# Patient Record
Sex: FEMALE | Race: WHITE | NOT HISPANIC OR LATINO | Employment: OTHER | URBAN - METROPOLITAN AREA
[De-identification: names, ages, dates, MRNs, and addresses within clinical notes are randomized per-mention and may not be internally consistent; named-entity substitution may affect disease eponyms.]

---

## 2017-01-16 DIAGNOSIS — I10 BENIGN ESSENTIAL HYPERTENSION: ICD-10-CM

## 2017-01-16 DIAGNOSIS — F41.9 ANXIETY: ICD-10-CM

## 2017-01-17 DIAGNOSIS — F41.9 ANXIETY: ICD-10-CM

## 2017-01-17 RX ORDER — HYDROCHLOROTHIAZIDE 12.5 MG/1
CAPSULE ORAL
Qty: 90 CAPSULE | Refills: 0 | Status: SHIPPED | OUTPATIENT
Start: 2017-01-17 | End: 2017-04-21 | Stop reason: SDUPTHER

## 2017-01-17 RX ORDER — ALPRAZOLAM 1 MG/1
TABLET ORAL
Qty: 14 TABLET | Refills: 0 | Status: SHIPPED | OUTPATIENT
Start: 2017-01-17 | End: 2017-04-25

## 2017-01-17 RX ORDER — ALPRAZOLAM 1 MG/1
TABLET ORAL
Qty: 45 TABLET | Refills: 0 | Status: SHIPPED | OUTPATIENT
Start: 2017-01-17 | End: 2017-01-25

## 2017-01-17 NOTE — TELEPHONE ENCOUNTER
----- Message from Talisha Kovacs sent at 1/17/2017 12:30 PM CST -----  Contact: Patient  Refill    alprazolam (XANAX) 1 MG tablet 45 tablet 0 10/17/2016  No  Sig: TAKE 1/2 TABLET BY MOUTH EVERY MORNING AND TAKE 1 TABLET AT BEDTIME  Class: Normal    [id number] - [place], [place] - [address] [facility] [phone] (Phone) [phone] (Fax)    The patient has an appointment scheduled for 1/24/17, which is the soonest she could get in.  She would like a refill of her medication to get her through until that appointment.  She says it is due to be filled tomorrow.      Thanks!

## 2017-01-18 NOTE — TELEPHONE ENCOUNTER
Pt is overdue for office visit with Dr. Parker. Has appt 1/24. Will send a two week supply xanax  to cover until her appt. hctz refilled.

## 2017-01-20 ENCOUNTER — HOSPITAL ENCOUNTER (EMERGENCY)
Facility: HOSPITAL | Age: 57
Discharge: HOME | End: 2017-01-21
Attending: EMERGENCY MEDICINE
Payer: MEDICARE

## 2017-01-20 DIAGNOSIS — R10.9 ABDOMINAL PAIN, UNSPECIFIED LOCATION: Primary | ICD-10-CM

## 2017-01-20 PROCEDURE — 96361 HYDRATE IV INFUSION ADD-ON: CPT

## 2017-01-20 PROCEDURE — 96375 TX/PRO/DX INJ NEW DRUG ADDON: CPT

## 2017-01-20 PROCEDURE — 96365 THER/PROPH/DIAG IV INF INIT: CPT

## 2017-01-20 PROCEDURE — 99284 EMERGENCY DEPT VISIT MOD MDM: CPT | Mod: 25

## 2017-01-20 PROCEDURE — 99283 EMERGENCY DEPT VISIT LOW MDM: CPT | Mod: ,,, | Performed by: EMERGENCY MEDICINE

## 2017-01-20 NOTE — ED AVS SNAPSHOT
[facility]  [address] [address]               Cara Mendoza   2017 11:58 PM   ED    Description:  Female : 1960   Department:  [facility]           Your Care was Coordinated By:     Provider Role From To    Eulalio Coleman MD Attending Provider 17 0015 --      Reason for Visit     Abdominal Pain           Diagnoses this Visit        Comments    Abdominal pain, unspecified location    -  Primary       ED Disposition     ED Disposition Condition Comment    Discharge             To Do List           Follow-up Information     Follow up with Jaqueline Parker MD.    Specialty:  Internal Medicine    Contact information:    [address]  [place] [address]  [phone]          Follow up with [facility].    Specialty:  Emergency Medicine    Why:  ASAP for new or worsening symptoms    Contact information:    [address] [facility] [id number] [phone]       These Medications        Disp Refills Start End    promethazine (PHENERGAN) 25 MG tablet 20 tablet 0 2017     Take 1 tablet (25 mg total) by mouth every 6 (six) hours as needed for Nausea. - Oral    Pharmacy: [facility] [address] [place] [address] Ph #: [phone]       tramadol (ULTRAM) 50 mg tablet 10 tablet 0 2017    Take 1 tablet (50 mg total) by mouth every 6 (six) hours as needed for Pain. - Oral    Pharmacy: [facility] [address] [place], [place] [address] Ph #: [phone]         [facility]     [facility] -  Assistance  Registered nurses in the [facility] provide clinical advisement, health education, appointment booking, and other advisory services.  Call for this free service at [phone].             Medications           Message regarding Medications     Verify the changes and/or additions to your medication  regime listed below are the same as discussed with your clinician today.  If any of these changes or additions are incorrect, please notify your healthcare provider.        START taking these NEW medications        Refills    tramadol (ULTRAM) 50 mg tablet 0    Sig: Take 1 tablet (50 mg total) by mouth every 6 (six) hours as needed for Pain.    Class: Print    Route: Oral      These medications were administered today        Dose Freq    sodium chloride 0.9% bolus 1,000 mL 1,000 mL ED 1 Time    Sig: Inject 1,000 mLs into the vein ED 1 Time.    Class: Normal    Route: Intravenous    promethazine (PHENERGAN) 25 mg in dextrose 5 % 50 mL IVPB 25 mg ED 1 Time    Sig: Inject 25 mg into the vein ED 1 Time.    Class: Normal    Route: Intravenous    hydromorphone injection 0.5 mg 0.5 mg ED 1 Time    Sig: Inject 0.5 mLs (0.5 mg total) into the vein ED 1 Time.    Class: Normal    Route: Intravenous           Verify that the below list of medications is an accurate representation of the medications you are currently taking.  If none reported, the list may be blank. If incorrect, please contact your healthcare provider. Carry this list with you in case of emergency.           Current Medications     alprazolam (XANAX) 1 MG tablet TAKE 1/2 TABLET BY MOUTH TWO TIMES A DAY    alprazolam (XANAX) 1 MG tablet TAKE 1/2 TABLET BY MOUTH EVERY MORNING AND TAKE 1 TABLET AT BEDTIME    escitalopram oxalate (LEXAPRO) 20 MG tablet Take 1 tablet (20 mg total) by mouth every evening.    estradiol (ESTRACE) 1 MG tablet Take 1 tablet (1 mg total) by mouth once daily.    gabapentin (NEURONTIN) 300 MG capsule TAKE 1 CAPSULE BY MOUTH TWO TIMES A DAY    hydrochlorothiazide (MICROZIDE) 12.5 mg capsule TAKE 1 CAPSULE BY MOUTH EVERY DAY    levothyroxine (SYNTHROID) 75 MCG tablet TAKE 1 TABLET BY MOUTH BEFORE BREAKFAST    promethazine (PHENERGAN) 25 MG tablet Take 1 tablet (25 mg total) by mouth every 6 (six) hours as needed for Nausea.    tramadol  "(ULTRAM) 50 mg tablet Take 1 tablet (50 mg total) by mouth every 6 (six) hours as needed for Pain.    vitamin D 1000 units Tab Take 185 mg by mouth once daily.           Clinical Reference Information           Your Vitals Were     BP Pulse Temp Resp Height Weight    140/63 85 98.3 °F (36.8 °C) 18 5' (1.524 m) 90.7 kg (200 lb)    SpO2 BMI             96% 39.06 kg/m2         Allergies as of 1/21/2017        Reactions    Codeine Nausea And Vomiting    Compazine [Prochlorperazine Edisylate] Anaphylaxis, Nausea And Vomiting    Demerol [Meperidine] Anaphylaxis, Nausea And Vomiting    Morpholine Analogues Anaphylaxis, Nausea And Vomiting    vomit    Percocet [Oxycodone-acetaminophen] Nausea And Vomiting    Phenothiazines Anaphylaxis    Takes Phenergan daily w/o problems.      Corticosteroids (Glucocorticoids) Itching    Agitation, "skin crawling" sensation    Cortisone Itching    Agitation, "skin crawling" sensation    Fentanyl Nausea And Vomiting, Swelling    Fentanyl patch made lips numb and swollen    Morphine Nausea And Vomiting    Nsaids (Non-steroidal Anti-inflammatory Drug) Other (See Comments)    Cluster ulcers    Pcn [Penicillins] Other (See Comments)    Stomach cramps    Toradol [Ketorolac] Nausea And Vomiting    ulcers      Immunizations Administered on Date of Encounter - 1/21/2017     None      ED Micro, Lab, POCT     Start Ordered       Status Ordering Provider    01/21/17 0029 01/21/17 0028  CBC auto differential  STAT      Final result     01/21/17 0029 01/21/17 0028  Comprehensive metabolic panel  STAT      Final result     01/21/17 0029 01/21/17 0028  Lipase  STAT      Final result     01/21/17 0029 01/21/17 0028  Urinalysis  STAT      Final result       ED Imaging Orders     None        Discharge Instructions         Abdominal Pain    Abdominal pain is pain in the stomach or belly area. Everyone has this pain from time to time. In many cases it goes away on its own. But abdominal pain can sometimes be " due to a serious problem, such as appendicitis. So its important to know when to seek help.  Causes of abdominal pain  There are many possible causes of abdominal pain. Common causes in adults include:  · Constipation, diarrhea, or gas  · Stomach acid flowing back up into the esophagus (acid reflux or heartburn)  · Severe acid reflux, called GERD (gastroesophageal reflux disease)  · A sore in the lining of the stomach or small intestine (peptic ulcer)  · Inflammation of the gallbladder, liver, or pancreas  · Gallstones or kidney stones  · Appendicitis   · Intestinal blockage   · An internal organ pushing through a muscle or other tissue (hernia)  · Urinary tract infections  · In women, menstrual cramps, fibroids, or endometriosis  · Inflammation or infection of the intestines  Diagnosing the cause of abdominal pain  Your healthcare provider will do a physical exam help find the cause of your pain. If needed, tests will be ordered. Belly pain has many possible causes. So it can be hard to find the reason for your pain. Giving details about your pain can help. Tell your provider where and when you feel the pain, and what makes it better or worse. Also let your provider know if you have other symptoms such as:  · Fever  · Tiredness  · Upset stomach (nausea)  · Vomiting  · Changes in bathroom habits  Treating abdominal pain  Some causes of pain need emergency medical treatment right away. These include appendicitis or a bowel blockage. Other problems can be treated with rest, fluids, or medicines. Your healthcare provider can give you specific instructions for treatment or self-care based on what is causing your pain.  If you have vomiting or diarrhea, sip water or other clear fluids. When you are ready to eat solid foods again, start with small amounts of easy-to-digest, low-fat foods. These include apple sauce, toast, or crackers.   When to seek medical care  Call 911 or go to the hospital right away if you:  · Cant  pass stool and are vomiting  · Are vomiting blood or have bloody diarrhea or black, tarry diarrhea  · Have chest, neck, or shoulder pain  · Feel like you might pass out  · Have pain in your shoulder blades with nausea  · Have sudden, severe belly pain  · Have new, severe pain unlike any you have felt before  · Have a belly that is rigid, hard, and tender to touch  Call your healthcare provider if you have:  · Pain for more than 5 days  · Bloating for more than 2 days  · Diarrhea for more than 5 days  · A fever of 100.4°F (38.0°C) or higher, or as directed by your provider  · Pain that gets worse  · Weight loss for no reason  · Continued lack of appetite  · Blood in your stool  How to prevent abdominal pain  Here are some tips to help prevent abdominal pain:  · Eat smaller amounts of food at one time.  · Avoid greasy, fried, or other high-fat foods.  · Avoid foods that give you gas.  · Exercise regularly.  · Drink plenty of fluids.  To help prevent GERD symptoms:  · Quit smoking.  · Reduce alcohol and certain foods that increase stomach acid.  · Avoid aspirin and over-the-counter pain and fever medicines (NSAIDS or nonsteroidal anti-inflammatory drugs), if possible  · Lose extra weight.  · Finish eating at least 2 hours before you go to bed or lie down.  · Raise the head of your bed.  © 6623-5731 [employer]. [address] [place] [address], [place] [address]. All rights reserved. This information is not intended as a substitute for professional medical care. Always follow your healthcare professional's instructions.          Your Scheduled Appointments     Jan 24, 2017  1:40 PM CST   Established Patient Visit with MD Rigoberto Interiano - Internal Medicine ([facility])    [address]  [facility] [address]   [phone]            Feb 06, 2017 11:30 AM CST   GASTROENTEROLOGY ESTABLISHED PATIENT with MD Rigoberto Floyd - Gastroenterology ([facility] )    [address]  [facility] [address]   [phone]              [facility] Sign-Up     Activating your [facility] account is as easy as 1-2-3!     1) Visit [email or web address], select Sign Up Now, enter this activation code and your date of birth, then select Next.  Activation code not generated  Current Patient Portal Status: Account disabled      2) Create a username and password to use when you visit [facility] in the future and select a security question in case you lose your password and select Next.    3) Enter your e-mail address and click Sign Up!    Additional Information  If you have questions, please e-mail [email or web address] or call [phone] to talk to our [facility] staff. Remember, [facility] is NOT to be used for urgent needs. For medical emergencies, dial 911.          [facility] complies with applicable Federal civil rights laws and does not discriminate on the basis of race, color, national origin, age, disability, or sex.        Language Assistance Services     ATTENTION: Language assistance services are available, free of charge. Please call [phone].      ATENCIÓN: Si habla español, tiene a al disposición servicios gratuitos de asistencia lingüística. Llame al [phone].     CHÚ Ý: N?u b?n nói Ti?ng Vi?t, có các d?ch v? h? tr? ngôn ng? mi?n phí dành cho b?n. G?i s? [phone].

## 2017-01-21 VITALS
DIASTOLIC BLOOD PRESSURE: 63 MMHG | WEIGHT: 200 LBS | HEART RATE: 85 BPM | RESPIRATION RATE: 18 BRPM | HEIGHT: 60 IN | TEMPERATURE: 98 F | OXYGEN SATURATION: 96 % | SYSTOLIC BLOOD PRESSURE: 140 MMHG | BODY MASS INDEX: 39.27 KG/M2

## 2017-01-21 VITALS
DIASTOLIC BLOOD PRESSURE: 68 MMHG | SYSTOLIC BLOOD PRESSURE: 123 MMHG | OXYGEN SATURATION: 95 % | TEMPERATURE: 99 F | WEIGHT: 200 LBS | BODY MASS INDEX: 39.27 KG/M2 | HEIGHT: 60 IN | RESPIRATION RATE: 18 BRPM | HEART RATE: 73 BPM

## 2017-01-21 DIAGNOSIS — K52.9 GASTROENTERITIS: Primary | ICD-10-CM

## 2017-01-21 LAB
ALBUMIN SERPL BCP-MCNC: 4 G/DL
ALP SERPL-CCNC: 57 U/L
ALT SERPL W/O P-5'-P-CCNC: 9 U/L
ANION GAP SERPL CALC-SCNC: 12 MMOL/L
AST SERPL-CCNC: 13 U/L
BASOPHILS # BLD AUTO: 0.01 K/UL
BASOPHILS NFR BLD: 0.1 %
BILIRUB SERPL-MCNC: 0.8 MG/DL
BILIRUB UR QL STRIP: NEGATIVE
BUN SERPL-MCNC: 20 MG/DL
CALCIUM SERPL-MCNC: 9.1 MG/DL
CHLORIDE SERPL-SCNC: 103 MMOL/L
CLARITY UR REFRACT.AUTO: CLEAR
CO2 SERPL-SCNC: 23 MMOL/L
COLOR UR AUTO: YELLOW
CREAT SERPL-MCNC: 0.7 MG/DL
DIFFERENTIAL METHOD: ABNORMAL
EOSINOPHIL # BLD AUTO: 0 K/UL
EOSINOPHIL NFR BLD: 0.2 %
ERYTHROCYTE [DISTWIDTH] IN BLOOD BY AUTOMATED COUNT: 14.4 %
EST. GFR  (AFRICAN AMERICAN): >60 ML/MIN/1.73 M^2
EST. GFR  (NON AFRICAN AMERICAN): >60 ML/MIN/1.73 M^2
GLUCOSE SERPL-MCNC: 125 MG/DL
GLUCOSE UR QL STRIP: NEGATIVE
HCT VFR BLD AUTO: 41.7 %
HGB BLD-MCNC: 14.9 G/DL
HGB UR QL STRIP: NEGATIVE
KETONES UR QL STRIP: ABNORMAL
LEUKOCYTE ESTERASE UR QL STRIP: NEGATIVE
LIPASE SERPL-CCNC: 12 U/L
LYMPHOCYTES # BLD AUTO: 0.8 K/UL
LYMPHOCYTES NFR BLD: 7.8 %
MCH RBC QN AUTO: 29.3 PG
MCHC RBC AUTO-ENTMCNC: 35.7 %
MCV RBC AUTO: 82 FL
MONOCYTES # BLD AUTO: 0.4 K/UL
MONOCYTES NFR BLD: 3.5 %
NEUTROPHILS # BLD AUTO: 8.8 K/UL
NEUTROPHILS NFR BLD: 88 %
NITRITE UR QL STRIP: NEGATIVE
PH UR STRIP: 8 [PH] (ref 5–8)
PLATELET # BLD AUTO: 219 K/UL
PMV BLD AUTO: 11 FL
POTASSIUM SERPL-SCNC: 3.9 MMOL/L
PROT SERPL-MCNC: 7.3 G/DL
PROT UR QL STRIP: ABNORMAL
RBC # BLD AUTO: 5.08 M/UL
SODIUM SERPL-SCNC: 138 MMOL/L
SP GR UR STRIP: 1.02 (ref 1–1.03)
URN SPEC COLLECT METH UR: ABNORMAL
UROBILINOGEN UR STRIP-ACNC: NEGATIVE EU/DL
WBC # BLD AUTO: 10.02 K/UL

## 2017-01-21 PROCEDURE — 85025 COMPLETE CBC W/AUTO DIFF WBC: CPT

## 2017-01-21 PROCEDURE — 99283 EMERGENCY DEPT VISIT LOW MDM: CPT | Mod: 25

## 2017-01-21 PROCEDURE — 83690 ASSAY OF LIPASE: CPT

## 2017-01-21 PROCEDURE — 63600175 PHARM REV CODE 636 W HCPCS: Performed by: EMERGENCY MEDICINE

## 2017-01-21 PROCEDURE — 81003 URINALYSIS AUTO W/O SCOPE: CPT

## 2017-01-21 PROCEDURE — 80053 COMPREHEN METABOLIC PANEL: CPT

## 2017-01-21 PROCEDURE — 25000003 PHARM REV CODE 250: Performed by: EMERGENCY MEDICINE

## 2017-01-21 PROCEDURE — 96360 HYDRATION IV INFUSION INIT: CPT

## 2017-01-21 PROCEDURE — 96372 THER/PROPH/DIAG INJ SC/IM: CPT

## 2017-01-21 RX ORDER — PROMETHAZINE HYDROCHLORIDE 25 MG/1
25 SUPPOSITORY RECTAL EVERY 8 HOURS PRN
Qty: 10 SUPPOSITORY | Refills: 1 | Status: SHIPPED | OUTPATIENT
Start: 2017-01-21 | End: 2017-01-25

## 2017-01-21 RX ORDER — PROMETHAZINE HYDROCHLORIDE 25 MG/1
25 TABLET ORAL EVERY 6 HOURS PRN
Qty: 20 TABLET | Refills: 0 | Status: SHIPPED | OUTPATIENT
Start: 2017-01-21 | End: 2017-01-25

## 2017-01-21 RX ORDER — SODIUM CHLORIDE 9 MG/ML
1000 INJECTION, SOLUTION INTRAVENOUS ONCE
Status: COMPLETED | OUTPATIENT
Start: 2017-01-21 | End: 2017-01-21

## 2017-01-21 RX ORDER — HYDROMORPHONE HYDROCHLORIDE 1 MG/ML
0.5 INJECTION, SOLUTION INTRAMUSCULAR; INTRAVENOUS; SUBCUTANEOUS
Status: COMPLETED | OUTPATIENT
Start: 2017-01-21 | End: 2017-01-21

## 2017-01-21 RX ORDER — ONDANSETRON 2 MG/ML
4 INJECTION INTRAMUSCULAR; INTRAVENOUS
Status: DISCONTINUED | OUTPATIENT
Start: 2017-01-21 | End: 2017-01-21

## 2017-01-21 RX ORDER — ONDANSETRON 4 MG/1
4 TABLET, ORALLY DISINTEGRATING ORAL
Status: COMPLETED | OUTPATIENT
Start: 2017-01-21 | End: 2017-01-21

## 2017-01-21 RX ORDER — TRAMADOL HYDROCHLORIDE 50 MG/1
50 TABLET ORAL EVERY 6 HOURS PRN
Qty: 10 TABLET | Refills: 0 | Status: SHIPPED | OUTPATIENT
Start: 2017-01-21 | End: 2017-01-25

## 2017-01-21 RX ORDER — PROMETHAZINE HYDROCHLORIDE 25 MG/ML
25 INJECTION, SOLUTION INTRAMUSCULAR; INTRAVENOUS
Status: COMPLETED | OUTPATIENT
Start: 2017-01-21 | End: 2017-01-21

## 2017-01-21 RX ORDER — MORPHINE SULFATE 2 MG/ML
6 INJECTION, SOLUTION INTRAMUSCULAR; INTRAVENOUS
Status: DISCONTINUED | OUTPATIENT
Start: 2017-01-21 | End: 2017-01-21

## 2017-01-21 RX ORDER — HYDROMORPHONE HYDROCHLORIDE 1 MG/ML
0.5 INJECTION, SOLUTION INTRAMUSCULAR; INTRAVENOUS; SUBCUTANEOUS
Status: DISCONTINUED | OUTPATIENT
Start: 2017-01-21 | End: 2017-01-21

## 2017-01-21 RX ADMIN — SODIUM CHLORIDE 1000 ML: 0.9 INJECTION, SOLUTION INTRAVENOUS at 08:01

## 2017-01-21 RX ADMIN — PROMETHAZINE HYDROCHLORIDE 25 MG: 25 INJECTION INTRAMUSCULAR; INTRAVENOUS at 01:01

## 2017-01-21 RX ADMIN — PROMETHAZINE HYDROCHLORIDE 25 MG: 25 INJECTION INTRAMUSCULAR; INTRAVENOUS at 07:01

## 2017-01-21 RX ADMIN — SODIUM CHLORIDE 1000 ML: 0.9 INJECTION, SOLUTION INTRAVENOUS at 01:01

## 2017-01-21 RX ADMIN — ONDANSETRON 4 MG: 4 TABLET, ORALLY DISINTEGRATING ORAL at 07:01

## 2017-01-21 RX ADMIN — HYDROMORPHONE HYDROCHLORIDE 0.5 MG: 1 INJECTION, SOLUTION INTRAMUSCULAR; INTRAVENOUS; SUBCUTANEOUS at 01:01

## 2017-01-21 NOTE — ED NOTES
Pt reports she is unable to take tramadol due to nausea and can only take dilaudid. Dr Coleman notified. Pt reports she will take tylenol at home. Tramadol prescription placed in the shredder bin.

## 2017-01-21 NOTE — ED AVS SNAPSHOT
[facility]  [address] [place] [address]               Cara Mendoza   2017  7:08 PM   ED    Description:  Female : 1960   Department:  [facility]           Your Care was Coordinated By:     Provider Role From To    Rachel Ding MD Attending Provider [id number] --      Reason for Visit     Vomiting     Diarrhea           Diagnoses this Visit        Comments    Gastroenteritis    -  Primary       ED Disposition     ED Disposition Condition Comment    Discharge             To Do List           Follow-up Information     Follow up with Jaqueline Parker MD In 3 day(s).    Specialty:  Internal Medicine    Contact information:    [address]  [facility] [address]  [phone]         These Medications        Disp Refills Start End    promethazine (PHENERGAN) 25 MG suppository 10 suppository 1 2017    Place 1 suppository (25 mg total) rectally every 8 (eight) hours as needed for Nausea. - Rectal    [id number] [address] Ph #: [phone]         [facility]     [facility] -  Assistance  Registered nurses in the [facility] provide clinical advisement, health education, appointment booking, and other advisory services.  Call for this free service at [phone].             Medications           Message regarding Medications     Verify the changes and/or additions to your medication regime listed below are the same as discussed with your clinician today.  If any of these changes or additions are incorrect, please notify your healthcare provider.        START taking these NEW medications        Refills    promethazine (PHENERGAN) 25 MG suppository 1    Sig: Place 1 suppository (25 mg total) rectally every 8 (eight) hours as needed for Nausea.    Class: Print    Route: Rectal      These medications were administered today        Dose  Freq    ondansetron disintegrating tablet 4 mg 4 mg ED 1 Time    Sig: Take 1 tablet (4 mg total) by mouth ED 1 Time.    Class: Normal    Route: Oral    promethazine injection 25 mg 25 mg ED 1 Time    Sig: Inject 1 mL (25 mg total) into the muscle ED 1 Time.    Class: Normal    Route: Intramuscular    0.9%  NaCl infusion 1,000 mL Once    Sig: Inject 1,000 mLs into the vein once.    Class: Normal    Route: Intravenous           Verify that the below list of medications is an accurate representation of the medications you are currently taking.  If none reported, the list may be blank. If incorrect, please contact your healthcare provider. Carry this list with you in case of emergency.           Current Medications     alprazolam (XANAX) 1 MG tablet TAKE 1/2 TABLET BY MOUTH TWO TIMES A DAY    escitalopram oxalate (LEXAPRO) 20 MG tablet Take 1 tablet (20 mg total) by mouth every evening.    estradiol (ESTRACE) 1 MG tablet Take 1 tablet (1 mg total) by mouth once daily.    gabapentin (NEURONTIN) 300 MG capsule TAKE 1 CAPSULE BY MOUTH TWO TIMES A DAY    hydrochlorothiazide (MICROZIDE) 12.5 mg capsule TAKE 1 CAPSULE BY MOUTH EVERY DAY    levothyroxine (SYNTHROID) 75 MCG tablet TAKE 1 TABLET BY MOUTH BEFORE BREAKFAST    promethazine (PHENERGAN) 25 MG tablet Take 1 tablet (25 mg total) by mouth every 6 (six) hours as needed for Nausea.    alprazolam (XANAX) 1 MG tablet TAKE 1/2 TABLET BY MOUTH EVERY MORNING AND TAKE 1 TABLET AT BEDTIME    promethazine (PHENERGAN) 25 MG suppository Place 1 suppository (25 mg total) rectally every 8 (eight) hours as needed for Nausea.    tramadol (ULTRAM) 50 mg tablet Take 1 tablet (50 mg total) by mouth every 6 (six) hours as needed for Pain.    vitamin D 1000 units Tab Take 185 mg by mouth once daily.           Clinical Reference Information           Your Vitals Were     BP Pulse Temp Resp Height Weight    135/88 (BP Location: Right arm, Patient Position: Sitting) 93 98.9 °F (37.2 °C) (Oral)  "18 5' (1.524 m) 90.7 kg (200 lb)    SpO2 BMI             100% 39.06 kg/m2         Allergies as of 1/21/2017        Reactions    Codeine Nausea And Vomiting    Compazine [Prochlorperazine Edisylate] Anaphylaxis, Nausea And Vomiting    Demerol [Meperidine] Anaphylaxis, Nausea And Vomiting    Morpholine Analogues Anaphylaxis, Nausea And Vomiting    vomit    Percocet [Oxycodone-acetaminophen] Nausea And Vomiting    Phenothiazines Anaphylaxis    Takes Phenergan daily w/o problems.      Corticosteroids (Glucocorticoids) Itching    Agitation, "skin crawling" sensation    Cortisone Itching    Agitation, "skin crawling" sensation    Fentanyl Nausea And Vomiting, Swelling    Fentanyl patch made lips numb and swollen    Morphine Nausea And Vomiting    Nsaids (Non-steroidal Anti-inflammatory Drug) Other (See Comments)    Cluster ulcers    Pcn [Penicillins] Other (See Comments)    Stomach cramps    Toradol [Ketorolac] Nausea And Vomiting    ulcers    Tramadol Nausea And Vomiting      Immunizations Administered on Date of Encounter - 1/21/2017     None      ED Micro, Lab, POCT     None      ED Imaging Orders     None        Discharge Instructions           Viral Gastroenteritis (Adult)    Gastroenteritis is commonly called the stomach flu. It is most often caused by a virus that affects the stomach and intestinal tract and usually lasts from 2 to 7 days. Common viruses causing gastroenteritis include norovirus, rotavirus, and hepatitis A. Non-viral causes of gastroenteritis include bacteria, parasites, and toxins.  The danger from repeated vomiting or diarrhea is dehydration. This is the loss of too much fluid from the body. When this occurs, body fluids must be replaced. Antibiotics do not help with this illness because it is usually viral.Simple home treatment will be helpful.  Symptoms of viral gastroenteritis may include:  · Watery, loose stools  · Stomach pain or abdominal cramps  · Fever and chills  · Nausea and " vomiting  · Loss of bowel control  · Headache  Home care  Gastroenteritis is transmitted by contact with the stool or vomit of an infected person. This can occur from person to person or from contact with a contaminated surface.  Follow these guidelines when caring for yourself at home:  · If symptoms are severe, rest at home for the next 24 hours or until you are feeling better.  · Wash your hands with soap and water or use alcohol-based  to prevent the spread of infection. Wash your hands after touching anyone who is sick.  · Wash your hands or use alcohol-based  after using the toilet and before meals. Clean the toilet after each use.  Remember these tips when preparing food:  · People with diarrhea should not prepare or serve food to others. When preparing foods, wash your hands before and after.  · Wash your hands after using cutting boards, countertops, knives, or utensils that have been in contact with raw food.  · Keep uncooked meats away from cooked and ready-to-eat foods.  Medicine  You may use acetaminophen or NSAID medicines like ibuprofen or naproxen to control fever unless another medicine was given. If you have chronic liver or kidney disease, talk with your healthcare provider before using these medicines. Also talk with your provider if you've had a stomach ulcer or gastrointestinal bleeding. Don't give aspirin to anyone under 18 years of age who is ill with a fever. It may cause severe liver damage. Don't use NSAIDS is you are already taking one for another condition (like arthritis) or are on aspirin (such as for heart disease or after a stroke).  If medicine for vomiting or diarrhea are prescribed, take these only as directed. Do not take over-the-counter medicines for vomiting or diarrhea unless instructed by your healthcare provider.  Diet  Follow these guidelines for food:  · Water and liquids are important so you don't get dehydrated. Drink a small amount at a time or suck  on ice chips if you are vomiting.  · If you eat, avoid fatty, greasy, spicy, or fried foods.  · Don't eat dairy if you have diarrhea. This can make diarrhea worse.  · Avoid tobacco, alcohol, and caffeine which may worsen symptoms.  During the first 24 hours (the first full day), follow the diet below:  · Beverages. Sports drinks, soft drinks without caffeine, ginger ale, mineral water (plain or flavored), decaffeinated tea and coffee. If you are very dehydrated, sports drinks aren't a good choice. They have too much sugar and not enough electrolytes. In this case, commercially available products called oral rehydration solutions, are best.  · Soups. Eat clear broth, consommé, and bouillon.  · Desserts. Eat gelatin, popsicles, and fruit juice bars.  During the next 24 hours (the second day), you may add the following to the above:  · Hot cereal, plain toast, bread, rolls, and crackers  · Plain noodles, rice, mashed potatoes, chicken noodle or rice soup  · Unsweetened canned fruit (avoid pineapple), bananas  · Limit fat intake to less than 15 grams per day. Do this by avoiding margarine, butter, oils, mayonnaise, sauces, gravies, fried foods, peanut butter, meat, poultry, and fish.  · Limit fiber and avoid raw or cooked vegetables, fresh fruits (except bananas), and bran cereals.  · Limit caffeine and chocolate. Don't use spices or seasonings other than salt.  · Limit dairy products.  · Avoid alcohol.  During the next 24 hours:  · Gradually resume a normal diet as you feel better and your symptoms improve.  · If at any time it starts getting worse again, go back to clear liquids until you feel better.  Follow-up care  Follow up with your healthcare provider, or as advised. Call your provider if you don't get better within 24 hours or if diarrhea lasts more than a week. Also follow up if you are unable to keep down liquids and get dehydrated. If a stool (diarrhea) sample was taken, call as directed for the  results.  Call 911  Call 911 if any of these occur:  · Trouble breathing  · Chest pain  · Confused  · Severe drowsiness or trouble awakening  · Fainting or loss of consciousness  · Rapid heart rate  · Seizure  · Stiff neck  When to seek medical advice  Call your healthcare provider right away if any of these occur:  · Abdominal pain that gets worse  · Continued vomiting (unable to keep liquids down)  · Frequent diarrhea (more than 5 times a day)  · Blood in vomit or stool (black or red color)  · Dark urine, reduced urine output, or extreme thirst  · Weakness or dizziness  · Drowsiness  · Fever of 100.4°F (38°C) oral or higher that does not get better with fever medicine  · New rash  © 5147-8949 [employer]. [address] [place] [address], [place] [address]. All rights reserved. This information is not intended as a substitute for professional medical care. Always follow your healthcare professional's instructions.          Your Scheduled Appointments     Jan 24, 2017  1:40 PM CST   Established Patient Visit with MD Rigoberto Interiano luciana - Internal Medicine ([facility])    [address]  [place] [address]   [phone]            Feb 06, 2017 11:30 AM CST   GASTROENTEROLOGY ESTABLISHED PATIENT with MD Rigoberto Floyd luciana - Gastroenterology ([facility] )    [address]  [place] [address]   [phone]              [facility] Sign-Up     Activating your [facility] account is as easy as 1-2-3!     1) Visit [email or web address], select Sign Up Now, enter this activation code and your date of birth, then select Next.  Activation code not generated  Current Patient Portal Status: Account disabled      2) Create a username and password to use when you visit [facility] in the future and select a security question in case you lose your password and select Next.    3) Enter your e-mail address and click Sign Up!    Additional Information  If you have questions, please  e-mail [email or web address] or call [phone] to talk to our [facility] staff. Remember, [facility] is NOT to be used for urgent needs. For medical emergencies, dial 911.          [facility] complies with applicable Federal civil rights laws and does not discriminate on the basis of race, color, national origin, age, disability, or sex.        Language Assistance Services     ATTENTION: Language assistance services are available, free of charge. Please call [phone].      ATENCIÓN: Si habla español, tiene a al disposición servicios gratuitos de asistencia lingüística. Llame al [phone].     CHÚ Ý: N?u b?n nói Ti?ng Vi?t, có các d?ch v? h? tr? ngôn ng? mi?n phí dành cho b?n. G?i s? [phone].

## 2017-01-21 NOTE — DISCHARGE INSTRUCTIONS
Abdominal Pain    Abdominal pain is pain in the stomach or belly area. Everyone has this pain from time to time. In many cases it goes away on its own. But abdominal pain can sometimes be due to a serious problem, such as appendicitis. So its important to know when to seek help.  Causes of abdominal pain  There are many possible causes of abdominal pain. Common causes in adults include:  · Constipation, diarrhea, or gas  · Stomach acid flowing back up into the esophagus (acid reflux or heartburn)  · Severe acid reflux, called GERD (gastroesophageal reflux disease)  · A sore in the lining of the stomach or small intestine (peptic ulcer)  · Inflammation of the gallbladder, liver, or pancreas  · Gallstones or kidney stones  · Appendicitis   · Intestinal blockage   · An internal organ pushing through a muscle or other tissue (hernia)  · Urinary tract infections  · In women, menstrual cramps, fibroids, or endometriosis  · Inflammation or infection of the intestines  Diagnosing the cause of abdominal pain  Your healthcare provider will do a physical exam help find the cause of your pain. If needed, tests will be ordered. Belly pain has many possible causes. So it can be hard to find the reason for your pain. Giving details about your pain can help. Tell your provider where and when you feel the pain, and what makes it better or worse. Also let your provider know if you have other symptoms such as:  · Fever  · Tiredness  · Upset stomach (nausea)  · Vomiting  · Changes in bathroom habits  Treating abdominal pain  Some causes of pain need emergency medical treatment right away. These include appendicitis or a bowel blockage. Other problems can be treated with rest, fluids, or medicines. Your healthcare provider can give you specific instructions for treatment or self-care based on what is causing your pain.  If you have vomiting or diarrhea, sip water or other clear fluids. When you are ready to eat solid foods again,  start with small amounts of easy-to-digest, low-fat foods. These include apple sauce, toast, or crackers.   When to seek medical care  Call 911 or go to the hospital right away if you:  · Cant pass stool and are vomiting  · Are vomiting blood or have bloody diarrhea or black, tarry diarrhea  · Have chest, neck, or shoulder pain  · Feel like you might pass out  · Have pain in your shoulder blades with nausea  · Have sudden, severe belly pain  · Have new, severe pain unlike any you have felt before  · Have a belly that is rigid, hard, and tender to touch  Call your healthcare provider if you have:  · Pain for more than 5 days  · Bloating for more than 2 days  · Diarrhea for more than 5 days  · A fever of 100.4°F (38.0°C) or higher, or as directed by your provider  · Pain that gets worse  · Weight loss for no reason  · Continued lack of appetite  · Blood in your stool  How to prevent abdominal pain  Here are some tips to help prevent abdominal pain:  · Eat smaller amounts of food at one time.  · Avoid greasy, fried, or other high-fat foods.  · Avoid foods that give you gas.  · Exercise regularly.  · Drink plenty of fluids.  To help prevent GERD symptoms:  · Quit smoking.  · Reduce alcohol and certain foods that increase stomach acid.  · Avoid aspirin and over-the-counter pain and fever medicines (NSAIDS or nonsteroidal anti-inflammatory drugs), if possible  · Lose extra weight.  · Finish eating at least 2 hours before you go to bed or lie down.  · Raise the head of your bed.  © 0964-6831 [employer]. [address] [place] [address], [place] [address]. All rights reserved. This information is not intended as a substitute for professional medical care. Always follow your healthcare professional's instructions.

## 2017-01-21 NOTE — ED NOTES
"Pt assisted to bed. No acute distress is noted. Pt identifiers verified  Appearance: Pt is clean and well groomed. Clothes appropriately fastened  Neuro: pt awake and alert. Pt oriented x4. Speech is clear and appropriate. Facial movement is symmetrical. Pt moving all extremities. Sensation in all extremities is intact, fatigued  Resp: airway is patent with normal rate and effort noted. Pt on room air.   Skin: skin is warm, dry and intact  Cardiac: Heart sounds are normal. Peripheral pulses are palpable and intact. Cap refill <3 seconds. No edema noted  GI: abdominal pain, nausea, states emesis is yellow "bile" in color  : pt denies frequency or pain when urinating  "

## 2017-01-21 NOTE — ED PROVIDER NOTES
"Encounter Date: 1/20/2017    SCRIBE #1 NOTE: I, Janiya Logan, am scribing for, and in the presence of,  Dr. Coleman. I have scribed the entire note.       History     Chief Complaint   Patient presents with    Abdominal Pain     Patient has been vomiting tonight. patient also reports RUQ pain. Patient has a hx on pancreatitis. Pt left from [facility] and came here because the wait was too long,     Review of patient's allergies indicates:   Allergen Reactions    Codeine Nausea And Vomiting    Compazine [prochlorperazine edisylate] Anaphylaxis and Nausea And Vomiting    Demerol [meperidine] Anaphylaxis and Nausea And Vomiting    Morpholine analogues Anaphylaxis and Nausea And Vomiting     vomit    Percocet [oxycodone-acetaminophen] Nausea And Vomiting    Phenothiazines Anaphylaxis     Takes Phenergan daily w/o problems.      Corticosteroids (glucocorticoids) Itching     Agitation, "skin crawling" sensation    Cortisone Itching     Agitation, "skin crawling" sensation    Fentanyl Nausea And Vomiting and Swelling     Fentanyl patch made lips numb and swollen    Morphine Nausea And Vomiting    Nsaids (non-steroidal anti-inflammatory drug) Other (See Comments)     Cluster ulcers    Pcn [penicillins] Other (See Comments)     Stomach cramps    Toradol [ketorolac] Nausea And Vomiting     ulcers     HPI Comments: Time seen by provider: 12:15 AM    This is a 56 y.o. female with history of pancreatitis, gastric ulcer, and kidney stones who presents with complaint of acute abdominal pain. Pain is located in epigastrium. Onset today. Pt states this is similar to prior episodes of pancreatitis. No fever. Pt has associated nausea and vomiting. No diarrhea. Pt has prior history of cholecystectomy. Has not taken any medication to relieve the pain.     The history is provided by the patient.     Past Medical History   Diagnosis Date    Adrenal adenoma     Gastric ulcer     Hypothyroidism     Kidney " stones     Pancreatitis     Traumatic compression fracture of L1 lumbar vertebra      Past Medical History Pertinent Negatives   Diagnosis Date Noted    Depression 10/19/2014    GERD (gastroesophageal reflux disease) 10/19/2014     Past Surgical History   Procedure Laterality Date    Tonsillectomy      Cholecystectomy      Cystoscopy      X2       Right adrenal gland remove      Tonsillectomy      Tubal ligation      Hysterectomy      Kyphosis surgery  7/20/15    Colonoscopy N/A 2016     Procedure: COLONOSCOPY;  Surgeon: Sathya Strickland MD;  Location: [address];  Service: Endoscopy;  Laterality: N/A;     Family History   Problem Relation Age of Onset    Diabetes Father      borderline     Social History   Substance Use Topics    Smoking status: Never Smoker    Smokeless tobacco: Never Used    Alcohol use No     Review of Systems   Constitutional: Negative for fever.   HENT: Negative for nosebleeds.    Eyes: Negative for visual disturbance.   Respiratory: Negative for shortness of breath.    Cardiovascular: Negative for chest pain.   Gastrointestinal: Positive for abdominal pain (Epigastric), nausea and vomiting. Negative for diarrhea.   Genitourinary: Negative for dysuria.   Musculoskeletal: Negative for back pain.   Skin: Negative for rash.   Neurological: Negative for syncope.       Physical Exam   Initial Vitals   BP Pulse Resp Temp SpO2   17 2219 17 2219 17 2219 17 2219 17 2219   126/60 90 18 98.1 °F (36.7 °C) 97 %     Physical Exam    Nursing note and vitals reviewed.  Constitutional: She appears well-developed and well-nourished. She is not diaphoretic. She appears distressed (Moderate given pain. ).   HENT:   Head: Normocephalic and atraumatic.   Eyes: EOM are normal.   Neck: Normal range of motion. Neck supple.   Cardiovascular: Normal rate, regular rhythm, normal heart sounds and intact distal pulses.   Pulmonary/Chest: Breath sounds normal.  No stridor. No respiratory distress. She has no wheezes. She has no rhonchi. She has no rales.   Abdominal: Soft. Bowel sounds are normal. There is tenderness (Mild epigastric. ). There is no rebound and no guarding.   Non-rigid.    Musculoskeletal: Normal range of motion.   Neurological: She is alert and oriented to person, place, and time.   Skin: Skin is warm and dry.   Psychiatric: She has a normal mood and affect. Her behavior is normal.         ED Course   Procedures  Labs Reviewed   CBC W/ AUTO DIFFERENTIAL - Abnormal; Notable for the following:        Result Value    Gran # 8.8 (*)     Lymph # 0.8 (*)     Gran% 88.0 (*)     Lymph% 7.8 (*)     Mono% 3.5 (*)     All other components within normal limits   COMPREHENSIVE METABOLIC PANEL - Abnormal; Notable for the following:     Glucose 125 (*)     ALT 9 (*)     All other components within normal limits   URINALYSIS - Abnormal; Notable for the following:     Protein, UA Trace (*)     Ketones, UA 2+ (*)     All other components within normal limits   LIPASE             Medical Decision Making:   History:   Old Medical Records: I decided to obtain old medical records.  Initial Assessment:   56 y.o. female presents with epigastric abdominal pain, nausea, and vomiting. My initial differential diagnoses include but are not limited to pancreatitis, gastritis, gastroenteritis, [place], electrolyte disturbance, and dehydration. Will obtain labs, treat her symptoms, and reassess  Clinical Tests:   Lab Tests: Reviewed and Ordered            Scribe Attestation:   Scribe #1: I performed the above scribed service and the documentation accurately describes the services I performed. I attest to the accuracy of the note.    Attending Attestation:           Physician Attestation for Scribe:  Physician Attestation Statement for Scribe #1: I, Dr. Coleman, reviewed documentation, as scribed by Janiya Logan in my presence, and it is both accurate and complete.         Attending ED  Notes:   2:10 AM  Pt feeling improved after treatment. Labs are reassuring. Pt stable for discharge home. Tolerating PO. Follow up with PCP and return for new or worsening symptoms.           ED Course     Clinical Impression:   The encounter diagnosis was Abdominal pain, unspecified location.    Disposition:   Disposition: Discharged  Condition: Stable       Eulalio Coleman MD  01/21/17 0250

## 2017-01-22 NOTE — DISCHARGE INSTRUCTIONS

## 2017-01-22 NOTE — ED NOTES
IVF placed on IV pump, infusing at bolus rate - patient given water to wash mouth out per her request - resting now on left side - will continue to monitor

## 2017-01-22 NOTE — ED NOTES
"Patient was seen at [facility] last night with same symptoms - was given phergan and IVF there - has not received any relief from that - symptoms began yesterday at about 1p after eating a hamburger - stated she thought it was a pancreatic episode, as she has chronic pancreatic history - unable to urinate, stated that "stopped" sometime today  "

## 2017-01-25 ENCOUNTER — OFFICE VISIT (OUTPATIENT)
Dept: INTERNAL MEDICINE | Facility: CLINIC | Age: 57
End: 2017-01-25
Payer: MEDICARE

## 2017-01-25 VITALS
HEART RATE: 62 BPM | HEIGHT: 60 IN | TEMPERATURE: 98 F | BODY MASS INDEX: 36.14 KG/M2 | DIASTOLIC BLOOD PRESSURE: 80 MMHG | OXYGEN SATURATION: 98 % | WEIGHT: 184.06 LBS | SYSTOLIC BLOOD PRESSURE: 118 MMHG

## 2017-01-25 DIAGNOSIS — E03.9 HYPOTHYROIDISM, UNSPECIFIED TYPE: ICD-10-CM

## 2017-01-25 DIAGNOSIS — F41.9 ANXIETY: ICD-10-CM

## 2017-01-25 DIAGNOSIS — R73.02 IGT (IMPAIRED GLUCOSE TOLERANCE): ICD-10-CM

## 2017-01-25 DIAGNOSIS — K52.9 GASTROENTERITIS: Primary | ICD-10-CM

## 2017-01-25 PROCEDURE — 99213 OFFICE O/P EST LOW 20 MIN: CPT | Mod: PBBFAC | Performed by: INTERNAL MEDICINE

## 2017-01-25 PROCEDURE — 99214 OFFICE O/P EST MOD 30 MIN: CPT | Mod: S$PBB,,, | Performed by: INTERNAL MEDICINE

## 2017-01-25 PROCEDURE — 99999 PR PBB SHADOW E&M-EST. PATIENT-LVL III: CPT | Mod: PBBFAC,,, | Performed by: INTERNAL MEDICINE

## 2017-01-25 NOTE — MR AVS SNAPSHOT
[facility]  [address]  [facility] [address]  Phone: [phone]  Fax: [phone]                  Cara Mendoza   2017 10:20 AM   Office Visit    Description:  Female : 1960   Provider:  Jaqueline Parker MD   Department:  [facility]           Reason for Visit     Hospital Follow Up           Diagnoses this Visit        Comments    Gastroenteritis    -  Primary     IGT (impaired glucose tolerance)         Anxiety         Hypothyroidism, unspecified type                To Do List           Future Appointments        Provider Department Dept Phone    2017 11:30 AM MD [facility] - G[facility]ology [phone]    2017 10:40 AM MD [facility] [phone]      Goals (5 Years of Data)     None      Follow-Up and Disposition     Return in about 3 months (around 2017).    Follow-up and Disposition History      [facility]     [facility] On Call Nurse Care Line -  Assistance  Registered nurses in the [facility] On Call Center provide clinical advisement, health education, appointment booking, and other advisory services.  Call for this free service at [phone].             Medications           Message regarding Medications     Verify the changes and/or additions to your medication regime listed below are the same as discussed with your clinician today.  If any of these changes or additions are incorrect, please notify your healthcare provider.        STOP taking these medications     tramadol (ULTRAM) 50 mg tablet Take 1 tablet (50 mg total) by mouth every 6 (six) hours as needed for Pain.    promethazine (PHENERGAN) 25 MG tablet Take 1 tablet (25 mg total) by mouth every 6 (six) hours as needed for Nausea.    promethazine (PHENERGAN) 25 MG suppository Place 1 suppository (25 mg total) rectally every 8 (eight) hours as needed for Nausea.           Verify that the below list of medications is an accurate  representation of the medications you are currently taking.  If none reported, the list may be blank. If incorrect, please contact your healthcare provider. Carry this list with you in case of emergency.           Current Medications     alprazolam (XANAX) 1 MG tablet TAKE 1/2 TABLET BY MOUTH TWO TIMES A DAY    escitalopram oxalate (LEXAPRO) 20 MG tablet Take 1 tablet (20 mg total) by mouth every evening.    estradiol (ESTRACE) 1 MG tablet Take 1 tablet (1 mg total) by mouth once daily.    gabapentin (NEURONTIN) 300 MG capsule TAKE 1 CAPSULE BY MOUTH TWO TIMES A DAY    hydrochlorothiazide (MICROZIDE) 12.5 mg capsule TAKE 1 CAPSULE BY MOUTH EVERY DAY    levothyroxine (SYNTHROID) 75 MCG tablet TAKE 1 TABLET BY MOUTH BEFORE BREAKFAST    vitamin D 1000 units Tab Take 185 mg by mouth once daily.           Clinical Reference Information           Vital Signs - Last Recorded  Most recent update: 1/25/2017 10:47 AM by Ernie Modi MA    BP Pulse Temp Ht Wt SpO2    118/80 (BP Location: Left arm, Patient Position: Sitting, BP Method: Manual) 62 98.4 °F (36.9 °C) (Oral) 5' (1.524 m) 83.5 kg (184 lb 1.4 oz) 98%    BMI                35.95 kg/m2          Blood Pressure          Most Recent Value    BP  118/80      Allergies as of 1/25/2017     Codeine    Compazine [Prochlorperazine Edisylate]    Demerol [Meperidine]    Morpholine Analogues    Percocet [Oxycodone-acetaminophen]    Phenothiazines    Corticosteroids (Glucocorticoids)    Cortisone    Fentanyl    Morphine    Nsaids (Non-steroidal Anti-inflammatory Drug)    Pcn [Penicillins]    Toradol [Ketorolac]    Tramadol      Immunizations Administered on Date of Encounter - 1/25/2017     None      [facility] Sign-Up     Activating your [facility] account is as easy as 1-2-3!     1) Visit [email or web address], select Sign Up Now, enter this activation code and your date of birth, then select Next.  Activation code not generated  Current Patient Portal Status: Account disabled      2)  Create a username and password to use when you visit [facility] in the future and select a security question in case you lose your password and select Next.    3) Enter your e-mail address and click Sign Up!    Additional Information  If you have questions, please e-mail [email or web address] or call [phone] to talk to our [facility] staff. Remember, [facility] is NOT to be used for urgent needs. For medical emergencies, dial 911.         Instructions    Start w/ clear soups and bread. Advance diet as tolerated.     Call [phone] in the next few weeks to schedule mammogram.

## 2017-01-25 NOTE — PATIENT INSTRUCTIONS
Start w/ clear soups and bread. Advance diet as tolerated.     Call [phone] in the next few weeks to schedule mammogram.

## 2017-01-25 NOTE — PROGRESS NOTES
Subjective:       Patient ID: Cara Mendoza is a 56 y.o. female.    Chief Complaint: Hospital Follow Up ( Gastroenteritis )    HPI   Thinks she had food poisoning from [employer]. Had vomiting and diarrhea within a few hours of eating. Went to ED in [facility] but didn't stay. Went to [facility] 1/20/17 for gastroenteritis. Lipase 1/21/17 WNL. CMP and CBC WNL except glucose 125. Discharged but still had vomiting. Seen in [facility] 1/21/17 and was given IV phenergan, which helped. Discharged on phenergan suppository. Has been drinking [employer]. Last use of phenergan was last night. Reports diarrhea stopped yesterday. No vomiting. Nibbled on club crackers yesterday and did ok. No fevers/chills/bloody stools.    Has h/o PUD and chronic pancreatitis. Last seen in GI clinic w/ Dr. Strickland 10/4/16. EGD 11/7/16 - chronic gastritis and duodenitis. C scope 11/7/16 WNL - repeat in 10 yrs.     Anxiety - Was on xanax 2mg BID when first seen by me 6/3/15 and has been working to wean xanax for the past year and a half.   Tried on buspar, zoloft, effexor, etc. Various stressors had come up over the last year (has been taking care of both of her parents), which have halted further weaning of xanax.   Currently on lexapro 20mg nightly and xanax 1mg in am and 1/2 at night. While being sick, was off of xanax for 3 days. Thinks she'll be ok w/ just 0.5mg BID.     Review of Systems  as above in HPI.     Objective:      Physical Exam    Visit Vitals    /80 (BP Location: Left arm, Patient Position: Sitting, BP Method: Manual)    Pulse 62    Temp 98.4 °F (36.9 °C) (Oral)    Ht 5' (1.524 m)    Wt 83.5 kg (184 lb 1.4 oz)    SpO2 98%    BMI 35.95 kg/m2     Gen - A+OX4, NAD  HEENT - PERRL, OP clear. MMM  Neck - no LAD  CV -R RR, no m/r  Chest - CTAB, no wheezing/rhonchi/crackles  Abd - S/NT/ND/+BS  Ext - 2+ B radial pulses. No LE edema.     Labs reviewed.    Assessment/Plan     Cara METZGER was seen today for hospital  follow up.    Diagnoses and all orders for this visit:    Gastroenteritis - nausea and diarrhea resolved.   Start w/ clear soups and bread. Advance diet as tolerated.     IGT (impaired glucose tolerance) - will be getting labs from [facility] so will add on.   -     Cancel: Hemoglobin A1c; Future    Anxiety - ok w/ lexapro 20mg daily. Wean xanax to 0.5mg bid.  appropriate    Hypothyroidism, unspecified type - on synthroid. Need to recheck TFT.    Call [phone] in the next few weeks to schedule mammogram.   Return in about 3 months (around 4/25/2017).    Jaqueline Parker MD  Department of Internal Medicine - [facility]  10:47 AM

## 2017-01-27 LAB
CHOLEST SERPL-MCNC: 172 MG/DL (ref 125–200)
CHOLEST/HDLC SERPL: 3.9 (CALC)
HBA1C MFR BLD: 5.6 % OF TOTAL HGB
HDLC SERPL-MCNC: 44 MG/DL
LDLC SERPL CALC-MCNC: 94 MG/DL (CALC)
NONHDLC SERPL-MCNC: 128 MG/DL (CALC)
T4 FREE SERPL-MCNC: 1.3 NG/DL (ref 0.8–1.8)
TRIGL SERPL-MCNC: 172 MG/DL
TSH SERPL-ACNC: 1.42 MIU/L (ref 0.4–4.5)

## 2017-02-08 ENCOUNTER — TELEPHONE (OUTPATIENT)
Dept: INTERNAL MEDICINE | Facility: CLINIC | Age: 57
End: 2017-02-08

## 2017-02-08 DIAGNOSIS — F13.20 BENZODIAZEPINE DEPENDENCE: ICD-10-CM

## 2017-02-08 DIAGNOSIS — F41.9 ANXIETY: Primary | ICD-10-CM

## 2017-02-08 NOTE — TELEPHONE ENCOUNTER
----- Message from Agustín Mcintosh sent at 2/8/2017 10:09 AM CST -----  Contact: Self/[phone] cell  Patient is calling to speak with someone in the office regarding personal information. Please call and advise.    Thank you!

## 2017-02-08 NOTE — TELEPHONE ENCOUNTER
Pt stated she has a few questions and something that she needs to update Dr Parker on, please have Dr Parker call when available.

## 2017-02-10 NOTE — TELEPHONE ENCOUNTER
Reports her dad had a heart attack last week. Reports after discharge, her dad passed out and then went to the hospital again. Reports that she was not able to down on the xanax so she's on the 1mg BID. Reports she had strep throat (went to  and positive for strep) and was given zpack x 2. Has been wearing a mask around the house.

## 2017-02-17 DIAGNOSIS — F41.9 ANXIETY: ICD-10-CM

## 2017-02-20 RX ORDER — ALPRAZOLAM 1 MG/1
TABLET ORAL
Qty: 45 TABLET | Refills: 0 | Status: SHIPPED | OUTPATIENT
Start: 2017-02-20 | End: 2017-03-27 | Stop reason: SDUPTHER

## 2017-02-23 DIAGNOSIS — F41.9 ANXIETY: ICD-10-CM

## 2017-02-23 RX ORDER — ESCITALOPRAM OXALATE 20 MG/1
20 TABLET ORAL NIGHTLY
Qty: 30 TABLET | Refills: 6 | Status: SHIPPED | OUTPATIENT
Start: 2017-02-23 | End: 2017-09-29 | Stop reason: SDUPTHER

## 2017-02-23 NOTE — TELEPHONE ENCOUNTER
----- Message from Marta Cerna sent at 2/23/2017  9:57 AM CST -----  Contact: self [phone]  Type: Rx    Name of medication(s): escitalopram oxalate (LEXAPRO) 20 MG tablet    Is this a refill? New rx? Refill     Who prescribed medication?    Pharmacy Name, Phone, & Location: [facility]  On file     Comments: please advise, Thanks !

## 2017-03-27 DIAGNOSIS — F41.9 ANXIETY: ICD-10-CM

## 2017-03-28 RX ORDER — ALPRAZOLAM 1 MG/1
TABLET ORAL
Qty: 45 TABLET | Refills: 1 | Status: SHIPPED | OUTPATIENT
Start: 2017-03-28 | End: 2017-04-25 | Stop reason: SDUPTHER

## 2017-04-21 DIAGNOSIS — E03.4 HYPOTHYROIDISM DUE TO ACQUIRED ATROPHY OF THYROID: ICD-10-CM

## 2017-04-21 DIAGNOSIS — I10 BENIGN ESSENTIAL HYPERTENSION: ICD-10-CM

## 2017-04-21 RX ORDER — HYDROCHLOROTHIAZIDE 12.5 MG/1
CAPSULE ORAL
Qty: 90 CAPSULE | Refills: 3 | Status: SHIPPED | OUTPATIENT
Start: 2017-04-21 | End: 2017-11-13

## 2017-04-21 RX ORDER — LEVOTHYROXINE SODIUM 75 UG/1
TABLET ORAL
Qty: 90 TABLET | Refills: 1 | Status: SHIPPED | OUTPATIENT
Start: 2017-04-21 | End: 2017-10-26 | Stop reason: SDUPTHER

## 2017-04-25 ENCOUNTER — OFFICE VISIT (OUTPATIENT)
Dept: INTERNAL MEDICINE | Facility: CLINIC | Age: 57
End: 2017-04-25
Payer: MEDICARE

## 2017-04-25 VITALS
HEART RATE: 61 BPM | SYSTOLIC BLOOD PRESSURE: 110 MMHG | RESPIRATION RATE: 17 BRPM | HEIGHT: 60 IN | DIASTOLIC BLOOD PRESSURE: 70 MMHG | TEMPERATURE: 98 F | BODY MASS INDEX: 36.44 KG/M2 | WEIGHT: 185.63 LBS

## 2017-04-25 DIAGNOSIS — R10.9 ABDOMINAL PAIN, UNSPECIFIED LOCATION: Primary | ICD-10-CM

## 2017-04-25 DIAGNOSIS — R09.89 DECREASED DORSALIS PEDIS PULSE: ICD-10-CM

## 2017-04-25 DIAGNOSIS — Z12.31 ENCOUNTER FOR SCREENING MAMMOGRAM FOR BREAST CANCER: ICD-10-CM

## 2017-04-25 DIAGNOSIS — Z87.19 HISTORY OF CHRONIC PANCREATITIS: ICD-10-CM

## 2017-04-25 DIAGNOSIS — F41.9 ANXIETY: ICD-10-CM

## 2017-04-25 PROCEDURE — 99214 OFFICE O/P EST MOD 30 MIN: CPT | Mod: S$PBB,,, | Performed by: INTERNAL MEDICINE

## 2017-04-25 PROCEDURE — 99999 PR PBB SHADOW E&M-EST. PATIENT-LVL IV: CPT | Mod: PBBFAC,,, | Performed by: INTERNAL MEDICINE

## 2017-04-25 RX ORDER — ALPRAZOLAM 1 MG/1
TABLET ORAL
Qty: 30 TABLET | Refills: 3 | Status: SHIPPED | OUTPATIENT
Start: 2017-04-27 | End: 2017-08-14

## 2017-04-25 NOTE — PROGRESS NOTES
Subjective:       Patient ID: Cara Mendoza is a 56 y.o. female.    Chief Complaint: Follow-up    HPI   H/o chronic pancreatitis. Last seen in GI clinic w/ Dr. Strickland 10/4/16. EGD 11/7/16 - chronic gastritis and duodenitis. C scope 11/7/16 WNL - repeat in 10 yrs.   Last week, had pain at the top of her stomach, which is chronic. Had been taking pepcid. Will be making appt w/ GI - Dr. Boucher - at  for second opinion.  If she eats normal food, then nausea. However can eat creamed potatoes. Has phenergan to take prn nausea.   Allergic to multiple medicines. Didn't want to take the pain medicine.     Anxiety - Was on xanax 2mg BID when first seen by me 6/3/15 and has been working to wean xanax for the past two years.   Tried on buspar, zoloft, effexor, etc. Various stressors had come up over the last year (has been taking care of both of her parents), which have halted further weaning of xanax.   Currently on lexapro 20mg nightly and xanax 1mg in am and 1/2 at night.    Tried to wean xanax again and then dad reportedly had a heart attack and so she went back to xanax 1mg in the am and 1/2 at night.  appropriate.     Pt has moved back to her own home. Only takes care of parents during the day time now. Anxiety doing well.     Review of Systems  as above in HPI.     Objective:      Physical Exam    /70  Pulse 61  Temp 98.3 °F (36.8 °C)  Resp 17  Ht 5' (1.524 m)  Wt 84.2 kg (185 lb 10 oz)  BMI 36.25 kg/m2    Gen - A+OX4, NAD  HEENT - PERRL, OP clear. MMM.  NECK - NO LAD  CV - RRR  Chest - ctab, no wheezing/rhonchi/wheezing  Abd - S/ND/+BS. Epigastric and upper abdomen pain on palpation. No rebound or guarding.   Ext - 2+ L DP. And palp R DP. No LE edema.   MSK - no spinal tenderness to palpation.     Assessment/Plan     Cara METZGER was seen today for follow-up.    Diagnoses and all orders for this visit:    Abdominal pain, unspecified location  -     US Abdomen Complete; Future  -     Lipase; Future  -      Comprehensive metabolic panel; Future    History of chronic pancreatitis  -     US Abdomen Complete; Future  -     Lipase; Future  -     Comprehensive metabolic panel; Future    Anxiety - cont weaning to 1/2 pill bid.   -     alprazolam (XANAX) 1 MG tablet; TAKE 1/2 TABLET BY MOUTH TWICE DAILY    Decreased dorsalis pedis pulse on the R side.  -     VAS Ankle Brachial Indices with Exercise; Future    Encounter for screening mammogram for breast cancer  -     Mammo Digital Screening Bilateral With CAD; Future      Return in about 3 months (around 7/25/2017).      Jaqueline Parker MD  Department of Internal Medicine - [facility]  10:58 AM

## 2017-04-25 NOTE — MR AVS SNAPSHOT
[facility] - Internal Medicine  [address]  [facility] [address]  Phone: [phone]  Fax: [phone]                  Cara Mendoza   2017 10:40 AM   Office Visit    Description:  Female : 1960   Provider:  Jaqueline Parker MD   Department:  [facility]           Reason for Visit     Follow-up           Diagnoses this Visit        Comments    Abdominal pain, unspecified location    -  Primary     History of chronic pancreatitis         Anxiety         Decreased dorsalis pedis pulse         Encounter for screening mammogram for breast cancer                To Do List           Future Appointments        Provider Department Dept Phone    2017 11:40 AM LAB, APPOINTMENT [facility] [phone]    5/3/2017 7:15 AM NOM US 11 ALL [facility] [phone]    5/3/2017 11:00 AM [facility] MAMMO2 SCREEN [facility] [phone]    5/3/2017 1:00 PM VASCULAR, CARDIOLOGY [facility] - Vascular Cardiology [phone]    2017 8:40 AM Jaqueline Parker MD [facility] Internal Medicine [phone]      Goals (5 Years of Data)     None      Follow-Up and Disposition     Return in about 3 months (around 2017).    Follow-up and Disposition History       These Medications        Disp Refills Start End    alprazolam (XANAX) 1 MG tablet 30 tablet 3 2017     TAKE 1/2 TABLET BY MOUTH TWICE DAILY    Pharmacy: [address] Ph #: [phone]         [facility]     [facility] Care [facility] -  Assistance  Unless otherwise directed by your provider, please contact [facility], our nurse care line that is available for  assistance.     Registered nurses in the [facility] Center provide: appointment scheduling, clinical advisement, health education, and other advisory services.  Call: [phone] (toll free)               Medications            Message regarding Medications     Verify the changes and/or additions to your medication regime listed below are the same as discussed with your clinician today.  If any of these changes or additions are incorrect, please notify your healthcare provider.        CHANGE how you are taking these medications     Start Taking Instead of    alprazolam (XANAX) 1 MG tablet alprazolam (XANAX) 1 MG tablet    Dosage:  TAKE 1/2 TABLET BY MOUTH TWICE DAILY Dosage:  TAKE 1/2 TABLET BY MOUTH EVERY MORNING AND TAKE 1 TABLET AT BEDTIME    Reason for Change:  Reorder     Starting on: 4/27/2017            Verify that the below list of medications is an accurate representation of the medications you are currently taking.  If none reported, the list may be blank. If incorrect, please contact your healthcare provider. Carry this list with you in case of emergency.           Current Medications     alprazolam (XANAX) 1 MG tablet Starting on Apr 27, 2017. TAKE 1/2 TABLET BY MOUTH TWICE DAILY    escitalopram oxalate (LEXAPRO) 20 MG tablet Take 1 tablet (20 mg total) by mouth every evening.    estradiol (ESTRACE) 1 MG tablet Take 1 tablet (1 mg total) by mouth once daily.    gabapentin (NEURONTIN) 300 MG capsule TAKE 1 CAPSULE BY MOUTH TWO TIMES A DAY    hydrochlorothiazide (MICROZIDE) 12.5 mg capsule TAKE 1 CAPSULE BY MOUTH EVERY DAY    levothyroxine (SYNTHROID) 75 MCG tablet TAKE 1 TABLET BY MOUTH BEFORE BREAKFAST    vitamin D 1000 units Tab Take 185 mg by mouth once daily.           Clinical Reference Information           Your Vitals Were     BP Pulse Temp Resp Height Weight    110/70 61 98.3 °F (36.8 °C) 17 5' (1.524 m) 84.2 kg (185 lb 10 oz)    BMI                36.25 kg/m2          Blood Pressure          Most Recent Value    BP  110/70      Allergies as of 4/25/2017     Codeine    Compazine [Prochlorperazine Edisylate]    Demerol [Meperidine]    Morpholine Analogues    Percocet [Oxycodone-acetaminophen]    Phenothiazines     Corticosteroids (Glucocorticoids)    Cortisone    Fentanyl    Morphine    Nsaids (Non-steroidal Anti-inflammatory Drug)    Pcn [Penicillins]    Toradol [Ketorolac]    Tramadol      Immunizations Administered on Date of Encounter - 4/25/2017     None      Orders Placed During Today's Visit     Future Labs/Procedures Expected by Expires    Comprehensive metabolic panel  4/25/2017 (Approximate) 6/24/2018    Lipase  4/25/2017 6/24/2018    Mammo Digital Screening Bilateral With CAD  4/25/2017 6/25/2018    US Abdomen Complete  4/25/2017 4/25/2018    VAS Ankle Brachial Indices with Exercise  As directed 4/25/2018      [facility] Sign-Up     Activating your [facility] account is as easy as 1-2-3!     1) Visit [email or web address], select Sign Up Now, enter this activation code and your date of birth, then select Next.  Activation code not generated  Current Patient Portal Status: Account disabled      2) Create a username and password to use when you visit [facility] in the future and select a security question in case you lose your password and select Next.    3) Enter your e-mail address and click Sign Up!    Additional Information  If you have questions, please e-mail [email or web address] or call [phone] to talk to our [facility] staff. Remember, [facility] is NOT to be used for urgent needs. For medical emergencies, dial 911.         Language Assistance Services     ATTENTION: Language assistance services are available, free of charge. Please call [phone].      ATENCIÓN: Si habla español, tiene a al disposición servicios gratuitos de asistencia lingüística. Llame al [phone].     CHÚ Ý: N?u b?n nói Ti?ng Vi?t, có các d?ch v? h? tr? ngôn ng? mi?n phí dành cho b?n. G?i s? [phone].         [facility] complies with applicable Federal civil rights laws and does not discriminate on the basis of race, color, national origin, age, disability, or sex.

## 2017-04-26 ENCOUNTER — TELEPHONE (OUTPATIENT)
Dept: INTERNAL MEDICINE | Facility: CLINIC | Age: 57
End: 2017-04-26

## 2017-04-26 ENCOUNTER — HOSPITAL ENCOUNTER (INPATIENT)
Facility: HOSPITAL | Age: 57
LOS: 2 days | Discharge: HOME | DRG: 440 | End: 2017-04-28
Attending: EMERGENCY MEDICINE | Admitting: EMERGENCY MEDICINE
Payer: MEDICARE

## 2017-04-26 ENCOUNTER — NURSE TRIAGE (OUTPATIENT)
Dept: ADMINISTRATIVE | Facility: CLINIC | Age: 57
End: 2017-04-26

## 2017-04-26 DIAGNOSIS — K85.90 ACUTE PANCREATITIS, UNSPECIFIED COMPLICATION STATUS, UNSPECIFIED PANCREATITIS TYPE: Primary | ICD-10-CM

## 2017-04-26 LAB
ALBUMIN SERPL BCP-MCNC: 3.5 G/DL
ALP SERPL-CCNC: 50 U/L
ALT SERPL W/O P-5'-P-CCNC: 12 U/L
ANION GAP SERPL CALC-SCNC: 8 MMOL/L
AST SERPL-CCNC: 18 U/L
BASOPHILS # BLD AUTO: 0.02 K/UL
BASOPHILS NFR BLD: 0.4 %
BILIRUB SERPL-MCNC: 0.4 MG/DL
BILIRUB UR QL STRIP: NEGATIVE
BUN SERPL-MCNC: 11 MG/DL
CALCIUM SERPL-MCNC: 8.2 MG/DL
CHLORIDE SERPL-SCNC: 109 MMOL/L
CLARITY UR REFRACT.AUTO: CLEAR
CO2 SERPL-SCNC: 23 MMOL/L
COLOR UR AUTO: COLORLESS
CREAT SERPL-MCNC: 0.6 MG/DL
DIFFERENTIAL METHOD: ABNORMAL
EOSINOPHIL # BLD AUTO: 0.2 K/UL
EOSINOPHIL NFR BLD: 2.8 %
ERYTHROCYTE [DISTWIDTH] IN BLOOD BY AUTOMATED COUNT: 14.2 %
EST. GFR  (AFRICAN AMERICAN): >60 ML/MIN/1.73 M^2
EST. GFR  (NON AFRICAN AMERICAN): >60 ML/MIN/1.73 M^2
GLUCOSE SERPL-MCNC: 90 MG/DL
GLUCOSE UR QL STRIP: NEGATIVE
HCT VFR BLD AUTO: 40.2 %
HGB BLD-MCNC: 14 G/DL
HGB UR QL STRIP: NEGATIVE
KETONES UR QL STRIP: NEGATIVE
LEUKOCYTE ESTERASE UR QL STRIP: NEGATIVE
LIPASE SERPL-CCNC: 149 U/L
LYMPHOCYTES # BLD AUTO: 2.8 K/UL
LYMPHOCYTES NFR BLD: 53.2 %
MCH RBC QN AUTO: 29.4 PG
MCHC RBC AUTO-ENTMCNC: 34.8 %
MCV RBC AUTO: 84 FL
MONOCYTES # BLD AUTO: 0.6 K/UL
MONOCYTES NFR BLD: 10.7 %
NEUTROPHILS # BLD AUTO: 1.7 K/UL
NEUTROPHILS NFR BLD: 32.5 %
NITRITE UR QL STRIP: NEGATIVE
PH UR STRIP: 6 [PH] (ref 5–8)
PLATELET # BLD AUTO: 231 K/UL
PMV BLD AUTO: 11.9 FL
POTASSIUM SERPL-SCNC: 3.7 MMOL/L
PROT SERPL-MCNC: 6 G/DL
PROT UR QL STRIP: NEGATIVE
RBC # BLD AUTO: 4.77 M/UL
SODIUM SERPL-SCNC: 140 MMOL/L
SP GR UR STRIP: 1 (ref 1–1.03)
URN SPEC COLLECT METH UR: ABNORMAL
UROBILINOGEN UR STRIP-ACNC: NEGATIVE EU/DL
WBC # BLD AUTO: 5.32 K/UL

## 2017-04-26 PROCEDURE — 80053 COMPREHEN METABOLIC PANEL: CPT

## 2017-04-26 PROCEDURE — 12000002 HC ACUTE/MED SURGE SEMI-PRIVATE ROOM

## 2017-04-26 PROCEDURE — 96376 TX/PRO/DX INJ SAME DRUG ADON: CPT

## 2017-04-26 PROCEDURE — 25000003 PHARM REV CODE 250: Performed by: EMERGENCY MEDICINE

## 2017-04-26 PROCEDURE — 93010 ELECTROCARDIOGRAM REPORT: CPT | Mod: ,,, | Performed by: INTERNAL MEDICINE

## 2017-04-26 PROCEDURE — 63600175 PHARM REV CODE 636 W HCPCS: Performed by: EMERGENCY MEDICINE

## 2017-04-26 PROCEDURE — 94761 N-INVAS EAR/PLS OXIMETRY MLT: CPT

## 2017-04-26 PROCEDURE — 99285 EMERGENCY DEPT VISIT HI MDM: CPT | Mod: ,,, | Performed by: EMERGENCY MEDICINE

## 2017-04-26 PROCEDURE — 99223 1ST HOSP IP/OBS HIGH 75: CPT | Mod: AI,,, | Performed by: PHYSICIAN ASSISTANT

## 2017-04-26 PROCEDURE — 96374 THER/PROPH/DIAG INJ IV PUSH: CPT

## 2017-04-26 PROCEDURE — 96361 HYDRATE IV INFUSION ADD-ON: CPT

## 2017-04-26 PROCEDURE — 81003 URINALYSIS AUTO W/O SCOPE: CPT

## 2017-04-26 PROCEDURE — 85025 COMPLETE CBC W/AUTO DIFF WBC: CPT

## 2017-04-26 PROCEDURE — 83690 ASSAY OF LIPASE: CPT

## 2017-04-26 PROCEDURE — 93005 ELECTROCARDIOGRAM TRACING: CPT

## 2017-04-26 PROCEDURE — 96375 TX/PRO/DX INJ NEW DRUG ADDON: CPT

## 2017-04-26 PROCEDURE — 99285 EMERGENCY DEPT VISIT HI MDM: CPT | Mod: 25

## 2017-04-26 RX ORDER — HYDROMORPHONE HYDROCHLORIDE 1 MG/ML
1 INJECTION, SOLUTION INTRAMUSCULAR; INTRAVENOUS; SUBCUTANEOUS
Status: COMPLETED | OUTPATIENT
Start: 2017-04-26 | End: 2017-04-26

## 2017-04-26 RX ORDER — POLYETHYLENE GLYCOL 3350 17 G/17G
17 POWDER, FOR SOLUTION ORAL 3 TIMES DAILY PRN
Status: DISCONTINUED | OUTPATIENT
Start: 2017-04-26 | End: 2017-04-28 | Stop reason: HOSPADM

## 2017-04-26 RX ORDER — ONDANSETRON 2 MG/ML
4 INJECTION INTRAMUSCULAR; INTRAVENOUS EVERY 8 HOURS PRN
Status: DISCONTINUED | OUTPATIENT
Start: 2017-04-26 | End: 2017-04-27

## 2017-04-26 RX ORDER — DIPHENHYDRAMINE HYDROCHLORIDE 50 MG/ML
25 INJECTION INTRAMUSCULAR; INTRAVENOUS
Status: COMPLETED | OUTPATIENT
Start: 2017-04-26 | End: 2017-04-26

## 2017-04-26 RX ORDER — ALPRAZOLAM 0.5 MG/1
0.5 TABLET ORAL 2 TIMES DAILY
Status: DISCONTINUED | OUTPATIENT
Start: 2017-04-26 | End: 2017-04-28 | Stop reason: HOSPADM

## 2017-04-26 RX ORDER — ONDANSETRON 2 MG/ML
4 INJECTION INTRAMUSCULAR; INTRAVENOUS EVERY 12 HOURS PRN
Status: DISCONTINUED | OUTPATIENT
Start: 2017-04-26 | End: 2017-04-26

## 2017-04-26 RX ORDER — GLUCAGON 1 MG
1 KIT INJECTION
Status: DISCONTINUED | OUTPATIENT
Start: 2017-04-26 | End: 2017-04-28 | Stop reason: HOSPADM

## 2017-04-26 RX ORDER — HYDROMORPHONE HYDROCHLORIDE 1 MG/ML
1 INJECTION, SOLUTION INTRAMUSCULAR; INTRAVENOUS; SUBCUTANEOUS EVERY 4 HOURS PRN
Status: DISCONTINUED | OUTPATIENT
Start: 2017-04-26 | End: 2017-04-28

## 2017-04-26 RX ORDER — ESTRADIOL 1 MG/1
1 TABLET ORAL DAILY
Status: DISCONTINUED | OUTPATIENT
Start: 2017-04-27 | End: 2017-04-28 | Stop reason: HOSPADM

## 2017-04-26 RX ORDER — IBUPROFEN 200 MG
16 TABLET ORAL
Status: DISCONTINUED | OUTPATIENT
Start: 2017-04-26 | End: 2017-04-28 | Stop reason: HOSPADM

## 2017-04-26 RX ORDER — METOCLOPRAMIDE HYDROCHLORIDE 5 MG/ML
5 INJECTION INTRAMUSCULAR; INTRAVENOUS EVERY 6 HOURS PRN
Status: DISCONTINUED | OUTPATIENT
Start: 2017-04-26 | End: 2017-04-27

## 2017-04-26 RX ORDER — DEXTROSE MONOHYDRATE AND SODIUM CHLORIDE 5; .45 G/100ML; G/100ML
INJECTION, SOLUTION INTRAVENOUS CONTINUOUS
Status: DISCONTINUED | OUTPATIENT
Start: 2017-04-26 | End: 2017-04-28 | Stop reason: HOSPADM

## 2017-04-26 RX ORDER — ACETAMINOPHEN 325 MG/1
650 TABLET ORAL EVERY 4 HOURS PRN
Status: DISCONTINUED | OUTPATIENT
Start: 2017-04-26 | End: 2017-04-28 | Stop reason: HOSPADM

## 2017-04-26 RX ORDER — ESCITALOPRAM OXALATE 20 MG/1
20 TABLET ORAL NIGHTLY
Status: DISCONTINUED | OUTPATIENT
Start: 2017-04-26 | End: 2017-04-28 | Stop reason: HOSPADM

## 2017-04-26 RX ORDER — IPRATROPIUM BROMIDE AND ALBUTEROL SULFATE 2.5; .5 MG/3ML; MG/3ML
3 SOLUTION RESPIRATORY (INHALATION) EVERY 4 HOURS PRN
Status: DISCONTINUED | OUTPATIENT
Start: 2017-04-26 | End: 2017-04-28 | Stop reason: HOSPADM

## 2017-04-26 RX ORDER — LOPERAMIDE HYDROCHLORIDE 2 MG/1
2 CAPSULE ORAL
Status: DISCONTINUED | OUTPATIENT
Start: 2017-04-26 | End: 2017-04-28 | Stop reason: HOSPADM

## 2017-04-26 RX ORDER — HYDROMORPHONE HYDROCHLORIDE 1 MG/ML
0.5 INJECTION, SOLUTION INTRAMUSCULAR; INTRAVENOUS; SUBCUTANEOUS EVERY 4 HOURS PRN
Status: DISCONTINUED | OUTPATIENT
Start: 2017-04-26 | End: 2017-04-28

## 2017-04-26 RX ORDER — ONDANSETRON 2 MG/ML
4 INJECTION INTRAMUSCULAR; INTRAVENOUS
Status: COMPLETED | OUTPATIENT
Start: 2017-04-26 | End: 2017-04-26

## 2017-04-26 RX ORDER — RAMELTEON 8 MG/1
8 TABLET ORAL NIGHTLY PRN
Status: DISCONTINUED | OUTPATIENT
Start: 2017-04-26 | End: 2017-04-28 | Stop reason: HOSPADM

## 2017-04-26 RX ORDER — HYDROCHLOROTHIAZIDE 12.5 MG/1
12.5 TABLET ORAL DAILY
Status: DISCONTINUED | OUTPATIENT
Start: 2017-04-27 | End: 2017-04-28 | Stop reason: HOSPADM

## 2017-04-26 RX ORDER — GABAPENTIN 300 MG/1
300 CAPSULE ORAL 2 TIMES DAILY
Status: DISCONTINUED | OUTPATIENT
Start: 2017-04-26 | End: 2017-04-28 | Stop reason: HOSPADM

## 2017-04-26 RX ORDER — METOCLOPRAMIDE 10 MG/1
10 TABLET ORAL EVERY 6 HOURS PRN
Status: DISCONTINUED | OUTPATIENT
Start: 2017-04-26 | End: 2017-04-26

## 2017-04-26 RX ORDER — IBUPROFEN 200 MG
24 TABLET ORAL
Status: DISCONTINUED | OUTPATIENT
Start: 2017-04-26 | End: 2017-04-28 | Stop reason: HOSPADM

## 2017-04-26 RX ORDER — LEVOTHYROXINE SODIUM 75 UG/1
75 TABLET ORAL
Status: DISCONTINUED | OUTPATIENT
Start: 2017-04-27 | End: 2017-04-28 | Stop reason: HOSPADM

## 2017-04-26 RX ADMIN — SODIUM CHLORIDE 1000 ML: 0.9 INJECTION, SOLUTION INTRAVENOUS at 09:04

## 2017-04-26 RX ADMIN — METOCLOPRAMIDE 10 MG: 10 TABLET ORAL at 10:04

## 2017-04-26 RX ADMIN — HYDROMORPHONE HYDROCHLORIDE 1 MG: 1 INJECTION, SOLUTION INTRAMUSCULAR; INTRAVENOUS; SUBCUTANEOUS at 09:04

## 2017-04-26 RX ADMIN — DEXTROSE MONOHYDRATE AND SODIUM CHLORIDE: 5; .45 INJECTION, SOLUTION INTRAVENOUS at 10:04

## 2017-04-26 RX ADMIN — ONDANSETRON 4 MG: 2 INJECTION INTRAMUSCULAR; INTRAVENOUS at 09:04

## 2017-04-26 RX ADMIN — DIPHENHYDRAMINE HYDROCHLORIDE 25 MG: 50 INJECTION, SOLUTION INTRAMUSCULAR; INTRAVENOUS at 09:04

## 2017-04-26 RX ADMIN — HYDROMORPHONE HYDROCHLORIDE 1 MG: 1 INJECTION, SOLUTION INTRAMUSCULAR; INTRAVENOUS; SUBCUTANEOUS at 10:04

## 2017-04-26 NOTE — IP AVS SNAPSHOT
[facility]  [address]  [facility] [address]  Phone: [phone]           Patient Discharge Instructions   Our goal is to set you up for success. This packet includes information on your condition, medications, and your home care.  It will help you care for yourself to prevent having to return to the hospital.     Please ask your nurse if you have any questions.      There are many details to remember when preparing to leave the hospital. Here is what you will need to do:    1. Take your medicine. If you are prescribed medications, review your Medication List on the following pages. You may have new medications to  at the pharmacy and others that you'll need to stop taking. Review the instructions for how and when to take your medications. Talk with your doctor or nurses if you are unsure of what to do.     2. Go to your follow-up appointments. Specific follow-up information is listed in the following pages. Your may be contacted by a nurse or clinical provider about future appointments. Be sure we have all of the phone numbers to reach you. Please contact your provider's office if you are unable to make an appointment.     3. Watch for warning signs. Your doctor or nurse will give you detailed warning signs to watch for and when to call for assistance. These instructions may also include educational information about your condition. If you experience any of warning signs to your health, call your doctor.           [facility]  Unless otherwise directed by your provider, please   contact [facility], our nurse care line   that is available for 24/7 assistance.     [phone] (toll-free)     Registered nurses in the [facility]   provide: appointment scheduling, clinical advisement, health education, and other advisory services.                  ** Verify the list of medication(s) below is accurate and up to date. Carry this with you in case of  emergency. If your medications have changed, please notify your healthcare provider.             Medication List      START taking these medications        Additional Info                      HYDROmorphone 2 MG tablet   Commonly known as:  DILAUDID   Quantity:  30 tablet   Refills:  0   Dose:  2-4 mg    Last time this was given:  2 mg on 4/28/2017  2:18 PM   Instructions:  Take 1-2 tablets (2-4 mg total) by mouth every 3 (three) hours as needed for Pain.     Begin Date    AM    Noon    PM    Bedtime       promethazine 25 MG tablet   Commonly known as:  PHENERGAN   Quantity:  60 tablet   Refills:  1   Dose:  25 mg    Last time this was given:  25 mg on 4/28/2017 11:05 AM   Instructions:  Take 1 tablet (25 mg total) by mouth every 6 (six) hours as needed.     Begin Date    AM    Noon    PM    Bedtime         CONTINUE taking these medications        Additional Info                      alprazolam 1 MG tablet   Commonly known as:  XANAX   Quantity:  30 tablet   Refills:  3    Last time this was given:  0.5 mg on 4/28/2017  8:17 AM   Instructions:  TAKE 1/2 TABLET BY MOUTH TWICE DAILY     Begin Date    AM    Noon    PM    Bedtime       escitalopram oxalate 20 MG tablet   Commonly known as:  LEXAPRO   Quantity:  30 tablet   Refills:  6   Dose:  20 mg    Last time this was given:  20 mg on 4/27/2017  8:01 PM   Instructions:  Take 1 tablet (20 mg total) by mouth every evening.     Begin Date    AM    Noon    PM    Bedtime       estradiol 1 MG tablet   Commonly known as:  ESTRACE   Quantity:  30 tablet   Refills:  6   Dose:  1 mg    Last time this was given:  1 mg on 4/28/2017  8:17 AM   Instructions:  Take 1 tablet (1 mg total) by mouth once daily.     Begin Date    AM    Noon    PM    Bedtime       gabapentin 300 MG capsule   Commonly known as:  NEURONTIN   Quantity:  60 capsule   Refills:  5    Last time this was given:  300 mg on 4/28/2017  8:17 AM   Instructions:  TAKE 1 CAPSULE BY MOUTH TWO TIMES A DAY     Begin Date     AM    Noon    PM    Bedtime       hydrochlorothiazide 12.5 mg capsule   Commonly known as:  MICROZIDE   Quantity:  90 capsule   Refills:  3    Instructions:  TAKE 1 CAPSULE BY MOUTH EVERY DAY     Begin Date    AM    Noon    PM    Bedtime       levothyroxine 75 MCG tablet   Commonly known as:  SYNTHROID   Quantity:  90 tablet   Refills:  1    Last time this was given:  75 mcg on 4/28/2017  6:05 AM   Instructions:  TAKE 1 TABLET BY MOUTH BEFORE BREAKFAST     Begin Date    AM    Noon    PM    Bedtime       vitamin D 1000 units Tab   Refills:  0   Dose:  185 mg    Instructions:  Take 185 mg by mouth once daily.     Begin Date    AM    Noon    PM    Bedtime            Where to Get Your Medications      These medications were sent to [id number] - [place], [place] - [address] [facility]  [address] [place] [address]     Phone:  [phone]     promethazine 25 MG tablet         You can get these medications from any pharmacy     Bring a paper prescription for each of these medications     HYDROmorphone 2 MG tablet                  Please bring to all follow up appointments:    1. A copy of your discharge instructions.  2. All medicines you are currently taking in their original bottles.  3. Identification and insurance card.    Please arrive 15 minutes ahead of scheduled appointment time.    Please call 24 hours in advance if you must reschedule your appointment and/or time.        Your Scheduled Appointments     May 03, 2017  7:15 AM CDT   Us Abdomen with [facility] US 11 ALL   [facility]    [address] [facility] [address]   [phone]            May 03, 2017 11:00 AM CDT   Mammo Screening with [facility] MAMMO2 SCREEN   [facility] ([facility] )    [address] [facility] [address] [address]   [phone]            May 03, 2017  1:00 PM CDT   Ankle Brachial Exercise with VASCULAR, CARDIOLOGY   [facility] - Vascular  Cardiology ([facility] )    [address]  [place] [address]   [phone]            May 18, 2017  9:20 AM CDT   Hospital Follow Up with MD Rigoberto Interiano - Internal Medicine ([facility])    [address]  [place] [address]   [phone]            Jul 25, 2017  8:40 AM CDT   Established Patient Visit with MD Rigoberto Interiano - Internal Medicine ([facility])    [address]  [place] [id number] [phone]              Follow-up Information     Follow up with Jaqueline Parker MD On 5/18/2017.    Specialty:  Internal Medicine    Why:  9:20am    Contact information:    [address]  [place] [address]  [phone]          Discharge Instructions     Future Orders    Diet general     Questions:    Total calories:      Fat restriction, if any:      Protein restriction, if any:      Na restriction, if any:      Fluid restriction:      Additional restrictions:          Primary Diagnosis     Your primary diagnosis was:  Acute Inflammation Of The Pancreas      Admission Information     Date & Time Provider Department CSN    4/26/2017  8:37 PM Mahendra Carter MD [facility] [id number]      Care Providers     Provider Role Specialty Primary office phone    Mahendra Carter MD Attending Provider Hospitalist [phone]    Mahendra Carter MD Team Attending  Hospitalist [phone]    Rita Sanchez MD Team Attending  Hospitalist [phone]      Your Vitals Were     BP Pulse Temp Resp Height Weight    123/57 (BP Location: Right arm, Patient Position: Lying, BP Method: Automatic) 77 98.5 °F (36.9 °C) (Oral) 20 5' (1.524 m) 83.9 kg (184 lb 15.5 oz)    SpO2 BMI             95% 36.12 kg/m2         Recent Lab Values        7/14/2014                           8:01 PM           A1C 5.3                       Allergies as of 4/28/2017        Reactions    Codeine Nausea And  "Vomiting    Compazine [Prochlorperazine Edisylate] Anaphylaxis, Nausea And Vomiting    Demerol [Meperidine] Anaphylaxis, Nausea And Vomiting    Morpholine Analogues Anaphylaxis, Nausea And Vomiting    vomit    Percocet [Oxycodone-acetaminophen] Nausea And Vomiting    Phenothiazines Anaphylaxis    Takes Phenergan daily w/o problems.      Corticosteroids (Glucocorticoids) Itching    Agitation, "skin crawling" sensation    Cortisone Itching    Agitation, "skin crawling" sensation    Fentanyl Nausea And Vomiting, Swelling    Fentanyl patch made lips numb and swollen    Morphine Nausea And Vomiting    Nsaids (Non-steroidal Anti-inflammatory Drug) Other (See Comments)    Cluster ulcers    Pcn [Penicillins] Other (See Comments)    Stomach cramps    Toradol [Ketorolac] Nausea And Vomiting    ulcers    Tramadol Nausea And Vomiting      Advance Directives     An advance directive is a document which, in the event you are no longer able to make decisions for yourself, tells your healthcare team what kind of treatment you do or do not want to receive, or who you would like to make those decisions for you.  If you do not currently have an advance directive, [facility] encourages you to create one.  For more information call:  [phone]), [phone] ([phone]),  or log on to [email or web address].        Language Assistance Services     ATTENTION: Language assistance services are available, free of charge. Please call [phone].      ATENCIÓN: Si habla español, tiene a al disposición servicios gratuitos de asistencia lingüística. Llame al [phone].     CHÚ Ý: N?u b?n nói Ti?ng Vi?t, có các d?ch v? h? tr? ngôn ng? mi?n phí dành cho b?n. G?i s? [phone].        [facility] Sign-Up     Activating your [facility] account is as easy as 1-2-3!     1) Visit [email or web address], select Sign Up Now, enter this activation code and your date of birth, then select Next.  Activation code not generated  Current " Patient Portal Status: Account disabled      2) Create a username and password to use when you visit [facility] in the future and select a security question in case you lose your password and select Next.    3) Enter your e-mail address and click Sign Up!    Additional Information  If you have questions, please e-mail [employer] [email or web address] or call [phone] to talk to our [employer] staff. Remember, [facility] is NOT to be used for urgent needs. For medical emergencies, dial 911.          [facility] complies with applicable Federal civil rights laws and does not discriminate on the basis of race, color, national origin, age, disability, or sex.

## 2017-04-26 NOTE — TELEPHONE ENCOUNTER
Reason for Disposition   Nursing judgment    Protocols used: ST NO PROTOCOL AVAILABLE - SICK ADULT-A-OH    Pt states that MD told her to go to ED yesterday due to elevated lipase, pt could not because taking care of elderly parents, pt states that she is going to go tonight but wanted to see if she had to go to ED. Pt sent to ED per Dr Parker notes.

## 2017-04-26 NOTE — TELEPHONE ENCOUNTER
Lipase not quite 5x ULN but pt does have chronic pancreatitis and thus lipase at 198 may signify acute pancreatitis. Pt hasn't been able to eat except jello due to pain and nausea. Spoke w/ pt. Advised to go to ED.

## 2017-04-27 LAB
AMYLASE SERPL-CCNC: 125 U/L
ANION GAP SERPL CALC-SCNC: 6 MMOL/L
BASOPHILS # BLD AUTO: 0.01 K/UL
BASOPHILS NFR BLD: 0.2 %
BUN SERPL-MCNC: 10 MG/DL
CALCIUM SERPL-MCNC: 8.2 MG/DL
CHLORIDE SERPL-SCNC: 104 MMOL/L
CO2 SERPL-SCNC: 27 MMOL/L
CREAT SERPL-MCNC: 0.6 MG/DL
DIFFERENTIAL METHOD: NORMAL
EOSINOPHIL # BLD AUTO: 0.1 K/UL
EOSINOPHIL NFR BLD: 0.9 %
ERYTHROCYTE [DISTWIDTH] IN BLOOD BY AUTOMATED COUNT: 14.3 %
EST. GFR  (AFRICAN AMERICAN): >60 ML/MIN/1.73 M^2
EST. GFR  (NON AFRICAN AMERICAN): >60 ML/MIN/1.73 M^2
GLUCOSE SERPL-MCNC: 107 MG/DL
HCT VFR BLD AUTO: 39.2 %
HGB BLD-MCNC: 13.1 G/DL
LIPASE SERPL-CCNC: 247 U/L
LYMPHOCYTES # BLD AUTO: 2.3 K/UL
LYMPHOCYTES NFR BLD: 35.7 %
MAGNESIUM SERPL-MCNC: 2.1 MG/DL
MCH RBC QN AUTO: 29.2 PG
MCHC RBC AUTO-ENTMCNC: 33.4 %
MCV RBC AUTO: 88 FL
MONOCYTES # BLD AUTO: 0.4 K/UL
MONOCYTES NFR BLD: 6.6 %
NEUTROPHILS # BLD AUTO: 3.6 K/UL
NEUTROPHILS NFR BLD: 56.4 %
PHOSPHATE SERPL-MCNC: 3.3 MG/DL
PLATELET # BLD AUTO: 206 K/UL
PMV BLD AUTO: 10.5 FL
POTASSIUM SERPL-SCNC: 4 MMOL/L
RBC # BLD AUTO: 4.48 M/UL
SODIUM SERPL-SCNC: 137 MMOL/L
WBC # BLD AUTO: 6.33 K/UL

## 2017-04-27 PROCEDURE — 83735 ASSAY OF MAGNESIUM: CPT

## 2017-04-27 PROCEDURE — 82150 ASSAY OF AMYLASE: CPT

## 2017-04-27 PROCEDURE — 25000003 PHARM REV CODE 250: Performed by: PHYSICIAN ASSISTANT

## 2017-04-27 PROCEDURE — 80048 BASIC METABOLIC PNL TOTAL CA: CPT

## 2017-04-27 PROCEDURE — 84100 ASSAY OF PHOSPHORUS: CPT

## 2017-04-27 PROCEDURE — 36415 COLL VENOUS BLD VENIPUNCTURE: CPT

## 2017-04-27 PROCEDURE — 85025 COMPLETE CBC W/AUTO DIFF WBC: CPT

## 2017-04-27 PROCEDURE — 99232 SBSQ HOSP IP/OBS MODERATE 35: CPT | Mod: ,,, | Performed by: INTERNAL MEDICINE

## 2017-04-27 PROCEDURE — 63600175 PHARM REV CODE 636 W HCPCS: Performed by: PHYSICIAN ASSISTANT

## 2017-04-27 PROCEDURE — 25000003 PHARM REV CODE 250: Performed by: EMERGENCY MEDICINE

## 2017-04-27 PROCEDURE — 83690 ASSAY OF LIPASE: CPT

## 2017-04-27 PROCEDURE — 11000001 HC ACUTE MED/SURG PRIVATE ROOM

## 2017-04-27 PROCEDURE — 63600175 PHARM REV CODE 636 W HCPCS: Performed by: EMERGENCY MEDICINE

## 2017-04-27 RX ORDER — ONDANSETRON 2 MG/ML
4 INJECTION INTRAMUSCULAR; INTRAVENOUS EVERY 8 HOURS PRN
Status: DISCONTINUED | OUTPATIENT
Start: 2017-04-27 | End: 2017-04-28 | Stop reason: HOSPADM

## 2017-04-27 RX ORDER — PROMETHAZINE HYDROCHLORIDE 25 MG/1
25 TABLET ORAL EVERY 6 HOURS PRN
Status: DISCONTINUED | OUTPATIENT
Start: 2017-04-27 | End: 2017-04-28 | Stop reason: HOSPADM

## 2017-04-27 RX ADMIN — PROMETHAZINE HYDROCHLORIDE 25 MG: 25 TABLET ORAL at 12:04

## 2017-04-27 RX ADMIN — ONDANSETRON 4 MG: 2 INJECTION INTRAMUSCULAR; INTRAVENOUS at 04:04

## 2017-04-27 RX ADMIN — GABAPENTIN 300 MG: 300 CAPSULE ORAL at 08:04

## 2017-04-27 RX ADMIN — PROMETHAZINE HYDROCHLORIDE 25 MG: 25 TABLET ORAL at 07:04

## 2017-04-27 RX ADMIN — HYDROMORPHONE HYDROCHLORIDE 1 MG: 1 INJECTION, SOLUTION INTRAMUSCULAR; INTRAVENOUS; SUBCUTANEOUS at 02:04

## 2017-04-27 RX ADMIN — LEVOTHYROXINE SODIUM 75 MCG: 75 TABLET ORAL at 06:04

## 2017-04-27 RX ADMIN — DEXTROSE MONOHYDRATE AND SODIUM CHLORIDE: 5; .45 INJECTION, SOLUTION INTRAVENOUS at 07:04

## 2017-04-27 RX ADMIN — PROMETHAZINE HYDROCHLORIDE 25 MG: 25 TABLET ORAL at 06:04

## 2017-04-27 RX ADMIN — GABAPENTIN 300 MG: 300 CAPSULE ORAL at 12:04

## 2017-04-27 RX ADMIN — DEXTROSE MONOHYDRATE AND SODIUM CHLORIDE: 5; .45 INJECTION, SOLUTION INTRAVENOUS at 02:04

## 2017-04-27 RX ADMIN — HYDROMORPHONE HYDROCHLORIDE 1 MG: 1 INJECTION, SOLUTION INTRAMUSCULAR; INTRAVENOUS; SUBCUTANEOUS at 08:04

## 2017-04-27 RX ADMIN — ALPRAZOLAM 0.5 MG: 0.5 TABLET ORAL at 08:04

## 2017-04-27 RX ADMIN — HYDROMORPHONE HYDROCHLORIDE 1 MG: 1 INJECTION, SOLUTION INTRAMUSCULAR; INTRAVENOUS; SUBCUTANEOUS at 04:04

## 2017-04-27 RX ADMIN — ALPRAZOLAM 0.5 MG: 0.5 TABLET ORAL at 12:04

## 2017-04-27 RX ADMIN — ESCITALOPRAM 20 MG: 5 TABLET, FILM COATED ORAL at 08:04

## 2017-04-27 RX ADMIN — DEXTROSE MONOHYDRATE AND SODIUM CHLORIDE: 5; .45 INJECTION, SOLUTION INTRAVENOUS at 12:04

## 2017-04-27 RX ADMIN — HYDROMORPHONE HYDROCHLORIDE 1 MG: 1 INJECTION, SOLUTION INTRAMUSCULAR; INTRAVENOUS; SUBCUTANEOUS at 07:04

## 2017-04-27 RX ADMIN — HYDROCHLOROTHIAZIDE 12.5 MG: 12.5 TABLET ORAL at 08:04

## 2017-04-27 RX ADMIN — ESTRADIOL 1 MG: 1 TABLET ORAL at 03:04

## 2017-04-27 NOTE — NURSING TRANSFER
Nursing Transfer Note      4/27/2017     Transfer From: ER to room 347     Transfer via wheelchair    Transfer with IV fluids    Transported by Escort    Medicines sent: D51/2NS    Chart send with patient: Yes    Notified: MD aware    Patient reassessed at: 04/27/2017 020    Upon arrival to floor: patient oriented to room, call bell in reach and bed in lowest position

## 2017-04-27 NOTE — SUBJECTIVE & OBJECTIVE
"Past Medical History:   Diagnosis Date    Adrenal adenoma     Gastric ulcer     Hypothyroidism     Kidney stones     Pancreatitis     Traumatic compression fracture of L1 lumbar vertebra        Past Surgical History:   Procedure Laterality Date    CHOLECYSTECTOMY      COLONOSCOPY N/A 2016    Procedure: COLONOSCOPY;  Surgeon: Sathya Strickland MD;  Location: [address];  Service: Endoscopy;  Laterality: N/A;    CYSTOSCOPY      HYSTERECTOMY      KYPHOSIS SURGERY  7/20/15    right adrenal gland remove      tonsillectomy      TONSILLECTOMY      TUBAL LIGATION      x2          Review of patient's allergies indicates:   Allergen Reactions    Codeine Nausea And Vomiting    Compazine [prochlorperazine edisylate] Anaphylaxis and Nausea And Vomiting    Demerol [meperidine] Anaphylaxis and Nausea And Vomiting    Morpholine analogues Anaphylaxis and Nausea And Vomiting     vomit    Percocet [oxycodone-acetaminophen] Nausea And Vomiting    Phenothiazines Anaphylaxis     Takes Phenergan daily w/o problems.      Corticosteroids (glucocorticoids) Itching     Agitation, "skin crawling" sensation    Cortisone Itching     Agitation, "skin crawling" sensation    Fentanyl Nausea And Vomiting and Swelling     Fentanyl patch made lips numb and swollen    Morphine Nausea And Vomiting    Nsaids (non-steroidal anti-inflammatory drug) Other (See Comments)     Cluster ulcers    Pcn [penicillins] Other (See Comments)     Stomach cramps    Toradol [ketorolac] Nausea And Vomiting     ulcers    Tramadol Nausea And Vomiting       No current facility-administered medications on file prior to encounter.      Current Outpatient Prescriptions on File Prior to Encounter   Medication Sig    alprazolam (XANAX) 1 MG tablet TAKE 1/2 TABLET BY MOUTH TWICE DAILY    escitalopram oxalate (LEXAPRO) 20 MG tablet Take 1 tablet (20 mg total) by mouth every evening.    estradiol (ESTRACE) 1 MG tablet Take 1 tablet (1 " mg total) by mouth once daily.    gabapentin (NEURONTIN) 300 MG capsule TAKE 1 CAPSULE BY MOUTH TWO TIMES A DAY    hydrochlorothiazide (MICROZIDE) 12.5 mg capsule TAKE 1 CAPSULE BY MOUTH EVERY DAY    levothyroxine (SYNTHROID) 75 MCG tablet TAKE 1 TABLET BY MOUTH BEFORE BREAKFAST    vitamin D 1000 units Tab Take 185 mg by mouth once daily.     Family History     Problem Relation (Age of Onset)    Diabetes Father    Heart attack Father        Social History Main Topics    Smoking status: Never Smoker    Smokeless tobacco: Never Used    Alcohol use No    Drug use: Not on file    Sexual activity: Not Currently     Review of Systems   Constitutional: Positive for appetite change. Negative for activity change, chills, fatigue, fever and unexpected weight change.   HENT: Negative for congestion, rhinorrhea, sore throat, trouble swallowing and voice change.    Eyes: Negative for visual disturbance.   Respiratory: Negative for cough, choking, chest tightness, shortness of breath and wheezing.    Cardiovascular: Negative for chest pain, palpitations and leg swelling.   Gastrointestinal: Positive for abdominal pain, nausea and vomiting. Negative for abdominal distention, anal bleeding, blood in stool, constipation and diarrhea.   Endocrine: Negative for cold intolerance, heat intolerance, polydipsia and polyuria.   Genitourinary: Positive for frequency. Negative for dysuria, flank pain, hematuria and urgency.   Musculoskeletal: Negative for arthralgias, back pain, joint swelling and myalgias.   Skin: Negative for color change and rash.   Neurological: Negative for dizziness, seizures, syncope, facial asymmetry, speech difficulty, weakness, light-headedness, numbness and headaches.   Hematological: Negative for adenopathy. Does not bruise/bleed easily.   Psychiatric/Behavioral: Negative for agitation, confusion, hallucinations and suicidal ideas.     Objective:     Vital Signs (Most Recent):  Temp: 98.1 °F (36.7 °C)  (04/27/17 0030)  Pulse: 69 (04/27/17 0030)  Resp: 18 (04/27/17 0030)  BP: 113/61 (04/27/17 0030)  SpO2: 96 % (04/27/17 0030) Vital Signs (24h Range):  Temp:  [97.4 °F (36.3 °C)-98.1 °F (36.7 °C)] 98.1 °F (36.7 °C)  Pulse:  [64-71] 69  Resp:  [18] 18  SpO2:  [96 %-98 %] 96 %  BP: (113-135)/(59-65) 113/61     Weight: 83.9 kg (184 lb 15.5 oz)  Body mass index is 36.12 kg/(m^2).    Physical Exam   Constitutional: She is oriented to person, place, and time. She appears well-developed and well-nourished. No distress.   HENT:   Head: Normocephalic and atraumatic.   Eyes: Pupils are equal, round, and reactive to light.   Neck: Neck supple. Carotid bruit is not present. No thyromegaly present.   Cardiovascular: Normal rate and regular rhythm.  Exam reveals no gallop.    No murmur heard.  Pulmonary/Chest: Effort normal and breath sounds normal. No respiratory distress. She has no wheezes.   Abdominal: Soft. Bowel sounds are normal. She exhibits no distension and no mass. There is no splenomegaly or hepatomegaly. There is tenderness in the right upper quadrant.   Musculoskeletal: Normal range of motion. She exhibits no edema or deformity.   Neurological: She is alert and oriented to person, place, and time. No cranial nerve deficit or sensory deficit.   Skin: Skin is warm and dry. No rash noted.   Psychiatric: She has a normal mood and affect.        Significant Labs: All pertinent labs within the past 24 hours have been reviewed.    Significant Imaging: I have reviewed all pertinent imaging results/findings within the past 24 hours.

## 2017-04-27 NOTE — ED PROVIDER NOTES
"Encounter Date: 4/26/2017    SCRIBE #1 NOTE: I, Janiya Logan, am scribing for, and in the presence of,  Dr. Centeno. I have scribed the entire note.       History     Chief Complaint   Patient presents with    Abnormal Lab     Pt was contacted by Dr Parker and told her Lipase low was told to come to ED. Pt has chronic Pancreatitis.     Review of patient's allergies indicates:   Allergen Reactions    Codeine Nausea And Vomiting    Compazine [prochlorperazine edisylate] Anaphylaxis and Nausea And Vomiting    Demerol [meperidine] Anaphylaxis and Nausea And Vomiting    Morpholine analogues Anaphylaxis and Nausea And Vomiting     vomit    Percocet [oxycodone-acetaminophen] Nausea And Vomiting    Phenothiazines Anaphylaxis     Takes Phenergan daily w/o problems.      Corticosteroids (glucocorticoids) Itching     Agitation, "skin crawling" sensation    Cortisone Itching     Agitation, "skin crawling" sensation    Fentanyl Nausea And Vomiting and Swelling     Fentanyl patch made lips numb and swollen    Morphine Nausea And Vomiting    Nsaids (non-steroidal anti-inflammatory drug) Other (See Comments)     Cluster ulcers    Pcn [penicillins] Other (See Comments)     Stomach cramps    Toradol [ketorolac] Nausea And Vomiting     ulcers    Tramadol Nausea And Vomiting     HPI Comments: Time seen by provider: 9:00 PM    This is a 56 y.o. female with history of pancreatitis who presents with complaint of acute abnormal labs and abdominal pain prior to arrival. Pt states she had blood work done for her chronic pancreatitis yesterday and was told by PCP to come into ED for low lipase. She rates her abdominal pain as 10+/10 and states it radiates from the back to the front. She notes she has not been able to eat since last Thursday. Pt endorses frequent urination. Pt denies chest pain, SOB, fever, dysuria, and weakness.     The history is provided by the patient.     Past Medical History:   Diagnosis Date    Adrenal " adenoma     Gastric ulcer     Hypothyroidism     Kidney stones     Pancreatitis     Traumatic compression fracture of L1 lumbar vertebra      Past Surgical History:   Procedure Laterality Date    CHOLECYSTECTOMY      COLONOSCOPY N/A 2016    Procedure: COLONOSCOPY;  Surgeon: Sathya Strickland MD;  Location: [address]);  Service: Endoscopy;  Laterality: N/A;    CYSTOSCOPY      HYSTERECTOMY      KYPHOSIS SURGERY  7/20/15    right adrenal gland remove      tonsillectomy      TONSILLECTOMY      TUBAL LIGATION      x2        Family History   Problem Relation Age of Onset    Diabetes Father      borderline    Heart attack Father      Social History   Substance Use Topics    Smoking status: Never Smoker    Smokeless tobacco: Never Used    Alcohol use No     Review of Systems   Constitutional: Negative for fever.   HENT: Negative for nosebleeds.    Eyes: Negative for visual disturbance.   Respiratory: Negative for shortness of breath.    Cardiovascular: Negative for chest pain.   Gastrointestinal: Positive for abdominal pain.   Genitourinary: Positive for frequency. Negative for dysuria.   Musculoskeletal: Negative for back pain.   Skin: Negative for rash.   Neurological: Negative for weakness.       Physical Exam   Initial Vitals   BP Pulse Resp Temp SpO2   17   135/65 64 18 97.6 °F (36.4 °C) 97 %     Physical Exam    Nursing note and vitals reviewed.  Constitutional: She appears well-developed and well-nourished. No distress.   HENT:   Head: Normocephalic and atraumatic.   Mouth/Throat: Oropharynx is clear and moist and mucous membranes are normal.   Eyes: Conjunctivae and EOM are normal. Pupils are equal, round, and reactive to light.   Neck: Normal range of motion. Neck supple. No thyromegaly present.   Cardiovascular: Normal rate, regular rhythm, normal heart sounds and intact distal pulses. Exam reveals no gallop and  no friction rub.    No murmur heard.  Pulmonary/Chest: Breath sounds normal. No respiratory distress. She has no wheezes. She has no rhonchi. She has no rales.   Abdominal: Soft. She exhibits no distension. There is no rebound and no guarding.   Moderate diffuse tenderness in the RUQ and epigastric region. No bowel sounds.    Musculoskeletal: Normal range of motion. She exhibits no edema or tenderness.   Lymphadenopathy:     She has no cervical adenopathy.   Neurological: She is alert and oriented to person, place, and time. She has normal strength. No cranial nerve deficit or sensory deficit.   Skin: Skin is dry. No rash noted.         ED Course   Procedures  Labs Reviewed   CBC W/ AUTO DIFFERENTIAL - Abnormal; Notable for the following:        Result Value    Gran # 1.7 (*)     Gran% 32.5 (*)     Lymph% 53.2 (*)     All other components within normal limits   URINALYSIS - Abnormal; Notable for the following:     Color, UA Colorless (*)     All other components within normal limits   COMPREHENSIVE METABOLIC PANEL - Abnormal; Notable for the following:     Calcium 8.2 (*)     Alkaline Phosphatase 50 (*)     All other components within normal limits   LIPASE - Abnormal; Notable for the following:     Lipase 149 (*)     All other components within normal limits   LIPASE     EKG Readings: (Independently Interpreted)   Normal sinus rhythm at 56. NO acute STT changes. No ectopy. No STEMI.           Medical Decision Making:   History:   Old Medical Records: I decided to obtain old medical records.  Initial Assessment:   Initial considerations based upon present complaint include but are not limited to: pancreatitis, duct stone, gastritis, metabolic abnormality, and dehydration.   Independently Interpreted Test(s):   I have ordered and independently interpreted EKG Reading(s) - see prior notes  Clinical Tests:   Lab Tests: Reviewed and Ordered  Radiological Study: Reviewed and Ordered  Medical Tests: Reviewed and  Ordered            Scribe Attestation:   Scribe #1: I performed the above scribed service and the documentation accurately describes the services I performed. I attest to the accuracy of the note.    Attending Attestation:           Physician Attestation for Scribe:  Physician Attestation Statement for Scribe #1: I, Dr. Centeno, reviewed documentation, as scribed by Janiya Logan in my presence, and it is both accurate and complete.                 ED Course     Clinical Impression:   The encounter diagnosis was Acute pancreatitis, unspecified complication status, unspecified pancreatitis type.          Kevin Centeno MD  04/30/17 1406

## 2017-04-27 NOTE — H&P
[facility] Hospital Medicine  History & Physical    Patient Name: Cara Mendoza  MRN: [id number]  Admission Date: 2017  Attending Physician: Matteo Borja MD   Primary Care Provider: Jaqueline Parker MD    [facility] Medicine Team: Networked reference to record PCT  Manda Jeong PA-C     Patient information was obtained from patient, past medical records and ER records.     Subjective:     Principal Problem:Acute pancreatitis    Chief Complaint:   Chief Complaint   Patient presents with    Abnormal Lab     Pt was contacted by Dr Parker and told her Lipase low was told to come to ED. Pt has chronic Pancreatitis.        HPI: Patient is a 56 year old lady with a h/o chronic pancreatitis, PUD, HTN, hypothyroidism, and anxiety.  She presents with complaints of abdominal pain, primarily to the RUQ that began a week ago.  She was seen by her PCP yesterday, and he called her today to notify her of abnormal labs.  She was told to report to the ER.  She also complains of associated N/V.  She reports decreased PO intake and has not been able to eat since Thursday.  Patient notes that pain began after eating crawfish.  Denies chest pain, SOB, dizziness, palpitations, fever/chills, dysuria.  Complains of frequent urination.      Past Medical History:   Diagnosis Date    Adrenal adenoma     Gastric ulcer     Hypothyroidism     Kidney stones     Pancreatitis     Traumatic compression fracture of L1 lumbar vertebra        Past Surgical History:   Procedure Laterality Date    CHOLECYSTECTOMY      COLONOSCOPY N/A 2016    Procedure: COLONOSCOPY;  Surgeon: Sathya Strickland MD;  Location: [address]);  Service: Endoscopy;  Laterality: N/A;    CYSTOSCOPY      HYSTERECTOMY      KYPHOSIS SURGERY  7/20/15    right adrenal gland remove      tonsillectomy      TONSILLECTOMY      TUBAL LIGATION      x2          Review of patient's allergies indicates:   Allergen Reactions    Codeine  "Nausea And Vomiting    Compazine [prochlorperazine edisylate] Anaphylaxis and Nausea And Vomiting    Demerol [meperidine] Anaphylaxis and Nausea And Vomiting    Morpholine analogues Anaphylaxis and Nausea And Vomiting     vomit    Percocet [oxycodone-acetaminophen] Nausea And Vomiting    Phenothiazines Anaphylaxis     Takes Phenergan daily w/o problems.      Corticosteroids (glucocorticoids) Itching     Agitation, "skin crawling" sensation    Cortisone Itching     Agitation, "skin crawling" sensation    Fentanyl Nausea And Vomiting and Swelling     Fentanyl patch made lips numb and swollen    Morphine Nausea And Vomiting    Nsaids (non-steroidal anti-inflammatory drug) Other (See Comments)     Cluster ulcers    Pcn [penicillins] Other (See Comments)     Stomach cramps    Toradol [ketorolac] Nausea And Vomiting     ulcers    Tramadol Nausea And Vomiting       No current facility-administered medications on file prior to encounter.      Current Outpatient Prescriptions on File Prior to Encounter   Medication Sig    alprazolam (XANAX) 1 MG tablet TAKE 1/2 TABLET BY MOUTH TWICE DAILY    escitalopram oxalate (LEXAPRO) 20 MG tablet Take 1 tablet (20 mg total) by mouth every evening.    estradiol (ESTRACE) 1 MG tablet Take 1 tablet (1 mg total) by mouth once daily.    gabapentin (NEURONTIN) 300 MG capsule TAKE 1 CAPSULE BY MOUTH TWO TIMES A DAY    hydrochlorothiazide (MICROZIDE) 12.5 mg capsule TAKE 1 CAPSULE BY MOUTH EVERY DAY    levothyroxine (SYNTHROID) 75 MCG tablet TAKE 1 TABLET BY MOUTH BEFORE BREAKFAST    vitamin D 1000 units Tab Take 185 mg by mouth once daily.     Family History     Problem Relation (Age of Onset)    Diabetes Father    Heart attack Father        Social History Main Topics    Smoking status: Never Smoker    Smokeless tobacco: Never Used    Alcohol use No    Drug use: Not on file    Sexual activity: Not Currently     Review of Systems   Constitutional: Positive for appetite " change. Negative for activity change, chills, fatigue, fever and unexpected weight change.   HENT: Negative for congestion, rhinorrhea, sore throat, trouble swallowing and voice change.    Eyes: Negative for visual disturbance.   Respiratory: Negative for cough, choking, chest tightness, shortness of breath and wheezing.    Cardiovascular: Negative for chest pain, palpitations and leg swelling.   Gastrointestinal: Positive for abdominal pain, nausea and vomiting. Negative for abdominal distention, anal bleeding, blood in stool, constipation and diarrhea.   Endocrine: Negative for cold intolerance, heat intolerance, polydipsia and polyuria.   Genitourinary: Positive for frequency. Negative for dysuria, flank pain, hematuria and urgency.   Musculoskeletal: Negative for arthralgias, back pain, joint swelling and myalgias.   Skin: Negative for color change and rash.   Neurological: Negative for dizziness, seizures, syncope, facial asymmetry, speech difficulty, weakness, light-headedness, numbness and headaches.   Hematological: Negative for adenopathy. Does not bruise/bleed easily.   Psychiatric/Behavioral: Negative for agitation, confusion, hallucinations and suicidal ideas.     Objective:     Vital Signs (Most Recent):  Temp: 98.1 °F (36.7 °C) (04/27/17 0030)  Pulse: 69 (04/27/17 0030)  Resp: 18 (04/27/17 0030)  BP: 113/61 (04/27/17 0030)  SpO2: 96 % (04/27/17 0030) Vital Signs (24h Range):  Temp:  [97.4 °F (36.3 °C)-98.1 °F (36.7 °C)] 98.1 °F (36.7 °C)  Pulse:  [64-71] 69  Resp:  [18] 18  SpO2:  [96 %-98 %] 96 %  BP: (113-135)/(59-65) 113/61     Weight: 83.9 kg (184 lb 15.5 oz)  Body mass index is 36.12 kg/(m^2).    Physical Exam   Constitutional: She is oriented to person, place, and time. She appears well-developed and well-nourished. No distress.   HENT:   Head: Normocephalic and atraumatic.   Eyes: Pupils are equal, round, and reactive to light.   Neck: Neck supple. Carotid bruit is not present. No thyromegaly  present.   Cardiovascular: Normal rate and regular rhythm.  Exam reveals no gallop.    No murmur heard.  Pulmonary/Chest: Effort normal and breath sounds normal. No respiratory distress. She has no wheezes.   Abdominal: Soft. Bowel sounds are normal. She exhibits no distension and no mass. There is no splenomegaly or hepatomegaly. There is tenderness in the right upper quadrant.   Musculoskeletal: Normal range of motion. She exhibits no edema or deformity.   Neurological: She is alert and oriented to person, place, and time. No cranial nerve deficit or sensory deficit.   Skin: Skin is warm and dry. No rash noted.   Psychiatric: She has a normal mood and affect.        Significant Labs: All pertinent labs within the past 24 hours have been reviewed.    Significant Imaging: I have reviewed all pertinent imaging results/findings within the past 24 hours.    Assessment/Plan:     * Acute pancreatitis  - Lipase 198 yesterday.  Will check and order US.  - IVFs, NPO, supportive care.      Hypothyroidism  - Continue levothyroxine 75mcg daily.      Anxiety  - Continue Xanax 0.5mg BID, Lexapro 20mg qHS.      Benign essential hypertension  - Continue HCTZ 12.5mg daily.      VTE Risk Mitigation         Ordered     Medium Risk of VTE  Once      04/26/17 2259     Place CHANEL hose  Until discontinued      04/26/17 2259     Place sequential compression device  Until discontinued      04/26/17 2259        Manda Jeong PA-C  Department of Hospital Medicine   [facility]

## 2017-04-27 NOTE — PROGRESS NOTES
[facility] Hospital Medicine  Progress Note    Patient Name: Cara Mendoza  MRN: [id number]  Patient Class: IP- Inpatient   Admission Date: 4/26/2017  Length of Stay: 1 days  Attending Physician: Mahendra Carter MD  Primary Care Provider: Jaqueline Parker MD    [facility] Medicine Team: [facility] HOSP MED G Mahendra Carter MD    Subjective:     Principal Problem:Acute pancreatitis    HPI:  Patient is a 56 year old lady with a h/o chronic pancreatitis, PUD, HTN, hypothyroidism, and anxiety.  She presents with complaints of abdominal pain, primarily to the RUQ that began a week ago.  She was seen by her PCP yesterday, and he called her today to notify her of abnormal labs.  She was told to report to the ER.  She also complains of associated N/V.  She reports decreased PO intake and has not been able to eat since Thursday.  Patient notes that pain began after eating crawfish.  Denies chest pain, SOB, dizziness, palpitations, fever/chills, dysuria.  Complains of frequent urination.      Hospital Course:       Interval History:     Cristent says pain is improving, eager to start diet.     Review of Systems   All other systems reviewed and are negative.    Objective:     Vital Signs (Most Recent):  Temp: 98.3 °F (36.8 °C) (04/27/17 1500)  Pulse: 66 (04/27/17 1500)  Resp: 18 (04/27/17 1500)  BP: (!) 127/59 (04/27/17 1500)  SpO2: 97 % (04/27/17 1500) Vital Signs (24h Range):  Temp:  [97.1 °F (36.2 °C)-98.3 °F (36.8 °C)] 98.3 °F (36.8 °C)  Pulse:  [61-71] 66  Resp:  [18] 18  SpO2:  [85 %-98 %] 97 %  BP: (105-135)/(59-70) 127/59     Weight: 83.9 kg (184 lb 15.5 oz)  Body mass index is 36.12 kg/(m^2).    Intake/Output Summary (Last 24 hours) at 04/27/17 1712  Last data filed at 04/27/17 1500   Gross per 24 hour   Intake              335 ml   Output             1475 ml   Net            -1140 ml      Physical Exam   Constitutional: She appears well-nourished.   HENT:   Head: Atraumatic.   Cardiovascular: Normal rate.     Pulmonary/Chest: Effort normal.   Abdominal: Soft. She exhibits no distension. There is no tenderness.   Neurological: She is alert.   Psychiatric: She has a normal mood and affect.   Nursing note and vitals reviewed.      Significant Labs:   BMP:   Recent Labs  Lab 04/27/17  0452         K 4.0      CO2 27   BUN 10   CREATININE 0.6   CALCIUM 8.2*   MG 2.1     CBC:   Recent Labs  Lab 04/26/17 2129 04/27/17 0452   WBC 5.32 6.33   HGB 14.0 13.1   HCT 40.2 39.2    206       Significant Imaging: None    Assessment/Plan:      * Acute pancreatitis  - Continue IVF, pain control  - Symptoms improving ADAT    Hypothyroidism  - Continue levothyroxine 75mcg daily.      Anxiety  - Continue Xanax 0.5mg BID, Lexapro 20mg qHS.      Benign essential hypertension  - Continue HCTZ 12.5mg daily.      VTE Risk Mitigation         Ordered     Medium Risk of VTE  Once      04/26/17 2259     Place CHANEL hose  Until discontinued      04/26/17 2259     Place sequential compression device  Until discontinued      04/26/17 2259          Mahendra Carter MD  Department of Hospital Medicine   [facility]

## 2017-04-27 NOTE — ED TRIAGE NOTES
Reports having PCP call for elevated lipase. Hx of pancreatitis, has been experiencing abdominal pain, N/V since last week.

## 2017-04-27 NOTE — PLAN OF CARE
Problem: Patient Care Overview  Goal: Plan of Care Review  Outcome: Ongoing (interventions implemented as appropriate)  Pt remains free from falls and injury. Plan of care reviewed with pt. Pt understands plan. Pt c/o R upper abd pain, VSS. Phenergan given around the clock q6h. Plans were discussed with pt to try and wean off around the clock hydromorphone 1mg IV. Will continue to monitor.

## 2017-04-27 NOTE — PLAN OF CARE
Problem: Patient Care Overview  Goal: Plan of Care Review  Outcome: Ongoing (interventions implemented as appropriate)  Pt denies Chest pain, SOB C/o nausea and abd pain; prn zofran, phenergan, dilaudid given. Fall risk reviewed with pt. No falls, trauma or injury noted. VSS. NPO. Plan of care reviewed with patient. No further questions at this time. No significant events. Will continue to monitor.

## 2017-04-27 NOTE — ED NOTES
Two patient identifiers have been checked and are correct.      Appearance: Pt awake, alert & oriented to person, place & time. Pt in no acute distress at present time. Pt is clean and well groomed with clothes appropriately fastened.   Skin: Skin warm, dry & intact. Color consistent with ethnicity. Mucous membranes moist. No breakdown or brusing noted.   Musculoskeletal: Patient moving all extremities well, no obvious swelling or deformities noted.   Respiratory: Respirations spontaneous, even, and non-labored. Visible chest rise noted. Airway is open and patent. No accessory muscle use noted.   Neurologic: Sensation is intact. Speech is clear and appropriate. Eyes open spontaneously, behavior appropriate to situation, follows commands, facial expression symmetrical, bilateral hand grasp equal and even, purposeful motor response noted.  Cardiac: All peripheral pulses present. No Bilateral lower extremity edema. Cap refill is <3 seconds.  Abdomen: Abdomen soft, tender to palpation.   : Pt reports no dysuria or hematuria.

## 2017-04-28 VITALS
DIASTOLIC BLOOD PRESSURE: 57 MMHG | BODY MASS INDEX: 36.31 KG/M2 | WEIGHT: 184.94 LBS | HEIGHT: 60 IN | OXYGEN SATURATION: 95 % | HEART RATE: 77 BPM | TEMPERATURE: 99 F | RESPIRATION RATE: 20 BRPM | SYSTOLIC BLOOD PRESSURE: 123 MMHG

## 2017-04-28 LAB
BASOPHILS # BLD AUTO: 0.01 K/UL
BASOPHILS NFR BLD: 0.2 %
DIFFERENTIAL METHOD: NORMAL
EOSINOPHIL # BLD AUTO: 0.1 K/UL
EOSINOPHIL NFR BLD: 2.9 %
ERYTHROCYTE [DISTWIDTH] IN BLOOD BY AUTOMATED COUNT: 14.4 %
HCT VFR BLD AUTO: 38.7 %
HGB BLD-MCNC: 12.4 G/DL
LYMPHOCYTES # BLD AUTO: 2.1 K/UL
LYMPHOCYTES NFR BLD: 43.7 %
MCH RBC QN AUTO: 28.6 PG
MCHC RBC AUTO-ENTMCNC: 32 %
MCV RBC AUTO: 89 FL
MONOCYTES # BLD AUTO: 0.5 K/UL
MONOCYTES NFR BLD: 10.6 %
NEUTROPHILS # BLD AUTO: 2.1 K/UL
NEUTROPHILS NFR BLD: 42.6 %
PLATELET # BLD AUTO: 225 K/UL
PMV BLD AUTO: 11.1 FL
RBC # BLD AUTO: 4.34 M/UL
WBC # BLD AUTO: 4.83 K/UL

## 2017-04-28 PROCEDURE — 25000003 PHARM REV CODE 250: Performed by: EMERGENCY MEDICINE

## 2017-04-28 PROCEDURE — 25000003 PHARM REV CODE 250: Performed by: PHYSICIAN ASSISTANT

## 2017-04-28 PROCEDURE — 36415 COLL VENOUS BLD VENIPUNCTURE: CPT

## 2017-04-28 PROCEDURE — 63600175 PHARM REV CODE 636 W HCPCS: Performed by: EMERGENCY MEDICINE

## 2017-04-28 PROCEDURE — 85025 COMPLETE CBC W/AUTO DIFF WBC: CPT

## 2017-04-28 PROCEDURE — 63600175 PHARM REV CODE 636 W HCPCS: Performed by: PHYSICIAN ASSISTANT

## 2017-04-28 PROCEDURE — 25000003 PHARM REV CODE 250: Performed by: INTERNAL MEDICINE

## 2017-04-28 PROCEDURE — 99239 HOSP IP/OBS DSCHRG MGMT >30: CPT | Mod: ,,, | Performed by: INTERNAL MEDICINE

## 2017-04-28 RX ORDER — HYDROMORPHONE HYDROCHLORIDE 2 MG/1
2-4 TABLET ORAL
Qty: 30 TABLET | Refills: 0 | Status: SHIPPED | OUTPATIENT
Start: 2017-04-28 | End: 2017-05-18

## 2017-04-28 RX ORDER — PROMETHAZINE HYDROCHLORIDE 25 MG/1
25 TABLET ORAL EVERY 6 HOURS PRN
Qty: 60 TABLET | Refills: 1 | Status: SHIPPED | OUTPATIENT
Start: 2017-04-28 | End: 2017-08-14

## 2017-04-28 RX ORDER — HYDROMORPHONE HYDROCHLORIDE 2 MG/1
2 TABLET ORAL
Status: DISCONTINUED | OUTPATIENT
Start: 2017-04-28 | End: 2017-04-28 | Stop reason: HOSPADM

## 2017-04-28 RX ORDER — HYDROMORPHONE HYDROCHLORIDE 2 MG/1
4 TABLET ORAL
Status: DISCONTINUED | OUTPATIENT
Start: 2017-04-28 | End: 2017-04-28 | Stop reason: HOSPADM

## 2017-04-28 RX ADMIN — ESTRADIOL 1 MG: 1 TABLET ORAL at 08:04

## 2017-04-28 RX ADMIN — HYDROMORPHONE HYDROCHLORIDE 2 MG: 2 TABLET ORAL at 02:04

## 2017-04-28 RX ADMIN — DEXTROSE MONOHYDRATE AND SODIUM CHLORIDE: 5; .45 INJECTION, SOLUTION INTRAVENOUS at 04:04

## 2017-04-28 RX ADMIN — ONDANSETRON 4 MG: 2 INJECTION INTRAMUSCULAR; INTRAVENOUS at 06:04

## 2017-04-28 RX ADMIN — HYDROMORPHONE HYDROCHLORIDE 1 MG: 1 INJECTION, SOLUTION INTRAMUSCULAR; INTRAVENOUS; SUBCUTANEOUS at 11:04

## 2017-04-28 RX ADMIN — HYDROMORPHONE HYDROCHLORIDE 1 MG: 1 INJECTION, SOLUTION INTRAMUSCULAR; INTRAVENOUS; SUBCUTANEOUS at 12:04

## 2017-04-28 RX ADMIN — GABAPENTIN 300 MG: 300 CAPSULE ORAL at 08:04

## 2017-04-28 RX ADMIN — PROMETHAZINE HYDROCHLORIDE 25 MG: 25 TABLET ORAL at 01:04

## 2017-04-28 RX ADMIN — HYDROCHLOROTHIAZIDE 12.5 MG: 12.5 TABLET ORAL at 08:04

## 2017-04-28 RX ADMIN — LEVOTHYROXINE SODIUM 75 MCG: 75 TABLET ORAL at 06:04

## 2017-04-28 RX ADMIN — HYDROMORPHONE HYDROCHLORIDE 1 MG: 1 INJECTION, SOLUTION INTRAMUSCULAR; INTRAVENOUS; SUBCUTANEOUS at 04:04

## 2017-04-28 RX ADMIN — PROMETHAZINE HYDROCHLORIDE 25 MG: 25 TABLET ORAL at 11:04

## 2017-04-28 RX ADMIN — ALPRAZOLAM 0.5 MG: 0.5 TABLET ORAL at 08:04

## 2017-04-28 NOTE — PLAN OF CARE
04/27/17 1428   Discharge Assessment   Assessment Type Discharge Planning Assessment   Confirmed/corrected address and phone number on facesheet? Yes   Assessment information obtained from? Patient   Expected Length of Stay (days) 3   Communicated expected length of stay with patient/caregiver yes  (patient would like to leave as soon as possible )   Prior to hospitilization cognitive status: Alert/Oriented   Prior to hospitalization functional status: Independent   Current cognitive status: Alert/Oriented   Current Functional Status: Independent   Arrived From other (see comments)  (PCP recommended admit 2/2 elevated lipase)   Lives With alone   Able to Return to Prior Arrangements yes   Is patient able to care for self after discharge? Yes  (is primary cg for parents)   Patient's perception of discharge disposition home or selfcare   Readmission Within The Last 30 Days no previous admission in last 30 days   Patient currently being followed by outpatient case management? No   Patient currently receives home health services? No   Does the patient currently use HME? No   Patient currently receives private duty nursing? No   Patient currently receives any other outside agency services? No   Equipment Currently Used at Home none   Do you have any problems affording any of your prescribed medications? No   Is the patient taking medications as prescribed? yes   Do you have any financial concerns preventing you from receiving the healthcare you need? No   Does the patient have transportation to healthcare appointments? Yes   Transportation Available car   On Dialysis? No   Does the patient receive services at the Coumadin Clinic? No   Are there any open cases? No   Discharge Plan A Home with family   Patient/Family In Agreement With Plan yes

## 2017-04-28 NOTE — PLAN OF CARE
Problem: Patient Care Overview  Goal: Plan of Care Review  Outcome: Ongoing (interventions implemented as appropriate)  VS and assessment performed per orders. Pain and nausea medication given as ordered, moderate relief. Plan of care reviewed with pt, all concerns addressed. Pt free from injury or any falls

## 2017-04-28 NOTE — PROGRESS NOTES
Report given to ADDIS Layne. Pt is being transferred to Room 1144. Waiting for the room to be cleaned. Will transfer the pt shortly.

## 2017-04-28 NOTE — PLAN OF CARE
04/28/17 1413   Final Note   Assessment Type Final Discharge Note   Discharge Disposition Home   Discharge planning education complete? Yes   Hospital Follow Up  Appt(s) scheduled? Yes   Discharge plans and expectations educations in teach back method with documentation complete? Yes   Offered [facility] -- Bedside Delivery? n/a   Discharge/Hospital Encounter Summary to (non-[facility]) PCP n/a   Referral to Outpatient Case Management complete? No   Referral to / orders for Home Health Complete? n/a   30 day supply of medicines given at discharge, if documented non-compliance / non-adherence? n/a

## 2017-04-29 ENCOUNTER — HOSPITAL ENCOUNTER (OUTPATIENT)
Facility: HOSPITAL | Age: 57
Discharge: HOME | End: 2017-05-03
Attending: FAMILY MEDICINE | Admitting: HOSPITALIST
Payer: MEDICARE

## 2017-04-29 ENCOUNTER — NURSE TRIAGE (OUTPATIENT)
Dept: ADMINISTRATIVE | Facility: CLINIC | Age: 57
End: 2017-04-29

## 2017-04-29 DIAGNOSIS — K86.89 PANCREATIC DUCT DILATED: ICD-10-CM

## 2017-04-29 DIAGNOSIS — K85.90 ACUTE PANCREATITIS, UNSPECIFIED COMPLICATION STATUS, UNSPECIFIED PANCREATITIS TYPE: ICD-10-CM

## 2017-04-29 DIAGNOSIS — R11.2 INTRACTABLE VOMITING WITH NAUSEA, UNSPECIFIED VOMITING TYPE: ICD-10-CM

## 2017-04-29 DIAGNOSIS — F41.9 ANXIETY: Chronic | ICD-10-CM

## 2017-04-29 DIAGNOSIS — K20.90 ESOPHAGITIS: ICD-10-CM

## 2017-04-29 DIAGNOSIS — R10.13 EPIGASTRIC PAIN: Primary | ICD-10-CM

## 2017-04-29 DIAGNOSIS — I10 BENIGN ESSENTIAL HYPERTENSION: Chronic | ICD-10-CM

## 2017-04-29 DIAGNOSIS — K29.50 CHRONIC GASTRITIS WITHOUT BLEEDING, UNSPECIFIED GASTRITIS TYPE: ICD-10-CM

## 2017-04-29 DIAGNOSIS — E03.9 HYPOTHYROIDISM, UNSPECIFIED TYPE: Chronic | ICD-10-CM

## 2017-04-29 DIAGNOSIS — R79.89 ABNORMAL LFTS: ICD-10-CM

## 2017-04-29 DIAGNOSIS — R74.01 TRANSAMINITIS: ICD-10-CM

## 2017-04-29 DIAGNOSIS — K85.90 ACUTE PANCREATITIS: ICD-10-CM

## 2017-04-29 DIAGNOSIS — K29.80 DUODENITIS: ICD-10-CM

## 2017-04-29 LAB
ALBUMIN SERPL BCP-MCNC: 3.4 G/DL
ALP SERPL-CCNC: 96 U/L
ALT SERPL W/O P-5'-P-CCNC: 150 U/L
ANION GAP SERPL CALC-SCNC: 5 MMOL/L
AST SERPL-CCNC: 44 U/L
BASOPHILS # BLD AUTO: 0.02 K/UL
BASOPHILS NFR BLD: 0.3 %
BILIRUB SERPL-MCNC: 0.3 MG/DL
BUN SERPL-MCNC: 10 MG/DL
CALCIUM SERPL-MCNC: 9 MG/DL
CHLORIDE SERPL-SCNC: 100 MMOL/L
CO2 SERPL-SCNC: 33 MMOL/L
CREAT SERPL-MCNC: 0.6 MG/DL
DIFFERENTIAL METHOD: NORMAL
EOSINOPHIL # BLD AUTO: 0.1 K/UL
EOSINOPHIL NFR BLD: 2.1 %
ERYTHROCYTE [DISTWIDTH] IN BLOOD BY AUTOMATED COUNT: 14.5 %
EST. GFR  (AFRICAN AMERICAN): >60 ML/MIN/1.73 M^2
EST. GFR  (NON AFRICAN AMERICAN): >60 ML/MIN/1.73 M^2
GLUCOSE SERPL-MCNC: 87 MG/DL
HCT VFR BLD AUTO: 37.5 %
HGB BLD-MCNC: 12.8 G/DL
LIPASE SERPL-CCNC: 92 U/L
LYMPHOCYTES # BLD AUTO: 2.6 K/UL
LYMPHOCYTES NFR BLD: 45.3 %
MCH RBC QN AUTO: 30 PG
MCHC RBC AUTO-ENTMCNC: 34.1 %
MCV RBC AUTO: 88 FL
MONOCYTES # BLD AUTO: 0.5 K/UL
MONOCYTES NFR BLD: 8 %
NEUTROPHILS # BLD AUTO: 2.5 K/UL
NEUTROPHILS NFR BLD: 44.3 %
PLATELET # BLD AUTO: 207 K/UL
PMV BLD AUTO: 11 FL
POTASSIUM SERPL-SCNC: 4 MMOL/L
PROT SERPL-MCNC: 6 G/DL
RBC # BLD AUTO: 4.27 M/UL
SODIUM SERPL-SCNC: 138 MMOL/L
WBC # BLD AUTO: 5.74 K/UL

## 2017-04-29 PROCEDURE — 80053 COMPREHEN METABOLIC PANEL: CPT

## 2017-04-29 PROCEDURE — 96374 THER/PROPH/DIAG INJ IV PUSH: CPT

## 2017-04-29 PROCEDURE — 83690 ASSAY OF LIPASE: CPT

## 2017-04-29 PROCEDURE — 96361 HYDRATE IV INFUSION ADD-ON: CPT

## 2017-04-29 PROCEDURE — 96360 HYDRATION IV INFUSION INIT: CPT | Mod: XS

## 2017-04-29 PROCEDURE — 94761 N-INVAS EAR/PLS OXIMETRY MLT: CPT

## 2017-04-29 PROCEDURE — 63600175 PHARM REV CODE 636 W HCPCS: Performed by: EMERGENCY MEDICINE

## 2017-04-29 PROCEDURE — 99285 EMERGENCY DEPT VISIT HI MDM: CPT | Mod: ,,, | Performed by: EMERGENCY MEDICINE

## 2017-04-29 PROCEDURE — 99220 PR INITIAL OBSERVATION CARE,LEVL III: CPT | Mod: ,,, | Performed by: PHYSICIAN ASSISTANT

## 2017-04-29 PROCEDURE — 96375 TX/PRO/DX INJ NEW DRUG ADDON: CPT

## 2017-04-29 PROCEDURE — 25000003 PHARM REV CODE 250: Performed by: PHYSICIAN ASSISTANT

## 2017-04-29 PROCEDURE — 96376 TX/PRO/DX INJ SAME DRUG ADON: CPT

## 2017-04-29 PROCEDURE — 85025 COMPLETE CBC W/AUTO DIFF WBC: CPT

## 2017-04-29 PROCEDURE — 63600175 PHARM REV CODE 636 W HCPCS: Performed by: FAMILY MEDICINE

## 2017-04-29 PROCEDURE — 25000003 PHARM REV CODE 250: Performed by: FAMILY MEDICINE

## 2017-04-29 PROCEDURE — G0378 HOSPITAL OBSERVATION PER HR: HCPCS

## 2017-04-29 PROCEDURE — 99285 EMERGENCY DEPT VISIT HI MDM: CPT | Mod: 25

## 2017-04-29 RX ORDER — ONDANSETRON 8 MG/1
8 TABLET, ORALLY DISINTEGRATING ORAL EVERY 8 HOURS PRN
Status: DISCONTINUED | OUTPATIENT
Start: 2017-04-29 | End: 2017-05-03 | Stop reason: HOSPADM

## 2017-04-29 RX ORDER — SODIUM CHLORIDE 9 MG/ML
INJECTION, SOLUTION INTRAVENOUS CONTINUOUS
Status: DISCONTINUED | OUTPATIENT
Start: 2017-04-29 | End: 2017-05-03 | Stop reason: HOSPADM

## 2017-04-29 RX ORDER — GABAPENTIN 100 MG/1
300 CAPSULE ORAL 2 TIMES DAILY
Status: DISCONTINUED | OUTPATIENT
Start: 2017-04-29 | End: 2017-05-03 | Stop reason: HOSPADM

## 2017-04-29 RX ORDER — ALPRAZOLAM 0.5 MG/1
0.5 TABLET ORAL 2 TIMES DAILY
Status: DISCONTINUED | OUTPATIENT
Start: 2017-04-29 | End: 2017-05-03 | Stop reason: HOSPADM

## 2017-04-29 RX ORDER — IPRATROPIUM BROMIDE AND ALBUTEROL SULFATE 2.5; .5 MG/3ML; MG/3ML
3 SOLUTION RESPIRATORY (INHALATION) EVERY 4 HOURS PRN
Status: DISCONTINUED | OUTPATIENT
Start: 2017-04-29 | End: 2017-05-03 | Stop reason: HOSPADM

## 2017-04-29 RX ORDER — GLUCAGON 1 MG
1 KIT INJECTION
Status: DISCONTINUED | OUTPATIENT
Start: 2017-04-29 | End: 2017-05-03

## 2017-04-29 RX ORDER — HYDROCHLOROTHIAZIDE 12.5 MG/1
12.5 TABLET ORAL DAILY
Status: DISCONTINUED | OUTPATIENT
Start: 2017-04-30 | End: 2017-04-30

## 2017-04-29 RX ORDER — POLYETHYLENE GLYCOL 3350 17 G/17G
17 POWDER, FOR SOLUTION ORAL 3 TIMES DAILY PRN
Status: DISCONTINUED | OUTPATIENT
Start: 2017-04-29 | End: 2017-05-03 | Stop reason: HOSPADM

## 2017-04-29 RX ORDER — HYDROMORPHONE HYDROCHLORIDE 1 MG/ML
0.5 INJECTION, SOLUTION INTRAMUSCULAR; INTRAVENOUS; SUBCUTANEOUS
Status: COMPLETED | OUTPATIENT
Start: 2017-04-29 | End: 2017-04-29

## 2017-04-29 RX ORDER — ESCITALOPRAM OXALATE 20 MG/1
20 TABLET ORAL NIGHTLY
Status: DISCONTINUED | OUTPATIENT
Start: 2017-04-29 | End: 2017-05-03 | Stop reason: HOSPADM

## 2017-04-29 RX ORDER — HYDROMORPHONE HYDROCHLORIDE 1 MG/ML
1 INJECTION, SOLUTION INTRAMUSCULAR; INTRAVENOUS; SUBCUTANEOUS
Status: COMPLETED | OUTPATIENT
Start: 2017-04-29 | End: 2017-04-29

## 2017-04-29 RX ORDER — IBUPROFEN 200 MG
16 TABLET ORAL
Status: DISCONTINUED | OUTPATIENT
Start: 2017-04-29 | End: 2017-05-03

## 2017-04-29 RX ORDER — ONDANSETRON 2 MG/ML
4 INJECTION INTRAMUSCULAR; INTRAVENOUS
Status: COMPLETED | OUTPATIENT
Start: 2017-04-29 | End: 2017-04-29

## 2017-04-29 RX ORDER — RAMELTEON 8 MG/1
8 TABLET ORAL NIGHTLY PRN
Status: DISCONTINUED | OUTPATIENT
Start: 2017-04-29 | End: 2017-05-03 | Stop reason: HOSPADM

## 2017-04-29 RX ORDER — LEVOTHYROXINE SODIUM 75 UG/1
75 TABLET ORAL
Status: DISCONTINUED | OUTPATIENT
Start: 2017-04-30 | End: 2017-05-03 | Stop reason: HOSPADM

## 2017-04-29 RX ORDER — AMOXICILLIN 250 MG
1 CAPSULE ORAL 2 TIMES DAILY
Status: DISCONTINUED | OUTPATIENT
Start: 2017-04-29 | End: 2017-05-03 | Stop reason: HOSPADM

## 2017-04-29 RX ORDER — HYDROMORPHONE HYDROCHLORIDE 1 MG/ML
1 INJECTION, SOLUTION INTRAMUSCULAR; INTRAVENOUS; SUBCUTANEOUS EVERY 4 HOURS PRN
Status: DISCONTINUED | OUTPATIENT
Start: 2017-04-29 | End: 2017-05-03

## 2017-04-29 RX ORDER — PROMETHAZINE HYDROCHLORIDE 25 MG/1
25 TABLET ORAL EVERY 6 HOURS PRN
Status: DISCONTINUED | OUTPATIENT
Start: 2017-04-29 | End: 2017-05-03 | Stop reason: HOSPADM

## 2017-04-29 RX ORDER — HYDROMORPHONE HYDROCHLORIDE 1 MG/ML
0.5 INJECTION, SOLUTION INTRAMUSCULAR; INTRAVENOUS; SUBCUTANEOUS EVERY 4 HOURS PRN
Status: DISCONTINUED | OUTPATIENT
Start: 2017-04-29 | End: 2017-05-03

## 2017-04-29 RX ORDER — LOPERAMIDE HYDROCHLORIDE 2 MG/1
2 CAPSULE ORAL
Status: DISCONTINUED | OUTPATIENT
Start: 2017-04-29 | End: 2017-05-03

## 2017-04-29 RX ORDER — IBUPROFEN 200 MG
24 TABLET ORAL
Status: DISCONTINUED | OUTPATIENT
Start: 2017-04-29 | End: 2017-05-03

## 2017-04-29 RX ORDER — ESTRADIOL 1 MG/1
1 TABLET ORAL DAILY
Status: DISCONTINUED | OUTPATIENT
Start: 2017-04-30 | End: 2017-05-03 | Stop reason: HOSPADM

## 2017-04-29 RX ADMIN — GABAPENTIN 300 MG: 300 CAPSULE ORAL at 10:04

## 2017-04-29 RX ADMIN — SODIUM CHLORIDE: 0.9 INJECTION, SOLUTION INTRAVENOUS at 11:04

## 2017-04-29 RX ADMIN — HYDROMORPHONE HYDROCHLORIDE 1 MG: 1 INJECTION, SOLUTION INTRAMUSCULAR; INTRAVENOUS; SUBCUTANEOUS at 06:04

## 2017-04-29 RX ADMIN — HYDROMORPHONE HYDROCHLORIDE 0.5 MG: 1 INJECTION, SOLUTION INTRAMUSCULAR; INTRAVENOUS; SUBCUTANEOUS at 05:04

## 2017-04-29 RX ADMIN — ALPRAZOLAM 0.5 MG: 0.5 TABLET ORAL at 10:04

## 2017-04-29 RX ADMIN — STANDARDIZED SENNA CONCENTRATE AND DOCUSATE SODIUM 1 TABLET: 8.6; 5 TABLET, FILM COATED ORAL at 10:04

## 2017-04-29 RX ADMIN — SODIUM CHLORIDE 1000 ML: 0.9 INJECTION, SOLUTION INTRAVENOUS at 04:04

## 2017-04-29 RX ADMIN — ONDANSETRON 4 MG: 2 INJECTION INTRAMUSCULAR; INTRAVENOUS at 04:04

## 2017-04-29 RX ADMIN — HYDROMORPHONE HYDROCHLORIDE 0.5 MG: 1 INJECTION, SOLUTION INTRAMUSCULAR; INTRAVENOUS; SUBCUTANEOUS at 09:04

## 2017-04-29 NOTE — ED TRIAGE NOTES
Pt was discharged from 11th floor yesterday. She had been admitted for pancreatitis. Last night, pt states she began vomiting after eating and her symptoms continued through this morning. Pt also reports extreme pain and exhaustion.

## 2017-04-29 NOTE — ED PROVIDER NOTES
"Encounter Date: 4/29/2017    SCRIBE #1 NOTE: I, Janiya Logan, am scribing for, and in the presence of,  Dr. Abarca. I have scribed the following portions of the note - the Resident attestation.       History     Chief Complaint   Patient presents with    Pancreatitis     Discharged from the hospital yesterday; increased pain and vomiting.     Review of patient's allergies indicates:   Allergen Reactions    Codeine Nausea And Vomiting    Compazine [prochlorperazine edisylate] Anaphylaxis and Nausea And Vomiting    Demerol [meperidine] Anaphylaxis and Nausea And Vomiting    Morpholine analogues Anaphylaxis and Nausea And Vomiting     vomit    Percocet [oxycodone-acetaminophen] Nausea And Vomiting    Phenothiazines Anaphylaxis     Takes Phenergan daily w/o problems.      Corticosteroids (glucocorticoids) Itching     Agitation, "skin crawling" sensation    Cortisone Itching     Agitation, "skin crawling" sensation    Fentanyl Nausea And Vomiting and Swelling     Fentanyl patch made lips numb and swollen    Morphine Nausea And Vomiting    Nsaids (non-steroidal anti-inflammatory drug) Other (See Comments)     Cluster ulcers    Pcn [penicillins] Other (See Comments)     Stomach cramps    Toradol [ketorolac] Nausea And Vomiting     ulcers    Tramadol Nausea And Vomiting     HPI Comments: Mr Mendoza is a 56 y F discharged yesterday after inpatient stay for pancreatitis who re-presents with recurrent vomiting and continued epigastric pain despite PO dilaudid. She reports that she had pushed herself to leave the hospital to make it to a family function tomorrow, was able to tolerate PO before leaving. However, when she got home, she started to feel badly again. She was unable to tolerate PO. She says she was up all night with nausea and significant pain despite taking her home meds. She reports that she feels worse this morning. She called her discharging provider, who advised her to come to the ED if she " continued to feel badly. Her pain is epigastric, radiates across the entire upper abdomen, constant, severe, sharp/stabbing, worse with attempts at PO intake.     The history is provided by the patient.     Past Medical History:   Diagnosis Date    Adrenal adenoma     Gastric ulcer     Hypothyroidism     Kidney stones     Pancreatitis     Traumatic compression fracture of L1 lumbar vertebra      Past Surgical History:   Procedure Laterality Date    CHOLECYSTECTOMY      COLONOSCOPY N/A 2016    Procedure: COLONOSCOPY;  Surgeon: Sathya Strickland MD;  Location: [address];  Service: Endoscopy;  Laterality: N/A;    CYSTOSCOPY      HYSTERECTOMY      KYPHOSIS SURGERY  7/20/15    right adrenal gland remove      tonsillectomy      TONSILLECTOMY      TUBAL LIGATION      x2        Family History   Problem Relation Age of Onset    Diabetes Father      borderline    Heart attack Father      Social History   Substance Use Topics    Smoking status: Never Smoker    Smokeless tobacco: Never Used    Alcohol use No     Review of Systems   Constitutional: Positive for appetite change. Negative for chills, diaphoresis and fever.   HENT: Negative for congestion, sore throat and trouble swallowing.    Respiratory: Negative for cough and shortness of breath.    Gastrointestinal: Positive for abdominal pain, nausea and vomiting. Negative for diarrhea.   Endocrine: Negative for polyphagia and polyuria.   Genitourinary: Negative for difficulty urinating, dysuria, flank pain and frequency.   Musculoskeletal: Negative for back pain.   Skin: Negative for rash.   Allergic/Immunologic: Negative for immunocompromised state.   Neurological: Negative for syncope, weakness, numbness and headaches.   Hematological: Does not bruise/bleed easily.       Physical Exam   Initial Vitals   BP Pulse Resp Temp SpO2   17 1541 17 1541 17 1541 17 1541 17 1541   137/65 72 18 97.8 °F (36.6 °C) 95 %      Physical Exam    Nursing note and vitals reviewed.  Constitutional: She appears well-developed and well-nourished. She is not diaphoretic. No distress.   HENT:   Head: Normocephalic and atraumatic.   Dry MM   Eyes: Conjunctivae are normal. No scleral icterus.   Neck: Normal range of motion. Neck supple.   Cardiovascular: Normal rate, regular rhythm, normal heart sounds and intact distal pulses.   Pulmonary/Chest: Breath sounds normal. No respiratory distress. She has no wheezes. She has no rhonchi. She has no rales.   Abdominal: Bowel sounds are normal. She exhibits no distension. There is tenderness in the epigastric area. There is guarding. There is no rebound.   Musculoskeletal: Normal range of motion. She exhibits no edema.   Neurological: She is alert and oriented to person, place, and time.   Skin: Skin is warm and dry.         ED Course   Procedures  Labs Reviewed   COMPREHENSIVE METABOLIC PANEL - Abnormal; Notable for the following:        Result Value    CO2 33 (*)     Albumin 3.4 (*)     AST 44 (*)      (*)     Anion Gap 5 (*)     All other components within normal limits   LIPASE - Abnormal; Notable for the following:     Lipase 92 (*)     All other components within normal limits   CBC W/ AUTO DIFFERENTIAL             Medical Decision Making:   History:   Old Medical Records: I decided to obtain old medical records.  Clinical Tests:   Lab Tests: Reviewed and Ordered    HO4 Note:   DDX: Intractable nausea/vomiting/pain from pancreatitis  Afebrile  No sepsis  Mildly dehydrated on exam  Obtained repeat labs  Gave dilaudid, zofran, IVF  Labs returned notable for a new transaminitis not present on admission. Lipase improved from value 2 days ago.   Admitted for observation  Priscila Mccormick MD 8:04PM 4/29/17            Scribe Attestation:   Scribe #1: I performed the above scribed service and the documentation accurately describes the services I performed. I attest to the accuracy of the  note.    Attending Attestation:   Physician Attestation Statement for Resident:  As the supervising MD   Physician Attestation Statement: I have personally seen and examined this patient.   I agree with the above history. -: Pt with pancreatitis. Recently discharged for same but not doing well at home. Pt was told to come back if worse and she is worse. Will admit.    As the supervising MD I agree with the above PE.    As the supervising MD I agree with the above treatment, course, plan, and disposition.          Physician Attestation for Scribe:  Physician Attestation Statement for Scribe #1: I, Dr. Abarca, reviewed documentation, as scribed by Janiya Logan in my presence, and it is both accurate and complete.                 ED Course     Clinical Impression:   The primary encounter diagnosis was Acute pancreatitis, unspecified complication status, unspecified pancreatitis type. Diagnoses of Intractable vomiting with nausea, unspecified vomiting type and Epigastric pain were also pertinent to this visit.    Disposition:   Disposition: Admitted       Priscila Mccormick MD  Resident  04/29/17 2004

## 2017-04-29 NOTE — TELEPHONE ENCOUNTER
Reason for Disposition   [1] SEVERE pain (e.g., excruciating) AND [2] present > 1 hour    Protocols used:  ABDOMINAL PAIN - [facility]    Pt calling with concerns of abdominal pain related to pancreatitis.  Pt rates pain 9/10 on pain scale even with taking Dilaudid.  Care advice given.

## 2017-04-29 NOTE — IP AVS SNAPSHOT
[facility]  [address]  [facility] [address]  Phone: [phone]           Patient Discharge Instructions   Our goal is to set you up for success. This packet includes information on your condition, medications, and your home care.  It will help you care for yourself to prevent having to return to the hospital.     Please ask your nurse if you have any questions.      There are many details to remember when preparing to leave the hospital. Here is what you will need to do:    1. Take your medicine. If you are prescribed medications, review your Medication List on the following pages. You may have new medications to  at the pharmacy and others that you'll need to stop taking. Review the instructions for how and when to take your medications. Talk with your doctor or nurses if you are unsure of what to do.     2. Go to your follow-up appointments. Specific follow-up information is listed in the following pages. Your may be contacted by a nurse or clinical provider about future appointments. Be sure we have all of the phone numbers to reach you. Please contact your provider's office if you are unable to make an appointment.     3. Watch for warning signs. Your doctor or nurse will give you detailed warning signs to watch for and when to call for assistance. These instructions may also include educational information about your condition. If you experience any of warning signs to your health, call your doctor.           [facility]  Unless otherwise directed by your provider, please   contact [facility], our nurse care line   that is available for 24/7 assistance.     [phone] (toll-free)     Registered nurses in the [facility]   provide: appointment scheduling, clinical advisement, health education, and other advisory services.                  ** Verify the list of medication(s) below is accurate and up to date. Carry this with you in case of  emergency. If your medications have changed, please notify your healthcare provider.             Medication List      START taking these medications        Additional Info                      aluminum-magnesium hydroxide-simethicone 200-200-20 mg/5 mL Susp   Commonly known as:  MAALOX   Refills:  0   Dose:  30 mL    Last time this was given:  30 mLs on 5/3/2017  6:20 AM   Instructions:  Take 30 mLs by mouth every 8 (eight) hours as needed (Reflux).     Begin Date    AM    Noon    PM    Bedtime       famotidine 20 MG tablet   Commonly known as:  PEPCID   Quantity:  60 tablet   Refills:  3   Dose:  20 mg    Instructions:  Take 1 tablet (20 mg total) by mouth 2 (two) times daily as needed for Heartburn.     Begin Date    AM    Noon    PM    Bedtime       pantoprazole 40 MG tablet   Commonly known as:  PROTONIX   Quantity:  30 tablet   Refills:  11   Dose:  40 mg    Last time this was given:  40 mg on 5/3/2017  9:53 AM   Instructions:  Take 1 tablet (40 mg total) by mouth once daily.     Begin Date    AM    Noon    PM    Bedtime         CONTINUE taking these medications        Additional Info                      alprazolam 1 MG tablet   Commonly known as:  XANAX   Quantity:  30 tablet   Refills:  3    Last time this was given:  0.5 mg on 5/3/2017  9:53 AM   Instructions:  TAKE 1/2 TABLET BY MOUTH TWICE DAILY     Begin Date    AM    Noon    PM    Bedtime       escitalopram oxalate 20 MG tablet   Commonly known as:  LEXAPRO   Quantity:  30 tablet   Refills:  6   Dose:  20 mg    Last time this was given:  20 mg on 5/2/2017  9:04 PM   Instructions:  Take 1 tablet (20 mg total) by mouth every evening.     Begin Date    AM    Noon    PM    Bedtime       estradiol 1 MG tablet   Commonly known as:  ESTRACE   Quantity:  30 tablet   Refills:  6   Dose:  1 mg    Last time this was given:  1 mg on 5/3/2017  9:52 AM   Instructions:  Take 1 tablet (1 mg total) by mouth once daily.     Begin Date    AM    Noon    PM    Bedtime        gabapentin 300 MG capsule   Commonly known as:  NEURONTIN   Quantity:  60 capsule   Refills:  5    Last time this was given:  300 mg on 5/3/2017  9:53 AM   Instructions:  TAKE 1 CAPSULE BY MOUTH TWO TIMES A DAY     Begin Date    AM    Noon    PM    Bedtime       hydrochlorothiazide 12.5 mg capsule   Commonly known as:  MICROZIDE   Quantity:  90 capsule   Refills:  3    Instructions:  TAKE 1 CAPSULE BY MOUTH EVERY DAY     Begin Date    AM    Noon    PM    Bedtime       HYDROmorphone 2 MG tablet   Commonly known as:  DILAUDID   Quantity:  30 tablet   Refills:  0   Dose:  2-4 mg    Instructions:  Take 1-2 tablets (2-4 mg total) by mouth every 3 (three) hours as needed for Pain.     Begin Date    AM    Noon    PM    Bedtime       levothyroxine 75 MCG tablet   Commonly known as:  SYNTHROID   Quantity:  90 tablet   Refills:  1    Last time this was given:  75 mcg on 5/3/2017  6:15 AM   Instructions:  TAKE 1 TABLET BY MOUTH BEFORE BREAKFAST     Begin Date    AM    Noon    PM    Bedtime       promethazine 25 MG tablet   Commonly known as:  PHENERGAN   Quantity:  60 tablet   Refills:  1   Dose:  25 mg    Last time this was given:  25 mg on 5/3/2017  1:53 PM   Instructions:  Take 1 tablet (25 mg total) by mouth every 6 (six) hours as needed.     Begin Date    AM    Noon    PM    Bedtime       vitamin D 1000 units Tab   Refills:  0   Dose:  185 mg    Instructions:  Take 185 mg by mouth once daily.     Begin Date    AM    Noon    PM    Bedtime            Where to Get Your Medications      These medications were sent to [id number] [place] - [address]  [address] [address]     Phone:  [phone]     famotidine 20 MG tablet    pantoprazole 40 MG tablet         You can get these medications from any pharmacy     You don't need a prescription for these medications     aluminum-magnesium hydroxide-simethicone 200-200-20 mg/5 mL Susp                  Please bring to all follow up  appointments:    1. A copy of your discharge instructions.  2. All medicines you are currently taking in their original bottles.  3. Identification and insurance card.    Please arrive 15 minutes ahead of scheduled appointment time.    Please call 24 hours in advance if you must reschedule your appointment and/or time.        Your Scheduled Appointments     May 18, 2017  9:20 AM CDT   Hospital Follow Up with MD Rigoberto Interiano - Internal Medicine ([facility])    [address]  [place] [address]   [phone]            Jul 25, 2017  8:40 AM CDT   Established Patient Visit with MD Rigoberto Interiano - Internal Medicine ([facility])    [address]  [place] [id number] [phone]              Follow-up Information     Follow up with Sathya Strickland MD. Schedule an appointment as soon as possible for a visit in 2 weeks.    Specialty:  Gastroenterology    Why:  For discharge from hospital follow up, If symptoms worsen    Contact information:    [address] [facility] [address]  [phone]          Follow up with Jaqueline Parker MD. Schedule an appointment as soon as possible for a visit in 1 week.    Specialty:  Internal Medicine    Why:  For discharge from hospital follow up    Contact information:    [address]  [place] [address]  [phone]          Discharge Instructions     Future Orders    Activity as tolerated     Call MD for:  difficulty breathing or increased cough     Call MD for:  persistent dizziness, light-headedness, or visual disturbances     Call MD for:  persistent nausea and vomiting or diarrhea     Call MD for:  temperature >100.4     Diet general     Questions:    Total calories:      Fat restriction, if any:      Protein restriction, if any:      Na restriction, if any:      Fluid restriction:      Additional restrictions:      Diet general     Questions:    Total calories:      Fat  restriction, if any:      Protein restriction, if any:      Na restriction, if any:      Fluid restriction:      Additional restrictions:  Low Chol/Sat Fat      Instructions    Discharge Summary/Instructions for after an Upper EUS  Patient Name: Destinee Mendoza  Patient MRN: [id number]  Patient YOB: 1960  Tuesday, May 02, 2017    Chong Ortiz MD  1.  Do Not eat or drink anything for 1 hour.  Try sips of water first.  If   tolerated, resume your regular diet or one recommended by your physician.  2.  Do not drive, operate machinery, make critical decisions, or do   activities that require coordination or balance for 24 hours.  3.  You may experience a sore throat for 24 to 48 hours.  You may use throat   lozenges or gargle with warm salt water to relieve the discomfort.  4.  Because air was put into your stomach during the procedure, you may   experience some belching.  5.  Go directly to the emergency room if you notice any of the following:   Chills and/or fever over 101   Persistent vomiting or vomiting with blood   Severe abdominal pain, other than gas cramps   Severe chest pain   Black, tarry stools  Your doctor recommends these additional instructions:  - Return patient to hospital lubin for ongoing care.   - Resume previous diet.   - Continue present medications.   - Await path results.  Resume your previous diet.   Continue your present medications.   We are waiting for pathology results.  Return patient to hospital lubin for ongoing care.   Resume previous diet.   Continue present medications.   Await path results.  If you have any questions on the above instructions, call the GI Lab and ask   for an endoscopy nurse.  If you have any problems, please call your physician.  EMERGENCY PHONE NUMBER: [phone]  LAB RESULTS: [phone]  Chong Ortiz MD  5/2/2017 2:11:44 PM  This report has been verified and signed electronically.         Primary Diagnosis     Your primary  "diagnosis was:  Abdominal Pain, Pit Of Stomach      Admission Information     Date & Time Provider Department CSN    4/29/2017  3:45 PM Rita Sanchez MD [facility] [id number]      Care Providers     Provider Role Specialty Primary office phone    Rita Sanchez MD Attending Provider Hospitalist [phone]    Mahendra Carter MD Team Attending  Hospitalist [phone]    Rita Sanchez MD Team Attending  Hospitalist [phone]    Chong Ortiz MD Surgeon  Gastroenterology [phone]      Your Vitals Were     BP Pulse Temp Resp Height Weight    140/66 (BP Location: Left leg, Patient Position: Lying, BP Method: Automatic) 70 97.7 °F (36.5 °C) (Oral) 16 5' (1.524 m) 83.5 kg (184 lb)    SpO2 BMI             96% 35.94 kg/m2         Recent Lab Values        7/14/2014                           8:01 PM           A1C 5.3                       Pending Labs     Order Current Status    Specimen to Pathology - Surgery In process      Allergies as of 5/3/2017        Reactions    Codeine Nausea And Vomiting    Compazine [Prochlorperazine Edisylate] Anaphylaxis, Nausea And Vomiting    Demerol [Meperidine] Anaphylaxis, Nausea And Vomiting    Morpholine Analogues Anaphylaxis, Nausea And Vomiting    vomit    Percocet [Oxycodone-acetaminophen] Nausea And Vomiting    Phenothiazines Anaphylaxis    Takes Phenergan daily w/o problems.      Corticosteroids (Glucocorticoids) Itching    Agitation, "skin crawling" sensation    Cortisone Itching    Agitation, "skin crawling" sensation    Fentanyl Nausea And Vomiting, Swelling    Fentanyl patch made lips numb and swollen    Morphine Nausea And Vomiting    Nsaids (Non-steroidal Anti-inflammatory Drug) Other (See Comments)    Cluster ulcers    Pcn [Penicillins] Other (See Comments)    Stomach cramps    Toradol [Ketorolac] Nausea And Vomiting    ulcers    Tramadol Nausea And Vomiting      Advance Directives     An advance directive is a document which, in the " event you are no longer able to make decisions for yourself, tells your healthcare team what kind of treatment you do or do not want to receive, or who you would like to make those decisions for you.  If you do not currently have an advance directive, [facility] encourages you to create one.  For more information call:  [phone]), [phone] ([phone]),  or log on to [email or web address].        Language Assistance Services     ATTENTION: Language assistance services are available, free of charge. Please call [phone].      ATENCIÓN: Si habla espyosef, tiene a al disposición servicios gratuitos de asistencia lingüística. Llame al [phone].     CHÚ Ý: N?u b?n nói Ti?ng Vi?t, có các d?ch v? h? tr? ngôn ng? mi?n phí dành cho b?n. G?i s? [phone].        [facility] Sign-Up     Activating your [facility] account is as easy as 1-2-3!     1) Visit [email or web address], select Sign Up Now, enter this activation code and your date of birth, then select Next.  Activation code not generated  Current Patient Portal Status: Account disabled      2) Create a username and password to use when you visit [facility] in the future and select a security question in case you lose your password and select Next.    3) Enter your e-mail address and click Sign Up!    Additional Information  If you have questions, please e-mail [email or web address] or call [phone] to talk to our [facility] staff. Remember, [facility] is NOT to be used for urgent needs. For medical emergencies, dial 911.          [facility] complies with applicable Federal civil rights laws and does not discriminate on the basis of race, color, national origin, age, disability, or sex.

## 2017-04-29 NOTE — DISCHARGE SUMMARY
[facility] Hospital Medicine  Discharge Summary      Patient Name: Cara Mendoza  MRN: [id number]  Admission Date: 4/26/2017  Hospital Length of Stay: 2 days  Discharge Date and Time: 4/28/2017  3:00 PM  Attending Physician: No att. providers found   Discharging Provider: Mhaendra Carter MD  Primary Care Provider: Jaqueline Parker MD    [facility] Medicine Team: [facility]  Mahendra Carter MD    HPI:     Patient is a 56 year old lady with a h/o chronic pancreatitis, PUD, HTN, hypothyroidism, and anxiety. She presents with complaints of abdominal pain, primarily to the RUQ that began a week ago. She was seen by her PCP yesterday, and he called her today to notify her of abnormal labs. She was told to report to the ER. She also complains of associated N/V. She reports decreased PO intake and has not been able to eat since Thursday. Patient notes that pain began after eating crawfish. Denies chest pain, SOB, dizziness, palpitations, fever/chills, dysuria. Complains of frequent urination.     * No surgery found *      Indwelling Lines/Drains at time of discharge:   Lines/Drains/Airways          No matching active lines, drains, or airways        Hospital Course:    The patient was treated with IVF and pain medication with good results.  Her US was negative.  She has had several instances of pancreatitis and seen GI about the issue but no there is no known etiology.  The patient asked for her diet to be advanced immediately and was consuming a full tray of regular diet at time of discharge.  She said her pain was managed by oral dilaudid tabs and had minimal nausea.  She was discharged after a stay of two days.    She called after discharge saying that her pain was increasing and she had thrown up.  She stated that she had been eating macaroni and cheese as well as some other foods and iced tea.  She says she called her PCP about the same issues.  I advised that she should try eating a bland diet that was  low in fat.  She had been taking one dilaudid tab and I said she could increase this to two.  I asked her to call her PCP about any further issues.  I advised that if her pain was uncontrollable or she could not tolerate oral intake that these would be problems she needed to re-present for.        Consults:         Pending Diagnostic Studies:     None        Final Active Diagnoses:    Diagnosis Date Noted POA    PRINCIPAL PROBLEM:  Acute pancreatitis [K85.90] 04/26/2017 Yes    Benign essential hypertension [I10] 04/26/2015 Yes     Chronic    Anxiety [F41.9] 07/14/2014 Yes     Chronic    Hypothyroidism [E03.9] 02/25/2013 Yes     Chronic      Problems Resolved During this Admission:    Diagnosis Date Noted Date Resolved POA      Discharged Condition: good    Disposition: Home or Self Care    Follow Up:  Follow-up Information     Follow up with Jaqueline Parker MD On 5/18/2017.    Specialty:  Internal Medicine    Why:  9:20am    Contact information:    [address]  [facility] [address]  [phone]          Patient Instructions:     Diet general       Medications:  Reconciled Home Medications:   Discharge Medication List as of 4/28/2017  2:44 PM      START taking these medications    Details   HYDROmorphone (DILAUDID) 2 MG tablet Take 1-2 tablets (2-4 mg total) by mouth every 3 (three) hours as needed for Pain., Starting 4/28/2017, Until Discontinued, Print      promethazine (PHENERGAN) 25 MG tablet Take 1 tablet (25 mg total) by mouth every 6 (six) hours as needed., Starting 4/28/2017, Until Discontinued, Normal         CONTINUE these medications which have NOT CHANGED    Details   alprazolam (XANAX) 1 MG tablet TAKE 1/2 TABLET BY MOUTH TWICE DAILY, Print      escitalopram oxalate (LEXAPRO) 20 MG tablet Take 1 tablet (20 mg total) by mouth every evening., Starting 2/23/2017, Until Discontinued, Normal      estradiol (ESTRACE) 1 MG tablet Take 1 tablet (1 mg total) by mouth once daily., Starting 8/26/2013, Until  Discontinued, Normal      gabapentin (NEURONTIN) 300 MG capsule TAKE 1 CAPSULE BY MOUTH TWO TIMES A DAY, Normal      hydrochlorothiazide (MICROZIDE) 12.5 mg capsule TAKE 1 CAPSULE BY MOUTH EVERY DAY, Normal      levothyroxine (SYNTHROID) 75 MCG tablet TAKE 1 TABLET BY MOUTH BEFORE BREAKFAST, Normal      vitamin D 1000 units Tab Take 185 mg by mouth once daily., Until Discontinued, Historical Med           Time spent on the discharge of patient: 35 minutes    Mahendra Carter MD  Department of Hospital Medicine  [facility]

## 2017-04-29 NOTE — PROVIDER PROGRESS NOTES - EMERGENCY DEPT.
Encounter Date: 4/29/2017    ED Physician Progress Notes       SCRIBE NOTE: I, Molina Sinha, am scribing for, and in the presence of,  Sridhar Hillman MD.  Physician Statement: I, Sridhar Hillman MD, personally performed the services described in this documentation as scribed by Molina Sinha in my presence, and it is both accurate and complete.     Physician Note:   Time seen by provider: 4:08 PM    Pt returns for relapse of her pancreatis sx for which she was discharged yesterday. She agrees this may have been a premature discharge as she asked the doctor to let her go even though lipase was not normal.  After she returned home she started vomiting again, and having extreme pain. Neither sx being controled by her oral dilaudid or Zofran. She spoke to on call doctor for her PCP and was advised to return back to the emergency room for evaluation and probable admission. Will obtain appropriate labs, start  IV rehydration, nausea control with Zofran, and final disposition per the main ED pods.     I initially evaluated this patient and ordered workup while in intake.  The patient will receive a full evaluation in an ED pod when space is available.  All results from tests ordered in intake will not be followed by the intake team, including myself. All results will be followed by the ED Pod team.

## 2017-04-30 LAB
ALBUMIN SERPL BCP-MCNC: 3.3 G/DL
ALP SERPL-CCNC: 159 U/L
ALT SERPL W/O P-5'-P-CCNC: 213 U/L
AMYLASE SERPL-CCNC: 58 U/L
ANION GAP SERPL CALC-SCNC: 6 MMOL/L
AST SERPL-CCNC: 241 U/L
BASOPHILS # BLD AUTO: 0.02 K/UL
BASOPHILS NFR BLD: 0.4 %
BILIRUB SERPL-MCNC: 1.1 MG/DL
BUN SERPL-MCNC: 9 MG/DL
CALCIUM SERPL-MCNC: 8.4 MG/DL
CHLORIDE SERPL-SCNC: 103 MMOL/L
CO2 SERPL-SCNC: 32 MMOL/L
CREAT SERPL-MCNC: 0.6 MG/DL
DIFFERENTIAL METHOD: ABNORMAL
EOSINOPHIL # BLD AUTO: 0.1 K/UL
EOSINOPHIL NFR BLD: 2.6 %
ERYTHROCYTE [DISTWIDTH] IN BLOOD BY AUTOMATED COUNT: 14.3 %
EST. GFR  (AFRICAN AMERICAN): >60 ML/MIN/1.73 M^2
EST. GFR  (NON AFRICAN AMERICAN): >60 ML/MIN/1.73 M^2
GLUCOSE SERPL-MCNC: 86 MG/DL
HCT VFR BLD AUTO: 37.3 %
HGB BLD-MCNC: 12.6 G/DL
LIPASE SERPL-CCNC: 56 U/L
LYMPHOCYTES # BLD AUTO: 2.2 K/UL
LYMPHOCYTES NFR BLD: 47 %
MAGNESIUM SERPL-MCNC: 1.7 MG/DL
MCH RBC QN AUTO: 29 PG
MCHC RBC AUTO-ENTMCNC: 33.8 %
MCV RBC AUTO: 86 FL
MONOCYTES # BLD AUTO: 0.7 K/UL
MONOCYTES NFR BLD: 14.3 %
NEUTROPHILS # BLD AUTO: 1.7 K/UL
NEUTROPHILS NFR BLD: 35.3 %
PHOSPHATE SERPL-MCNC: 3.7 MG/DL
PLATELET # BLD AUTO: 197 K/UL
PMV BLD AUTO: 10.2 FL
POTASSIUM SERPL-SCNC: 3.8 MMOL/L
PROT SERPL-MCNC: 5.9 G/DL
RBC # BLD AUTO: 4.34 M/UL
SODIUM SERPL-SCNC: 141 MMOL/L
WBC # BLD AUTO: 4.7 K/UL

## 2017-04-30 PROCEDURE — 25000003 PHARM REV CODE 250: Performed by: PHYSICIAN ASSISTANT

## 2017-04-30 PROCEDURE — 83690 ASSAY OF LIPASE: CPT

## 2017-04-30 PROCEDURE — G0378 HOSPITAL OBSERVATION PER HR: HCPCS

## 2017-04-30 PROCEDURE — 25000003 PHARM REV CODE 250: Performed by: INTERNAL MEDICINE

## 2017-04-30 PROCEDURE — 83735 ASSAY OF MAGNESIUM: CPT

## 2017-04-30 PROCEDURE — 99225 PR SUBSEQUENT OBSERVATION CARE,LEVEL II: CPT | Mod: ,,, | Performed by: INTERNAL MEDICINE

## 2017-04-30 PROCEDURE — 84100 ASSAY OF PHOSPHORUS: CPT

## 2017-04-30 PROCEDURE — 63600175 PHARM REV CODE 636 W HCPCS: Performed by: PHYSICIAN ASSISTANT

## 2017-04-30 PROCEDURE — 82150 ASSAY OF AMYLASE: CPT

## 2017-04-30 PROCEDURE — 80053 COMPREHEN METABOLIC PANEL: CPT

## 2017-04-30 PROCEDURE — 85025 COMPLETE CBC W/AUTO DIFF WBC: CPT

## 2017-04-30 PROCEDURE — 97802 MEDICAL NUTRITION INDIV IN: CPT

## 2017-04-30 RX ORDER — HEPARIN SODIUM 5000 [USP'U]/ML
5000 INJECTION, SOLUTION INTRAVENOUS; SUBCUTANEOUS EVERY 8 HOURS
Status: DISCONTINUED | OUTPATIENT
Start: 2017-04-30 | End: 2017-05-03 | Stop reason: HOSPADM

## 2017-04-30 RX ADMIN — PROMETHAZINE HYDROCHLORIDE 25 MG: 25 TABLET ORAL at 04:04

## 2017-04-30 RX ADMIN — ALPRAZOLAM 0.5 MG: 0.5 TABLET ORAL at 09:04

## 2017-04-30 RX ADMIN — ESTRADIOL 1 MG: 1 TABLET ORAL at 09:04

## 2017-04-30 RX ADMIN — HYDROMORPHONE HYDROCHLORIDE 1 MG: 1 INJECTION, SOLUTION INTRAMUSCULAR; INTRAVENOUS; SUBCUTANEOUS at 09:04

## 2017-04-30 RX ADMIN — HYDROMORPHONE HYDROCHLORIDE 1 MG: 1 INJECTION, SOLUTION INTRAMUSCULAR; INTRAVENOUS; SUBCUTANEOUS at 01:04

## 2017-04-30 RX ADMIN — GABAPENTIN 300 MG: 300 CAPSULE ORAL at 09:04

## 2017-04-30 RX ADMIN — HYDROCHLOROTHIAZIDE 12.5 MG: 12.5 TABLET ORAL at 09:04

## 2017-04-30 RX ADMIN — PROMETHAZINE HYDROCHLORIDE 25 MG: 25 TABLET ORAL at 10:04

## 2017-04-30 RX ADMIN — SODIUM CHLORIDE: 0.9 INJECTION, SOLUTION INTRAVENOUS at 08:04

## 2017-04-30 RX ADMIN — SODIUM CHLORIDE: 0.9 INJECTION, SOLUTION INTRAVENOUS at 09:04

## 2017-04-30 RX ADMIN — ONDANSETRON 8 MG: 8 TABLET, ORALLY DISINTEGRATING ORAL at 09:04

## 2017-04-30 RX ADMIN — STANDARDIZED SENNA CONCENTRATE AND DOCUSATE SODIUM 1 TABLET: 8.6; 5 TABLET, FILM COATED ORAL at 09:04

## 2017-04-30 RX ADMIN — HEPARIN SODIUM 5000 UNITS: 5000 INJECTION, SOLUTION INTRAVENOUS; SUBCUTANEOUS at 09:04

## 2017-04-30 RX ADMIN — HYDROMORPHONE HYDROCHLORIDE 1 MG: 1 INJECTION, SOLUTION INTRAMUSCULAR; INTRAVENOUS; SUBCUTANEOUS at 12:04

## 2017-04-30 RX ADMIN — LEVOTHYROXINE SODIUM 75 MCG: 75 TABLET ORAL at 06:04

## 2017-04-30 RX ADMIN — ESCITALOPRAM 20 MG: 20 TABLET, FILM COATED ORAL at 09:04

## 2017-04-30 NOTE — SUBJECTIVE & OBJECTIVE
"Past Medical History:   Diagnosis Date    Adrenal adenoma     Gastric ulcer     Hypothyroidism     Kidney stones     Pancreatitis     Traumatic compression fracture of L1 lumbar vertebra        Past Surgical History:   Procedure Laterality Date    CHOLECYSTECTOMY      COLONOSCOPY N/A 2016    Procedure: COLONOSCOPY;  Surgeon: Sathya Strickland MD;  Location: [address];  Service: Endoscopy;  Laterality: N/A;    CYSTOSCOPY      HYSTERECTOMY      KYPHOSIS SURGERY  7/20/15    right adrenal gland remove      tonsillectomy      TONSILLECTOMY      TUBAL LIGATION      x2          Review of patient's allergies indicates:   Allergen Reactions    Codeine Nausea And Vomiting    Compazine [prochlorperazine edisylate] Anaphylaxis and Nausea And Vomiting    Demerol [meperidine] Anaphylaxis and Nausea And Vomiting    Morpholine analogues Anaphylaxis and Nausea And Vomiting     vomit    Percocet [oxycodone-acetaminophen] Nausea And Vomiting    Phenothiazines Anaphylaxis     Takes Phenergan daily w/o problems.      Corticosteroids (glucocorticoids) Itching     Agitation, "skin crawling" sensation    Cortisone Itching     Agitation, "skin crawling" sensation    Fentanyl Nausea And Vomiting and Swelling     Fentanyl patch made lips numb and swollen    Morphine Nausea And Vomiting    Nsaids (non-steroidal anti-inflammatory drug) Other (See Comments)     Cluster ulcers    Pcn [penicillins] Other (See Comments)     Stomach cramps    Toradol [ketorolac] Nausea And Vomiting     ulcers    Tramadol Nausea And Vomiting       No current facility-administered medications on file prior to encounter.      Current Outpatient Prescriptions on File Prior to Encounter   Medication Sig    alprazolam (XANAX) 1 MG tablet TAKE 1/2 TABLET BY MOUTH TWICE DAILY    escitalopram oxalate (LEXAPRO) 20 MG tablet Take 1 tablet (20 mg total) by mouth every evening.    estradiol (ESTRACE) 1 MG tablet Take 1 tablet (1 " mg total) by mouth once daily.    gabapentin (NEURONTIN) 300 MG capsule TAKE 1 CAPSULE BY MOUTH TWO TIMES A DAY    hydrochlorothiazide (MICROZIDE) 12.5 mg capsule TAKE 1 CAPSULE BY MOUTH EVERY DAY    HYDROmorphone (DILAUDID) 2 MG tablet Take 1-2 tablets (2-4 mg total) by mouth every 3 (three) hours as needed for Pain.    levothyroxine (SYNTHROID) 75 MCG tablet TAKE 1 TABLET BY MOUTH BEFORE BREAKFAST    promethazine (PHENERGAN) 25 MG tablet Take 1 tablet (25 mg total) by mouth every 6 (six) hours as needed.    vitamin D 1000 units Tab Take 185 mg by mouth once daily.     Family History     Problem Relation (Age of Onset)    Diabetes Father    Heart attack Father        Social History Main Topics    Smoking status: Never Smoker    Smokeless tobacco: Never Used    Alcohol use No    Drug use: Not on file    Sexual activity: Not Currently     Review of Systems   Constitutional: Positive for appetite change. Negative for activity change, chills, fatigue, fever and unexpected weight change.   HENT: Negative for congestion, rhinorrhea, sore throat, trouble swallowing and voice change.    Eyes: Negative for visual disturbance.   Respiratory: Negative for cough, choking, chest tightness, shortness of breath and wheezing.    Cardiovascular: Negative for chest pain, palpitations and leg swelling.   Gastrointestinal: Positive for abdominal pain, nausea and vomiting. Negative for abdominal distention, anal bleeding, blood in stool, constipation and diarrhea.   Endocrine: Negative for cold intolerance, heat intolerance, polydipsia and polyuria.   Genitourinary: Negative for dysuria, flank pain, frequency, hematuria and urgency.   Musculoskeletal: Negative for arthralgias, back pain, joint swelling and myalgias.   Skin: Negative for color change and rash.   Neurological: Negative for dizziness, seizures, syncope, facial asymmetry, speech difficulty, weakness, light-headedness, numbness and headaches.   Hematological:  Negative for adenopathy. Does not bruise/bleed easily.   Psychiatric/Behavioral: Negative for agitation, confusion, hallucinations and suicidal ideas.     Objective:     Vital Signs (Most Recent):  Temp: 98.3 °F (36.8 °C) (04/30/17 0150)  Pulse: 75 (04/30/17 0150)  Resp: 13 (04/30/17 0150)  BP: 106/65 (04/30/17 0150)  SpO2: 100 % (04/30/17 0150) Vital Signs (24h Range):  Temp:  [97.5 °F (36.4 °C)-98.3 °F (36.8 °C)] 98.3 °F (36.8 °C)  Pulse:  [62-77] 75  Resp:  [13-18] 13  SpO2:  [85 %-100 %] 100 %  BP: (106-137)/(57-65) 106/65     Weight: 83.5 kg (184 lb)  Body mass index is 35.94 kg/(m^2).    Physical Exam   Constitutional: She is oriented to person, place, and time. She appears well-developed and well-nourished. No distress.   HENT:   Head: Normocephalic and atraumatic.   Eyes: Pupils are equal, round, and reactive to light.   Neck: Neck supple. Carotid bruit is not present. No thyromegaly present.   Cardiovascular: Normal rate and regular rhythm.  Exam reveals no gallop.    No murmur heard.  Pulmonary/Chest: Effort normal and breath sounds normal. No respiratory distress. She has no wheezes.   Abdominal: Soft. Bowel sounds are normal. She exhibits no distension and no mass. There is no splenomegaly or hepatomegaly. There is tenderness in the right upper quadrant.   Musculoskeletal: Normal range of motion. She exhibits no edema or deformity.   Neurological: She is alert and oriented to person, place, and time. No cranial nerve deficit or sensory deficit.   Skin: Skin is warm and dry. No rash noted.   Psychiatric: She has a normal mood and affect.        Significant Labs: All pertinent labs within the past 24 hours have been reviewed.    Significant Imaging: I have reviewed all pertinent imaging results/findings within the past 24 hours.

## 2017-04-30 NOTE — UM SECONDARY REVIEW
Physician Advisor External    Level of Care Issue    Approved Observation     Case has been approved for observation by EHR Evelyne Romeo.   JULIAN Huerta RN

## 2017-04-30 NOTE — ASSESSMENT & PLAN NOTE
- Clears, IVF  - Lipase trending down  - Mild bump in LFTs today, will obtain US with doppler of RUQ

## 2017-04-30 NOTE — ED NOTES
Pt transported to room 1156 A via stretcher with escort Pt belongings are with pt, pt condition stable on transport and pt will notify family of room number

## 2017-04-30 NOTE — PROGRESS NOTES
[facility] Hospital Medicine  Progress Note    Patient Name: Cara Mendoza  MRN: [id number]  Patient Class: OP- Observation   Admission Date: 4/29/2017  Length of Stay: 0 days  Attending Physician: Mahendra Carter MD  Primary Care Provider: Jaqueline Parker MD    [facility] Medicine Team: [facility] HOSP MED  Mahendra Carter MD    Subjective:     Principal Problem:Acute pancreatitis    HPI:  Patient is a 56 year old lady with a h/o recurrent pancreatitis, HTN, anxiety, hypothyroidism.  She presents with abdominal pain, N/V.  Patient was admitted on 4/26/2017 with similar symptoms and was found to have an elevated lipase of 198.  She was started on IVFs and advanced to a regular diet.  She has been worked up by GI extensively, but there is no known etiology.  She was discharged on 4/28/2017.  After discharge, patient states she ate macaroni and cheese and began having pain, N/V again.  She reports no PO intake since then.  Pain has been uncontrolled with PO Dilaudid.  She denies chest pain, SOB, dizziness, palpitations, fever/chills, diarrhea.    Hospital Course:       Interval History:     Walking around the room.      Upset about having a roommate.    Upset about having to wait in the ED for an extended time.      Says she is hungry and wants a diet.      Review of Systems   Gastrointestinal: Positive for abdominal pain.     Objective:     Vital Signs (Most Recent):  Temp: 98 °F (36.7 °C) (04/30/17 1222)  Pulse: 75 (04/30/17 1222)  Resp: 18 (04/30/17 1222)  BP: 121/70 (04/30/17 1222)  SpO2: 95 % (04/30/17 1222) Vital Signs (24h Range):  Temp:  [97.5 °F (36.4 °C)-98.4 °F (36.9 °C)] 98 °F (36.7 °C)  Pulse:  [62-79] 75  Resp:  [13-18] 18  SpO2:  [85 %-100 %] 95 %  BP: (106-160)/(57-70) 121/70     Weight: 83.5 kg (184 lb)  Body mass index is 35.94 kg/(m^2).  No intake or output data in the 24 hours ending 04/30/17 8090   Physical Exam   Constitutional: She appears well-developed and well-nourished.   HENT:    Head: Atraumatic.   Cardiovascular: Normal rate.    Pulmonary/Chest: Effort normal.   Abdominal: Soft.   Neurological: She is alert.   Skin: Skin is warm.   Psychiatric: She has a normal mood and affect.   Nursing note and vitals reviewed.      Significant Labs:   BMP:   Recent Labs  Lab 04/30/17  0426   GLU 86      K 3.8      CO2 32*   BUN 9   CREATININE 0.6   CALCIUM 8.4*   MG 1.7     CBC:   Recent Labs  Lab 04/29/17  1619 04/30/17  0426   WBC 5.74 4.70   HGB 12.8 12.6   HCT 37.5 37.3    197       Significant Imaging: None    Assessment/Plan:      * Acute pancreatitis  - Clears, IVF  - Lipase trending down  - Mild bump in LFTs today, will obtain US with doppler of RUQ      Hypothyroidism  - Continue levothyroxine 75mcg daily.      Anxiety  - Continue Xanax 0.5mg BID.      Benign essential hypertension  - Continue HCTZ 12.5mg daily.      VTE Risk Mitigation         Ordered     Medium Risk of VTE  Once      04/29/17 2153     Place CHANEL hose  Until discontinued      04/29/17 2153     Place sequential compression device  Until discontinued      04/29/17 2153          Mahendra Carter MD  Department of Hospital Medicine   [facility]

## 2017-04-30 NOTE — CONSULTS
[facility]  Adult Nutrition  Consult Note    SUMMARY     Recommendations    Recommendation/Intervention: Pt reports n/v but no difficulty chewing or swallowing. Helped pt call dining center. Family present at bedside.     1. As medically able, advance diet to adult regular with (low fat restricition).     RD to monitor and follow up as appropriate.    Goals: Tolerance of diet  Nutrition Goal Status: new  Communication of RD Recs: reviewed with RN    Continuum of Care Plan    Nursing Team: obtain weekly wts.    Reason for Assessment    Reason for Assessment: physician consult  Diagnosis:  (pancreatitis)  Relevent Medical History: HTN, anxiety, hypothyroidism   Interdisciplinary Rounds: did not attend  Nutrition Discharge Planning: Adequate PO intake to meet daily needs.     Nutrition Prescription Ordered    Current Diet Order: Clear liquids     Nutrition Risk Screen     Nutrition Risk Screen: no indicators present    Nutrition/Diet History    Patient Reported Diet/Restrictions/Preferences:  (no sugar, no refined CHO)  Food Preferences: No cultural/[employer]al limitations reported.   Factors Affecting Nutritional Intake: abdominal pain     Labs/Tests/Procedures/Meds    Pertinent Labs Reviewed: reviewed  Pertinent Labs Comments: Lytes + BG WNL.   Pertinent Medications Reviewed: reviewed  Pertinent Medications Comments: pmdamsetron, senna    Physical Findings    Overall Physical Appearance: obese  Skin: incision    Anthropometrics    Height (inches): 60 in  Weight Method: Stated  Weight (kg): 83.5 kg  Ideal Body Weight (IBW), Female: 100 lb  % Ideal Body Weight, Female (lb): 184.09 lb  BMI (kg/m2): 35.95  BMI Grade: 35 - 39.9 - obesity - grade II     Estimated/Assessed Needs    Weight Used For Calorie Calculations: 83.5 kg (184 lb 1.4 oz)   Height (cm): 152.4 cm  Energy Need Method: [place]-St Jeor (1700 kcal/day (mifflin x 1.25)  RMR ([place]-St. Jeor Equation): 1345.22  Weight Used For Protein  Calculations: 83.5 kg (184 lb 1.4 oz)  Protein Requirements:  gm/day (1-1.2 gm/kg of ABW )  Fluid Need Method: RDA Method (1700 kcal)     Nutrition Diagnosis    Inadequate oral intake r/t physiological causes AEB by clear liquid diet.  Status: new    Monitor and Evaluation    Food and Nutrient Intake: food and beverage intake  Food and Nutrient Adminstration: diet order  Anthropometric Measurements: weight change  Biochemical Data, Medical Tests and Procedures: electrolyte and renal panel, glucose/endocrine profile  Nutrition-Focused Physical Findings: overall appearance    Nutrition Risk    Level of Risk:  (f/u 2x/week )    Nutrition Follow-Up    RD Follow-up?: Yes     Manda Ritter, RD, LDN

## 2017-04-30 NOTE — PROGRESS NOTES
IMG paged re: diet. Pt states hungry, but currently NPO status and here with acute pancreatitis.     Will wait for team to call and any new orders.

## 2017-04-30 NOTE — SUBJECTIVE & OBJECTIVE
Interval History:     Walking around the room.      Upset about having a roommate.    Upset about having to wait in the ED for an extended time.      Says she is hungry and wants a diet.      Review of Systems   Gastrointestinal: Positive for abdominal pain.     Objective:     Vital Signs (Most Recent):  Temp: 98 °F (36.7 °C) (04/30/17 1222)  Pulse: 75 (04/30/17 1222)  Resp: 18 (04/30/17 1222)  BP: 121/70 (04/30/17 1222)  SpO2: 95 % (04/30/17 1222) Vital Signs (24h Range):  Temp:  [97.5 °F (36.4 °C)-98.4 °F (36.9 °C)] 98 °F (36.7 °C)  Pulse:  [62-79] 75  Resp:  [13-18] 18  SpO2:  [85 %-100 %] 95 %  BP: (106-160)/(57-70) 121/70     Weight: 83.5 kg (184 lb)  Body mass index is 35.94 kg/(m^2).  No intake or output data in the 24 hours ending 04/30/17 5787   Physical Exam   Constitutional: She appears well-developed and well-nourished.   HENT:   Head: Atraumatic.   Cardiovascular: Normal rate.    Pulmonary/Chest: Effort normal.   Abdominal: Soft.   Neurological: She is alert.   Skin: Skin is warm.   Psychiatric: She has a normal mood and affect.   Nursing note and vitals reviewed.      Significant Labs:   BMP:   Recent Labs  Lab 04/30/17  0426   GLU 86      K 3.8      CO2 32*   BUN 9   CREATININE 0.6   CALCIUM 8.4*   MG 1.7     CBC:   Recent Labs  Lab 04/29/17  1619 04/30/17  0426   WBC 5.74 4.70   HGB 12.8 12.6   HCT 37.5 37.3    197       Significant Imaging: None

## 2017-04-30 NOTE — H&P
[facility] Hospital Medicine  History & Physical    Patient Name: Cara Mendoza  MRN: [id number]  Admission Date: 2017  Attending Physician: Antonio Abarca MD   Primary Care Provider: Jaqueline Parker MD    [facility] Medicine Team: [facility] HOSP MED E Manda Jeong PA-C     Patient information was obtained from patient, past medical records and ER records.     Subjective:     Principal Problem:Acute pancreatitis    Chief Complaint:   Chief Complaint   Patient presents with    Pancreatitis     Discharged from the hospital yesterday; increased pain and vomiting.        HPI: Patient is a 56 year old lady with a h/o recurrent pancreatitis, HTN, anxiety, hypothyroidism.  She presents with abdominal pain, N/V.  Patient was admitted on 2017 with similar symptoms and was found to have an elevated lipase of 198.  She was started on IVFs and advanced to a regular diet.  She has been worked up by GI extensively, but there is no known etiology.  She was discharged on 2017.  After discharge, patient states she ate macaroni and cheese and began having pain, N/V again.  She reports no PO intake since then.  Pain has been uncontrolled with PO Dilaudid.  She denies chest pain, SOB, dizziness, palpitations, fever/chills, diarrhea.    Past Medical History:   Diagnosis Date    Adrenal adenoma     Gastric ulcer     Hypothyroidism     Kidney stones     Pancreatitis     Traumatic compression fracture of L1 lumbar vertebra        Past Surgical History:   Procedure Laterality Date    CHOLECYSTECTOMY      COLONOSCOPY N/A 2016    Procedure: COLONOSCOPY;  Surgeon: Sathya Strickland MD;  Location: [address]);  Service: Endoscopy;  Laterality: N/A;    CYSTOSCOPY      HYSTERECTOMY      KYPHOSIS SURGERY  7/20/15    right adrenal gland remove      tonsillectomy      TONSILLECTOMY      TUBAL LIGATION      x2          Review of patient's allergies indicates:   Allergen Reactions     "Codeine Nausea And Vomiting    Compazine [prochlorperazine edisylate] Anaphylaxis and Nausea And Vomiting    Demerol [meperidine] Anaphylaxis and Nausea And Vomiting    Morpholine analogues Anaphylaxis and Nausea And Vomiting     vomit    Percocet [oxycodone-acetaminophen] Nausea And Vomiting    Phenothiazines Anaphylaxis     Takes Phenergan daily w/o problems.      Corticosteroids (glucocorticoids) Itching     Agitation, "skin crawling" sensation    Cortisone Itching     Agitation, "skin crawling" sensation    Fentanyl Nausea And Vomiting and Swelling     Fentanyl patch made lips numb and swollen    Morphine Nausea And Vomiting    Nsaids (non-steroidal anti-inflammatory drug) Other (See Comments)     Cluster ulcers    Pcn [penicillins] Other (See Comments)     Stomach cramps    Toradol [ketorolac] Nausea And Vomiting     ulcers    Tramadol Nausea And Vomiting       No current facility-administered medications on file prior to encounter.      Current Outpatient Prescriptions on File Prior to Encounter   Medication Sig    alprazolam (XANAX) 1 MG tablet TAKE 1/2 TABLET BY MOUTH TWICE DAILY    escitalopram oxalate (LEXAPRO) 20 MG tablet Take 1 tablet (20 mg total) by mouth every evening.    estradiol (ESTRACE) 1 MG tablet Take 1 tablet (1 mg total) by mouth once daily.    gabapentin (NEURONTIN) 300 MG capsule TAKE 1 CAPSULE BY MOUTH TWO TIMES A DAY    hydrochlorothiazide (MICROZIDE) 12.5 mg capsule TAKE 1 CAPSULE BY MOUTH EVERY DAY    HYDROmorphone (DILAUDID) 2 MG tablet Take 1-2 tablets (2-4 mg total) by mouth every 3 (three) hours as needed for Pain.    levothyroxine (SYNTHROID) 75 MCG tablet TAKE 1 TABLET BY MOUTH BEFORE BREAKFAST    promethazine (PHENERGAN) 25 MG tablet Take 1 tablet (25 mg total) by mouth every 6 (six) hours as needed.    vitamin D 1000 units Tab Take 185 mg by mouth once daily.     Family History     Problem Relation (Age of Onset)    Diabetes Father    Heart attack Father "        Social History Main Topics    Smoking status: Never Smoker    Smokeless tobacco: Never Used    Alcohol use No    Drug use: Not on file    Sexual activity: Not Currently     Review of Systems   Constitutional: Positive for appetite change. Negative for activity change, chills, fatigue, fever and unexpected weight change.   HENT: Negative for congestion, rhinorrhea, sore throat, trouble swallowing and voice change.    Eyes: Negative for visual disturbance.   Respiratory: Negative for cough, choking, chest tightness, shortness of breath and wheezing.    Cardiovascular: Negative for chest pain, palpitations and leg swelling.   Gastrointestinal: Positive for abdominal pain, nausea and vomiting. Negative for abdominal distention, anal bleeding, blood in stool, constipation and diarrhea.   Endocrine: Negative for cold intolerance, heat intolerance, polydipsia and polyuria.   Genitourinary: Negative for dysuria, flank pain, frequency, hematuria and urgency.   Musculoskeletal: Negative for arthralgias, back pain, joint swelling and myalgias.   Skin: Negative for color change and rash.   Neurological: Negative for dizziness, seizures, syncope, facial asymmetry, speech difficulty, weakness, light-headedness, numbness and headaches.   Hematological: Negative for adenopathy. Does not bruise/bleed easily.   Psychiatric/Behavioral: Negative for agitation, confusion, hallucinations and suicidal ideas.     Objective:     Vital Signs (Most Recent):  Temp: 98.3 °F (36.8 °C) (04/30/17 0150)  Pulse: 75 (04/30/17 0150)  Resp: 13 (04/30/17 0150)  BP: 106/65 (04/30/17 0150)  SpO2: 100 % (04/30/17 0150) Vital Signs (24h Range):  Temp:  [97.5 °F (36.4 °C)-98.3 °F (36.8 °C)] 98.3 °F (36.8 °C)  Pulse:  [62-77] 75  Resp:  [13-18] 13  SpO2:  [85 %-100 %] 100 %  BP: (106-137)/(57-65) 106/65     Weight: 83.5 kg (184 lb)  Body mass index is 35.94 kg/(m^2).    Physical Exam   Constitutional: She is oriented to person, place, and time.  She appears well-developed and well-nourished. No distress.   HENT:   Head: Normocephalic and atraumatic.   Eyes: Pupils are equal, round, and reactive to light.   Neck: Neck supple. Carotid bruit is not present. No thyromegaly present.   Cardiovascular: Normal rate and regular rhythm.  Exam reveals no gallop.    No murmur heard.  Pulmonary/Chest: Effort normal and breath sounds normal. No respiratory distress. She has no wheezes.   Abdominal: Soft. Bowel sounds are normal. She exhibits no distension and no mass. There is no splenomegaly or hepatomegaly. There is tenderness in the right upper quadrant.   Musculoskeletal: Normal range of motion. She exhibits no edema or deformity.   Neurological: She is alert and oriented to person, place, and time. No cranial nerve deficit or sensory deficit.   Skin: Skin is warm and dry. No rash noted.   Psychiatric: She has a normal mood and affect.        Significant Labs: All pertinent labs within the past 24 hours have been reviewed.    Significant Imaging: I have reviewed all pertinent imaging results/findings within the past 24 hours.    Assessment/Plan:     * Acute pancreatitis  - Patient made NPO and started on IVFs.  - Lipase 92, down from 247 three days ago.  Will check amylase.      Hypothyroidism  - Continue levothyroxine 75mcg daily.      Anxiety  - Continue Xanax 0.5mg BID.      Benign essential hypertension  - Continue HCTZ 12.5mg daily.      VTE Risk Mitigation         Ordered     Medium Risk of VTE  Once      04/29/17 2153     Place CHANEL hose  Until discontinued      04/29/17 2153     Place sequential compression device  Until discontinued      04/29/17 2153        Manda Jeong PA-C  Department of Hospital Medicine   [facility]

## 2017-04-30 NOTE — ASSESSMENT & PLAN NOTE
- Patient made NPO and started on IVFs.  - Lipase 92, down from 247 three days ago.  Will check amylase.

## 2017-05-01 LAB
ALBUMIN SERPL BCP-MCNC: 3.1 G/DL
ALP SERPL-CCNC: 181 U/L
ALT SERPL W/O P-5'-P-CCNC: 263 U/L
ANION GAP SERPL CALC-SCNC: 6 MMOL/L
AST SERPL-CCNC: 161 U/L
BASOPHILS # BLD AUTO: 0.01 K/UL
BASOPHILS NFR BLD: 0.2 %
BILIRUB SERPL-MCNC: 0.8 MG/DL
BUN SERPL-MCNC: 5 MG/DL
CALCIUM SERPL-MCNC: 8.1 MG/DL
CHLORIDE SERPL-SCNC: 104 MMOL/L
CO2 SERPL-SCNC: 30 MMOL/L
CREAT SERPL-MCNC: 0.6 MG/DL
DIFFERENTIAL METHOD: ABNORMAL
EOSINOPHIL # BLD AUTO: 0.1 K/UL
EOSINOPHIL NFR BLD: 2.1 %
ERYTHROCYTE [DISTWIDTH] IN BLOOD BY AUTOMATED COUNT: 14.5 %
EST. GFR  (AFRICAN AMERICAN): >60 ML/MIN/1.73 M^2
EST. GFR  (NON AFRICAN AMERICAN): >60 ML/MIN/1.73 M^2
GLUCOSE SERPL-MCNC: 117 MG/DL
HCT VFR BLD AUTO: 36.1 %
HGB BLD-MCNC: 12.1 G/DL
LIPASE SERPL-CCNC: 17 U/L
LYMPHOCYTES # BLD AUTO: 2 K/UL
LYMPHOCYTES NFR BLD: 40 %
MCH RBC QN AUTO: 29.5 PG
MCHC RBC AUTO-ENTMCNC: 33.5 %
MCV RBC AUTO: 88 FL
MONOCYTES # BLD AUTO: 0.5 K/UL
MONOCYTES NFR BLD: 9.9 %
NEUTROPHILS # BLD AUTO: 2.3 K/UL
NEUTROPHILS NFR BLD: 47.6 %
PLATELET # BLD AUTO: 205 K/UL
PMV BLD AUTO: 10.8 FL
POTASSIUM SERPL-SCNC: 3.7 MMOL/L
PROT SERPL-MCNC: 5.8 G/DL
RBC # BLD AUTO: 4.1 M/UL
SODIUM SERPL-SCNC: 140 MMOL/L
WBC # BLD AUTO: 4.87 K/UL

## 2017-05-01 PROCEDURE — 99225 PR SUBSEQUENT OBSERVATION CARE,LEVEL II: CPT | Mod: ,,, | Performed by: INTERNAL MEDICINE

## 2017-05-01 PROCEDURE — 25000003 PHARM REV CODE 250: Performed by: INTERNAL MEDICINE

## 2017-05-01 PROCEDURE — 36415 COLL VENOUS BLD VENIPUNCTURE: CPT

## 2017-05-01 PROCEDURE — 85025 COMPLETE CBC W/AUTO DIFF WBC: CPT

## 2017-05-01 PROCEDURE — 25000003 PHARM REV CODE 250: Performed by: PHYSICIAN ASSISTANT

## 2017-05-01 PROCEDURE — 83690 ASSAY OF LIPASE: CPT

## 2017-05-01 PROCEDURE — 80053 COMPREHEN METABOLIC PANEL: CPT

## 2017-05-01 PROCEDURE — G0378 HOSPITAL OBSERVATION PER HR: HCPCS

## 2017-05-01 PROCEDURE — 63600175 PHARM REV CODE 636 W HCPCS: Performed by: PHYSICIAN ASSISTANT

## 2017-05-01 RX ORDER — MAG HYDROX/ALUMINUM HYD/SIMETH 200-200-20
30 SUSPENSION, ORAL (FINAL DOSE FORM) ORAL EVERY 6 HOURS PRN
Status: DISCONTINUED | OUTPATIENT
Start: 2017-05-01 | End: 2017-05-03 | Stop reason: HOSPADM

## 2017-05-01 RX ADMIN — ONDANSETRON 8 MG: 8 TABLET, ORALLY DISINTEGRATING ORAL at 10:05

## 2017-05-01 RX ADMIN — HEPARIN SODIUM 5000 UNITS: 5000 INJECTION, SOLUTION INTRAVENOUS; SUBCUTANEOUS at 01:05

## 2017-05-01 RX ADMIN — ALPRAZOLAM 0.5 MG: 0.5 TABLET ORAL at 09:05

## 2017-05-01 RX ADMIN — HYDROMORPHONE HYDROCHLORIDE 1 MG: 1 INJECTION, SOLUTION INTRAMUSCULAR; INTRAVENOUS; SUBCUTANEOUS at 12:05

## 2017-05-01 RX ADMIN — HYDROMORPHONE HYDROCHLORIDE 1 MG: 1 INJECTION, SOLUTION INTRAMUSCULAR; INTRAVENOUS; SUBCUTANEOUS at 02:05

## 2017-05-01 RX ADMIN — LEVOTHYROXINE SODIUM 75 MCG: 75 TABLET ORAL at 05:05

## 2017-05-01 RX ADMIN — HYDROMORPHONE HYDROCHLORIDE 1 MG: 1 INJECTION, SOLUTION INTRAMUSCULAR; INTRAVENOUS; SUBCUTANEOUS at 05:05

## 2017-05-01 RX ADMIN — PROMETHAZINE HYDROCHLORIDE 25 MG: 25 TABLET ORAL at 09:05

## 2017-05-01 RX ADMIN — HEPARIN SODIUM 5000 UNITS: 5000 INJECTION, SOLUTION INTRAVENOUS; SUBCUTANEOUS at 09:05

## 2017-05-01 RX ADMIN — SODIUM CHLORIDE: 0.9 INJECTION, SOLUTION INTRAVENOUS at 10:05

## 2017-05-01 RX ADMIN — GABAPENTIN 300 MG: 300 CAPSULE ORAL at 09:05

## 2017-05-01 RX ADMIN — ESTRADIOL 1 MG: 1 TABLET ORAL at 09:05

## 2017-05-01 RX ADMIN — HYDROMORPHONE HYDROCHLORIDE 1 MG: 1 INJECTION, SOLUTION INTRAMUSCULAR; INTRAVENOUS; SUBCUTANEOUS at 10:05

## 2017-05-01 RX ADMIN — STANDARDIZED SENNA CONCENTRATE AND DOCUSATE SODIUM 1 TABLET: 8.6; 5 TABLET, FILM COATED ORAL at 09:05

## 2017-05-01 RX ADMIN — PROMETHAZINE HYDROCHLORIDE 25 MG: 25 TABLET ORAL at 05:05

## 2017-05-01 RX ADMIN — PROMETHAZINE HYDROCHLORIDE 25 MG: 25 TABLET ORAL at 02:05

## 2017-05-01 RX ADMIN — ALUMINUM HYDROXIDE, MAGNESIUM HYDROXIDE, AND SIMETHICONE 30 ML: 200; 200; 20 SUSPENSION ORAL at 09:05

## 2017-05-01 RX ADMIN — HEPARIN SODIUM 5000 UNITS: 5000 INJECTION, SOLUTION INTRAVENOUS; SUBCUTANEOUS at 05:05

## 2017-05-01 RX ADMIN — HYDROMORPHONE HYDROCHLORIDE 1 MG: 1 INJECTION, SOLUTION INTRAMUSCULAR; INTRAVENOUS; SUBCUTANEOUS at 08:05

## 2017-05-01 RX ADMIN — ESCITALOPRAM 20 MG: 20 TABLET, FILM COATED ORAL at 09:05

## 2017-05-01 RX ADMIN — ALUMINUM HYDROXIDE, MAGNESIUM HYDROXIDE, AND SIMETHICONE 30 ML: 200; 200; 20 SUSPENSION ORAL at 12:05

## 2017-05-01 NOTE — ASSESSMENT & PLAN NOTE
- Clears, IVF  - Lipase trending down  - MRCP pending  - NPO at midnight pending GI interventions

## 2017-05-01 NOTE — PLAN OF CARE
Problem: Patient Care Overview  Goal: Plan of Care Review  Outcome: Ongoing (interventions implemented as appropriate)  No new injuries/falls during shift. VSS, afebrile. Complains of abdominal pain/nausea. Managed with PRN medications. No acute s/s of distress. AAOx4. Skin dry/intact. NS @ 100ml/hr. Bed locked, in lowest position, side rails raised x2, call light within reach. Will continue to monitor.

## 2017-05-01 NOTE — PROGRESS NOTES
[facility] Hospital Medicine  Progress Note    Patient Name: Cara Mendoza  MRN: [id number]  Patient Class: OP- Observation   Admission Date: 4/29/2017  Length of Stay: 0 days  Attending Physician: Mahendra Carter MD  Primary Care Provider: Jaqueline Parker MD    [facility] Medicine Team: [facility]  Mahendra Carter MD    Subjective:     Principal Problem:Acute pancreatitis    HPI:  Patient is a 56 year old lady with a h/o recurrent pancreatitis, HTN, anxiety, hypothyroidism.  She presents with abdominal pain, N/V.  Patient was admitted on 4/26/2017 with similar symptoms and was found to have an elevated lipase of 198.  She was started on IVFs and advanced to a regular diet.  She has been worked up by GI extensively, but there is no known etiology.  She was discharged on 4/28/2017.  After discharge, patient states she ate macaroni and cheese and began having pain, N/V again.  She reports no PO intake since then.  Pain has been uncontrolled with PO Dilaudid.  She denies chest pain, SOB, dizziness, palpitations, fever/chills, diarrhea.    Hospital Course:       Interval History:     Patient with continued pain.  Happy about GI consult.    Review of Systems   All other systems reviewed and are negative.    Objective:     Vital Signs (Most Recent):  Temp: 97.8 °F (36.6 °C) (05/01/17 0812)  Pulse: 70 (05/01/17 0812)  Resp: 20 (05/01/17 0812)  BP: 118/66 (05/01/17 0812)  SpO2: (!) 93 % (05/01/17 0812) Vital Signs (24h Range):  Temp:  [96.3 °F (35.7 °C)-97.8 °F (36.6 °C)] 97.8 °F (36.6 °C)  Pulse:  [67-79] 70  Resp:  [18-20] 20  SpO2:  [86 %-96 %] 93 %  BP: (115-138)/(55-72) 118/66     Weight: 83.5 kg (184 lb)  Body mass index is 35.94 kg/(m^2).  No intake or output data in the 24 hours ending 05/01/17 1328   Physical Exam   Constitutional: She appears well-nourished.   HENT:   Head: Atraumatic.   Cardiovascular: Normal rate.    Pulmonary/Chest: Effort normal.   Abdominal: Soft.   Neurological: She is alert.    Skin: Skin is warm.   Psychiatric: She has a normal mood and affect.   Nursing note and vitals reviewed.      Significant Labs:   BMP:   Recent Labs  Lab 04/30/17  0426 05/01/17  0907   GLU 86 117*    140   K 3.8 3.7    104   CO2 32* 30*   BUN 9 5*   CREATININE 0.6 0.6   CALCIUM 8.4* 8.1*   MG 1.7  --      CBC:   Recent Labs  Lab 04/29/17  1619 04/30/17  0426 05/01/17  0351   WBC 5.74 4.70 4.87   HGB 12.8 12.6 12.1   HCT 37.5 37.3 36.1*    197 205       Significant Imaging: None    Assessment/Plan:      * Acute pancreatitis  - Clears, IVF  - Lipase trending down  - MRCP pending  - NPO at midnight pending GI interventions      Hypothyroidism  - Continue levothyroxine 75mcg daily.      Anxiety  - Continue Xanax 0.5mg BID.      VTE Risk Mitigation         Ordered     heparin (porcine) injection 5,000 Units  Every 8 hours     Route:  Subcutaneous        04/30/17 2022     Medium Risk of VTE  Once      04/29/17 2153     Place CHANEL hose  Until discontinued      04/29/17 2153     Place sequential compression device  Until discontinued      04/29/17 2153          Mahendra Carter MD  Department of Hospital Medicine   [facility]

## 2017-05-01 NOTE — SUBJECTIVE & OBJECTIVE
Interval History:     Patient with continued pain.  Happy about GI consult.    Review of Systems   All other systems reviewed and are negative.    Objective:     Vital Signs (Most Recent):  Temp: 97.8 °F (36.6 °C) (05/01/17 0812)  Pulse: 70 (05/01/17 0812)  Resp: 20 (05/01/17 0812)  BP: 118/66 (05/01/17 0812)  SpO2: (!) 93 % (05/01/17 0812) Vital Signs (24h Range):  Temp:  [96.3 °F (35.7 °C)-97.8 °F (36.6 °C)] 97.8 °F (36.6 °C)  Pulse:  [67-79] 70  Resp:  [18-20] 20  SpO2:  [86 %-96 %] 93 %  BP: (115-138)/(55-72) 118/66     Weight: 83.5 kg (184 lb)  Body mass index is 35.94 kg/(m^2).  No intake or output data in the 24 hours ending 05/01/17 1328   Physical Exam   Constitutional: She appears well-nourished.   HENT:   Head: Atraumatic.   Cardiovascular: Normal rate.    Pulmonary/Chest: Effort normal.   Abdominal: Soft.   Neurological: She is alert.   Skin: Skin is warm.   Psychiatric: She has a normal mood and affect.   Nursing note and vitals reviewed.      Significant Labs:   BMP:   Recent Labs  Lab 04/30/17  0426 05/01/17  0907   GLU 86 117*    140   K 3.8 3.7    104   CO2 32* 30*   BUN 9 5*   CREATININE 0.6 0.6   CALCIUM 8.4* 8.1*   MG 1.7  --      CBC:   Recent Labs  Lab 04/29/17  1619 04/30/17  0426 05/01/17  0351   WBC 5.74 4.70 4.87   HGB 12.8 12.6 12.1   HCT 37.5 37.3 36.1*    197 205       Significant Imaging: None

## 2017-05-01 NOTE — CONSULTS
Advanced Endoscopy Service Consult    Reason for Consult: Evaluate for acute pancreatitis     HPI:  This is a 56 year old  female with a history of chronic abdominal pain since 2009 who presents with abdominal pain, nausea and vomiting with abnormal LFT's. She was discharged the day prior to admission for similar symptoms which were attributed to acute pancreatitis given lipase of 200. Lipase on admission was 90.     US here shows mild prominence of biliary ducts. Previous CT scans do not reveal evidence for acute pancreatitis.     She reports 9/10 RUQ pain with nausea. She is currently tolerating a liquid diet. Lipase has now normalized. Her last EUS was done April 2016 and showed no biliary abnormalities and diffuse echogenicity throughout pancreas. She has no evidence for chronic pancreatitis - no calcifications and normal 2016 fecal elastase.     She lives in [place], [place] alone. She denies a history of alcohol, tobacco or drug abuse. She denies constipation symptoms. EGD November 2016 for pain showed duodenitis and gastritis with normal biopsies. She was seen in general Gi clinic and no cause to her pain was revealed. She was advised to see outpatient pain management but the patient did not want to do so.     Constitutional: no fever or chills   Eyes: no visual changes   ENT: no sore throat or dysphagia  Respiratory: no cough or shortness of breath   Cardiovascular: no chest pain or palpitations   Gastrointestinal: as per HPI  Hematologic/Lymphatic: no easy bruising or lymphadenopathy   Musculoskeletal: no arthralgias or myalgias   Neurological: no seizures, tremors or change in mental status  Behavioral/Psych: no auditory or visual hallucinations    Past Medical History:   Diagnosis Date    Adrenal adenoma     Gastric ulcer     Hypothyroidism     Kidney stones     Pancreatitis     Traumatic compression fracture of L1 lumbar vertebra        Past Surgical History:   Procedure Laterality Date     "CHOLECYSTECTOMY      COLONOSCOPY N/A 2016    Procedure: COLONOSCOPY;  Surgeon: Sathya Strickland MD;  Location: [facility] ([address] [place] [address]);  Service: Endoscopy;  Laterality: N/A;    CYSTOSCOPY      HYSTERECTOMY      KYPHOSIS SURGERY  7/20/15    right adrenal gland remove      tonsillectomy      TONSILLECTOMY      TUBAL LIGATION      x2          Family History   Problem Relation Age of Onset    Diabetes Father      borderline    Heart attack Father        Review of patient's allergies indicates:   Allergen Reactions    Codeine Nausea And Vomiting    Compazine [prochlorperazine edisylate] Anaphylaxis and Nausea And Vomiting    Demerol [meperidine] Anaphylaxis and Nausea And Vomiting    Morpholine analogues Anaphylaxis and Nausea And Vomiting     vomit    Percocet [oxycodone-acetaminophen] Nausea And Vomiting    Phenothiazines Anaphylaxis     Takes Phenergan daily w/o problems.      Corticosteroids (glucocorticoids) Itching     Agitation, "skin crawling" sensation    Cortisone Itching     Agitation, "skin crawling" sensation    Fentanyl Nausea And Vomiting and Swelling     Fentanyl patch made lips numb and swollen    Morphine Nausea And Vomiting    Nsaids (non-steroidal anti-inflammatory drug) Other (See Comments)     Cluster ulcers    Pcn [penicillins] Other (See Comments)     Stomach cramps    Toradol [ketorolac] Nausea And Vomiting     ulcers    Tramadol Nausea And Vomiting       Social History     Social History    Marital status:      Spouse name: N/A    Number of children: N/A    Years of education: N/A     Social History Main Topics    Smoking status: Never Smoker    Smokeless tobacco: Never Used    Alcohol use No    Drug use: None    Sexual activity: Not Currently     Other Topics Concern    None     Social History Narrative        alprazolam  0.5 mg Oral BID    escitalopram oxalate  20 mg Oral QHS    estradiol  1 mg Oral Daily    gabapentin  300 mg Oral BID    " heparin (porcine)  5,000 Units Subcutaneous Q8H    levothyroxine  75 mcg Oral Before breakfast    senna-docusate 8.6-50 mg  1 tablet Oral BID       /66 (BP Location: Left arm, Patient Position: Lying, BP Method: Automatic)  Pulse 70  Temp 97.8 °F (36.6 °C) (Oral)   Resp 20  Ht 5' (1.524 m)  Wt 83.5 kg (184 lb)  SpO2 (!) 93%  Breastfeeding? No  BMI 35.94 kg/m2  General appearance: alert, appears stated age and cooperative.  Eyes: negative findings: conjunctivae normal and sclerae normal.  Throat: lips, mucosa, and tongue normal.  Lungs: clear to auscultation bilaterally.  Heart: S1, S2 normal.  Abdomen: soft, diffuse abdominal pain, bowel sounds normal; no masses.  Skin: no rashes to visible areas, no jaundice.  Lymph nodes: No cervical or supraclavicular adenopathy appreciated  Neurologic: Mental status: alert, oriented, thought content appropriate.  Extremities: No lower extremity edema, No muscle wasting appreciated.      Laboratory:    Recent Labs  Lab 04/29/17  1832 04/30/17  0426 05/01/17  0907    141 140   K 4.0 3.8 3.7    103 104   CO2 33* 32* 30*   BUN 10 9 5*   CREATININE 0.6 0.6 0.6   CALCIUM 9.0 8.4* 8.1*   PROT 6.0 5.9* 5.8*   BILITOT 0.3 1.1* 0.8   ALKPHOS 96 159* 181*   * 213* 263*   AST 44* 241* 161*         Recent Labs  Lab 04/29/17  1619 04/30/17  0426 05/01/17  0351   WBC 5.74 4.70 4.87   HGB 12.8 12.6 12.1   HCT 37.5 37.3 36.1*    197 205       No results for input(s): INR in the last 168 hours.    Assessment:  This is a 56 year old  female with a history of chronic abdominal pain since 2009 who presents with abdominal pain, nausea and vomiting. She was discharged the day prior to admission for similar symptoms which were attributed to acute pancreatitis given lipase of 200. Lipase on admission was 90.     1. Abnormal LFT's with diffuse pain and biliary ductal prominence on us. We will proceed with further imaging MRI/MRCP to evaluate for biliary  source such as stones or sludge.     Plan:  1. Proceed with MRI/MRCP today.   2. Keep Npo after midnight in the event a procedure is warranted.   3. Plan discussed with primary team.     Ольга Stahl MD PGY-6  Gastroenterology Fellow  Pager# [phone]

## 2017-05-01 NOTE — PLAN OF CARE
Problem: Patient Care Overview  Goal: Plan of Care Review  Outcome: Ongoing (interventions implemented as appropriate)  Pt free of falls/trauma/injuries. Bed in low position, wheels locked, and call light within reach. Skin integrity remains unchanged. Pain and nausea managed with prn medications. NS currently infusing @ 100 ml/hr. No distress noted or reported at this time. Will continue to monitor.

## 2017-05-01 NOTE — PLAN OF CARE
04/30/17 1615   Discharge Assessment   Assessment Type Discharge Planning Assessment   Confirmed/corrected address and phone number on facesheet? Yes   Assessment information obtained from? Patient;Medical Record  (patient known to me)   Expected Length of Stay (days) 2   Communicated expected length of stay with patient/caregiver yes   Prior to hospitilization cognitive status: Alert/Oriented   Prior to hospitalization functional status: Independent   Current cognitive status: Alert/Oriented   Current Functional Status: Independent   Arrived From home or self-care   Lives With alone   Able to Return to Prior Arrangements yes   Is patient able to care for self after discharge? Yes   Who are your caregiver(s) and their phone number(s)? patient is primary cg for her parents   Patient's perception of discharge disposition home or selfcare   Readmission Within The Last 30 Days previous discharge plan unsuccessful   Patient currently being followed by outpatient case management? No   Patient currently receives home health services? No   Does the patient currently use HME? No   Patient currently receives private duty nursing? No   Patient currently receives any other outside agency services? No   Equipment Currently Used at Home none   Do you have any problems affording any of your prescribed medications? No   Is the patient taking medications as prescribed? yes   Do you have any financial concerns preventing you from receiving the healthcare you need? No   Does the patient have transportation to healthcare appointments? Yes   Transportation Available car   On Dialysis? No   Does the patient receive services at the Coumadin Clinic? No   Are there any open cases? No   Discharge Plan A Home with family   Patient/Family In Agreement With Plan yes

## 2017-05-02 ENCOUNTER — SURGERY (OUTPATIENT)
Age: 57
End: 2017-05-02

## 2017-05-02 ENCOUNTER — ANESTHESIA EVENT (OUTPATIENT)
Dept: ENDOSCOPY | Facility: HOSPITAL | Age: 57
End: 2017-05-02
Payer: MEDICARE

## 2017-05-02 ENCOUNTER — ANESTHESIA (OUTPATIENT)
Dept: ENDOSCOPY | Facility: HOSPITAL | Age: 57
End: 2017-05-02
Payer: MEDICARE

## 2017-05-02 PROBLEM — R10.13 EPIGASTRIC PAIN: Status: ACTIVE | Noted: 2017-04-26

## 2017-05-02 LAB
ALBUMIN SERPL BCP-MCNC: 3.4 G/DL
ALP SERPL-CCNC: 173 U/L
ALT SERPL W/O P-5'-P-CCNC: 199 U/L
ANION GAP SERPL CALC-SCNC: 5 MMOL/L
AST SERPL-CCNC: 73 U/L
BASOPHILS # BLD AUTO: 0.01 K/UL
BASOPHILS NFR BLD: 0.2 %
BILIRUB SERPL-MCNC: 0.6 MG/DL
BUN SERPL-MCNC: 3 MG/DL
CALCIUM SERPL-MCNC: 8 MG/DL
CHLORIDE SERPL-SCNC: 105 MMOL/L
CO2 SERPL-SCNC: 32 MMOL/L
CREAT SERPL-MCNC: 0.6 MG/DL
DIFFERENTIAL METHOD: ABNORMAL
EOSINOPHIL # BLD AUTO: 0.2 K/UL
EOSINOPHIL NFR BLD: 3.3 %
ERYTHROCYTE [DISTWIDTH] IN BLOOD BY AUTOMATED COUNT: 14.7 %
EST. GFR  (AFRICAN AMERICAN): >60 ML/MIN/1.73 M^2
EST. GFR  (NON AFRICAN AMERICAN): >60 ML/MIN/1.73 M^2
GLUCOSE SERPL-MCNC: 82 MG/DL
HCT VFR BLD AUTO: 35.8 %
HGB BLD-MCNC: 12.1 G/DL
LIPASE SERPL-CCNC: 22 U/L
LYMPHOCYTES # BLD AUTO: 2.3 K/UL
LYMPHOCYTES NFR BLD: 49.9 %
MCH RBC QN AUTO: 29.4 PG
MCHC RBC AUTO-ENTMCNC: 33.8 %
MCV RBC AUTO: 87 FL
MONOCYTES # BLD AUTO: 0.5 K/UL
MONOCYTES NFR BLD: 11.9 %
NEUTROPHILS # BLD AUTO: 1.6 K/UL
NEUTROPHILS NFR BLD: 34.5 %
PLATELET # BLD AUTO: 219 K/UL
PMV BLD AUTO: 10.5 FL
POTASSIUM SERPL-SCNC: 3.9 MMOL/L
PROT SERPL-MCNC: 6.1 G/DL
RBC # BLD AUTO: 4.11 M/UL
SODIUM SERPL-SCNC: 142 MMOL/L
WBC # BLD AUTO: 4.55 K/UL

## 2017-05-02 PROCEDURE — 25000003 PHARM REV CODE 250: Performed by: NURSE ANESTHETIST, CERTIFIED REGISTERED

## 2017-05-02 PROCEDURE — 88305 TISSUE EXAM BY PATHOLOGIST: CPT | Mod: 59 | Performed by: PATHOLOGY

## 2017-05-02 PROCEDURE — G0378 HOSPITAL OBSERVATION PER HR: HCPCS

## 2017-05-02 PROCEDURE — 27201012 HC FORCEPS, HOT/COLD, DISP: Performed by: INTERNAL MEDICINE

## 2017-05-02 PROCEDURE — D9220A PRA ANESTHESIA: Mod: CRNA,,, | Performed by: NURSE ANESTHETIST, CERTIFIED REGISTERED

## 2017-05-02 PROCEDURE — 63600175 PHARM REV CODE 636 W HCPCS: Performed by: NURSE ANESTHETIST, CERTIFIED REGISTERED

## 2017-05-02 PROCEDURE — 80053 COMPREHEN METABOLIC PANEL: CPT

## 2017-05-02 PROCEDURE — D9220A PRA ANESTHESIA: Mod: ANES,,, | Performed by: ANESTHESIOLOGY

## 2017-05-02 PROCEDURE — 63600175 PHARM REV CODE 636 W HCPCS: Performed by: PHYSICIAN ASSISTANT

## 2017-05-02 PROCEDURE — 88341 IMHCHEM/IMCYTCHM EA ADD ANTB: CPT | Mod: 26,,, | Performed by: PATHOLOGY

## 2017-05-02 PROCEDURE — 88342 IMHCHEM/IMCYTCHM 1ST ANTB: CPT | Mod: 26,,, | Performed by: PATHOLOGY

## 2017-05-02 PROCEDURE — 25000003 PHARM REV CODE 250: Performed by: INTERNAL MEDICINE

## 2017-05-02 PROCEDURE — 99224 PR SUBSEQUENT OBSERVATION CARE,LEVEL I: CPT | Mod: ,,, | Performed by: INTERNAL MEDICINE

## 2017-05-02 PROCEDURE — 43239 EGD BIOPSY SINGLE/MULTIPLE: CPT | Mod: 59,,, | Performed by: INTERNAL MEDICINE

## 2017-05-02 PROCEDURE — 85025 COMPLETE CBC W/AUTO DIFF WBC: CPT

## 2017-05-02 PROCEDURE — 37000009 HC ANESTHESIA EA ADD 15 MINS: Performed by: INTERNAL MEDICINE

## 2017-05-02 PROCEDURE — 43259 EGD US EXAM DUODENUM/JEJUNUM: CPT | Mod: ,,, | Performed by: INTERNAL MEDICINE

## 2017-05-02 PROCEDURE — 43259 EGD US EXAM DUODENUM/JEJUNUM: CPT | Performed by: INTERNAL MEDICINE

## 2017-05-02 PROCEDURE — 88312 SPECIAL STAINS GROUP 1: CPT | Mod: 26,,, | Performed by: PATHOLOGY

## 2017-05-02 PROCEDURE — 36415 COLL VENOUS BLD VENIPUNCTURE: CPT

## 2017-05-02 PROCEDURE — 83690 ASSAY OF LIPASE: CPT

## 2017-05-02 PROCEDURE — 25000003 PHARM REV CODE 250: Performed by: PHYSICIAN ASSISTANT

## 2017-05-02 PROCEDURE — 88305 TISSUE EXAM BY PATHOLOGIST: CPT | Mod: 26,,, | Performed by: PATHOLOGY

## 2017-05-02 PROCEDURE — 43239 EGD BIOPSY SINGLE/MULTIPLE: CPT | Performed by: INTERNAL MEDICINE

## 2017-05-02 PROCEDURE — 37000008 HC ANESTHESIA 1ST 15 MINUTES: Performed by: INTERNAL MEDICINE

## 2017-05-02 RX ORDER — PROPOFOL 10 MG/ML
VIAL (ML) INTRAVENOUS
Status: DISCONTINUED | OUTPATIENT
Start: 2017-05-02 | End: 2017-05-02

## 2017-05-02 RX ORDER — PROPOFOL 10 MG/ML
VIAL (ML) INTRAVENOUS CONTINUOUS PRN
Status: DISCONTINUED | OUTPATIENT
Start: 2017-05-02 | End: 2017-05-02

## 2017-05-02 RX ORDER — LIDOCAINE HCL/PF 100 MG/5ML
SYRINGE (ML) INTRAVENOUS
Status: DISCONTINUED | OUTPATIENT
Start: 2017-05-02 | End: 2017-05-02

## 2017-05-02 RX ORDER — MIDAZOLAM HYDROCHLORIDE 1 MG/ML
INJECTION INTRAMUSCULAR; INTRAVENOUS
Status: DISCONTINUED | OUTPATIENT
Start: 2017-05-02 | End: 2017-05-02

## 2017-05-02 RX ADMIN — ONDANSETRON 8 MG: 8 TABLET, ORALLY DISINTEGRATING ORAL at 12:05

## 2017-05-02 RX ADMIN — LEVOTHYROXINE SODIUM 75 MCG: 75 TABLET ORAL at 05:05

## 2017-05-02 RX ADMIN — HYDROMORPHONE HYDROCHLORIDE 1 MG: 1 INJECTION, SOLUTION INTRAMUSCULAR; INTRAVENOUS; SUBCUTANEOUS at 06:05

## 2017-05-02 RX ADMIN — ESTRADIOL 1 MG: 1 TABLET ORAL at 09:05

## 2017-05-02 RX ADMIN — ONDANSETRON 8 MG: 8 TABLET, ORALLY DISINTEGRATING ORAL at 10:05

## 2017-05-02 RX ADMIN — LIDOCAINE HYDROCHLORIDE 100 MG: 20 INJECTION, SOLUTION INTRAVENOUS at 11:05

## 2017-05-02 RX ADMIN — HYDROMORPHONE HYDROCHLORIDE 1 MG: 1 INJECTION, SOLUTION INTRAMUSCULAR; INTRAVENOUS; SUBCUTANEOUS at 01:05

## 2017-05-02 RX ADMIN — STANDARDIZED SENNA CONCENTRATE AND DOCUSATE SODIUM 1 TABLET: 8.6; 5 TABLET, FILM COATED ORAL at 09:05

## 2017-05-02 RX ADMIN — PROMETHAZINE HYDROCHLORIDE 25 MG: 25 TABLET ORAL at 09:05

## 2017-05-02 RX ADMIN — ALUMINUM HYDROXIDE, MAGNESIUM HYDROXIDE, AND SIMETHICONE 30 ML: 200; 200; 20 SUSPENSION ORAL at 09:05

## 2017-05-02 RX ADMIN — ESCITALOPRAM 20 MG: 20 TABLET, FILM COATED ORAL at 09:05

## 2017-05-02 RX ADMIN — GABAPENTIN 300 MG: 300 CAPSULE ORAL at 09:05

## 2017-05-02 RX ADMIN — HEPARIN SODIUM 5000 UNITS: 5000 INJECTION, SOLUTION INTRAVENOUS; SUBCUTANEOUS at 09:05

## 2017-05-02 RX ADMIN — SODIUM CHLORIDE: 0.9 INJECTION, SOLUTION INTRAVENOUS at 04:05

## 2017-05-02 RX ADMIN — PROMETHAZINE HYDROCHLORIDE 25 MG: 25 TABLET ORAL at 06:05

## 2017-05-02 RX ADMIN — ALPRAZOLAM 0.5 MG: 0.5 TABLET ORAL at 09:05

## 2017-05-02 RX ADMIN — PROPOFOL 80 MG: 10 INJECTION, EMULSION INTRAVENOUS at 11:05

## 2017-05-02 RX ADMIN — HYDROMORPHONE HYDROCHLORIDE 1 MG: 1 INJECTION, SOLUTION INTRAMUSCULAR; INTRAVENOUS; SUBCUTANEOUS at 04:05

## 2017-05-02 RX ADMIN — PROPOFOL 150 MCG/KG/MIN: 10 INJECTION, EMULSION INTRAVENOUS at 11:05

## 2017-05-02 RX ADMIN — SODIUM CHLORIDE: 0.9 INJECTION, SOLUTION INTRAVENOUS at 11:05

## 2017-05-02 RX ADMIN — HYDROMORPHONE HYDROCHLORIDE 1 MG: 1 INJECTION, SOLUTION INTRAMUSCULAR; INTRAVENOUS; SUBCUTANEOUS at 09:05

## 2017-05-02 RX ADMIN — HEPARIN SODIUM 5000 UNITS: 5000 INJECTION, SOLUTION INTRAVENOUS; SUBCUTANEOUS at 05:05

## 2017-05-02 RX ADMIN — MIDAZOLAM HYDROCHLORIDE 2 MG: 1 INJECTION, SOLUTION INTRAMUSCULAR; INTRAVENOUS at 11:05

## 2017-05-02 RX ADMIN — ONDANSETRON 8 MG: 8 TABLET, ORALLY DISINTEGRATING ORAL at 01:05

## 2017-05-02 RX ADMIN — HYDROMORPHONE HYDROCHLORIDE 1 MG: 1 INJECTION, SOLUTION INTRAMUSCULAR; INTRAVENOUS; SUBCUTANEOUS at 12:05

## 2017-05-02 RX ADMIN — HYDROMORPHONE HYDROCHLORIDE 1 MG: 1 INJECTION, SOLUTION INTRAMUSCULAR; INTRAVENOUS; SUBCUTANEOUS at 10:05

## 2017-05-02 NOTE — ANESTHESIA PREPROCEDURE EVALUATION
05/02/2017  Cara Mendoza is a 56 y.o., female.    Pre-op Assessment    I have reviewed the Patient Summary Reports.     I have reviewed the Nursing Notes.   I have reviewed the Medications.     Review of Systems  Anesthesia Hx:  No problems with previous Anesthesia  Denies Family Hx of Anesthesia complications.  Personal Hx of Anesthesia complications, Post-Operative Nausea/Vomiting, in the past, but not with recent anesthetics / prophylaxis   Cardiovascular:   Exercise tolerance: good Hypertension CONCLUSIONS     1 - Concentric remodeling.     2 - Normal left ventricular size and systolic function (EF 60-65%).     3 - Normal right ventricular systolic function .     4 - Normal left ventricular diastolic function.     5 - The estimated PA systolic pressure is 24 mmHg.  Functional Capacity good / => 4 METS    Renal/:   renal calculi    Hepatic/GI:   PUD,  Esophageal / Stomach Disorders Peptic Ulcer Disease, Gastric Conditions:, Gastritis    Endocrine:   Hypothyroidism  Metabolic Disorders, Obesity / BMI > 30  Psych:   anxiety          Physical Exam   Airway/Jaw/Neck:  Airway Findings: Mouth Opening: Normal Tongue: Normal  General Airway Assessment: Adult, Good  Mallampati: I  TM Distance: Normal, at least 6 cm       Chest/Lungs:  Chest/Lungs Findings: Normal Respiratory Rate         Mental Status:  Mental Status Findings:  Cooperative, Alert and Oriented         Anesthesia Plan  Type of Anesthesia, risks & benefits discussed:  Anesthesia Type:  general  Patient's Preference: General  Intra-op Monitoring Plan:   Intra-op Monitoring Plan Comments:   Post Op Pain Control Plan:   Post Op Pain Control Plan Comments:   Induction:   IV  Beta Blocker:  Patient is not currently on a Beta-Blocker (No further documentation required).       Informed Consent: Patient understands risks and agrees with Anesthesia plan.   Questions answered. Anesthesia consent signed with patient.  ASA Score: 3     Day of Surgery Review of History & Physical:    H&P update referred to the surgeon.     Anesthesia Plan Notes: Reports that PONV only relieved with Phenergan.        Ready For Surgery From Anesthesia Perspective.

## 2017-05-02 NOTE — PATIENT INSTRUCTIONS
Discharge Summary/Instructions for after an Upper EUS  Patient Name: Destinee Mendoza  Patient MRN: [id number]  Patient YOB: 1960  Tuesday, May 02, 2017    Chong Ortiz MD  1.  Do Not eat or drink anything for 1 hour.  Try sips of water first.  If   tolerated, resume your regular diet or one recommended by your physician.  2.  Do not drive, operate machinery, make critical decisions, or do   activities that require coordination or balance for 24 hours.  3.  You may experience a sore throat for 24 to 48 hours.  You may use throat   lozenges or gargle with warm salt water to relieve the discomfort.  4.  Because air was put into your stomach during the procedure, you may   experience some belching.  5.  Go directly to the emergency room if you notice any of the following:   Chills and/or fever over 101   Persistent vomiting or vomiting with blood   Severe abdominal pain, other than gas cramps   Severe chest pain   Black, tarry stools  Your doctor recommends these additional instructions:  - Return patient to hospital [facility] for ongoing care.   - Resume previous diet.   - Continue present medications.   - Await path results.  Resume your previous diet.   Continue your present medications.   We are waiting for pathology results.  Return patient to hospital [facility] for ongoing care.   Resume previous diet.   Continue present medications.   Await path results.  If you have any questions on the above instructions, call the GI Lab and ask   for an endoscopy nurse.  If you have any problems, please call your physician.  EMERGENCY PHONE NUMBER: [phone]  LAB RESULTS: [phone]  Chong Ortiz MD  5/2/2017 2:11:44 PM  This report has been verified and signed electronically.

## 2017-05-02 NOTE — PROGRESS NOTES
Pt released from anesthesia.   AAOx4, VSS, NAD noted, denies pain.  Report called to Izzy Samuel RN on 11th floor.

## 2017-05-02 NOTE — ANESTHESIA RELEASE NOTE
Anesthesia Release from PACU Note    Patient: Cara Mendoza    Procedure(s) Performed: Procedure(s) (LRB):  ULTRASOUND-ENDOSCOPIC-UPPER (N/A)    Anesthesia type: general    Post pain: Adequate analgesia    Post assessment: no apparent anesthetic complications, tolerated procedure well and no evidence of recall    Last Vitals:   Visit Vitals    BP (!) 120/90 (BP Location: Left arm, Patient Position: Lying, BP Method: Automatic)    Pulse 90    Temp 36.8 °C (98.2 °F) (Skin)    Resp 17    Ht 5' (1.524 m)    Wt 83.5 kg (184 lb)    SpO2 100%    Breastfeeding No    BMI 35.94 kg/m2       Post vital signs: stable    Level of consciousness: awake, alert  and oriented    Nausea/Vomiting: no nausea/no vomiting    Complications: none    Airway Patency: patent    Respiratory: unassisted    Cardiovascular: stable and blood pressure at baseline    Hydration: euvolemic

## 2017-05-02 NOTE — ANESTHESIA POSTPROCEDURE EVALUATION
Anesthesia Post Evaluation    Patient: Cara Mendoza    Procedure(s) Performed: Procedure(s) (LRB):  ULTRASOUND-ENDOSCOPIC-UPPER (N/A)    Final Anesthesia Type: general  Patient location during evaluation: PACU  Patient participation: Yes- Able to Participate  Level of consciousness: awake and alert  Post-procedure vital signs: reviewed and stable  Pain management: adequate  Airway patency: patent  PONV status at discharge: No PONV  Anesthetic complications: no      Cardiovascular status: blood pressure returned to baseline  Respiratory status: unassisted and spontaneous ventilation  Hydration status: euvolemic  Follow-up not needed.        Visit Vitals    BP (!) 120/90 (BP Location: Left arm, Patient Position: Lying, BP Method: Automatic)    Pulse 90    Temp 36.8 °C (98.2 °F) (Skin)    Resp 17    Ht 5' (1.524 m)    Wt 83.5 kg (184 lb)    SpO2 100%    Breastfeeding No    BMI 35.94 kg/m2       Pain/Claudia Score: Pain Assessment Performed: Yes (5/2/2017 12:17 PM)  Presence of Pain: denies (5/2/2017 10:58 AM)  Pain Rating Prior to Med Admin: 10 (5/2/2017  9:13 AM)  Pain Rating Post Med Admin: 7 (5/2/2017  9:43 AM)  Claudia Score: 8 (5/2/2017 12:17 PM)

## 2017-05-03 VITALS
OXYGEN SATURATION: 94 % | TEMPERATURE: 98 F | RESPIRATION RATE: 16 BRPM | BODY MASS INDEX: 36.12 KG/M2 | WEIGHT: 184 LBS | SYSTOLIC BLOOD PRESSURE: 135 MMHG | HEIGHT: 60 IN | DIASTOLIC BLOOD PRESSURE: 63 MMHG | HEART RATE: 79 BPM

## 2017-05-03 LAB
ALBUMIN SERPL BCP-MCNC: 3 G/DL
ALP SERPL-CCNC: 169 U/L
ALT SERPL W/O P-5'-P-CCNC: 147 U/L
ANION GAP SERPL CALC-SCNC: 6 MMOL/L
AST SERPL-CCNC: 46 U/L
BASOPHILS # BLD AUTO: 0.01 K/UL
BASOPHILS NFR BLD: 0.2 %
BILIRUB SERPL-MCNC: 0.7 MG/DL
BUN SERPL-MCNC: 4 MG/DL
CALCIUM SERPL-MCNC: 7.8 MG/DL
CHLORIDE SERPL-SCNC: 106 MMOL/L
CO2 SERPL-SCNC: 29 MMOL/L
CREAT SERPL-MCNC: 0.6 MG/DL
DIFFERENTIAL METHOD: ABNORMAL
EOSINOPHIL # BLD AUTO: 0.1 K/UL
EOSINOPHIL NFR BLD: 1.9 %
ERYTHROCYTE [DISTWIDTH] IN BLOOD BY AUTOMATED COUNT: 14.9 %
EST. GFR  (AFRICAN AMERICAN): >60 ML/MIN/1.73 M^2
EST. GFR  (NON AFRICAN AMERICAN): >60 ML/MIN/1.73 M^2
GLUCOSE SERPL-MCNC: 83 MG/DL
HCT VFR BLD AUTO: 33.2 %
HGB BLD-MCNC: 11.3 G/DL
LIPASE SERPL-CCNC: 9 U/L
LYMPHOCYTES # BLD AUTO: 1.8 K/UL
LYMPHOCYTES NFR BLD: 37.5 %
MAGNESIUM SERPL-MCNC: 1.8 MG/DL
MCH RBC QN AUTO: 29.5 PG
MCHC RBC AUTO-ENTMCNC: 34 %
MCV RBC AUTO: 87 FL
MONOCYTES # BLD AUTO: 0.5 K/UL
MONOCYTES NFR BLD: 11.1 %
NEUTROPHILS # BLD AUTO: 2.3 K/UL
NEUTROPHILS NFR BLD: 49.1 %
PLATELET # BLD AUTO: 199 K/UL
PMV BLD AUTO: 10.5 FL
POTASSIUM SERPL-SCNC: 4.1 MMOL/L
PROT SERPL-MCNC: 5.5 G/DL
RBC # BLD AUTO: 3.83 M/UL
SODIUM SERPL-SCNC: 141 MMOL/L
WBC # BLD AUTO: 4.67 K/UL

## 2017-05-03 PROCEDURE — 85025 COMPLETE CBC W/AUTO DIFF WBC: CPT

## 2017-05-03 PROCEDURE — 25000003 PHARM REV CODE 250: Performed by: PHYSICIAN ASSISTANT

## 2017-05-03 PROCEDURE — 97803 MED NUTRITION INDIV SUBSEQ: CPT

## 2017-05-03 PROCEDURE — 99217 PR OBSERVATION CARE DISCHARGE: CPT | Mod: ,,, | Performed by: INTERNAL MEDICINE

## 2017-05-03 PROCEDURE — 25000003 PHARM REV CODE 250: Performed by: INTERNAL MEDICINE

## 2017-05-03 PROCEDURE — G0378 HOSPITAL OBSERVATION PER HR: HCPCS

## 2017-05-03 PROCEDURE — 80053 COMPREHEN METABOLIC PANEL: CPT

## 2017-05-03 PROCEDURE — 83690 ASSAY OF LIPASE: CPT

## 2017-05-03 PROCEDURE — 63600175 PHARM REV CODE 636 W HCPCS: Performed by: PHYSICIAN ASSISTANT

## 2017-05-03 PROCEDURE — 36415 COLL VENOUS BLD VENIPUNCTURE: CPT

## 2017-05-03 PROCEDURE — 83735 ASSAY OF MAGNESIUM: CPT

## 2017-05-03 RX ORDER — HYDROMORPHONE HYDROCHLORIDE 1 MG/ML
0.2 INJECTION, SOLUTION INTRAMUSCULAR; INTRAVENOUS; SUBCUTANEOUS EVERY 4 HOURS PRN
Status: DISCONTINUED | OUTPATIENT
Start: 2017-05-03 | End: 2017-05-03 | Stop reason: HOSPADM

## 2017-05-03 RX ORDER — FAMOTIDINE 20 MG/1
20 TABLET, FILM COATED ORAL 2 TIMES DAILY PRN
Qty: 60 TABLET | Refills: 3 | Status: SHIPPED | OUTPATIENT
Start: 2017-05-03 | End: 2017-11-13

## 2017-05-03 RX ORDER — PANTOPRAZOLE SODIUM 40 MG/1
40 TABLET, DELAYED RELEASE ORAL DAILY
Status: DISCONTINUED | OUTPATIENT
Start: 2017-05-03 | End: 2017-05-03 | Stop reason: HOSPADM

## 2017-05-03 RX ORDER — PANTOPRAZOLE SODIUM 40 MG/1
40 TABLET, DELAYED RELEASE ORAL DAILY
Qty: 30 TABLET | Refills: 11 | Status: SHIPPED | OUTPATIENT
Start: 2017-05-03 | End: 2017-11-13

## 2017-05-03 RX ORDER — MAG HYDROX/ALUMINUM HYD/SIMETH 200-200-20
30 SUSPENSION, ORAL (FINAL DOSE FORM) ORAL EVERY 8 HOURS PRN
COMMUNITY
Start: 2017-05-03 | End: 2017-05-18

## 2017-05-03 RX ADMIN — SODIUM CHLORIDE: 0.9 INJECTION, SOLUTION INTRAVENOUS at 02:05

## 2017-05-03 RX ADMIN — HYDROMORPHONE HYDROCHLORIDE 1 MG: 1 INJECTION, SOLUTION INTRAMUSCULAR; INTRAVENOUS; SUBCUTANEOUS at 02:05

## 2017-05-03 RX ADMIN — HYDROMORPHONE HYDROCHLORIDE 0.2 MG: 1 INJECTION, SOLUTION INTRAMUSCULAR; INTRAVENOUS; SUBCUTANEOUS at 09:05

## 2017-05-03 RX ADMIN — LEVOTHYROXINE SODIUM 75 MCG: 75 TABLET ORAL at 06:05

## 2017-05-03 RX ADMIN — ALUMINUM HYDROXIDE, MAGNESIUM HYDROXIDE, AND SIMETHICONE 30 ML: 200; 200; 20 SUSPENSION ORAL at 06:05

## 2017-05-03 RX ADMIN — HYDROMORPHONE HYDROCHLORIDE 1 MG: 1 INJECTION, SOLUTION INTRAMUSCULAR; INTRAVENOUS; SUBCUTANEOUS at 06:05

## 2017-05-03 RX ADMIN — PANTOPRAZOLE SODIUM 40 MG: 40 TABLET, DELAYED RELEASE ORAL at 09:05

## 2017-05-03 RX ADMIN — ONDANSETRON 8 MG: 8 TABLET, ORALLY DISINTEGRATING ORAL at 06:05

## 2017-05-03 RX ADMIN — HYDROMORPHONE HYDROCHLORIDE 0.2 MG: 1 INJECTION, SOLUTION INTRAMUSCULAR; INTRAVENOUS; SUBCUTANEOUS at 01:05

## 2017-05-03 RX ADMIN — ESTRADIOL 1 MG: 1 TABLET ORAL at 09:05

## 2017-05-03 RX ADMIN — PROMETHAZINE HYDROCHLORIDE 25 MG: 25 TABLET ORAL at 01:05

## 2017-05-03 RX ADMIN — ALPRAZOLAM 0.5 MG: 0.5 TABLET ORAL at 09:05

## 2017-05-03 RX ADMIN — PROMETHAZINE HYDROCHLORIDE 25 MG: 25 TABLET ORAL at 02:05

## 2017-05-03 RX ADMIN — SODIUM CHLORIDE: 0.9 INJECTION, SOLUTION INTRAVENOUS at 01:05

## 2017-05-03 RX ADMIN — GABAPENTIN 300 MG: 300 CAPSULE ORAL at 09:05

## 2017-05-03 NOTE — PLAN OF CARE
Problem: Patient Care Overview  Goal: Plan of Care Review  Outcome: Ongoing (interventions implemented as appropriate)  Patient Has complained of indigestion most of the night states the Maalox, and burping helps. She continue to receive the dilaudid every 4 hours with nausea med. Remains fall free.

## 2017-05-03 NOTE — ASSESSMENT & PLAN NOTE
- Clears, IVF  - Lipase trending down  - MRCP done  - GI intervention with EUS: no pancreatic pathology identified.             - [place] Grade C esophagitis. Biopsied.                        - Erythematous mucosa in the stomach. Biopsied.                        - Duodenitis. Biopsied.

## 2017-05-03 NOTE — PROGRESS NOTES
"[facility] Hospital Medicine  Progress Note    Patient Name: Cara Mendoza  MRN: [id number]  Patient Class: OP- Observation   Admission Date: 4/29/2017  Length of Stay: 0 days  Attending Physician: Rita Sanchez MD  Primary Care Provider: Jaqueline Parker MD    [facility] Medicine Team: [facility]  Rita Sanchez MD    Subjective:     Principal Problem:Epigastric pain    HPI:  "Patient is a 56 year old lady with a h/o recurrent pancreatitis, HTN, anxiety, hypothyroidism.  She presents with abdominal pain, N/V.  Patient was admitted on 4/26/2017 with similar symptoms and was found to have an elevated lipase of 198.  She was started on IVFs and advanced to a regular diet.  She has been worked up by GI extensively, but there is no known etiology.  She was discharged on 4/28/2017.  After discharge, patient states she ate macaroni and cheese and began having pain, N/V again.  She reports no PO intake since then.  Pain has been uncontrolled with PO Dilaudid.  She denies chest pain, SOB, dizziness, palpitations, fever/chills, diarrhea."    Hospital Course:       Interval History: Patient asleep post-procedure.    Review of Systems   Unable to perform ROS: Other   Constitutional: Negative for fever.   Psychiatric/Behavioral: Positive for decreased concentration.     Objective:     Vital Signs (Most Recent):  Temp: 97.9 °F (36.6 °C) (05/02/17 1338)  Pulse: 100 (05/02/17 1338)  Resp: 18 (05/02/17 1338)  BP: (!) 142/67 (05/02/17 1338)  SpO2: (!) 93 % (05/02/17 1603) Vital Signs (24h Range):  Temp:  [97.4 °F (36.3 °C)-98.2 °F (36.8 °C)] 97.9 °F (36.6 °C)  Pulse:  [] 100  Resp:  [16-20] 18  SpO2:  [87 %-100 %] 93 %  BP: (120-142)/(61-90) 142/67     Weight: 83.5 kg (184 lb)  Body mass index is 35.94 kg/(m^2).    Intake/Output Summary (Last 24 hours) at 05/02/17 2310  Last data filed at 05/02/17 1129   Gross per 24 hour   Intake              100 ml   Output                0 ml   Net              100 ml "      Physical Exam   Constitutional: She appears well-nourished. She appears lethargic.   HENT:   Head: Normocephalic.   Neck: Neck supple.   Cardiovascular: Normal rate and regular rhythm.    Pulmonary/Chest: Effort normal.   Abdominal: Soft. Bowel sounds are normal.   Neurological: She appears lethargic.   Skin: Skin is warm. She is not diaphoretic.   Psychiatric: She is inattentive.       Significant Labs:   BMP:     Recent Labs  Lab 05/02/17  0941   GLU 82      K 3.9      CO2 32*   BUN 3*   CREATININE 0.6   CALCIUM 8.0*     CBC:     Recent Labs  Lab 05/01/17  0351 05/02/17  0413   WBC 4.87 4.55   HGB 12.1 12.1   HCT 36.1* 35.8*    219         Assessment/Plan:      * Epigastric pain  - Clears, IVF  - Lipase trending down  - MRCP done  - GI intervention with EUS: no pancreatic pathology identified.             - [place] Grade C esophagitis. Biopsied.                        - Erythematous mucosa in the stomach. Biopsied.                        - Duodenitis. Biopsied.    Anxiety  - Continue Xanax 0.5mg BID.      Hypothyroidism  - Continue levothyroxine 75mcg daily.      Benign essential hypertension  - Continue HCTZ 12.5mg daily.      VTE Risk Mitigation         Ordered     heparin (porcine) injection 5,000 Units  Every 8 hours     Route:  Subcutaneous        04/30/17 2022     Medium Risk of VTE  Once      04/29/17 2153     Place CHANEL hose  Until discontinued      04/29/17 2153     Place sequential compression device  Until discontinued      04/29/17 2153          Rita Sanchez MD  Department of Hospital Medicine   [facility]

## 2017-05-03 NOTE — NURSING
Patient refuses to take S Q heparin. States because of the bruises, and the bleeding from injection site earlier today. Dr. Ganesh García notified.

## 2017-05-03 NOTE — DISCHARGE SUMMARY
"[facility] Medicine  Discharge Summary      Patient Name: Cara Mendoza  MRN: [id number]  Admission Date: 4/29/2017  Hospital Length of Stay: 0 days  Discharge Date and Time: 5/3/2017  4:48 PM  Attending Physician: Rita Sanchez MD   Discharging Provider: Rita Sanchez MD  Primary Care Provider: Jaqueline Parker MD  [place] Medicine Team: [facility]  Rita Sanchez MD    HPI:   "Patient is a 56 year old lady with a h/o recurrent pancreatitis, HTN, anxiety, hypothyroidism.  She presents with abdominal pain, N/V.  Patient was admitted on 4/26/2017 with similar symptoms and was found to have an elevated lipase of 198.  She was started on IVFs and advanced to a regular diet.  She has been worked up by GI extensively, but there is no known etiology.  She was discharged on 4/28/2017.  After discharge, patient states she ate macaroni and cheese and began having pain, N/V again.  She reports no PO intake since then.  Pain has been uncontrolled with PO Dilaudid.  She denies chest pain, SOB, dizziness, palpitations, fever/chills, diarrhea."    Procedure(s) (LRB):  ULTRASOUND-ENDOSCOPIC-UPPER (N/A)      Indwelling Lines/Drains at time of discharge:   Lines/Drains/Airways          No matching active lines, drains, or airways        Hospital Course:     Epigastric pain  History of chronic abdominal pain since 2009 (patient reports chronic pancreatitis) presenting with abdominal pain, nausea and vomiting. She was discharged the day prior to admission for similar symptoms which were attributed to acute pancreatitis given lipase of 200. Lipase on this admission was 90. Abnormal LFT's with diffuse pain and biliary ductal prominence on US. MRI/MRCP done to evaluate for biliary source such as stones or sludge. Supportive care with Clears, IVF.  MRCP revealed "mild intra-and extrahepatic bile duct dilatation with tapering at the level of the duodenal ampulla, findings that have slightly progressed " "when compared to CT dated 08/31/2016 and correlate with the recent ultrasound. No definite intraductal filling defect to suggest choledocholithiasis.  No extrinsic compressive masses."  - GI intervention with EUS: no pancreatic pathology identified, without abnormalities within biliary tract or pancreatic duct (no dilation noted).   - LA Grade C esophagitis. Biopsied.  - Erythematous mucosa in the stomach. Biopsied.  - Duodenitis. Biopsied.  Pain likely associated with esophagitis and duodenitis; PPI started. Follow up with GI.     Anxiety  - Continue Xanax 0.5mg BID.      Hypothyroidism  - Continue levothyroxine 75mcg daily.     Benign essential hypertension  - Continue HCTZ 12.5mg daily    Consults:   Consults         Status Ordering Provider     Inpatient consult to [facility] ([facility])  Once     Provider:  (Not yet assigned)    Completed SOFIE ACE     Inpatient consult to Dietary  Once     Provider:  (Not yet assigned)    Completed AAKASH CARPENTER        Significant Diagnostic Studies:  EF   Date Value Ref Range Status   01/28/2016 60 55 - 65      CBC:   Recent Labs  Lab 05/03/17  0416   WBC 4.67   RBC 3.83*   HGB 11.3*   HCT 33.2*      MCV 87   MCH 29.5   MCHC 34.0     CMP:   Recent Labs  Lab 05/03/17 0416   GLU 83   CALCIUM 7.8*   ALBUMIN 3.0*   PROT 5.5*      K 4.1   CO2 29      BUN 4*   CREATININE 0.6   ALKPHOS 169*   *   AST 46*   BILITOT 0.7       Recent Labs  Lab 04/26/17  2129   COLORU Colorless*   SPECGRAV 1.005   PHUR 6.0   PROTEINUA Negative   NITRITE Negative   LEUKOCYTESUR Negative   UROBILINOGEN Negative     Imaging Results         US Endoscopic Ultrasound (In process)         FL ERCP Biliary And Pancreatic (No Result on File)         MRI MRCP Without Contrast (Final result) Result time:  05/01/17 22:23:38    Final result by Braydon Chery MD (05/01/17 22:23:38)    Impression:        Mild intra-and extrahepatic bile duct dilatation with " tapering at the level of the duodenal ampulla, findings that have slightly progressed when compared to CT dated 08/31/2016 and correlate with the recent ultrasound. No definite intraductal filling defect to suggest choledocholithiasis.  No extrinsic compressive masses.  Consider correlation with ERCP to rule out underlying stricture. A small neoplasm beyond the resolution of MRCP is possible but is thought to be less likely.  Sequela of post cholecystectomy status can present with similar findings responsible.    Additional findings include:  - Left adrenal lesions, previously characterized as adenomas on MRI dated 02/02/2015.  - Left renal disease.  - Right renal cortical scarring.  - Right hepatic cyst.  Left hepatic dystrophic calcification.  - Chronic compression deformity of the L1 vertebral body status post vertebroplasty.      Electronically signed by: TRISHA BOB MD  Date:     05/01/17  Time:    22:23     Narrative:    Technique: Multiplanar imaging of the abdomen was performed using MRCP protocol.  MIP reformatted images of the biliary tree were obtained to better delineate the bile duct anatomy.  Images were acquired on an independent workstation with concurrent supervision, and archived for permanent record.    Comparison: Ultrasound 04/30/2017, CT 08/31/2016,02/02/2015.    Findings:    Visualized heart is is normal.  No pericardial effusion.    Visualized lungs are within normal limits.  No pleural effusion.    Liver is normal in size.  There is mild intrahepatic bile duct dilatation.  There is a wedge-shaped area of abnormal signal intensity within the periphery of the right hepatic lobe, incompletely characterized on this exam but favored to be benign noting stability when compared to CT dated 08/31/2016.  There is a focal area of signal hypointensity within segment II, was consistent with a dystrophic calcification noted on the previous CT.  Intrahepatic flow voids are present.    Gallbladder is  surgically absent.  There is a dilatation of the common bile duct measuring 8 mm in maximum dimension with tapering at the level of the ampulla.  No intraductal filling defects.  No definite extrinsic compressive masses.    The pancreas demonstrates homogeneous signal intensity without focal abnormalities.  There is mild prominence of the pancreatic duct at the level of the head measuring 2-3 mm in maximum dimension.  No peripancreatic inflammatory changes or drainable fluid collections.    Stomach, duodenum and spleen are within normal limits.    Well-circumscribed lesions noted within the left adrenal gland, previously characterized as adenomas on MRI dated 02/02/2015.  Left adrenal gland is unremarkable.    Visualized bowel is within normal limits.    Kidneys are normal in size and location.  Cortical scarring noted in the upper pole of the right kidney, possibly sequela of remote vascular infarct or infection.  T2 hyperintense lesion noted within the left renal cortex, likely a cyst.  No hydronephrosis or proximal hydroureter.    No ascites.  No definite abdominal lymph node enlargement.    Subcutaneous soft tissues are within normal limits.    Degenerative changes of the spine noted.  There is a chronic L1 compression fracture status post vertebroplasty.            US Liver with Doppler (xpd) (Final result) Result time:  04/30/17 20:21:36    Procedure changed from US Abdomen Limited with Doppler (xpd)        Final result by Lachelle Jones MD (04/30/17 20:21:36)    Impression:      Normal Doppler evaluation of the liver.    Mild prominence of the biliary ductal system has increased compared to the prior examination and is of uncertain etiology.  Clinical correlation and further evaluation as warranted.      ______________________________________     Electronically signed by resident: LACHELLE JONES MD  Date:     04/30/17  Time:    19:24      As the supervising and teaching physician, I personally reviewed the  images and resident's interpretation and I agree with the findings.    Electronically signed by: SHANNAN VALENCIA MD  Date:     04/30/17  Time:    20:21     Narrative:    Comparison: 4/27/17    Findings: Limited evaluation of the pancreas shows no abnormality.  The liver measures 15.6 cm and does not extend below the margin.  There is mild prominence of the intrahepatic biliary ductal system and the common duct measures 9 mm at the level of the hepatic artery.  Patient status post cholecystectomy.  Small echogenic focus at the anterior aspect of the left lobe measures 1.2 x 1.3 x 1.2 cm probably with some calcification, decreased in size compared to prior.  No free abdominal fluid.  The spleen measures 11.4 x 3.2 cm.    IVC and hepatic veins are patent.  The portal venous system is patent and shows forward flow.  Hepatic arterial waveforms are within normal limits.            Procedure:            Upper EUS  Indications:          Common bile duct dilation (acquired) seen on MRCP  Providers:            Chong Ortiz MD, Tamra Montes De Oca LPN,                         Sweetie Duong RN, Giancarlo Hoffman CRNA  Patient Profile:      Refer to note in patient chart for documentation of                         history and physical. . .  Referring MD:         Chong Ortiz MD  Complications:        No immediate complications.  Medicines:            Monitored Anesthesia Care  Procedure:            After obtaining informed consent, the endoscope was                         passed under direct vision. Throughout the                         procedure, the patient's blood pressure, pulse, and                         oxygen saturations were monitored continuously. The                         Olympus scope [id number] ([id number]) was introduced                         through the mouth, and advanced to the second part                         of duodenum. The Olympus scope GIF- ([id number])                         was  introduced through the mouth, and advanced to                         the second part of duodenum. The upper EUS was                         accomplished without difficulty. The patient                         tolerated the procedure well.  Findings:       Endoscopic Finding :       LA Grade C (one or more mucosal breaks continuous between tops of 2        or more mucosal folds, less than 75% circumference) esophagitis with        no bleeding was found in the lower third of the esophagus. Biopsies        were taken with a cold forceps for histology.       Diffuse mildly erythematous mucosa without bleeding was found in the        stomach. Biopsies were taken with a cold forceps for Helicobacter        pylori testing.       Diffuse mild inflammation characterized by congestion (edema) was        found in the second portion of the duodenum. Biopsies were taken        with a cold forceps for histology.       Endosonographic Finding :       The esophagus, stomach and duodenum were visualized        endosonographically.       There was no sign of significant endosonographic abnormality        involving the celiac trunk.       There was no sign of significant endosonographic abnormality in the        entire pancreas. The pancreatic duct measured up to 2 mm in        diameter. No masses.       There was no sign of significant endosonographic abnormality in the        common bile duct. The maximum diameter of the duct was 7 mm. No        stones were identified.       Endosonographic imaging in the left lobe of the liver showed no mass.       There was no sign of significant endosonographic abnormality in the        ampulla. No masses were identified.  Impression:           - LA Grade C esophagitis. Biopsied.                        - Erythematous mucosa in the stomach. Biopsied.                        - Duodenitis. Biopsied.                        - The celiac trunk was endosonographically normal.                        -  There was no sign of significant pathology in the                         entire pancreas.                        - There was no sign of significant pathology in the                         common bile duct.                        - There was no sign of significant pathology in the                         ampulla.  Recommendation:       - Return patient to hospital [facility] for ongoing care.                        - Resume previous diet.                        - Continue present medications.                        - Await path results.  Attending Participation:       I personally performed the entire procedure.  Chong Ortiz MD  5/2/2017 2:11:44 PM  This report has been verified and signed electronically.  Number of Addenda: 0  Note Initiated On: 5/2/2017 11:09 AM  Estimated Blood Loss: Estimated blood loss: none.       This report has been verified and signed electronically.    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 Schedule an appointment as soon as possible for a visit in 2 weeks.    Specialty:  Gastroenterology    Why:  For discharge from hospital follow up, If symptoms worsen    Contact information:    [address] [facility] [address]  [phone]          Follow up with Jaqueline Parker MD. Schedule an appointment as soon as possible for a visit in 1 week.    Specialty:   Internal Medicine    Why:  For discharge from hospital follow up    Contact information:    [address]  [facility] [address]  [phone]          Patient Instructions:     Diet general     Diet general   Order Specific Question Answer Comments   Additional restrictions: Low Chol/Sat Fat      Activity as tolerated     Call MD for:  temperature >100.4     Call MD for:  persistent nausea and vomiting or diarrhea     Call MD for:  difficulty breathing or increased cough     Call MD for:  persistent dizziness, light-headedness, or visual disturbances       Medications:  Reconciled Home Medications:   Current Discharge Medication List      START taking these medications    Details   aluminum-magnesium hydroxide-simethicone (MAALOX) 200-200-20 mg/5 mL Susp Take 30 mLs by mouth every 8 (eight) hours as needed (Reflux).      famotidine (PEPCID) 20 MG tablet Take 1 tablet (20 mg total) by mouth 2 (two) times daily as needed for Heartburn.  Qty: 60 tablet, Refills: 3      pantoprazole (PROTONIX) 40 MG tablet Take 1 tablet (40 mg total) by mouth once daily.  Qty: 30 tablet, Refills: 11         CONTINUE these medications which have NOT CHANGED    Details   alprazolam (XANAX) 1 MG tablet TAKE 1/2 TABLET BY MOUTH TWICE DAILY  Qty: 30 tablet, Refills: 3    Associated Diagnoses: Anxiety      escitalopram oxalate (LEXAPRO) 20 MG tablet Take 1 tablet (20 mg total) by mouth every evening.  Qty: 30 tablet, Refills: 6    Associated Diagnoses: Anxiety      estradiol (ESTRACE) 1 MG tablet Take 1 tablet (1 mg total) by mouth once daily.  Qty: 30 tablet, Refills: 6      gabapentin (NEURONTIN) 300 MG capsule TAKE 1 CAPSULE BY MOUTH TWO TIMES A DAY  Qty: 60 capsule, Refills: 5      hydrochlorothiazide (MICROZIDE) 12.5 mg capsule TAKE 1 CAPSULE BY MOUTH EVERY DAY  Qty: 90 capsule, Refills: 3    Associated Diagnoses: Benign essential hypertension      HYDROmorphone (DILAUDID) 2 MG tablet Take 1-2 tablets (2-4 mg total) by mouth every 3  (three) hours as needed for Pain.  Qty: 30 tablet, Refills: 0      levothyroxine (SYNTHROID) 75 MCG tablet TAKE 1 TABLET BY MOUTH BEFORE BREAKFAST  Qty: 90 tablet, Refills: 1    Associated Diagnoses: Hypothyroidism due to acquired atrophy of thyroid      promethazine (PHENERGAN) 25 MG tablet Take 1 tablet (25 mg total) by mouth every 6 (six) hours as needed.  Qty: 60 tablet, Refills: 1      vitamin D 1000 units Tab Take 185 mg by mouth once daily.           Time spent on the discharge of patient: 60 minutes  Time Spent:  Included reviewing hospital course with patient/family, reviewing discharge medications, and arranging follow-up care.  Face to face services were provided on 5/3/2017   Physical Exam on 5/3/2017:  height is 5' (1.524 m) and weight is 83.5 kg (184 lb). Her oral temperature is 97.7 °F (36.5 °C). Her blood pressure is 140/66 (abnormal) and her pulse is 70. Her respiration is 16 and oxygen saturation is 96%.   Cardiovascular: Normal rate and regular rhythm.   Pulmonary/Chest: Effort normal.   Abdominal: Soft. Bowel sounds are normal.        Rita Sanchez MD  Department of Hospital Medicine  [facility]

## 2017-05-03 NOTE — PROGRESS NOTES
[facility]  Adult Nutrition  Progress Note    SUMMARY     Observe pt eating food at bedside. Pt reports appetite is good. Denies n/v. No family present at bedside. BG + lytes WNL.     Recommendations    1. continue current diet order.     RD to monitor and follow up as appropriate.     Goals: Tolerance of diet  Nutrition Goal Status: progressing towards goal  Communication of RD Recs: reviewed with RN    Continuum of Care Plan    Nursing Team: obtain weekly wts.    Reason for Assessment    Reason for Assessment: RD follow-up  Diagnosis:  (pancreatitis)  Relevent Medical History: HTN, anxiety, hypothyroidism   Interdisciplinary Rounds: did not attend  Nutrition Discharge Planning: Adequate PO intake to meet daily needs.     Nutrition Prescription Ordered    Current Diet Order: Light/GI soft  Oral Nutrition Supplement:  (not needed at this time)     Evaluation of Received Nutrients/Fluid Intake    IV Fluid (mL): 1800     Nutrition Risk Screen     Nutrition Risk Screen: no indicators present    Nutrition/Diet History    Patient Reported Diet/Restrictions/Preferences:  (no sugar, no refined CHO)  Food Preferences: No cultural/Jain limitations reported.   Factors Affecting Nutritional Intake: abdominal pain    Labs/Tests/Procedures/Meds    Pertinent Labs Reviewed: reviewed  Pertinent Labs Comments: BUN 4, BG + lytes WNL  Pertinent Medications Reviewed: reviewed  Pertinent Medications Comments: heparin, senna, pantoprazole, IVF    Physical Findings    Overall Physical Appearance: obese  Tubes:  (-)  Oral/Mouth Cavity: WDL  Skin: incision    Anthropometrics    Height (inches): 60 in  Weight Method: Stated  Weight (kg): 83.5 kg  Ideal Body Weight (IBW), Female: 100 lb  % Ideal Body Weight, Female (lb): 184.09 lb  BMI (kg/m2): 35.95  BMI Grade: 35 - 39.9 - obesity - grade II     Estimated/Assessed Needs    Weight Used For Calorie Calculations: 83.5 kg (184 lb 1.4 oz)   Height (cm): 152.4 cm  Energy  Need Method: [place]-St Jeor (1700 kcal/day (mifflin x 1.25))  RMR ([place]-St. Jeor Equation): 1345.22  Weight Used For Protein Calculations: 83.5 kg (184 lb 1.4 oz)  Protein Requirements:  gm/day (1-1.2 gm/kg of ABW )  Fluid Need Method: RDA Method (1700 kcal)    Nutrition Diagnosis     Inadequate oral intake r/t physiological causes AEB by clear liquid diet.  Status: resolved     Monitor and Evaluation    Food and Nutrient Intake: food and beverage intake  Food and Nutrient Adminstration: diet order  Anthropometric Measurements: weight change  Biochemical Data, Medical Tests and Procedures: electrolyte and renal panel, glucose/endocrine profile  Nutrition-Focused Physical Findings: overall appearance     Nutrition Risk    Level of Risk:  (f/u 1x/week)    Nutrition Follow-Up    RD Follow-up?: Yes     Manda Ritter RD, LDN

## 2017-05-03 NOTE — PROGRESS NOTES
[facility] ([facility]) Follow-up Note    EUS yesterday without abnormalities within biliary tract or pancreatic duct (no dilation noted).   LA Grade C esophagitis found which was biopsied.  Erythematous mucosa in the stomach and duodenum also biopsied.                          Recommendations:         · Resume previous diet.  · Continue present medications.  · Await path results - we will contact her.   · Investigate into other causes for pain.    Thank you for allowing us to participate in the care of Cara Mendoza. Please do not hesitate to reconsult us with questions.    Ольга Stahl MD PGY-6  Gastroenterology Fellow  Pager# [phone]

## 2017-05-03 NOTE — SUBJECTIVE & OBJECTIVE
Interval History: Patient asleep post-procedure.    Review of Systems   Unable to perform ROS: Other   Constitutional: Negative for fever.   Psychiatric/Behavioral: Positive for decreased concentration.     Objective:     Vital Signs (Most Recent):  Temp: 97.9 °F (36.6 °C) (05/02/17 1338)  Pulse: 100 (05/02/17 1338)  Resp: 18 (05/02/17 1338)  BP: (!) 142/67 (05/02/17 1338)  SpO2: (!) 93 % (05/02/17 1603) Vital Signs (24h Range):  Temp:  [97.4 °F (36.3 °C)-98.2 °F (36.8 °C)] 97.9 °F (36.6 °C)  Pulse:  [] 100  Resp:  [16-20] 18  SpO2:  [87 %-100 %] 93 %  BP: (120-142)/(61-90) 142/67     Weight: 83.5 kg (184 lb)  Body mass index is 35.94 kg/(m^2).    Intake/Output Summary (Last 24 hours) at 05/02/17 2310  Last data filed at 05/02/17 1129   Gross per 24 hour   Intake              100 ml   Output                0 ml   Net              100 ml      Physical Exam   Constitutional: She appears well-nourished. She appears lethargic.   HENT:   Head: Normocephalic.   Neck: Neck supple.   Cardiovascular: Normal rate and regular rhythm.    Pulmonary/Chest: Effort normal.   Abdominal: Soft. Bowel sounds are normal.   Neurological: She appears lethargic.   Skin: Skin is warm. She is not diaphoretic.   Psychiatric: She is inattentive.       Significant Labs:   BMP:     Recent Labs  Lab 05/02/17  0941   GLU 82      K 3.9      CO2 32*   BUN 3*   CREATININE 0.6   CALCIUM 8.0*     CBC:     Recent Labs  Lab 05/01/17  0351 05/02/17  0413   WBC 4.87 4.55   HGB 12.1 12.1   HCT 36.1* 35.8*    219

## 2017-05-15 RX ORDER — GABAPENTIN 300 MG/1
CAPSULE ORAL
Qty: 60 CAPSULE | Refills: 5 | Status: SHIPPED | OUTPATIENT
Start: 2017-05-15 | End: 2017-11-12 | Stop reason: SDUPTHER

## 2017-05-17 ENCOUNTER — TELEPHONE (OUTPATIENT)
Dept: GASTROENTEROLOGY | Facility: CLINIC | Age: 57
End: 2017-05-17

## 2017-05-17 NOTE — TELEPHONE ENCOUNTER
----- Message from Chong Ortiz MD sent at 5/17/2017  1:16 PM CDT -----  Biopsies of the esophagus, stomach, and duodenum are negative for infection. No need to repeat

## 2017-05-18 ENCOUNTER — OFFICE VISIT (OUTPATIENT)
Dept: INTERNAL MEDICINE | Facility: CLINIC | Age: 57
End: 2017-05-18
Payer: MEDICARE

## 2017-05-18 ENCOUNTER — NURSE TRIAGE (OUTPATIENT)
Dept: ADMINISTRATIVE | Facility: CLINIC | Age: 57
End: 2017-05-18

## 2017-05-18 VITALS
DIASTOLIC BLOOD PRESSURE: 78 MMHG | WEIGHT: 178.13 LBS | TEMPERATURE: 98 F | HEIGHT: 60 IN | BODY MASS INDEX: 34.97 KG/M2 | HEART RATE: 72 BPM | RESPIRATION RATE: 18 BRPM | SYSTOLIC BLOOD PRESSURE: 114 MMHG

## 2017-05-18 DIAGNOSIS — F41.9 ANXIETY: ICD-10-CM

## 2017-05-18 DIAGNOSIS — Z09 HOSPITAL DISCHARGE FOLLOW-UP: Primary | ICD-10-CM

## 2017-05-18 DIAGNOSIS — K20.90 ESOPHAGITIS: ICD-10-CM

## 2017-05-18 DIAGNOSIS — L30.9 DERMATITIS: ICD-10-CM

## 2017-05-18 DIAGNOSIS — K86.1 IDIOPATHIC CHRONIC PANCREATITIS: ICD-10-CM

## 2017-05-18 PROCEDURE — 99213 OFFICE O/P EST LOW 20 MIN: CPT | Mod: S$PBB,,, | Performed by: INTERNAL MEDICINE

## 2017-05-18 PROCEDURE — 99999 PR PBB SHADOW E&M-EST. PATIENT-LVL III: CPT | Mod: PBBFAC,,, | Performed by: INTERNAL MEDICINE

## 2017-05-18 PROCEDURE — 99495 TRANSJ CARE MGMT MOD F2F 14D: CPT | Mod: PBBFAC | Performed by: INTERNAL MEDICINE

## 2017-05-18 PROCEDURE — 99213 OFFICE O/P EST LOW 20 MIN: CPT | Mod: PBBFAC | Performed by: INTERNAL MEDICINE

## 2017-05-18 NOTE — PROGRESS NOTES
Transitional Care Note  Subjective:       Patient ID: Cara Mendoza is a 56 y.o. female.  Chief Complaint: Hospital Follow Up (still having pain and discomfort)    Family and/or Caretaker present at visit?  No.  Diagnostic tests reviewed/disposition: No diagnosic tests pending after this hospitalization.  Disease/illness education: yes  Home health/community services discussion/referrals: Patient does not have home health established from hospital visit.  They do not need home health.  If needed, we will set up home health for the patient.   Establishment or re-establishment of referral orders for community resources: No other necessary community resources.   Discussion with other health care providers: No discussion with other health care providers necessary.     HPI   Seen at last visit in April. Elevated lipase w/ abdominal pain and intractable n/v. Sent to ED for pancreatitis (pt w/ h/o recurrent pancreatitis). Admitted 4/26-4/28/17 for pancreatitis. Given IVF and pain medicines. Abd US w/ enlarged pancreas consistent w/ h/o pancreatitis; s/p cholecystectomy; stable focal coarse calcification w/in L hepatic lobe. Discharged home and per note, by discharge, able to eat full diet.     However pt re-presented to ED 4/29/17 w/ increased abdominal pain and intractable N/V again. Repeat lipase 90. Elevated LFTs w/ ductal prominence on US. S/p MRCP and EUS, which showed grade C esophagitis. Biopsy w/o H pylori.  Discharged on pantoprazole 40mg daily and pepcid 20mg BID prn.     Upon discharge, pt is seeking a second opinion at  GI - Dr. Drake (saw him last week). Had labs drawn recently to compare lipase.     Has been trying to eat a healthier diet and esophagitis friendly diet. Through these episodes, weight loss of 6lbs over 3 weeks.    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 However per pt, she's been dealing well w/ the stress. Has anxiety and on lexapro 20mg daily and in the  "process of weaning xanax to 0.5mg bid (when she started seeing me, she was on 2mg BID). Pt reports that she's felt much better as she's weaning off of xanax as she's more clearheaded.     Abdominal pain is about 3 of 10 but she hasn't eaten today.     Was out in the sun watching granddaughter play baseball over the weekend (Saturday). Tuesday night, started having whelps around the chest/neck. Thought it was sunburn and put cocoa butter on it. Last night, when she got out of the shower, thought it was increased in size. This morning, she has some red whelps around the arms also. No new foods/detergent. Itchy neck/upper chest.   Allergic to steroids and steroid cream --> panic attacks and feels like "skin's crawling"    Review of Systems  as above in HPI.     Objective:      Physical Exam    /78  Pulse 72  Temp 98.2 °F (36.8 °C) (Oral)   Resp 18  Ht 5' (1.524 m)  Wt 80.8 kg (178 lb 2.1 oz)  BMI 34.79 kg/m2    GEN - A+OX4, NAD   HEENT - PERRL, EOMI, OP clear. MMM.   Neck - No thyromegaly or cervical LAD. No thyroid masses felt.  CV - RRR, no m/r   Chest - CTAB, no wheezing or rhonchi  Abd - S/ND/+BS. Epigastric region w/ mild tenderness to palpation. No rebound or guarding.   Ext - 2+BDP and radial pulses. No LE edema.   MSK - no spinal tenderness to palpation. Normal gait.  Skin - maculopapular rash on the upper chest and neck area and small patches on the inner L and R elbow. No pain on palpation/crepitus.     Discharge labs reviewed.      MRCP 5/1/17  Impression       Mild intra-and extrahepatic bile duct dilatation with tapering at the level of the duodenal ampulla, findings that have slightly progressed when compared to CT dated 08/31/2016 and correlate with the recent ultrasound. No definite intraductal filling defect to suggest choledocholithiasis.  No extrinsic compressive masses.  Consider correlation with ERCP to rule out underlying stricture. A small neoplasm beyond the resolution of MRCP is " possible but is thought to be less likely.  Sequela of post cholecystectomy status can present with similar findings responsible.    Additional findings include:  - Left adrenal lesions, previously characterized as adenomas on MRI dated 02/02/2015.  - Left renal disease.  - Right renal cortical scarring.  - Right hepatic cyst.  Left hepatic dystrophic calcification.  - Chronic compression deformity of the L1 vertebral body status post vertebroplasty.     EUS 5/2/17  Impression:           - LA Grade C esophagitis. Biopsied.                        - Erythematous mucosa in the stomach. Biopsied.                        - Duodenitis. Biopsied.                        - The celiac trunk was endosonographically normal.                        - There was no sign of significant pathology in the                         entire pancreas.                        - There was no sign of significant pathology in the                         common bile duct.                        - There was no sign of significant pathology in the                         ampulla.  Recommendation:       - Return patient to [facility] for ongoing care.                        - Resume previous diet.                        - Continue present medications.                        - Await path results.      Assessment/Plan       Cara METZGER was seen today for hospital follow up.    Diagnoses and all orders for this visit:    Hospital discharge follow-up for Idiopathic acute on chronic pancreatitis - resolved now. Will get note and lab results from Dr. Drake ( GI). Abdominal pain is much improved.     Esophagitis - cont protonix and pepcid. Avoid acidic, fatty, spicy foods, alcohol, etc.     Anxiety - doing well on xanax 0.5mg-1mg bid. Continue to wean xanax. Cont SSRI.     Dermatitis - cannot take steroids (topical or PO). Didn't like zyrtec. Take benadryl prn.     RTC in 3 mo, sooner if needed.     Jaqueline Parker MD  Department of Internal Medicine - [facility]  [address]  12:52 PM

## 2017-05-18 NOTE — MR AVS SNAPSHOT
[facility] - Internal Medicine  [address]  [facility] [address]  Phone: [phone]  Fax: [phone]                  Cara Mendoza   2017 9:20 AM   Office Visit    Description:  Female : 1960   Provider:  Jaqueline Parker MD   Department:  [facility]           Reason for Visit     Hospital Follow Up           Diagnoses this Visit        Comments    Hospital discharge follow-up    -  Primary     Idiopathic chronic pancreatitis         Esophagitis         Anxiety         Dermatitis                To Do List           Future Appointments        Provider Department Dept Phone    2017 8:40 AM MD [facility]  Internal Medicine [phone]      Goals (5 Years of Data)     None      [facility]     [facility] On [facility] Nurse Care Line -  Assistance  Unless otherwise directed by your provider, please contact [facility], our nurse care line that is available for  assistance.     Registered nurses in the [facility] On Call Center provide: appointment scheduling, clinical advisement, health education, and other advisory services.  Call: [phone] (toll free)               Medications           Message regarding Medications     Verify the changes and/or additions to your medication regime listed below are the same as discussed with your clinician today.  If any of these changes or additions are incorrect, please notify your healthcare provider.        STOP taking these medications     HYDROmorphone (DILAUDID) 2 MG tablet Take 1-2 tablets (2-4 mg total) by mouth every 3 (three) hours as needed for Pain.    aluminum-magnesium hydroxide-simethicone (MAALOX) 200-200-20 mg/5 mL Susp Take 30 mLs by mouth every 8 (eight) hours as needed (Reflux).           Verify that the below list of medications is an accurate representation of the medications you are currently taking.  If none reported, the list may be blank. If incorrect, please contact your healthcare  provider. Carry this list with you in case of emergency.           Current Medications     alprazolam (XANAX) 1 MG tablet TAKE 1/2 TABLET BY MOUTH TWICE DAILY    escitalopram oxalate (LEXAPRO) 20 MG tablet Take 1 tablet (20 mg total) by mouth every evening.    estradiol (ESTRACE) 1 MG tablet Take 1 tablet (1 mg total) by mouth once daily.    famotidine (PEPCID) 20 MG tablet Take 1 tablet (20 mg total) by mouth 2 (two) times daily as needed for Heartburn.    gabapentin (NEURONTIN) 300 MG capsule TAKE 1 CAPSULE BY MOUTH TWO TIMES A DAY    hydrochlorothiazide (MICROZIDE) 12.5 mg capsule TAKE 1 CAPSULE BY MOUTH EVERY DAY    levothyroxine (SYNTHROID) 75 MCG tablet TAKE 1 TABLET BY MOUTH BEFORE BREAKFAST    pantoprazole (PROTONIX) 40 MG tablet Take 1 tablet (40 mg total) by mouth once daily.    vitamin D 1000 units Tab Take 185 mg by mouth once daily.    promethazine (PHENERGAN) 25 MG tablet Take 1 tablet (25 mg total) by mouth every 6 (six) hours as needed.           Clinical Reference Information           Your Vitals Were     BP Pulse Temp Resp Height Weight    114/78 72 98.2 °F (36.8 °C) (Oral) 18 5' (1.524 m) 80.8 kg (178 lb 2.1 oz)    BMI                34.79 kg/m2          Blood Pressure          Most Recent Value    BP  114/78      Allergies as of 5/18/2017     Codeine    Compazine [Prochlorperazine Edisylate]    Demerol [Meperidine]    Morpholine Analogues    Percocet [Oxycodone-acetaminophen]    Phenothiazines    Corticosteroids (Glucocorticoids)    Cortisone    Fentanyl    Morphine    Nsaids (Non-steroidal Anti-inflammatory Drug)    Pcn [Penicillins]    Toradol [Ketorolac]    Tramadol      Immunizations Administered on Date of Encounter - 5/18/2017     None      [facility] Sign-Up     Activating your [facility] account is as easy as 1-2-3!     1) Visit [email or web address], select Sign Up Now, enter this activation code and your date of birth, then select Next.  Activation code not generated  Current Patient Portal  Status: Account disabled      2) Create a username and password to use when you visit [facility] in the future and select a security question in case you lose your password and select Next.    3) Enter your e-mail address and click Sign Up!    Additional Information  If you have questions, please e-mail [email or web address] or call [phone] to talk to our [facility] staff. Remember, [facility] is NOT to be used for urgent needs. For medical emergencies, dial 911.         Language Assistance Services     ATTENTION: Language assistance services are available, free of charge. Please call [phone].      ATENCIÓN: Si habla bob, tiene a al disposición servicios gratuitos de asistencia lingüística. Llame al [phone].     CHÚ Ý: N?u b?n nói Ti?ng Vi?t, có các d?ch v? h? tr? ngôn ng? mi?n phí dành cho b?n. G?i s? [phone].         [facility] complies with applicable Federal civil rights laws and does not discriminate on the basis of race, color, national origin, age, disability, or sex.

## 2017-05-18 NOTE — TELEPHONE ENCOUNTER
Reason for Disposition   Mild widespread rash    Protocols used: ST RASH OR REDNESS - WIDESPREAD-A-OH  pt was calling to notify Dr. Parker that her rash has spread to her face and her chest area and her arms. Denies swelling or pain or itching.     Please contact patient with further instructions, questions, or concerns

## 2017-05-22 ENCOUNTER — TELEPHONE (OUTPATIENT)
Dept: INTERNAL MEDICINE | Facility: CLINIC | Age: 57
End: 2017-05-22

## 2017-05-22 DIAGNOSIS — K27.9 PUD (PEPTIC ULCER DISEASE): ICD-10-CM

## 2017-05-22 DIAGNOSIS — K86.1 CHRONIC PANCREATITIS, UNSPECIFIED PANCREATITIS TYPE: Primary | ICD-10-CM

## 2017-05-22 RX ORDER — HYDROMORPHONE HYDROCHLORIDE 2 MG/1
2 TABLET ORAL EVERY 8 HOURS PRN
Qty: 30 TABLET | Refills: 0 | Status: SHIPPED | OUTPATIENT
Start: 2017-05-22 | End: 2017-07-18

## 2017-05-22 NOTE — TELEPHONE ENCOUNTER
----- Message from Kelsea Alejandro sent at 5/22/2017  2:21 PM CDT -----  Contact: self/[phone]  Pt called in regards to getting a Rx refill for hydromorphone (PF) injection 2 mg.        [facility]  Please advise

## 2017-05-22 NOTE — TELEPHONE ENCOUNTER
Pt w/ peptic ulcer disease. Allergic to multiple meds.  appropriate. Will refill dilaudid PO. [facility].

## 2017-07-15 ENCOUNTER — DOCUMENTATION ONLY (OUTPATIENT)
Dept: INTERNAL MEDICINE | Facility: CLINIC | Age: 57
End: 2017-07-15

## 2017-07-15 ENCOUNTER — NURSE TRIAGE (OUTPATIENT)
Dept: ADMINISTRATIVE | Facility: CLINIC | Age: 57
End: 2017-07-15

## 2017-07-15 NOTE — PROGRESS NOTES
The [employer] medical Dictation software was used during the dictation of portions or the entirety of this medical record.  Phonetic or grammatic errors may exist due to the use of this software. For clarification, refer to the author of the document.      Dr. Drake's office visit 5/10/17 reviewed. It seems that she might've establish care w/ Dr. Drake. Will clarify at next visit. Pt has appt on 7/25/17 for follow up w/ me.     Labs 5/17/17  Lipid panel-total cholesterol 231, HDL 58, triglycerides 128,   CMP  Glucose 95, BUNs 10, creatinine 0.62, sodium 139, potassium 4.3, chloride 101, bicarbonate 30, calcium 9.5, total protein 6.4, albumin 4, total bilirubin 0.4, alkaline phosphatase 76, AST 14, ALT 10  TSH 0.85  Lipase 81  CBC-white blood cell count 5.7, hemoglobin 13.8, hematocrit 41.3, platelet 235, MCV 87.9, RDW 14.8

## 2017-07-15 NOTE — TELEPHONE ENCOUNTER
Reason for Disposition   Over-The-Counter (OTC) medicines for mild constipation (all triage questions negative)    Protocols used: ST CONSTIPATION-A-AH

## 2017-07-18 ENCOUNTER — TELEPHONE (OUTPATIENT)
Dept: INTERNAL MEDICINE | Facility: CLINIC | Age: 57
End: 2017-07-18

## 2017-07-18 DIAGNOSIS — R92.8 ABNORMAL MAMMOGRAM OF RIGHT BREAST: Primary | ICD-10-CM

## 2017-07-18 NOTE — TELEPHONE ENCOUNTER
----- Message from Kelsea Manuel sent at 7/17/2017  8:49 AM CDT -----  Contact: patient [phone]  Pt would like to speak with  and understands that she may not be able to call back until later today. Pt said that her mammogram showed a lump on her right breast. After an ultrasound and another mammogram pt was told that they could only see the lump from one angle. Pt's gyn is at [facility] and has scheduled her for an mri at the end of the month.    Pt feels she needs advice from you . These tests were done at [facility]. If you feel you could schedule her sooner at [facility] please let her know.

## 2017-07-18 NOTE — TELEPHONE ENCOUNTER
Spoke w/ pt. Abnormal MMG w/ lump at the R breast. US w/ lump. Reports uncomfortable w/ [facility] MD and would like to be referred to [facility]. Placed referral. Will need records. Off hormones. Reports withdrawal from that also.    Reports that pt has SBO from the dilaudid and so they're weaning her off of dilaudid. Withdrawal symptoms as she stopped dilaudid last Friday. Shakes and sweats. But not terrible per pt. Has only 4 pills left of dilaudid 2mg. Recommended pt to take 1/2 pill daily x 4 days then 1/2 pill qod till done. Then stop.

## 2017-07-19 ENCOUNTER — HOSPITAL ENCOUNTER (OUTPATIENT)
Dept: RADIOLOGY | Facility: HOSPITAL | Age: 57
Discharge: HOME | End: 2017-07-19
Attending: NURSE PRACTITIONER

## 2017-07-19 ENCOUNTER — TELEPHONE (OUTPATIENT)
Dept: SURGERY | Facility: CLINIC | Age: 57
End: 2017-07-19

## 2017-07-19 NOTE — TELEPHONE ENCOUNTER
----- Message from Gordo Merlos LPN sent at 7/18/2017  2:28 PM CDT -----  Abnormal mammo protocol. Thanks, gordo   ----- Message -----  From: David Hamm  Sent: 7/18/2017  12:18 PM  To: Gordo Merlos LPN    [phone]//pt states that she needs to speak with nurse in ref to if she needs a possible Mri//please call//thank you

## 2017-07-19 NOTE — TELEPHONE ENCOUNTER
Called patient per abnormal mammogram protocol to discuss second opinion regarding breast mass. Patient would like mammograms reviewed. Gave patient our address at the [facility], and my contact information. Asked patient to ensure that the discs are either dropped off at the [facility] or mailed to us. Explained that once we receive the images our radiologist will review the discs and be in contact with their recommendations. Also explained that I will call patient to update her once the disc is received and sent for review. Verbalized understanding of all information. She will go  her discs and reports from [facility] today and call me when she is on her way here to drop them off.

## 2017-07-24 ENCOUNTER — OFFICE VISIT (OUTPATIENT)
Dept: SURGERY | Facility: CLINIC | Age: 57
End: 2017-07-24
Payer: MEDICARE

## 2017-07-24 ENCOUNTER — HOSPITAL ENCOUNTER (OUTPATIENT)
Dept: RADIOLOGY | Facility: HOSPITAL | Age: 57
Discharge: HOME | End: 2017-07-24
Attending: SURGERY
Payer: MEDICARE

## 2017-07-24 ENCOUNTER — TELEPHONE (OUTPATIENT)
Dept: SURGERY | Facility: CLINIC | Age: 57
End: 2017-07-24

## 2017-07-24 VITALS
DIASTOLIC BLOOD PRESSURE: 79 MMHG | HEIGHT: 60 IN | BODY MASS INDEX: 34.63 KG/M2 | TEMPERATURE: 98 F | WEIGHT: 176.38 LBS | HEART RATE: 75 BPM | SYSTOLIC BLOOD PRESSURE: 118 MMHG

## 2017-07-24 VITALS — WEIGHT: 178 LBS | BODY MASS INDEX: 34.95 KG/M2 | HEIGHT: 60 IN

## 2017-07-24 DIAGNOSIS — R92.8 ABNORMAL MAMMOGRAM: ICD-10-CM

## 2017-07-24 DIAGNOSIS — R92.8 ABNORMAL MAMMOGRAM: Primary | ICD-10-CM

## 2017-07-24 PROCEDURE — 99213 OFFICE O/P EST LOW 20 MIN: CPT | Mod: PBBFAC,25,PO | Performed by: SURGERY

## 2017-07-24 PROCEDURE — 77061 BREAST TOMOSYNTHESIS UNI: CPT | Mod: TC

## 2017-07-24 PROCEDURE — 76642 ULTRASOUND BREAST LIMITED: CPT | Mod: 26,RT,, | Performed by: RADIOLOGY

## 2017-07-24 PROCEDURE — 99203 OFFICE O/P NEW LOW 30 MIN: CPT | Mod: S$PBB,,, | Performed by: SURGERY

## 2017-07-24 PROCEDURE — 77061 BREAST TOMOSYNTHESIS UNI: CPT | Mod: 26,,, | Performed by: RADIOLOGY

## 2017-07-24 PROCEDURE — 77065 DX MAMMO INCL CAD UNI: CPT | Mod: 26,,, | Performed by: RADIOLOGY

## 2017-07-24 PROCEDURE — 76642 ULTRASOUND BREAST LIMITED: CPT | Mod: TC,RT

## 2017-07-24 PROCEDURE — 99999 PR PBB SHADOW E&M-EST. PATIENT-LVL III: CPT | Mod: PBBFAC,,, | Performed by: SURGERY

## 2017-07-24 NOTE — TELEPHONE ENCOUNTER
Called patient after speaking with Gordo in radiology to determine if we could perform spot compression of the breast today for abnormal mammogram. Patient is eager to come today before she goes out of town tomorrow. Scheduled with Dr. Petit for 1pm and mammography immediately following. Patient verbalized understanding of appt date/time/location, no questions or concerns regarding plan of care. Patient understands that, if biopsy is warrented, it will be a separate visit

## 2017-07-24 NOTE — LETTER
July 27, 2017      Jaqueline Parker MD  [address]  [facility] [address]           [facility]  [address]  [facility] [address]  Phone: [phone]  Fax: [phone]          Patient: Cara Mendoza   MR Number: [id number]   YOB: 1960   Date of Visit: 7/24/2017       Dear Dr. Jaqueline Parker:    Thank you for referring Cara Mendoza to me for evaluation. Attached you will find relevant portions of my assessment and plan of care.    If you have questions, please do not hesitate to call me. I look forward to following Cara Mendoza along with you.    Sincerely,        Enclosure  CC:  No Recipients    If you would like to receive this communication electronically, please contact [email or web address] or [phone] to request more information on [employer] Link access.    For providers and/or their staff who would like to refer a patient to [facility], please contact us through our one-stop-shop provider referral line, Enio Ferrera, at [phone].    If you feel you have received this communication in error or would no longer like to receive these types of communications, please e-mail [email or web address]

## 2017-08-02 NOTE — PROGRESS NOTES
"New Patient Evaluation  Date of Service: 2017    HISTORY OF PRESENT ILLNESS:   Cara Mendoza is a 57 y.o. female who presents for evaluation of an abnormal right both mammogram and ultrasound. Patient initially underwent screening mammogram on 2017 at outside hospital. Diagnostic imaging will be performed at [facility] today. Findings confirmed persistent density. Films were interpreted as BIRADS 0.     Patient has not noted palpable masses, nipple or skin changes, or nipple discharge. Patient denies previous breast biopsy. Patient denies a personal history of breast cancer. She does get annual mammograms.     GYNECOLOGIC HISTORY:   Patient underwent menarche at age 10. She is  and first live birth was at age 20. She did not breast feed. Age of menopause was 35.  Patient reports hormonal therapy x 20 years, estradiol.     IMAGING:    Findings:     There are scattered areas of fibroglandular density.     There is an asymmetry in the lateral right breast middle depth seen on CC view only on screening mammogram 7/3/17.      Focused sonographic evaluation of the right breast on 17 between 3:37 PM -  3:39 PM labeled "Rt Breast 8:00 N +6" demonstrates a focal change in echogenicity. Additional focused sonographic evaluation of the right breast on 17 between 4:33 PM - 4:35 PM on a separate ultrasound machine again demonstrates a focal change of echogenicity in the right breast labeled "Right Breast 8:00 N+ 6."  These sonographic findings are indeterminate, and possibly normal breast tissue.     A single metallic BB was used on the skin at the site of focal change of echogenicity. Repeat full field digital mammogram on 17 demonstrates no suspicious finding subjacent to the metallic BB.     No suspicious findings in the left breast.     Impression: The asymmetry in the lateral aspect of the right breast seen on CC view only may correspond to focal change of echogenicity seen on " ultrasound.     Recommendation: Spot compression mammography of the right breast and right breast ultrasound.    PAST MEDICAL HISTORY:   The following portions of the patient's history were reviewed and updated as appropriate: She  has a past medical history of Adrenal adenoma; Gastric ulcer; Hypothyroidism; Kidney stones; Pancreatitis; and Traumatic compression fracture of L1 lumbar vertebra.  She  does not have any pertinent problems on file.  She  has a past surgical history that includes tonsillectomy; Cholecystectomy; Cystoscopy; x2 ; right adrenal gland remove; Tonsillectomy; Tubal ligation; Hysterectomy; Kyphosis surgery (7/20/15); and Colonoscopy (N/A, 2016).  Her family history includes Diabetes in her father; Heart attack in her father.  She  reports that she has never smoked. She has never used smokeless tobacco. She reports that she does not drink alcohol. Her drug history is not on file.  She has a current medication list which includes the following prescription(s): alprazolam, escitalopram oxalate, estradiol, famotidine, gabapentin, hydrochlorothiazide, levothyroxine, pantoprazole, promethazine, and vitamin d.  She is allergic to codeine; compazine [prochlorperazine edisylate]; demerol [meperidine]; morpholine analogues; percocet [oxycodone-acetaminophen]; phenothiazines; corticosteroids (glucocorticoids); cortisone; fentanyl; morphine; nsaids (non-steroidal anti-inflammatory drug); pcn [penicillins]; toradol [ketorolac]; and tramadol..    REVIEW OF SYSTEMS:   ROS are negative other than checked as positive below    General:  Fever []  Chills []  Hot flashes []  Weight change []  Appetite change[]  Night sweats/change in sleep []  Fatigue []  Dizziness/weakness [] Psychiatric:   Irritability []  Anxiety []  Depression []  Mood swings []   Neuro:   Paralysis []  Tremor []  Numbness []  Seizures []  Problems with memory []  Loss of balance[]  Headaches[] HEENT:   Sores in mouth  []  Postnasal drip []  Congestion []  Sinus pain []  Hearing change []  Vision change []  Hoarseness or change in voice[]  Eye pain []  Double vision []  Glaucoma []  Ear pain []  Ear discharge []  Ringing []   Pulmonary:  Hemoptysis []  Cough  []  Wheezing [] Cardiovascular: Chest pain/pressure []   Palpitations []  Fainting [] GI:   Difficulty swallowing []  Jaundice []  Nausea, Vomiting []  Constipation []  Diarrheal []  Hemorrhoids []  Blood in stool []  Heartburn [] :  Problems controlling urine []  Foul smelling urine [] Dark urine []  Hematuria []  Dysuria []  Urinary frequency []  Weak stream []  Infection []  Kidney Stone []  Vaginal discharge []  Vaginal dryness []  Pain with intercourse []     Peripheral vascular: Swelling []  Cramps in legs [] Musculoskeletal: Bone or joint pain []  Stiffnes []  New pain [] Hematological:   Epistaxis []  Gingival bleeding []  Easy bruising []  Use of blood thinners [] Integumentary:   Skin rash []  New moles/lesions []  Texture change []  Color change []  Hair loss/growth []     PHYSICAL EXAM:   /79 (BP Location: Right arm, Patient Position: Sitting, BP Method: Automatic)   Pulse 75   Temp 97.9 °F (36.6 °C)   Ht 5' (1.524 m)   Wt 80 kg (176 lb 6.4 oz)   BMI 34.45 kg/m²   General: The patient appears well and is in no acute distress.   Neuro: Alert & oriented x3.   HEENT: PERRLA, EOMI, sclerae nonicteric. Mucous membranes moist.   Cardiovascular: Regular rate and rhythm.   Breasts: The exam was done with the patient seated and supine.  Left breast within normal limits. No palpable masses and no abnormal skin or nipple findings. No supraclavicular or axillary lymphadenopathy on the left side.   Right breast within normal limits. No palpable masses and no abnormal skin or nipple findings. No supraclavicular or axillary lymphadenopathy on the right side.  Pulmonary: nonlabored breathing bilaterally   Abdomen: soft, nontender, nondistended   Extremities:  No  pedal edema. No clubbing or cyanosis.      ASSESSMENT:       ICD-10-CM ICD-9-CM    1. Abnormal mammogram R92.8 793.80        This is a 57 y.o. female with abnormal right mammogram.     PLAN:   Spot MMG today and u/s  Will f/u results.      Addendum: MRI recommended due to patient concerns and visible only on one view.  Scheduled for next week.

## 2017-08-03 ENCOUNTER — TELEPHONE (OUTPATIENT)
Dept: INTERNAL MEDICINE | Facility: CLINIC | Age: 57
End: 2017-08-03

## 2017-08-03 NOTE — TELEPHONE ENCOUNTER
----- Message from Yudith Menjivar sent at 8/3/2017 11:30 AM CDT -----  Contact: Patient/[phone]  Prescription Request:     Name of medication: Something for pain - requesting Dilaudid    Reason for request: Back Pain    Pharmacy: [id number] - [address]    Stated that her pain level is a 10. Please notify patient when RX has been sent in. Pharmacy closes at 6pm.    Thank You

## 2017-08-04 NOTE — TELEPHONE ENCOUNTER
Will have to wait till appt. At last conversation, she was in the process of weaning off of dilaudid (see phone note from 7/18/17) due to small bowel obstruction and thus she's not supposed to be on it anymore. If she continues to have the pain, I'll refer her back to pain management.

## 2017-08-08 ENCOUNTER — HOSPITAL ENCOUNTER (OUTPATIENT)
Dept: RADIOLOGY | Facility: HOSPITAL | Age: 57
Discharge: HOME | End: 2017-08-08
Attending: SURGERY
Payer: MEDICARE

## 2017-08-08 DIAGNOSIS — R92.8 ABNORMAL MAMMOGRAM: ICD-10-CM

## 2017-08-08 PROCEDURE — 77059 MRI BREAST BILATERAL: CPT | Mod: 26,,, | Performed by: RADIOLOGY

## 2017-08-08 PROCEDURE — 0159T MRI BREAST BILATERAL: CPT | Mod: 26,,, | Performed by: RADIOLOGY

## 2017-08-08 PROCEDURE — 0159T MRI BREAST BILATERAL: CPT | Mod: TC

## 2017-08-08 PROCEDURE — A9577 INJ MULTIHANCE: HCPCS | Performed by: SURGERY

## 2017-08-08 PROCEDURE — 25500020 PHARM REV CODE 255: Performed by: SURGERY

## 2017-08-08 RX ADMIN — GADOBENATE DIMEGLUMINE 19 ML: 529 INJECTION, SOLUTION INTRAVENOUS at 08:08

## 2017-08-14 ENCOUNTER — TELEPHONE (OUTPATIENT)
Dept: SURGERY | Facility: CLINIC | Age: 57
End: 2017-08-14

## 2017-08-14 ENCOUNTER — OFFICE VISIT (OUTPATIENT)
Dept: INTERNAL MEDICINE | Facility: CLINIC | Age: 57
End: 2017-08-14
Payer: MEDICARE

## 2017-08-14 VITALS
HEART RATE: 68 BPM | BODY MASS INDEX: 35.67 KG/M2 | RESPIRATION RATE: 16 BRPM | HEIGHT: 60 IN | DIASTOLIC BLOOD PRESSURE: 72 MMHG | WEIGHT: 181.69 LBS | TEMPERATURE: 98 F | SYSTOLIC BLOOD PRESSURE: 136 MMHG

## 2017-08-14 DIAGNOSIS — F41.9 ANXIETY: Chronic | ICD-10-CM

## 2017-08-14 DIAGNOSIS — D35.02 ADENOMA OF LEFT ADRENAL GLAND: Primary | ICD-10-CM

## 2017-08-14 DIAGNOSIS — E03.4 HYPOTHYROIDISM DUE TO ACQUIRED ATROPHY OF THYROID: ICD-10-CM

## 2017-08-14 DIAGNOSIS — K86.1 OTHER CHRONIC PANCREATITIS: ICD-10-CM

## 2017-08-14 PROBLEM — R10.13 EPIGASTRIC PAIN: Status: RESOLVED | Noted: 2017-04-26 | Resolved: 2017-08-14

## 2017-08-14 PROCEDURE — 3075F SYST BP GE 130 - 139MM HG: CPT | Mod: ,,, | Performed by: INTERNAL MEDICINE

## 2017-08-14 PROCEDURE — 99999 PR PBB SHADOW E&M-EST. PATIENT-LVL III: CPT | Mod: PBBFAC,,, | Performed by: INTERNAL MEDICINE

## 2017-08-14 PROCEDURE — 99213 OFFICE O/P EST LOW 20 MIN: CPT | Mod: PBBFAC | Performed by: INTERNAL MEDICINE

## 2017-08-14 PROCEDURE — 99214 OFFICE O/P EST MOD 30 MIN: CPT | Mod: S$PBB,,, | Performed by: INTERNAL MEDICINE

## 2017-08-14 PROCEDURE — 3008F BODY MASS INDEX DOCD: CPT | Mod: ,,, | Performed by: INTERNAL MEDICINE

## 2017-08-14 PROCEDURE — 3078F DIAST BP <80 MM HG: CPT | Mod: ,,, | Performed by: INTERNAL MEDICINE

## 2017-08-14 RX ORDER — ALPRAZOLAM 1 MG/1
TABLET ORAL
Qty: 30 TABLET | Refills: 3 | Status: SHIPPED | OUTPATIENT
Start: 2017-08-14 | End: 2017-11-13
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
## 2017-08-14 NOTE — TELEPHONE ENCOUNTER
Patient left voicemail requesting callback regarding MRI results and further action. Returned call and left voicemail asking her to call me at her earliest convenience to discuss

## 2017-08-18 LAB
ALBUMIN SERPL-MCNC: 4.1 G/DL (ref 3.6–5.1)
ALBUMIN/GLOB SERPL: 1.9 (CALC) (ref 1–2.5)
ALDOST SERPL-MCNC: 2 NG/DL
ALP SERPL-CCNC: 52 U/L (ref 33–130)
ALT SERPL-CCNC: 6 U/L (ref 6–29)
AST SERPL-CCNC: 11 U/L (ref 10–35)
BILIRUB SERPL-MCNC: 0.4 MG/DL (ref 0.2–1.2)
BUN SERPL-MCNC: 11 MG/DL (ref 7–25)
BUN/CREAT SERPL: ABNORMAL (CALC) (ref 6–22)
CALCIUM SERPL-MCNC: 9.3 MG/DL (ref 8.6–10.4)
CATECHOLS PLAS-MCNC: 266 PG/ML
CHLORIDE SERPL-SCNC: 103 MMOL/L (ref 98–110)
CO2 SERPL-SCNC: 31 MMOL/L (ref 20–31)
CORTIS AM PEAK SERPL-MCNC: 13.6 MCG/DL
CREAT SERPL-MCNC: 0.58 MG/DL (ref 0.5–1.05)
DOPAMINE SERPL-MCNC: NORMAL PG/ML
EPINEPH PLAS-MCNC: NORMAL PG/ML
GFR SERPL CREATININE-BSD FRML MDRD: 102 ML/MIN/1.73M2
GLOBULIN SER CALC-MCNC: 2.2 G/DL (CALC) (ref 1.9–3.7)
GLUCOSE SERPL-MCNC: 107 MG/DL (ref 65–99)
METANEPH FREE SERPL-MCNC: <25 PG/ML
METANEPHS SERPL-MCNC: 49 PG/ML
NOREPINEPH PLAS-MCNC: 266 PG/ML
NORMETANEPH FREE SERPL-MCNC: 49 PG/ML
POTASSIUM SERPL-SCNC: 4 MMOL/L (ref 3.5–5.3)
PROT SERPL-MCNC: 6.3 G/DL (ref 6.1–8.1)
RENIN PLAS-CCNC: 0.64 NG/ML/H (ref 0.25–5.82)
SODIUM SERPL-SCNC: 141 MMOL/L (ref 135–146)
TSH SERPL-ACNC: 2.17 MIU/L (ref 0.4–4.5)

## 2017-08-19 LAB
METANEPH 24H UR-MRATE: 48 MCG/24 H (ref 90–315)
METANEPHS 24H UR-MRATE: 213 MCG/24 H (ref 224–832)
NORMETANEPHRINE 24H UR-MRATE: 165 MCG/24 H (ref 122–676)
SPECIMEN VOL 24H UR: 2350 ML

## 2017-08-21 ENCOUNTER — TELEPHONE (OUTPATIENT)
Dept: INTERNAL MEDICINE | Facility: CLINIC | Age: 57
End: 2017-08-21

## 2017-08-21 LAB
CREAT 24H UR-MRATE: 1.23 G/24 H (ref 0.63–2.5)
CREAT 24H UR-MRATE: 1.23 G/24 H (ref 0.63–2.5)
DOPAMINE 24H UR-MRATE: 232 MCG/24 H (ref 52–480)
EPINEPH 24H UR-MRATE: <5 MCG/24 H (ref 2–24)
EPINEPH+NOR 24H UR-MRATE: 24 MCG/24 H (ref 26–121)
NOREPINEPH 24H UR-MRATE: 24 MCG/24 H (ref 15–100)
SPECIMEN VOL 24H UR: 2350 ML
SPECIMEN VOL 24H UR: 2350 ML
VMA 24H UR-MRATE: 3.2 MG/24 H

## 2017-08-21 NOTE — TELEPHONE ENCOUNTER
Please call and let pt know that it does not look like the adrenal adenoma is abnormally secreting hormones. Thyroid, liver and kidney functions are in the normal range now.

## 2017-08-22 ENCOUNTER — HOSPITAL ENCOUNTER (EMERGENCY)
Facility: HOSPITAL | Age: 57
Discharge: HOME | End: 2017-08-22
Attending: EMERGENCY MEDICINE
Payer: COMMERCIAL

## 2017-08-22 ENCOUNTER — TELEPHONE (OUTPATIENT)
Dept: NEUROSURGERY | Facility: CLINIC | Age: 57
End: 2017-08-22

## 2017-08-22 VITALS
HEART RATE: 80 BPM | RESPIRATION RATE: 20 BRPM | TEMPERATURE: 98 F | WEIGHT: 176 LBS | HEIGHT: 60 IN | BODY MASS INDEX: 34.55 KG/M2 | OXYGEN SATURATION: 99 % | SYSTOLIC BLOOD PRESSURE: 145 MMHG | DIASTOLIC BLOOD PRESSURE: 82 MMHG

## 2017-08-22 DIAGNOSIS — S16.1XXA NECK STRAIN, INITIAL ENCOUNTER: ICD-10-CM

## 2017-08-22 DIAGNOSIS — S20.212A CHEST WALL CONTUSION, LEFT, INITIAL ENCOUNTER: ICD-10-CM

## 2017-08-22 DIAGNOSIS — S50.819A ABRASION FOREARM: Primary | ICD-10-CM
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
## 2017-08-22 PROCEDURE — 96372 THER/PROPH/DIAG INJ SC/IM: CPT

## 2017-08-22 PROCEDURE — 99285 EMERGENCY DEPT VISIT HI MDM: CPT | Mod: 25

## 2017-08-22 PROCEDURE — 63600175 PHARM REV CODE 636 W HCPCS: Performed by: FAMILY MEDICINE

## 2017-08-22 PROCEDURE — 63600175 PHARM REV CODE 636 W HCPCS: Performed by: EMERGENCY MEDICINE

## 2017-08-22 PROCEDURE — 25000003 PHARM REV CODE 250: Performed by: EMERGENCY MEDICINE

## 2017-08-22 PROCEDURE — 96365 THER/PROPH/DIAG IV INF INIT: CPT

## 2017-08-22 PROCEDURE — 96375 TX/PRO/DX INJ NEW DRUG ADDON: CPT

## 2017-08-22 RX ORDER — HYDROMORPHONE HYDROCHLORIDE 1 MG/ML
0.5 INJECTION, SOLUTION INTRAMUSCULAR; INTRAVENOUS; SUBCUTANEOUS
Status: COMPLETED | OUTPATIENT
Start: 2017-08-22 | End: 2017-08-22

## 2017-08-22 RX ORDER — CYCLOBENZAPRINE HCL 10 MG
10 TABLET ORAL 3 TIMES DAILY PRN
Qty: 30 TABLET | Refills: 0 | Status: SHIPPED | OUTPATIENT
Start: 2017-08-22 | End: 2017-09-01

## 2017-08-22 RX ORDER — HYDROMORPHONE HYDROCHLORIDE 2 MG/1
2 TABLET ORAL 2 TIMES DAILY PRN
Qty: 10 TABLET | Refills: 0 | Status: SHIPPED | OUTPATIENT
Start: 2017-08-22 | End: 2017-09-27

## 2017-08-22 RX ADMIN — HYDROMORPHONE HYDROCHLORIDE 0.5 MG: 1 INJECTION, SOLUTION INTRAMUSCULAR; INTRAVENOUS; SUBCUTANEOUS at 05:08

## 2017-08-22 RX ADMIN — PROMETHAZINE HYDROCHLORIDE 6.25 MG: 25 INJECTION INTRAMUSCULAR; INTRAVENOUS at 05:08

## 2017-08-22 RX ADMIN — HYDROMORPHONE HYDROCHLORIDE 0.5 MG: 1 INJECTION, SOLUTION INTRAMUSCULAR; INTRAVENOUS; SUBCUTANEOUS at 07:08
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
## 2017-08-22 NOTE — TELEPHONE ENCOUNTER
Pt was advised by Dr Montes to go to ER to get testing done, pt will go tomorrow and follow up after

## 2017-08-22 NOTE — ED NOTES
Received pt AAO x 3, pt sitting up in bed, with C Collar in place, pt with right side neck / collar bone tenderness, Nurse administering pain med..Will continue to monitor.

## 2017-08-22 NOTE — TELEPHONE ENCOUNTER
THE PATIENT ALREADY HAS AN APPT WITH DR MOSELEY 09/05/2017. THERE IS NO WAY TO GET HER IN ANY SOONER. THE PA'S ARE EVEN BOOKED UP ALL NEXT WEEK, I ADVISED HER IF SHANKAR JOINER GETS ANY WORSE COME TO THE ED HERE. LET THEM KNOW SHE IS A DR MOSELEY PATIENT AND TO CONSULT NEUROSURGERY

## 2017-08-31 ENCOUNTER — TELEPHONE (OUTPATIENT)
Dept: INTERNAL MEDICINE | Facility: CLINIC | Age: 57
End: 2017-08-31
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
## 2017-09-05 ENCOUNTER — OFFICE VISIT (OUTPATIENT)
Dept: NEUROSURGERY | Facility: CLINIC | Age: 57
End: 2017-09-05
Payer: COMMERCIAL

## 2017-09-05 VITALS
SYSTOLIC BLOOD PRESSURE: 119 MMHG | HEIGHT: 60 IN | WEIGHT: 184.31 LBS | TEMPERATURE: 98 F | RESPIRATION RATE: 14 BRPM | BODY MASS INDEX: 36.18 KG/M2 | HEART RATE: 72 BPM | DIASTOLIC BLOOD PRESSURE: 74 MMHG

## 2017-09-05 DIAGNOSIS — S32.010S COMPRESSION FRACTURE OF L1 LUMBAR VERTEBRA, SEQUELA: Primary | ICD-10-CM

## 2017-09-05 DIAGNOSIS — M50.10 CERVICAL DISC DISORDER WITH RADICULOPATHY: ICD-10-CM
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
## 2017-09-05 PROCEDURE — 99214 OFFICE O/P EST MOD 30 MIN: CPT | Mod: S$PBB,,, | Performed by: NEUROLOGICAL SURGERY

## 2017-09-05 PROCEDURE — 99999 PR PBB SHADOW E&M-EST. PATIENT-LVL IV: CPT | Mod: PBBFAC,,, | Performed by: NEUROLOGICAL SURGERY

## 2017-09-05 PROCEDURE — 99214 OFFICE O/P EST MOD 30 MIN: CPT | Mod: PBBFAC | Performed by: NEUROLOGICAL SURGERY

## 2017-09-05 RX ORDER — HYDROCODONE BITARTRATE AND ACETAMINOPHEN 5; 325 MG/1; MG/1
1 TABLET ORAL EVERY 8 HOURS PRN
Qty: 60 TABLET | Refills: 0 | Status: SHIPPED | OUTPATIENT
Start: 2017-09-05 | End: 2017-09-29

## 2017-09-05 RX ORDER — METHOCARBAMOL 750 MG/1
750 TABLET, FILM COATED ORAL
Qty: 90 TABLET | Refills: 2 | Status: SHIPPED | OUTPATIENT
Start: 2017-09-05 | End: 2017-09-15

## 2017-09-05 NOTE — LETTER
September 10, 2017      Jaqueline Parker MD  [address]  [facility] [address]           [facility] - Neurosurgery 7th Fl  [address]  [facility] [address]  Phone: [phone]          Patient: Cara Mendoza   MR Number: [id number]   YOB: 1960   Date of Visit: 9/5/2017       Dear Dr. Jaqueline Parker:    Thank you for referring Cara Mendoza to me for evaluation. Attached you will find relevant portions of my assessment and plan of care.    If you have questions, please do not hesitate to call me. I look forward to following Cara Mendoza along with you.    Sincerely,    Naveed Montes MD    Enclosure  CC:  No Recipients    If you would like to receive this communication electronically, please contact [email or web address] or [phone] to request more information on [employer] access.    For providers and/or their staff who would like to refer a patient to [facility], please contact us through our one-stop-shop provider referral line, [facility] , at [phone].    If you feel you have received this communication in error or would no longer like to receive these types of communications, please e-mail [email or web address]
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
## 2017-09-11 ENCOUNTER — TELEPHONE (OUTPATIENT)
Dept: NEUROSURGERY | Facility: CLINIC | Age: 57
End: 2017-09-11

## 2017-09-11 NOTE — TELEPHONE ENCOUNTER
----- Message from Maggie Pa sent at 9/11/2017  9:28 AM CDT -----  Contact: self @ [phone]  Pt is currently taking hydrocodone 5/325 and it is not helping her pain.  Pt is calling to see if she can take 2 at a time.  She says 2 helps her manage the pain / tolerate the pain.  She says the 2 pills do not make her looping or anything and she can still function to take care of her parents.  Pt says she has not been able to start her physical therapy due to the pain she is still having after taking 1 tablet.  pls call.

## 2017-09-11 NOTE — TELEPHONE ENCOUNTER
Patient has been informed that she should take her pain medication as prescribed. Patient was informed it is not recommended to take two pill if not instructed too. patient was informed that she will need to contact her PCP, as Dr. Montes only gave her a one time script.

## 2017-09-21 ENCOUNTER — TELEPHONE (OUTPATIENT)
Dept: NEUROSURGERY | Facility: CLINIC | Age: 57
End: 2017-09-21

## 2017-09-21 NOTE — TELEPHONE ENCOUNTER
Patient has been informed that Dr. Montes does not write for pain creams, an she was also informed the she can take some tylenol, or something over the counter for pain with the Hydrocodone.

## 2017-09-21 NOTE — TELEPHONE ENCOUNTER
----- Message from Maggie Pa sent at 9/21/2017 10:08 AM CDT -----  Contact: self @ [phone]  Pt says her prescription for hydrocodone 5/325 is not touching her head and neck pain.  Pt says she would like to discuss getting something stronger or a pain cream.  pls call.

## 2017-09-25 ENCOUNTER — TELEPHONE (OUTPATIENT)
Dept: NEUROSURGERY | Facility: CLINIC | Age: 57
End: 2017-09-25

## 2017-09-25 NOTE — TELEPHONE ENCOUNTER
Spoke with patient informed her that I lyndsey speak with Dr. Montes, and see if he will add an MRI of Brain to her appointment 9/27/2017.
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
## 2017-09-26 ENCOUNTER — TELEPHONE (OUTPATIENT)
Dept: NEUROSURGERY | Facility: CLINIC | Age: 57
End: 2017-09-26

## 2017-09-26 DIAGNOSIS — R51.9 HEADACHE, UNSPECIFIED HEADACHE TYPE: Primary | ICD-10-CM
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
## 2017-09-27 ENCOUNTER — OFFICE VISIT (OUTPATIENT)
Dept: NEUROSURGERY | Facility: CLINIC | Age: 57
End: 2017-09-27
Payer: MEDICARE

## 2017-09-27 ENCOUNTER — HOSPITAL ENCOUNTER (OUTPATIENT)
Dept: RADIOLOGY | Facility: HOSPITAL | Age: 57
Discharge: HOME | End: 2017-09-27
Attending: NEUROLOGICAL SURGERY
Payer: COMMERCIAL

## 2017-09-27 VITALS
WEIGHT: 164 LBS | BODY MASS INDEX: 32.2 KG/M2 | DIASTOLIC BLOOD PRESSURE: 74 MMHG | HEIGHT: 60 IN | SYSTOLIC BLOOD PRESSURE: 120 MMHG | HEART RATE: 75 BPM | TEMPERATURE: 98 F

## 2017-09-27 DIAGNOSIS — S32.010S COMPRESSION FRACTURE OF L1 LUMBAR VERTEBRA, SEQUELA: ICD-10-CM

## 2017-09-27 DIAGNOSIS — M54.2 NECK PAIN: Primary | ICD-10-CM
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
## 2017-09-27 DIAGNOSIS — M50.10 CERVICAL DISC DISORDER WITH RADICULOPATHY: ICD-10-CM

## 2017-09-27 PROCEDURE — 99213 OFFICE O/P EST LOW 20 MIN: CPT | Mod: S$PBB,,, | Performed by: PHYSICIAN ASSISTANT

## 2017-09-27 PROCEDURE — 72050 X-RAY EXAM NECK SPINE 4/5VWS: CPT | Mod: TC

## 2017-09-27 PROCEDURE — 72050 X-RAY EXAM NECK SPINE 4/5VWS: CPT | Mod: 26,,, | Performed by: RADIOLOGY

## 2017-09-27 PROCEDURE — 99999 PR PBB SHADOW E&M-EST. PATIENT-LVL III: CPT | Mod: PBBFAC,,, | Performed by: PHYSICIAN ASSISTANT

## 2017-09-27 PROCEDURE — 72114 X-RAY EXAM L-S SPINE BENDING: CPT | Mod: TC

## 2017-09-27 PROCEDURE — 99213 OFFICE O/P EST LOW 20 MIN: CPT | Mod: PBBFAC,25 | Performed by: PHYSICIAN ASSISTANT

## 2017-09-27 PROCEDURE — 72114 X-RAY EXAM L-S SPINE BENDING: CPT | Mod: 26,,, | Performed by: RADIOLOGY

## 2017-09-27 PROCEDURE — 72141 MRI NECK SPINE W/O DYE: CPT | Mod: 26,GC,, | Performed by: RADIOLOGY

## 2017-09-27 PROCEDURE — 72141 MRI NECK SPINE W/O DYE: CPT | Mod: TC

## 2017-09-27 RX ORDER — BIMATOPROST 0.1 MG/ML
SOLUTION/ DROPS OPHTHALMIC
COMMUNITY
Start: 2017-09-15 | End: 2018-08-03 | Stop reason: CLARIF

## 2017-09-27 RX ORDER — PROMETHAZINE HYDROCHLORIDE 12.5 MG/1
TABLET ORAL
COMMUNITY
Start: 2017-08-24 | End: 2018-01-09 | Stop reason: SDUPTHER
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
## 2017-09-27 NOTE — LETTER
September 27, 2017      Naveed Montes MD  [address]  [facility] [address]           [facility] 7th Fl  [address]  [facility] [address]  Phone: [phone]          Patient: Cara Mendoza   MR Number: [id number]   YOB: 1960   Date of Visit: 9/27/2017       Dear Dr. Naveed Montes:    Thank you for referring Cara Mendoza to me for evaluation. Attached you will find relevant portions of my assessment and plan of care.    If you have questions, please do not hesitate to call me. I look forward to following Cara Mendoza along with you.    Sincerely,    SKY Elizondoosure  CC:  No Recipients    If you would like to receive this communication electronically, please contact [email or web address] or [phone] to request more information on [employer] Link access.    For providers and/or their staff who would like to refer a patient to [facility], please contact us through our one-stop-shop provider referral line, [facility] , at [phone].    If you feel you have received this communication in error or would no longer like to receive these types of communications, please e-mail [email or web address]

## 2017-09-28 ENCOUNTER — TELEPHONE (OUTPATIENT)
Dept: NEUROSURGERY | Facility: CLINIC | Age: 57
End: 2017-09-28

## 2017-09-28 NOTE — TELEPHONE ENCOUNTER
----- Message from Oleg Gallagher sent at 9/28/2017  3:50 PM CDT -----  Contact: Patient @ [phone]  Patient is calling to get an update on the medication request (hydrocodone-acetaminophen 5-325mg (NORCO) 5-325 mg per tablet ) pls call when ready for pickup or call into     [id number] [place] [facility] [address] [address]  Phone: [phone] Fax: [phone]

## 2017-09-28 NOTE — TELEPHONE ENCOUNTER
Pt was advised that the message was given to Amanda Cifuentes today Sylvester was off 9/28/17 she returns tomorrow. WILL F/U than

## 2017-09-28 NOTE — TELEPHONE ENCOUNTER
Sw Pt about her request and explained the Pt that I'm still waiting a respond from her from Dr. MOSELEY she stated she understand

## 2017-09-28 NOTE — TELEPHONE ENCOUNTER
----- Message from Loly Neal sent at 9/28/2017 10:07 AM CDT -----  Contact: Patient [phone]  Patient is calling back to find out if her hydrocodone has been called in yet, patient states she would like a call to confirm it was called in, before she calls the pharmacy to confirm receipt. Please call         [id number]  [place] - [address]  [place] [address]  Phone: [phone] Fax: [phone]

## 2017-09-29 DIAGNOSIS — F41.9 ANXIETY: ICD-10-CM

## 2017-09-29 RX ORDER — HYDROCODONE BITARTRATE AND ACETAMINOPHEN 10; 325 MG/1; MG/1
1 TABLET ORAL EVERY 6 HOURS PRN
Qty: 60 TABLET | Refills: 0 | Status: SHIPPED | OUTPATIENT
Start: 2017-09-29 | End: 2017-11-13

## 2017-09-29 RX ORDER — ESCITALOPRAM OXALATE 20 MG/1
20 TABLET ORAL NIGHTLY
Qty: 30 TABLET | Refills: 6 | OUTPATIENT
Start: 2017-09-29

## 2017-09-29 RX ORDER — ESCITALOPRAM OXALATE 20 MG/1
TABLET ORAL
Qty: 30 TABLET | Refills: 6 | Status: SHIPPED | OUTPATIENT
Start: 2017-09-29 | End: 2017-11-13

## 2017-09-29 NOTE — TELEPHONE ENCOUNTER
----- Message from Olegario Segura sent at 9/29/2017  1:15 PM CDT -----  Contact: self/ [phone] cell  Type: Rx    Name of medication(s): escitalopram oxalate (LEXAPRO) 20 MG tablet    Is this a refill? New rx? refill    Who prescribed medication? Dr. Parker    Pharmacy Name, Phone, & Location: [facility]     Comments: Pt would like to request a refill on the medication above.  Please call and advise.    Thank you

## 2017-10-26 DIAGNOSIS — E03.4 HYPOTHYROIDISM DUE TO ACQUIRED ATROPHY OF THYROID: ICD-10-CM

## 2017-10-26 RX ORDER — LEVOTHYROXINE SODIUM 75 UG/1
TABLET ORAL
Qty: 90 TABLET | Refills: 1 | Status: SHIPPED | OUTPATIENT
Start: 2017-10-26 | End: 2017-11-13

## 2017-11-03 ENCOUNTER — TELEPHONE (OUTPATIENT)
Dept: INTERNAL MEDICINE | Facility: CLINIC | Age: 57
End: 2017-11-03

## 2017-11-03 DIAGNOSIS — K29.50 CHRONIC GASTRITIS WITHOUT BLEEDING, UNSPECIFIED GASTRITIS TYPE: ICD-10-CM

## 2017-11-03 DIAGNOSIS — K20.90 ESOPHAGITIS: ICD-10-CM

## 2017-11-03 DIAGNOSIS — K86.1 CHRONIC PANCREATITIS, UNSPECIFIED PANCREATITIS TYPE: Primary | ICD-10-CM

## 2017-11-03 NOTE — TELEPHONE ENCOUNTER
----- Message from Zuly Gentile sent at 11/3/2017 12:13 PM CDT -----  Contact: self [phone]  Type: Referral to a Specialist    Where would you like a referral to?    Have you previously requested? no    Is the physician within the [facility]? No    Name and phone number of specialist: [facility] in [place] phone [phone] and fax# [phone]    Reason for appointment: Recurring digestive issues dr Parker is aware.    Is an appointment scheduled with specialist? When? Not yet, waiting on referral to schedule apt    Comments: Patient is at Specialist waiting for the referral to be faxed over today please    Thank you

## 2017-11-13 ENCOUNTER — OFFICE VISIT (OUTPATIENT)
Dept: NEUROSURGERY | Facility: CLINIC | Age: 57
End: 2017-11-13
Payer: MEDICARE

## 2017-11-13 VITALS
SYSTOLIC BLOOD PRESSURE: 138 MMHG | WEIGHT: 187.31 LBS | HEART RATE: 65 BPM | TEMPERATURE: 99 F | DIASTOLIC BLOOD PRESSURE: 75 MMHG | BODY MASS INDEX: 36.58 KG/M2

## 2017-11-13 DIAGNOSIS — R51.9 NONINTRACTABLE HEADACHE, UNSPECIFIED CHRONICITY PATTERN, UNSPECIFIED HEADACHE TYPE: Primary | ICD-10-CM

## 2017-11-13 PROCEDURE — 99213 OFFICE O/P EST LOW 20 MIN: CPT | Mod: PBBFAC | Performed by: PHYSICIAN ASSISTANT

## 2017-11-13 PROCEDURE — 99213 OFFICE O/P EST LOW 20 MIN: CPT | Mod: S$PBB,,, | Performed by: PHYSICIAN ASSISTANT

## 2017-11-13 PROCEDURE — 99999 PR PBB SHADOW E&M-EST. PATIENT-LVL III: CPT | Mod: PBBFAC,,, | Performed by: PHYSICIAN ASSISTANT

## 2017-11-13 RX ORDER — PANTOPRAZOLE SODIUM 40 MG/1
40 TABLET, DELAYED RELEASE ORAL DAILY
COMMUNITY
End: 2022-04-14 | Stop reason: SDUPTHER

## 2017-11-13 RX ORDER — FAMOTIDINE 20 MG/1
20 TABLET, FILM COATED ORAL
COMMUNITY
End: 2017-11-28

## 2017-11-13 RX ORDER — LEVOTHYROXINE SODIUM 75 UG/1
75 TABLET ORAL
COMMUNITY
End: 2018-04-25 | Stop reason: SDUPTHER

## 2017-11-13 RX ORDER — HYDROCODONE BITARTRATE AND ACETAMINOPHEN 7.5; 325 MG/1; MG/1
TABLET ORAL
COMMUNITY
Start: 2017-11-10 | End: 2017-11-28

## 2017-11-13 RX ORDER — GABAPENTIN 300 MG/1
300 CAPSULE ORAL
COMMUNITY
End: 2017-12-11 | Stop reason: SDUPTHER

## 2017-11-13 RX ORDER — ALPRAZOLAM 1 MG/1
1 TABLET ORAL
COMMUNITY
End: 2017-12-11

## 2017-11-13 RX ORDER — ESTRADIOL 1 MG/1
1 TABLET ORAL
COMMUNITY
End: 2017-11-13

## 2017-11-13 RX ORDER — HYDROCHLOROTHIAZIDE 12.5 MG/1
12.5 CAPSULE ORAL
COMMUNITY
End: 2018-04-27 | Stop reason: SDUPTHER

## 2017-11-13 RX ORDER — DICYCLOMINE HYDROCHLORIDE 20 MG/1
TABLET ORAL
COMMUNITY
Start: 2017-11-10 | End: 2017-11-28

## 2017-11-13 RX ORDER — ESCITALOPRAM OXALATE 20 MG/1
20 TABLET ORAL
COMMUNITY
End: 2018-04-30 | Stop reason: SDUPTHER

## 2017-11-13 RX ORDER — GABAPENTIN 300 MG/1
CAPSULE ORAL
Qty: 60 CAPSULE | Refills: 5 | Status: SHIPPED | OUTPATIENT
Start: 2017-11-13 | End: 2017-11-13

## 2017-11-13 RX ORDER — DICYCLOMINE HYDROCHLORIDE 20 MG/1
20 TABLET ORAL
COMMUNITY
Start: 2017-11-09 | End: 2017-11-28

## 2017-11-13 RX ORDER — AZITHROMYCIN 250 MG/1
TABLET, FILM COATED ORAL
COMMUNITY
Start: 2017-11-06 | End: 2017-11-28
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
## 2017-11-28 ENCOUNTER — OFFICE VISIT (OUTPATIENT)
Dept: NEUROLOGY | Facility: CLINIC | Age: 57
End: 2017-11-28
Payer: COMMERCIAL

## 2017-11-28 VITALS
WEIGHT: 186 LBS | BODY MASS INDEX: 36.52 KG/M2 | HEIGHT: 60 IN | HEART RATE: 72 BPM | SYSTOLIC BLOOD PRESSURE: 127 MMHG | DIASTOLIC BLOOD PRESSURE: 82 MMHG

## 2017-11-28 DIAGNOSIS — M43.6 NECK STIFFNESS: ICD-10-CM

## 2017-11-28 DIAGNOSIS — M54.2 CERVICALGIA: ICD-10-CM
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
## 2017-11-28 DIAGNOSIS — G44.86 CERVICOGENIC HEADACHE: ICD-10-CM

## 2017-11-28 DIAGNOSIS — G44.321 INTRACTABLE CHRONIC POST-TRAUMATIC HEADACHE: Primary | ICD-10-CM

## 2017-11-28 DIAGNOSIS — M79.18 MYOFASCIAL PAIN: ICD-10-CM

## 2017-11-28 DIAGNOSIS — F41.9 ANXIETY: Chronic | ICD-10-CM

## 2017-11-28 DIAGNOSIS — G47.9 TROUBLE IN SLEEPING: ICD-10-CM

## 2017-11-28 PROCEDURE — 99204 OFFICE O/P NEW MOD 45 MIN: CPT | Mod: S$GLB,,, | Performed by: NURSE PRACTITIONER

## 2017-11-28 PROCEDURE — 99999 PR PBB SHADOW E&M-EST. PATIENT-LVL III: CPT | Mod: PBBFAC,,, | Performed by: NURSE PRACTITIONER

## 2017-11-28 PROCEDURE — 99213 OFFICE O/P EST LOW 20 MIN: CPT | Mod: PBBFAC | Performed by: NURSE PRACTITIONER

## 2017-11-28 RX ORDER — GABAPENTIN 300 MG/1
CAPSULE ORAL
Qty: 180 CAPSULE | Refills: 1 | Status: SHIPPED | OUTPATIENT
Start: 2017-11-28 | End: 2018-01-09 | Stop reason: DRUGHIGH

## 2017-11-28 RX ORDER — BACLOFEN 10 MG/1
TABLET ORAL
Qty: 90 TABLET | Refills: 5 | Status: SHIPPED | OUTPATIENT
Start: 2017-11-28 | End: 2018-02-09

## 2017-11-28 RX ORDER — ONDANSETRON 4 MG/1
TABLET, FILM COATED ORAL
COMMUNITY
Start: 2017-11-20 | End: 2017-12-11

## 2017-11-28 NOTE — Clinical Note
December 4, 2017      Amanda Phan PA-C  [address] [facility] [address]           [facility]  [address]  [place] [address]  Phone: [phone]  Fax: [phone]          Patient: Cara Mendoza   MR Number: [id number]   YOB: 1960   Date of Visit: 11/28/2017       Dear Amanda Phan:    Thank you for referring Cara Mendoza to me for evaluation. Attached you will find relevant portions of my assessment and plan of care.    If you have questions, please do not hesitate to call me. I look forward to following Cara Mendoza along with you.    Sincerely,    Jennifer Stubbs, [facility]    Enclosure  CC:  No Recipients    If you would like to receive this communication electronically, please contact [email or web address] or [phone] to request more information on [employer] Link access.    For providers and/or their staff who would like to refer a patient to [facility], please contact us through our one-stop-shop provider referral line, [facility], at [phone].    If you feel you have received this communication in error or would no longer like to receive these types of communications, please e-mail [email or web address]
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
## 2017-11-30 ENCOUNTER — TELEPHONE (OUTPATIENT)
Dept: NEUROLOGY | Facility: CLINIC | Age: 57
End: 2017-11-30

## 2017-11-30 ENCOUNTER — TELEPHONE (OUTPATIENT)
Dept: NEUROSURGERY | Facility: CLINIC | Age: 57
End: 2017-11-30

## 2017-11-30 NOTE — TELEPHONE ENCOUNTER
----- Message from Daryl Faust sent at 11/30/2017  8:40 AM CST -----  Contact: Lolita ([facility]) [phone]  Loilta called to speak to someone regarding the patient's plan of care. She faxed it over 11/21/17 and needs it signed. Please contact her to discuss further.

## 2017-12-11 ENCOUNTER — OFFICE VISIT (OUTPATIENT)
Dept: INTERNAL MEDICINE | Facility: CLINIC | Age: 57
End: 2017-12-11
Payer: MEDICARE

## 2017-12-11 VITALS
DIASTOLIC BLOOD PRESSURE: 86 MMHG | HEART RATE: 80 BPM | TEMPERATURE: 98 F | WEIGHT: 191.38 LBS | BODY MASS INDEX: 37.37 KG/M2 | RESPIRATION RATE: 16 BRPM | SYSTOLIC BLOOD PRESSURE: 122 MMHG

## 2017-12-11 DIAGNOSIS — K29.50 CHRONIC GASTRITIS WITHOUT BLEEDING, UNSPECIFIED GASTRITIS TYPE: ICD-10-CM

## 2017-12-11 DIAGNOSIS — E03.4 HYPOTHYROIDISM DUE TO ACQUIRED ATROPHY OF THYROID: ICD-10-CM

## 2017-12-11 DIAGNOSIS — K20.90 ESOPHAGITIS: ICD-10-CM

## 2017-12-11 DIAGNOSIS — G44.321 INTRACTABLE CHRONIC POST-TRAUMATIC HEADACHE: ICD-10-CM

## 2017-12-11 DIAGNOSIS — K86.1 CHRONIC PANCREATITIS, UNSPECIFIED PANCREATITIS TYPE: Primary | ICD-10-CM

## 2017-12-11 DIAGNOSIS — F41.9 ANXIETY: ICD-10-CM

## 2017-12-11 DIAGNOSIS — I10 ESSENTIAL HYPERTENSION: ICD-10-CM

## 2017-12-11 DIAGNOSIS — E66.9 OBESITY (BMI 30-39.9): ICD-10-CM

## 2017-12-11 DIAGNOSIS — D35.02 ADENOMA OF LEFT ADRENAL GLAND: ICD-10-CM

## 2017-12-11 DIAGNOSIS — K86.1 OTHER CHRONIC PANCREATITIS: ICD-10-CM

## 2017-12-11 PROCEDURE — 99214 OFFICE O/P EST MOD 30 MIN: CPT | Mod: PBBFAC,PN | Performed by: INTERNAL MEDICINE

## 2017-12-11 PROCEDURE — 99999 PR PBB SHADOW E&M-EST. PATIENT-LVL IV: CPT | Mod: PBBFAC,,, | Performed by: INTERNAL MEDICINE

## 2017-12-11 PROCEDURE — 99214 OFFICE O/P EST MOD 30 MIN: CPT | Mod: S$GLB,,, | Performed by: INTERNAL MEDICINE

## 2017-12-11 RX ORDER — ESTRADIOL 1 MG/1
1 TABLET ORAL DAILY
COMMUNITY
Start: 2017-12-09 | End: 2021-05-10 | Stop reason: SDUPTHER

## 2017-12-11 RX ORDER — DOXEPIN HYDROCHLORIDE 50 MG/G
CREAM TOPICAL
COMMUNITY
Start: 2017-12-06 | End: 2018-02-09

## 2017-12-11 RX ORDER — ALPRAZOLAM 0.5 MG/1
0.5 TABLET ORAL 2 TIMES DAILY PRN
Qty: 60 TABLET | Refills: 0 | Status: SHIPPED | OUTPATIENT
Start: 2017-12-11 | End: 2018-01-11 | Stop reason: SDUPTHER

## 2017-12-11 RX ORDER — ALPRAZOLAM 1 MG/1
TABLET ORAL
Qty: 30 TABLET | Refills: 3 | OUTPATIENT
Start: 2017-12-11

## 2017-12-11 RX ORDER — HYDROCODONE BITARTRATE AND ACETAMINOPHEN 5; 325 MG/1; MG/1
1 TABLET ORAL EVERY 6 HOURS PRN
Qty: 50 TABLET | Refills: 0 | Status: SHIPPED | OUTPATIENT
Start: 2017-12-11 | End: 2018-02-09 | Stop reason: SDUPTHER
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
## 2018-01-07 DIAGNOSIS — F41.9 ANXIETY: ICD-10-CM

## 2018-01-08 RX ORDER — ALPRAZOLAM 0.5 MG/1
TABLET ORAL
Qty: 60 TABLET | Refills: 0 | OUTPATIENT
Start: 2018-01-08

## 2018-01-08 NOTE — TELEPHONE ENCOUNTER
Patient requesting if there can be an increase on her xanax dosage, she is completely out. Please advise.

## 2018-01-08 NOTE — TELEPHONE ENCOUNTER
----- Message from Elisa Ellison sent at 1/8/2018 11:07 AM CST -----  Contact: [facility]/[phone]  Patient is requesting a refill on their medication.  Please call and advise in regard to if the dosage could be higher.  She requests a call back today as she is out.    ALPRAZolam (XANAX) 0.5 MG tablet    [id number] [place] [facility] [address]

## 2018-01-09 ENCOUNTER — TELEPHONE (OUTPATIENT)
Dept: NEUROLOGY | Facility: CLINIC | Age: 58
End: 2018-01-09

## 2018-01-09 ENCOUNTER — OFFICE VISIT (OUTPATIENT)
Dept: NEUROLOGY | Facility: CLINIC | Age: 58
End: 2018-01-09
Payer: COMMERCIAL

## 2018-01-09 VITALS
HEART RATE: 67 BPM | DIASTOLIC BLOOD PRESSURE: 85 MMHG | SYSTOLIC BLOOD PRESSURE: 112 MMHG | WEIGHT: 189 LBS | HEIGHT: 60 IN | BODY MASS INDEX: 37.11 KG/M2

## 2018-01-09 DIAGNOSIS — G89.29 CHRONIC INTRACTABLE HEADACHE, UNSPECIFIED HEADACHE TYPE: Primary | ICD-10-CM

## 2018-01-09 DIAGNOSIS — M54.2 CERVICALGIA: ICD-10-CM

## 2018-01-09 DIAGNOSIS — R41.3 MEMORY DISTURBANCE: ICD-10-CM

## 2018-01-09 DIAGNOSIS — G44.321 INTRACTABLE CHRONIC POST-TRAUMATIC HEADACHE: Primary | ICD-10-CM

## 2018-01-09 DIAGNOSIS — R51.9 CHRONIC INTRACTABLE HEADACHE, UNSPECIFIED HEADACHE TYPE: Primary | ICD-10-CM
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
## 2018-01-09 DIAGNOSIS — M43.6 NECK STIFFNESS: ICD-10-CM

## 2018-01-09 PROCEDURE — 99999 PR PBB SHADOW E&M-EST. PATIENT-LVL III: CPT | Mod: PBBFAC,,, | Performed by: NURSE PRACTITIONER

## 2018-01-09 PROCEDURE — 99213 OFFICE O/P EST LOW 20 MIN: CPT | Mod: PBBFAC | Performed by: NURSE PRACTITIONER

## 2018-01-09 PROCEDURE — 99214 OFFICE O/P EST MOD 30 MIN: CPT | Mod: S$GLB,,, | Performed by: NURSE PRACTITIONER

## 2018-01-09 RX ORDER — PROMETHAZINE HYDROCHLORIDE 12.5 MG/1
12.5 TABLET ORAL EVERY 6 HOURS PRN
Qty: 20 TABLET | Refills: 2 | Status: SHIPPED | OUTPATIENT
Start: 2018-01-09 | End: 2018-02-09 | Stop reason: SDUPTHER

## 2018-01-09 RX ORDER — GABAPENTIN 800 MG/1
800 TABLET ORAL 3 TIMES DAILY
Qty: 90 TABLET | Refills: 2 | Status: SHIPPED | OUTPATIENT
Start: 2018-01-09 | End: 2018-01-18 | Stop reason: ALTCHOICE

## 2018-01-09 NOTE — TELEPHONE ENCOUNTER
----- Message from Vania Bustamante MA sent at 1/9/2018 11:10 AM CST -----  Please contact Christiano Reji with an appt to see Dr. Dukes, she's requesting next week if possible. Referral entered by NAYELY ZAVALA...    Thanks!  -Vania

## 2018-01-09 NOTE — Clinical Note
January 15, 2018      Asael Carrillo MD  [address] [place], [place] [address]  [address]  [place] [address]           [facility]  [address]  [place] [address]  Phone: [phone]  Fax: [phone]          Patient: Cara Mendoza   MR Number: [id number]   YOB: 1960   Date of Visit: 1/9/2018       Dear Dr. Asael Carrillo:    Thank you for referring Cara Mendoza to me for evaluation. Attached you will find relevant portions of my assessment and plan of care.    If you have questions, please do not hesitate to call me. I look forward to following Cara Mendoza along with you.    Sincerely,    Jennifer Stubbs, [facility]    Enclosure  CC:  No Recipients    If you would like to receive this communication electronically, please contact [email or web address] or [phone] to request more information on [employer] Link access.    For providers and/or their staff who would like to refer a patient to [facility], please contact us through our one-stop-shop provider referral line, [facility], at [phone].    If you feel you have received this communication in error or would no longer like to receive these types of communications, please e-mail [email or web address]
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
## 2018-01-09 NOTE — TELEPHONE ENCOUNTER
"----- Message from Alie Ramachandran sent at 1/9/2018 11:44 AM CST -----  Contact: Patient herself  _  1st Request  X  2nd Request  _  3rd Request    Who: Destinee Mendoza (mrn# [id number])    Why:  Patient called and said, "she's returning your call and would like you to please call again."  Please give a call back at your earliest convenience.    THANKS!     What Number to Call Back: [phone]    When to Expect a call back: (Before the end of the day)   -- if the call is after 12:00, the call back will be tomorrow.                          "

## 2018-01-10 NOTE — TELEPHONE ENCOUNTER
----- Message from Oleg Gallagher sent at 1/10/2018  9:24 AM CST -----  Contact: Patient @ [phone]  Patient is calling to get an update on the continued PT, pls call

## 2018-01-11 ENCOUNTER — TELEPHONE (OUTPATIENT)
Dept: NEUROLOGY | Facility: CLINIC | Age: 58
End: 2018-01-11

## 2018-01-11 DIAGNOSIS — F41.9 ANXIETY: ICD-10-CM

## 2018-01-11 RX ORDER — ALPRAZOLAM 0.5 MG/1
0.5 TABLET ORAL 2 TIMES DAILY PRN
Qty: 60 TABLET | Refills: 0 | Status: SHIPPED | OUTPATIENT
Start: 2018-01-11 | End: 2018-02-09 | Stop reason: SDUPTHER

## 2018-01-11 NOTE — TELEPHONE ENCOUNTER
----- Message from Jose Tenorio sent at 1/11/2018 11:49 AM CST -----  Contact: [phone]/self  Refill request for ALPRAZolam (XANAX) 0.5 MG tablet  Please advise

## 2018-01-11 NOTE — TELEPHONE ENCOUNTER
Called and advised Patient that her Phenergan has been called in to her pharmacy per Noelle Stubbs.

## 2018-01-11 NOTE — TELEPHONE ENCOUNTER
----- Message from DEEPALI Ferrera sent at 1/11/2018  1:35 PM CST -----  Please let patient know I sent a prescription for phenergan to her pharmacy for her at her appointment.     Noelle     ----- Message -----  From: Yamini Mejia RN  Sent: 1/11/2018  11:24 AM  To: DEEPALI Ferrera        ----- Message -----  From: Bren Mac  Sent: 1/11/2018  11:20 AM  To: Enoc Rodriguez Staff    Pt is calling to request medication for nausea for MRI and can be reached at [phone].    Thank you

## 2018-01-14 ENCOUNTER — HOSPITAL ENCOUNTER (OUTPATIENT)
Dept: RADIOLOGY | Facility: HOSPITAL | Age: 58
Discharge: HOME | End: 2018-01-14
Attending: NURSE PRACTITIONER
Payer: COMMERCIAL

## 2018-01-14 DIAGNOSIS — M54.2 CERVICALGIA: ICD-10-CM

## 2018-01-14 DIAGNOSIS — R41.3 MEMORY DISTURBANCE: ICD-10-CM
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
## 2018-01-14 DIAGNOSIS — G44.321 INTRACTABLE CHRONIC POST-TRAUMATIC HEADACHE: ICD-10-CM

## 2018-01-14 DIAGNOSIS — M43.6 NECK STIFFNESS: ICD-10-CM

## 2018-01-14 PROCEDURE — 70553 MRI BRAIN STEM W/O & W/DYE: CPT | Mod: 26,,, | Performed by: RADIOLOGY

## 2018-01-14 PROCEDURE — 25500020 PHARM REV CODE 255: Performed by: NURSE PRACTITIONER

## 2018-01-14 PROCEDURE — 70553 MRI BRAIN STEM W/O & W/DYE: CPT | Mod: TC

## 2018-01-14 PROCEDURE — A9585 GADOBUTROL INJECTION: HCPCS | Performed by: NURSE PRACTITIONER

## 2018-01-14 RX ORDER — GADOBUTROL 604.72 MG/ML
9 INJECTION INTRAVENOUS
Status: COMPLETED | OUTPATIENT
Start: 2018-01-14 | End: 2018-01-14

## 2018-01-14 RX ADMIN — GADOBUTROL 9 ML: 604.72 INJECTION INTRAVENOUS at 10:01

## 2018-01-18 ENCOUNTER — OFFICE VISIT (OUTPATIENT)
Dept: NEUROLOGY | Facility: CLINIC | Age: 58
End: 2018-01-18
Attending: PSYCHIATRY & NEUROLOGY
Payer: COMMERCIAL

## 2018-01-18 ENCOUNTER — TELEPHONE (OUTPATIENT)
Dept: NEUROLOGY | Facility: CLINIC | Age: 58
End: 2018-01-18

## 2018-01-18 ENCOUNTER — LAB VISIT (OUTPATIENT)
Dept: LAB | Facility: OTHER | Age: 58
End: 2018-01-18
Payer: COMMERCIAL

## 2018-01-18 VITALS
HEIGHT: 60 IN | WEIGHT: 191.13 LBS | BODY MASS INDEX: 37.53 KG/M2 | SYSTOLIC BLOOD PRESSURE: 118 MMHG | DIASTOLIC BLOOD PRESSURE: 88 MMHG

## 2018-01-18 DIAGNOSIS — G44.321 INTRACTABLE CHRONIC POST-TRAUMATIC HEADACHE: ICD-10-CM

## 2018-01-18 DIAGNOSIS — E55.9 VITAMIN D DEFICIENCY: ICD-10-CM

## 2018-01-18 DIAGNOSIS — E66.9 OBESITY (BMI 30-39.9): ICD-10-CM

## 2018-01-18 DIAGNOSIS — E03.9 HYPOTHYROIDISM, UNSPECIFIED TYPE: Chronic | ICD-10-CM

## 2018-01-18 DIAGNOSIS — F41.9 ANXIETY: Chronic | ICD-10-CM

## 2018-01-18 DIAGNOSIS — F43.10 PTSD (POST-TRAUMATIC STRESS DISORDER): ICD-10-CM

## 2018-01-18 DIAGNOSIS — S06.0X0A CONCUSSION WITHOUT LOSS OF CONSCIOUSNESS, INITIAL ENCOUNTER: ICD-10-CM

## 2018-01-18 DIAGNOSIS — I10 ESSENTIAL HYPERTENSION: Chronic | ICD-10-CM

## 2018-01-18 DIAGNOSIS — G47.09 OTHER INSOMNIA: ICD-10-CM

## 2018-01-18 PROCEDURE — 84207 ASSAY OF VITAMIN B-6: CPT

## 2018-01-18 PROCEDURE — 84480 ASSAY TRIIODOTHYRONINE (T3): CPT

## 2018-01-18 PROCEDURE — 84436 ASSAY OF TOTAL THYROXINE: CPT

## 2018-01-18 PROCEDURE — 99215 OFFICE O/P EST HI 40 MIN: CPT | Mod: S$PBB,,, | Performed by: PSYCHIATRY & NEUROLOGY

## 2018-01-18 PROCEDURE — 36415 COLL VENOUS BLD VENIPUNCTURE: CPT

## 2018-01-18 PROCEDURE — 82306 VITAMIN D 25 HYDROXY: CPT

## 2018-01-18 PROCEDURE — 84425 ASSAY OF VITAMIN B-1: CPT

## 2018-01-18 PROCEDURE — 99214 OFFICE O/P EST MOD 30 MIN: CPT | Mod: PBBFAC | Performed by: PSYCHIATRY & NEUROLOGY

## 2018-01-18 PROCEDURE — 99999 PR PBB SHADOW E&M-EST. PATIENT-LVL IV: CPT | Mod: PBBFAC,,, | Performed by: PSYCHIATRY & NEUROLOGY

## 2018-01-18 RX ORDER — GABAPENTIN 300 MG/1
600 CAPSULE ORAL NIGHTLY
Qty: 30 CAPSULE | Refills: 3 | Status: SHIPPED | OUTPATIENT
Start: 2018-01-18 | End: 2018-02-19 | Stop reason: SDUPTHER

## 2018-01-18 NOTE — PATIENT INSTRUCTIONS
Thank you for visiting us at [facility].  You were seen by Dr. Torie Dukes.  You were seen for concussion.  We will be recommending the following:    -B12/folate, TFTs, Vitamin D, B2/B6  -conservative measures: cognitive rest, graded return to activity, focus on your therapeutic plan, avoid further TBIs, abstain from alcohol  -sleep/wake cycle:   -sleep hygiene   -melatonin 0.1-0.3mg at night  -headaches:   -migraine journal   -magnesium oxide 400mg daily(over the counter), gabapentin 600mg at night   -physical therapy, please coordinate with your external provider   -compounding gel  -You will be seen by Francisco Chiang or Kwadwo for your neuropsychological testing.  Please contact his assistant Deborah Hoffman at [phone] to schedule.    Please call or message (my [facility]) in 1 month    -counseling recommendations:      -THERAPIST RECOMMENDATIONS:  Sveta Ray, PhD - family & play therapist, [phone]  Sanchez Schmidt, Full time at [facility], marriage & family counsellor  Michael Borrego - couples therapist  Juanita Chakraborty - [facility]  Sergio Smith on [address] on [address]  [address] [facility] - Courtney Choudhary, Ze [place] [phone]  Nikkie Grace - [phone]  Yelena Tanner (ayurvedic) - [phone] (focus on families, trauma)  Lolita Tompkins [address] [facility], [phone]  Wilfrido Maria [phone] (focus on addiction)  [facility] - Apolinar Sergio [phone](focus on addiction)  Karin Cunningham [phone]  Fe Burgos [phone]  Kevin Muse , [address] [facility] [phone] (good with teenagers)  [facility], [phone]  Sharonda Dyer (sexual relationship therapist), [address] [facility], [place] [address], [phone]  Jenifer Sandhu  "[employer]", [phone], [address] at [facility], [address], [address] Sierra Santos, Social workers, Art therapy,  bodywork therapy, Conscious breathing , Alternative healing, Massage  therapists, [email or web address]      -Brain Health lifestyle modifications:    -sleep hygiene   - diet: Mediterranean Diet    -exercise: 20-30 minutes of  Moderate exercise 3-5 times a week   -stress management reviewed   -smoking cessation, alcohol moderation    Check out my blog post for further information:  [email or web address]      Sleep Hygiene:    1. Avoid watching TV, eating, and discussing emotional issues in bed. The bed should be used for sleep and sex only. If not, we can associate the bed with other activities and it often becomes difficult to fall asleep.  2. Minimize noise, light, and temperature extremes during sleep with ear plugs, window blinds, or an electric blanket or air conditioner. Even the slightest nighttime noises or luminescent lights can disrupt the quality of your sleep. Try to keep your bedroom at a comfortable temperature -- not too hot (above 75 degrees) or too cold (below 54 degrees).  3. Try not to drink fluids after 8 p.m. This may reduce awakenings due to urination.  4. Avoid naps, but if you do nap, make it no more than about 25 minutes about eight hours after you awake. But if you have problems falling asleep, then no naps for you.  5. Do not expose your self to bright light if you need to get up at night. Use a small night-light instead.  6. Nicotine is a stimulant and should be avoided particularly near bedtime and upon night awakenings. Having a smoke before bed, although it may feel relaxing, is actually putting a stimulant into your bloodstream.  7. Caffeine is also a stimulant and is present in coffee (100-200 mg), soda (50-75 mg), tea (50-75 mg), and various over-the-counter medications. Caffeine should be discontinued at least four to six hours before bedtime. If you consume large amounts of caffeine and you cut your self off too quickly, beware; you may get headaches that could keep you awake.  8.  Although alcohol is a depressant and may help you fall asleep, the subsequent metabolism that clears it from your body when you are sleeping causes a withdrawal syndrome. This withdrawal causes awakenings and is often associated with nightmares and sweats.  9. A light snack may be sleep-inducing, but a heavy meal too close to bedtime interferes with sleep. Stay away from protein and stick to carbohydrates or dairy products. Milk contains the amino acid L-tryptophan, which has been shown in research to help people go to sleep. So milk and cookies or crackers (without chocolate) may be useful and taste good as well.          You may receive a survey about your experience at [facility].  We appreciate you taking the time to complete the survey to help us improve our service and care.

## 2018-01-18 NOTE — TELEPHONE ENCOUNTER
Patient takes she's taking the gabapentin 600mg already and she still cannot fall asleep. Patient wants the medication you talk about in today visit be sent into the pharmacy. Patient also wanted to inform you she has been experience blackout and forgot to tell you at her visit.
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
## 2018-01-18 NOTE — Clinical Note
Thanks for the referral.  She'll be setting up following up with you, and I'll see her again in 4 months.

## 2018-01-18 NOTE — LETTER
January 18, 2018      Jennifer Stubbs, [facility]  [address]  [facility] [address]           [facility]  [address]  [facility] [address]  Phone: [phone]  Fax: [phone]          Patient: Cara Mendoza   MR Number: [id number]   YOB: 1960   Date of Visit: 1/18/2018       Dear Jennifer Stubbs:    Thank you for referring Cara Mendoza to me for evaluation. Attached you will find relevant portions of my assessment and plan of care.    If you have questions, please do not hesitate to call me. I look forward to following Cara Mendoza along with you.    Sincerely,    Torie Dukes MD    Enclosure  CC:  No Recipients    If you would like to receive this communication electronically, please contact [email or web address] or [phone] to request more information on [employer] Link access.    For providers and/or their staff who would like to refer a patient to [facility], please contact us through our one-stop-shop provider referral line, [facility], at [phone].    If you feel you have received this communication in error or would no longer like to receive these types of communications, please e-mail [email or web address]

## 2018-01-18 NOTE — ASSESSMENT & PLAN NOTE
-B12/folate, TSH, Vitamin D, B2/B6, Coenzyme Q10  -conservative measures: cognitive rest, graded return to work, avoid further TBIs, abstain from alcohol  -sleep/wake cycle:   -sleep hygiene   -melatonin 0.1-0.3mg at night, trazadone 50mg QHS PRN  -headaches:   -migraine journal   -magnesium oxide 400mg daily, gabapentin 800mg daily   -PT cervical ROM, gait and fall prevention   -diclofenac gel 1% PRN, sumatriptan PRN   -procedure: trigger point,  Deferred   -NP urgent referral

## 2018-01-19 PROBLEM — G47.09 OTHER INSOMNIA: Status: ACTIVE | Noted: 2018-01-19

## 2018-01-19 LAB
25(OH)D3+25(OH)D2 SERPL-MCNC: 16 NG/ML
T3 SERPL-MCNC: 81 NG/DL
T4 SERPL-MCNC: 7.9 UG/DL

## 2018-01-19 RX ORDER — TRAZODONE HYDROCHLORIDE 50 MG/1
50 TABLET ORAL NIGHTLY
Qty: 30 TABLET | Refills: 11 | Status: SHIPPED | OUTPATIENT
Start: 2018-01-19 | End: 2018-02-09 | Stop reason: SINTOL

## 2018-01-23 LAB
PYRIDOXAL SERPL-MCNC: 7 UG/L (ref 5–50)
VIT B1 SERPL-MCNC: 36 UG/L (ref 38–122)
VIT B2 SERPL-MCNC: 7 MCG/L (ref 1–19)

## 2018-02-08 ENCOUNTER — INITIAL CONSULT (OUTPATIENT)
Dept: NEUROLOGY | Facility: CLINIC | Age: 58
End: 2018-02-08
Attending: PSYCHIATRY & NEUROLOGY
Payer: MEDICARE

## 2018-02-08 DIAGNOSIS — F41.9 ANXIETY: ICD-10-CM

## 2018-02-08 DIAGNOSIS — F43.10 PTSD (POST-TRAUMATIC STRESS DISORDER): ICD-10-CM

## 2018-02-08 DIAGNOSIS — G44.321 INTRACTABLE CHRONIC POST-TRAUMATIC HEADACHE: ICD-10-CM

## 2018-02-08 DIAGNOSIS — I10 ESSENTIAL HYPERTENSION: Chronic | ICD-10-CM

## 2018-02-08 DIAGNOSIS — F41.9 ANXIETY: Chronic | ICD-10-CM

## 2018-02-08 DIAGNOSIS — E66.9 OBESITY (BMI 30-39.9): ICD-10-CM

## 2018-02-08 DIAGNOSIS — E55.9 VITAMIN D DEFICIENCY: ICD-10-CM

## 2018-02-08 DIAGNOSIS — E03.9 HYPOTHYROIDISM, UNSPECIFIED TYPE: Chronic | ICD-10-CM

## 2018-02-08 DIAGNOSIS — S06.0X0A CONCUSSION WITHOUT LOSS OF CONSCIOUSNESS, INITIAL ENCOUNTER: ICD-10-CM

## 2018-02-08 PROCEDURE — 99499 UNLISTED E&M SERVICE: CPT | Mod: S$PBB,,, | Performed by: PSYCHIATRY & NEUROLOGY

## 2018-02-08 PROCEDURE — 90791 PSYCH DIAGNOSTIC EVALUATION: CPT | Mod: PBBFAC | Performed by: PSYCHIATRY & NEUROLOGY

## 2018-02-08 PROCEDURE — 90791 PSYCH DIAGNOSTIC EVALUATION: CPT | Mod: S$PBB,,, | Performed by: PSYCHIATRY & NEUROLOGY

## 2018-02-08 RX ORDER — ALPRAZOLAM 0.5 MG/1
TABLET ORAL
Qty: 60 TABLET | Refills: 0 | OUTPATIENT
Start: 2018-02-08

## 2018-02-08 NOTE — Clinical Note
Macho Vogt,  Here's the urgent report for Ms. Mendoza. Please let me know if you have any questions.  [place], Fe
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
## 2018-02-09 ENCOUNTER — OFFICE VISIT (OUTPATIENT)
Dept: INTERNAL MEDICINE | Facility: CLINIC | Age: 58
End: 2018-02-09
Payer: MEDICARE

## 2018-02-09 VITALS
RESPIRATION RATE: 12 BRPM | TEMPERATURE: 97 F | HEART RATE: 68 BPM | DIASTOLIC BLOOD PRESSURE: 80 MMHG | WEIGHT: 191.5 LBS | HEIGHT: 60 IN | SYSTOLIC BLOOD PRESSURE: 122 MMHG | BODY MASS INDEX: 37.6 KG/M2

## 2018-02-09 DIAGNOSIS — G44.321 INTRACTABLE CHRONIC POST-TRAUMATIC HEADACHE: Primary | ICD-10-CM

## 2018-02-09 DIAGNOSIS — F41.9 ANXIETY: ICD-10-CM

## 2018-02-09 DIAGNOSIS — J06.9 UPPER RESPIRATORY TRACT INFECTION, UNSPECIFIED TYPE: ICD-10-CM

## 2018-02-09 PROCEDURE — 99213 OFFICE O/P EST LOW 20 MIN: CPT | Mod: PBBFAC,PN | Performed by: INTERNAL MEDICINE

## 2018-02-09 PROCEDURE — 99213 OFFICE O/P EST LOW 20 MIN: CPT | Mod: S$PBB,,, | Performed by: INTERNAL MEDICINE

## 2018-02-09 PROCEDURE — 99999 PR PBB SHADOW E&M-EST. PATIENT-LVL III: CPT | Mod: PBBFAC,,, | Performed by: INTERNAL MEDICINE

## 2018-02-09 RX ORDER — ALPRAZOLAM 0.5 MG/1
0.5 TABLET ORAL 2 TIMES DAILY PRN
Qty: 60 TABLET | Refills: 0 | Status: CANCELLED | OUTPATIENT
Start: 2018-02-09 | End: 2018-03-11

## 2018-02-09 RX ORDER — ALPRAZOLAM 0.5 MG/1
0.5 TABLET ORAL 2 TIMES DAILY PRN
Qty: 60 TABLET | Refills: 0 | Status: SHIPPED | OUTPATIENT
Start: 2018-02-09 | End: 2018-03-12 | Stop reason: SDUPTHER

## 2018-02-09 RX ORDER — TIZANIDINE HYDROCHLORIDE 2 MG/1
1 CAPSULE, GELATIN COATED ORAL 2 TIMES DAILY
Qty: 30 CAPSULE | Refills: 2 | Status: SHIPPED | OUTPATIENT
Start: 2018-02-09 | End: 2018-02-19

## 2018-02-09 NOTE — TELEPHONE ENCOUNTER
----- Message from Lor Rosales sent at 2/9/2018 12:34 PM CST -----  Contact: pt      _  1st Request  _  2nd Request  _  3rd Request    Please refill the medication(s) listed below. Please call the patient when the prescription(s) is ready for  at this phone number      [phone]      Medication #1ALPRAZolam (XANAX) 0.5 MG tablet     Medication #2      Preferred Pharmacy:[facility]

## 2018-02-09 NOTE — PROGRESS NOTES
Subjective:       Patient ID: Cara Mendoza is a 57 y.o. female.    Chief Complaint: Sinusitis (symptoms x 2 days) and Medication Refill    HPI  Pt c/o 2 days of nasal congestion, frontal HA with good response to Dayquil.  No F/C.  Still with postconcussive occipital HA.  Review of Systems    Objective:      Physical Exam   Constitutional: She appears well-developed. No distress.   obese   HENT:   Head: Normocephalic.   Mouth/Throat: Oropharynx is clear and moist.   Max and ethmoid sinuses tender   Eyes: EOM are normal.   Neck: Normal range of motion. No tracheal deviation present.   Cardiovascular: Normal rate, regular rhythm and intact distal pulses.    Pulmonary/Chest: Effort normal and breath sounds normal. No respiratory distress.   Abdominal: She exhibits no distension.   Musculoskeletal: Normal range of motion. She exhibits no edema.   Neurological: She is alert. No cranial nerve deficit. She exhibits normal muscle tone. Coordination normal.   Skin: Skin is warm and dry. No rash noted. She is not diaphoretic. No erythema.   Psychiatric: She has a normal mood and affect. Her behavior is normal.   Vitals reviewed.      Assessment:       1. Intractable chronic post-traumatic headache    2. Anxiety    3. Upper respiratory tract infection, unspecified type        Plan:       Cara was seen today for sinusitis and medication refill.    Diagnoses and all orders for this visit:    Intractable chronic post-traumatic headache  -     tiZANidine 2 mg Cap; Take 1 capsule (2 mg total) by mouth 2 (two) times daily.    Anxiety  -     ALPRAZolam (XANAX) 0.5 MG tablet; Take 1 tablet (0.5 mg total) by mouth 2 (two) times daily as needed for Anxiety.    Upper respiratory tract infection, unspecified type   Dayquil prn      Follow-up if symptoms worsen or fail to improve.

## 2018-02-19 ENCOUNTER — OFFICE VISIT (OUTPATIENT)
Dept: NEUROLOGY | Facility: CLINIC | Age: 58
End: 2018-02-19
Payer: MEDICARE

## 2018-02-19 VITALS
HEART RATE: 68 BPM | BODY MASS INDEX: 37.5 KG/M2 | SYSTOLIC BLOOD PRESSURE: 125 MMHG | HEIGHT: 60 IN | DIASTOLIC BLOOD PRESSURE: 72 MMHG | WEIGHT: 191 LBS

## 2018-02-19 DIAGNOSIS — S06.0X0A CONCUSSION WITHOUT LOSS OF CONSCIOUSNESS, INITIAL ENCOUNTER: ICD-10-CM

## 2018-02-19 DIAGNOSIS — G44.321 INTRACTABLE CHRONIC POST-TRAUMATIC HEADACHE: Primary | ICD-10-CM

## 2018-02-19 DIAGNOSIS — I10 ESSENTIAL HYPERTENSION: Chronic | ICD-10-CM

## 2018-02-19 DIAGNOSIS — F43.10 PTSD (POST-TRAUMATIC STRESS DISORDER): ICD-10-CM

## 2018-02-19 DIAGNOSIS — F41.9 ANXIETY: Chronic | ICD-10-CM

## 2018-02-19 DIAGNOSIS — E03.9 HYPOTHYROIDISM, UNSPECIFIED TYPE: Chronic | ICD-10-CM

## 2018-02-19 DIAGNOSIS — G44.86 CERVICOGENIC HEADACHE: ICD-10-CM

## 2018-02-19 DIAGNOSIS — E55.9 VITAMIN D DEFICIENCY: ICD-10-CM

## 2018-02-19 DIAGNOSIS — G47.9 TROUBLE IN SLEEPING: ICD-10-CM

## 2018-02-19 PROCEDURE — 99214 OFFICE O/P EST MOD 30 MIN: CPT | Mod: S$PBB,,, | Performed by: NURSE PRACTITIONER

## 2018-02-19 PROCEDURE — 99213 OFFICE O/P EST LOW 20 MIN: CPT | Mod: PBBFAC | Performed by: NURSE PRACTITIONER

## 2018-02-19 PROCEDURE — 99999 PR PBB SHADOW E&M-EST. PATIENT-LVL III: CPT | Mod: PBBFAC,,, | Performed by: NURSE PRACTITIONER

## 2018-02-19 RX ORDER — GABAPENTIN 400 MG/1
400 CAPSULE ORAL 3 TIMES DAILY
Qty: 90 CAPSULE | Refills: 5 | Status: SHIPPED | OUTPATIENT
Start: 2018-02-19 | End: 2018-06-27 | Stop reason: SDUPTHER
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
## 2018-03-08 ENCOUNTER — PATIENT MESSAGE (OUTPATIENT)
Dept: INTERNAL MEDICINE | Facility: CLINIC | Age: 58
End: 2018-03-08

## 2018-03-08 DIAGNOSIS — F41.9 ANXIETY: ICD-10-CM

## 2018-03-08 RX ORDER — ALPRAZOLAM 0.5 MG/1
TABLET ORAL
Qty: 60 TABLET | Refills: 0 | OUTPATIENT
Start: 2018-03-08

## 2018-03-08 NOTE — TELEPHONE ENCOUNTER
----- Message from Errol Juarez sent at 3/8/2018 12:10 PM CST -----  Contact: [phone] self  Patient would like refill of ALPRAZolam (XANAX) 0.5 MG tablet sent to [id number]. Patient advised she need the refill today and would like refills added so she doesn't have to call every month. Please call and advise.

## 2018-03-09 DIAGNOSIS — F41.9 ANXIETY: ICD-10-CM

## 2018-03-09 RX ORDER — TIZANIDINE 2 MG/1
2 TABLET ORAL NIGHTLY
COMMUNITY
Start: 2018-03-05 | End: 2018-07-18 | Stop reason: SDUPTHER

## 2018-03-09 RX ORDER — ALPRAZOLAM 0.5 MG/1
0.5 TABLET ORAL 2 TIMES DAILY PRN
Qty: 60 TABLET | Refills: 0 | OUTPATIENT
Start: 2018-03-09 | End: 2018-04-08

## 2018-03-09 RX ORDER — PROMETHAZINE HYDROCHLORIDE 12.5 MG/1
25 TABLET ORAL
COMMUNITY
Start: 2018-02-28 | End: 2018-07-23 | Stop reason: SDUPTHER

## 2018-03-09 NOTE — TELEPHONE ENCOUNTER
----- Message from Jossie Ariella sent at 3/9/2018 10:10 AM CST -----  Contact: self, [phone]  Patient is calling back requesting her Alprazolam prescription refill. States her brother passed away and needs her medication today. Also requests to know what she needs to do in order to have refills and not have to call every month.

## 2018-03-12 DIAGNOSIS — F41.9 ANXIETY: ICD-10-CM

## 2018-03-12 RX ORDER — ALPRAZOLAM 0.5 MG/1
0.5 TABLET ORAL 2 TIMES DAILY PRN
Qty: 60 TABLET | Refills: 0 | Status: SHIPPED | OUTPATIENT
Start: 2018-03-12 | End: 2018-04-12 | Stop reason: SDUPTHER

## 2018-03-12 NOTE — TELEPHONE ENCOUNTER
----- Message from Errol Juarez sent at 3/9/2018  4:34 PM CST -----  Contact: [phone] self  Patient would like refill of ALPRAZolam (XANAX) 0.5 MG tablet sent to [id number]. Please advise.

## 2018-04-08 DIAGNOSIS — F41.9 ANXIETY: ICD-10-CM

## 2018-04-10 RX ORDER — ALPRAZOLAM 0.5 MG/1
TABLET ORAL
Qty: 60 TABLET | Refills: 0 | OUTPATIENT
Start: 2018-04-10

## 2018-04-12 DIAGNOSIS — F41.9 ANXIETY: ICD-10-CM

## 2018-04-12 RX ORDER — ALPRAZOLAM 0.5 MG/1
0.5 TABLET ORAL 2 TIMES DAILY PRN
Qty: 60 TABLET | Refills: 0 | Status: SHIPPED | OUTPATIENT
Start: 2018-04-12 | End: 2018-04-25 | Stop reason: SDUPTHER

## 2018-04-12 NOTE — TELEPHONE ENCOUNTER
----- Message from Agatha William sent at 4/12/2018 12:49 PM CDT -----  Contact: self/[phone]  Patient is requesting to have a refill of ALPRAZolam (XANAX) 0.5 MG tablet sent to [id number] [address]. Please advise.

## 2018-04-17 ENCOUNTER — TELEPHONE (OUTPATIENT)
Dept: NEUROLOGY | Facility: CLINIC | Age: 58
End: 2018-04-17

## 2018-04-17 NOTE — TELEPHONE ENCOUNTER
----- Message from Torie Dukes MD sent at 4/17/2018  9:25 AM CDT -----  Contact: Pt  Thank you Tsering Arroyo, could you reschedule Ms. Mendoza in my next available.  kS    ----- Message -----  From: Oleg Graves MA  Sent: 4/16/2018   3:26 PM  To: Torie Dukes MD    Pt called and would like to reschedule her appt [phone].    Pt cn be reached at [phone].    Thanks

## 2018-04-25 DIAGNOSIS — F41.9 ANXIETY: ICD-10-CM

## 2018-04-25 RX ORDER — LEVOTHYROXINE SODIUM 75 UG/1
75 TABLET ORAL
Qty: 90 TABLET | Refills: 3 | Status: SHIPPED | OUTPATIENT
Start: 2018-04-25 | End: 2019-04-25 | Stop reason: SDUPTHER

## 2018-04-25 RX ORDER — ALPRAZOLAM 0.5 MG/1
0.5 TABLET ORAL 2 TIMES DAILY PRN
Qty: 180 TABLET | Refills: 1 | Status: SHIPPED | OUTPATIENT
Start: 2018-04-25 | End: 2018-05-14 | Stop reason: SDUPTHER

## 2018-04-25 NOTE — TELEPHONE ENCOUNTER
----- Message from Akiko Rojas sent at 4/25/2018  9:27 AM CDT -----  Contact: self  Requesting script for Hydrochlorothiazide 12.5 mg cap 1 po daily and Levothyroxine 75 mcg tab Mylan 1 po for breakfast. Please send to [facility] in [place].

## 2018-04-27 RX ORDER — HYDROCHLOROTHIAZIDE 12.5 MG/1
12.5 CAPSULE ORAL DAILY
Qty: 90 CAPSULE | Refills: 4 | Status: SHIPPED | OUTPATIENT
Start: 2018-04-27 | End: 2019-07-31 | Stop reason: SDUPTHER

## 2018-04-27 NOTE — TELEPHONE ENCOUNTER
----- Message from Lor Matute sent at 4/27/2018 11:54 AM CDT -----  Contact: self / [phone]  Patient is requesting a refill on the below. Please advise        hydroCHLOROthiazide (MICROZIDE) 12.5 mg capsule    Send to: Pharmacy     [id number] - [place], [place] - [address] [place] [address]

## 2018-04-30 RX ORDER — ESCITALOPRAM OXALATE 20 MG/1
20 TABLET ORAL DAILY
Qty: 90 TABLET | Refills: 2 | Status: SHIPPED | OUTPATIENT
Start: 2018-04-30 | End: 2019-01-29 | Stop reason: SDUPTHER

## 2018-04-30 RX ORDER — AMITRIPTYLINE HYDROCHLORIDE 25 MG/1
TABLET, FILM COATED ORAL
COMMUNITY
Start: 2018-04-06 | End: 2018-06-27

## 2018-04-30 RX ORDER — HYDROCODONE BITARTRATE AND ACETAMINOPHEN 10; 325 MG/1; MG/1
TABLET ORAL
COMMUNITY
Start: 2018-04-11 | End: 2018-06-27

## 2018-04-30 RX ORDER — HYDROCODONE BITARTRATE AND ACETAMINOPHEN 5; 325 MG/1; MG/1
TABLET ORAL
COMMUNITY
Start: 2018-03-26 | End: 2018-06-27

## 2018-04-30 NOTE — TELEPHONE ENCOUNTER
----- Message from Jose Tenorio sent at 4/30/2018  9:42 AM CDT -----  Contact: [phone]/self  Refill request for escitalopram oxalate (LEXAPRO) 20 MG tablet  PHARMACY: [id number]  [place], [address]  Pt states she need the prescription called in today.   Please advise

## 2018-05-14 DIAGNOSIS — F41.9 ANXIETY: ICD-10-CM

## 2018-05-14 RX ORDER — ALPRAZOLAM 0.5 MG/1
0.5 TABLET ORAL 2 TIMES DAILY PRN
Qty: 180 TABLET | Refills: 1 | Status: SHIPPED | OUTPATIENT
Start: 2018-05-14 | End: 2018-11-07 | Stop reason: SDUPTHER

## 2018-05-22 ENCOUNTER — OFFICE VISIT (OUTPATIENT)
Dept: NEUROLOGY | Facility: CLINIC | Age: 58
End: 2018-05-22
Attending: PSYCHIATRY & NEUROLOGY
Payer: MEDICARE

## 2018-05-22 VITALS
BODY MASS INDEX: 37.31 KG/M2 | HEIGHT: 60 IN | SYSTOLIC BLOOD PRESSURE: 114 MMHG | DIASTOLIC BLOOD PRESSURE: 78 MMHG | WEIGHT: 190.06 LBS

## 2018-05-22 DIAGNOSIS — E66.9 OBESITY (BMI 30-39.9): ICD-10-CM

## 2018-05-22 DIAGNOSIS — S06.0X0A CONCUSSION WITHOUT LOSS OF CONSCIOUSNESS, INITIAL ENCOUNTER: ICD-10-CM

## 2018-05-22 DIAGNOSIS — E55.9 VITAMIN D DEFICIENCY: ICD-10-CM
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
## 2018-05-22 DIAGNOSIS — H91.93 BILATERAL HEARING LOSS, UNSPECIFIED HEARING LOSS TYPE: Primary | ICD-10-CM

## 2018-05-22 DIAGNOSIS — F41.9 ANXIETY: Chronic | ICD-10-CM

## 2018-05-22 DIAGNOSIS — I10 ESSENTIAL HYPERTENSION: Chronic | ICD-10-CM

## 2018-05-22 PROCEDURE — 99213 OFFICE O/P EST LOW 20 MIN: CPT | Mod: PBBFAC | Performed by: PSYCHIATRY & NEUROLOGY

## 2018-05-22 PROCEDURE — 99215 OFFICE O/P EST HI 40 MIN: CPT | Mod: S$PBB,,, | Performed by: PSYCHIATRY & NEUROLOGY

## 2018-05-22 PROCEDURE — 99999 PR PBB SHADOW E&M-EST. PATIENT-LVL III: CPT | Mod: PBBFAC,,, | Performed by: PSYCHIATRY & NEUROLOGY
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
## 2018-05-22 NOTE — PATIENT INSTRUCTIONS
-Vit D3 1000 IU daily and multivitamin  -conservative measures: cognitive rest, avoid further TBIs, abstain from alcohol  -audiology  -sleep/wake cycle:   -sleep hygiene   -melatonin 3mg at night,  -headaches:   -migraine journal   -magnesium oxide 400mg daily, gabapentin 400mg three times a day (can take 400/800 day/night)   -Physical therapy home exercise program   -compounding gel   -f/u Noelle Vulvevich for headaches  -counseling recommendations    THERAPIST RECOMMENDATIONS:  Sveta Ray, PhD - family & play therapist, [phone]  Sanchez Schmidt, Full time at [employer], marriage & family counsellor  Michael Borrego - couples therapist  Juanita Chakraborty - [facility]  Sergio Smith on [address]  Shey Dumont on [address]  Katie Clou[address]  [facility] - Gely De La Rosa, Courtney Caballero, Shey Higgins, [phone]  Nikkie Senaito - [phone]  Yelena Tanner (ayurvedic) - [phone] (focus on families, trauma)  Lolita Turner [address], [phone]  Wilfrido Maria [phone] (focus on addiction)  [facility] - Apolinar Sergio [phone](focus on addiction)  Karin Cunningham [phone]  Fe Burgos [phone]  Kevin Muse , [address] [facility] [phone] (good with teenagers)  [facility], [phone]  Sharonda Dyer (sexual relationship therapist), [address] [facility] [facility], [place] [address], [phone]  Jenifer Sandhu  [place], [phone], [address] at [facility], [address], [address] Sierra Santos, Social workers, Art therapy,  bodywork therapy, Conscious breathing , Alternative healing, Massage therapists, [email or web address]    Anxiety/depression: counseling, PCP, lexapro      -Brain Health lifestyle modifications:    -sleep hygiene   - diet: Mediterranean Diet    -exercise: 20-30 minutes of  Moderate exercise 3-5 times a week   -stress management reviewed   -smoking cessation, alcohol moderation    Check out my blog post for further  information:  [email or web address]      Sleep Hygiene:    1. Avoid watching TV, eating, and discussing emotional issues in bed. The bed should be used for sleep and sex only. If not, we can associate the bed with other activities and it often becomes difficult to fall asleep.  2. Minimize noise, light, and temperature extremes during sleep with ear plugs, window blinds, or an electric blanket or air conditioner. Even the slightest nighttime noises or luminescent lights can disrupt the quality of your sleep. Try to keep your bedroom at a comfortable temperature -- not too hot (above 75 degrees) or too cold (below 54 degrees).  3. Try not to drink fluids after 8 p.m. This may reduce awakenings due to urination.  4. Avoid naps, but if you do nap, make it no more than about 25 minutes about eight hours after you awake. But if you have problems falling asleep, then no naps for you.  5. Do not expose your self to bright light if you need to get up at night. Use a small night-light instead.  6. Nicotine is a stimulant and should be avoided particularly near bedtime and upon night awakenings. Having a smoke before bed, although it may feel relaxing, is actually putting a stimulant into your bloodstream.  7. Caffeine is also a stimulant and is present in coffee (100-200 mg), soda (50-75 mg), tea (50-75 mg), and various over-the-counter medications. Caffeine should be discontinued at least four to six hours before bedtime. If you consume large amounts of caffeine and you cut your self off too quickly, beware; you may get headaches that could keep you awake.  8. Although alcohol is a depressant and may help you fall asleep, the subsequent metabolism that clears it from your body when you are sleeping causes a withdrawal syndrome. This withdrawal causes awakenings and is often associated with nightmares and sweats.  9. A light snack may be sleep-inducing, but a heavy  meal too close to bedtime interferes with sleep. Stay away from protein and stick to carbohydrates or dairy products. Milk contains the amino acid L-tryptophan, which has been shown in research to help people go to sleep. So milk and cookies or crackers (without chocolate) may be useful and taste good as well.

## 2018-05-22 NOTE — Clinical Note
She is going to schedule a f/u for her headaches with you.  They are improved but persistent.  I recommended switching her gabapentin from 400 TID to 400/800.  I'll see her in 6 months to re-eval.

## 2018-06-27 ENCOUNTER — OFFICE VISIT (OUTPATIENT)
Dept: NEUROLOGY | Facility: CLINIC | Age: 58
End: 2018-06-27
Payer: MEDICARE

## 2018-06-27 VITALS
BODY MASS INDEX: 38.52 KG/M2 | HEART RATE: 81 BPM | WEIGHT: 196.19 LBS | DIASTOLIC BLOOD PRESSURE: 78 MMHG | SYSTOLIC BLOOD PRESSURE: 126 MMHG | HEIGHT: 60 IN

## 2018-06-27 DIAGNOSIS — G44.321 INTRACTABLE CHRONIC POST-TRAUMATIC HEADACHE: Primary | ICD-10-CM

## 2018-06-27 DIAGNOSIS — G44.86 CERVICOGENIC HEADACHE: ICD-10-CM

## 2018-06-27 DIAGNOSIS — S06.0X0S CONCUSSION WITHOUT LOSS OF CONSCIOUSNESS, SEQUELA: ICD-10-CM

## 2018-06-27 DIAGNOSIS — E55.9 VITAMIN D DEFICIENCY: ICD-10-CM

## 2018-06-27 DIAGNOSIS — F43.10 PTSD (POST-TRAUMATIC STRESS DISORDER): ICD-10-CM

## 2018-06-27 DIAGNOSIS — M54.81 BILATERAL OCCIPITAL NEURALGIA: ICD-10-CM

## 2018-06-27 DIAGNOSIS — I10 ESSENTIAL HYPERTENSION: Chronic | ICD-10-CM

## 2018-06-27 DIAGNOSIS — F41.9 ANXIETY: Chronic | ICD-10-CM

## 2018-06-27 PROCEDURE — 99213 OFFICE O/P EST LOW 20 MIN: CPT | Mod: PBBFAC | Performed by: NURSE PRACTITIONER

## 2018-06-27 PROCEDURE — 99214 OFFICE O/P EST MOD 30 MIN: CPT | Mod: S$PBB,,, | Performed by: NURSE PRACTITIONER

## 2018-06-27 PROCEDURE — 99999 PR PBB SHADOW E&M-EST. PATIENT-LVL III: CPT | Mod: PBBFAC,,, | Performed by: NURSE PRACTITIONER

## 2018-06-27 RX ORDER — GABAPENTIN 400 MG/1
CAPSULE ORAL
Qty: 120 CAPSULE | Refills: 5 | Status: SHIPPED | OUTPATIENT
Start: 2018-06-27 | End: 2018-09-06 | Stop reason: SDUPTHER
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
## 2018-07-09 ENCOUNTER — PATIENT MESSAGE (OUTPATIENT)
Dept: GASTROENTEROLOGY | Facility: CLINIC | Age: 58
End: 2018-07-09

## 2018-07-09 DIAGNOSIS — F07.81 POST CONCUSSION SYNDROME: Primary | ICD-10-CM

## 2018-07-14 ENCOUNTER — PATIENT MESSAGE (OUTPATIENT)
Dept: NEUROLOGY | Facility: CLINIC | Age: 58
End: 2018-07-14

## 2018-07-16 ENCOUNTER — PATIENT MESSAGE (OUTPATIENT)
Dept: NEUROLOGY | Facility: CLINIC | Age: 58
End: 2018-07-16

## 2018-07-16 DIAGNOSIS — S06.0X0S CONCUSSION WITHOUT LOSS OF CONSCIOUSNESS, SEQUELA: ICD-10-CM
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
## 2018-07-17 NOTE — TELEPHONE ENCOUNTER
Dr. Lynne (neuropsychology) is happy to chat with patient by phone, and recommends Referral to Social Work.  Please advise patient and facilitate scheduling

## 2018-07-17 NOTE — TELEPHONE ENCOUNTER
Spoke with patient.  Pain Management referral made.  Re-inforced conservative measures, including PT and HEP, pain management referral, NP made.  Pt deferred pain management.  I also re-inforced thatpre-morbid PTSD/anxiety and litigation is a risk factor for PCS and persistent symptoms.  Pt acknowledged understanding of results and agreement with plan.

## 2018-07-18 ENCOUNTER — PATIENT MESSAGE (OUTPATIENT)
Dept: NEUROLOGY | Facility: CLINIC | Age: 58
End: 2018-07-18

## 2018-07-18 RX ORDER — TIZANIDINE 2 MG/1
2 TABLET ORAL NIGHTLY
Qty: 30 TABLET | Refills: 5 | Status: SHIPPED | OUTPATIENT
Start: 2018-07-18 | End: 2018-07-23 | Stop reason: ALTCHOICE

## 2018-07-18 NOTE — TELEPHONE ENCOUNTER
Spoke with patient. I reinforced the plan  I have referred her to a LCSW for additional care.  I recommended that she be evaluated by a pain management specialist(deferred), adhere to PT HEP, magnesium oxide 400mg daily, gabapentin 400mg TID.  I recommended that she also follow up with her PCP and consider a psychiatry referral.   I advised her to followup with Noelle Vera regarding her headaches.  I will have follow-up with her November.  I informed her that [facility] [employer] will coordinate with her  regarding any issues about her litigation.  Pt acknowledged understanding of results and agreement with plan.

## 2018-07-23 ENCOUNTER — OFFICE VISIT (OUTPATIENT)
Dept: INTERNAL MEDICINE | Facility: CLINIC | Age: 58
End: 2018-07-23
Payer: MEDICARE

## 2018-07-23 VITALS
BODY MASS INDEX: 38.74 KG/M2 | RESPIRATION RATE: 12 BRPM | SYSTOLIC BLOOD PRESSURE: 156 MMHG | TEMPERATURE: 98 F | DIASTOLIC BLOOD PRESSURE: 88 MMHG | WEIGHT: 197.31 LBS | HEIGHT: 60 IN | HEART RATE: 84 BPM

## 2018-07-23 DIAGNOSIS — H65.02 ACUTE SEROUS OTITIS MEDIA OF LEFT EAR, RECURRENCE NOT SPECIFIED: Primary | ICD-10-CM

## 2018-07-23 DIAGNOSIS — I10 ESSENTIAL HYPERTENSION: ICD-10-CM

## 2018-07-23 DIAGNOSIS — E66.01 MORBID OBESITY: ICD-10-CM

## 2018-07-23 PROBLEM — E66.9 OBESITY (BMI 30-39.9): Status: RESOLVED | Noted: 2017-12-11 | Resolved: 2018-07-23

## 2018-07-23 PROBLEM — S06.0X0A CONCUSSION WITHOUT LOSS OF CONSCIOUSNESS: Status: RESOLVED | Noted: 2018-01-18 | Resolved: 2018-07-23

## 2018-07-23 PROCEDURE — 99213 OFFICE O/P EST LOW 20 MIN: CPT | Mod: PBBFAC,PN | Performed by: INTERNAL MEDICINE

## 2018-07-23 PROCEDURE — 99213 OFFICE O/P EST LOW 20 MIN: CPT | Mod: S$PBB,,, | Performed by: INTERNAL MEDICINE

## 2018-07-23 PROCEDURE — 99999 PR PBB SHADOW E&M-EST. PATIENT-LVL III: CPT | Mod: PBBFAC,,, | Performed by: INTERNAL MEDICINE

## 2018-07-23 RX ORDER — PROMETHAZINE HYDROCHLORIDE 12.5 MG/1
25 TABLET ORAL EVERY 6 HOURS PRN
Qty: 12 TABLET | Refills: 0 | Status: SHIPPED | OUTPATIENT
Start: 2018-07-23 | End: 2018-08-03 | Stop reason: CLARIF

## 2018-07-23 RX ORDER — METHYLPREDNISOLONE 4 MG/1
TABLET ORAL
Qty: 1 PACKAGE | Refills: 0 | Status: SHIPPED | OUTPATIENT
Start: 2018-07-23 | End: 2018-08-03 | Stop reason: CLARIF

## 2018-07-23 NOTE — PROGRESS NOTES
Subjective:       Patient ID: Cara Mendoza is a 58 y.o. female.    Chief Complaint: Otalgia (symptoms to left ear x 2 days) and Medication Refill    HPI  Pt with 2 days of L ear pain, coryza, hyperacusis and nausea.  No ear drainage, no F/C.  Review of Systems    Objective:      Physical Exam   Constitutional: She appears well-developed. No distress.   obese   HENT:   Head: Normocephalic.   Mouth/Throat: Oropharynx is clear and moist.   L TM distended with serous fluid   Eyes: EOM are normal.   Neck: Normal range of motion. No tracheal deviation present.   Cardiovascular: Normal rate, regular rhythm, normal heart sounds and intact distal pulses.    Pulmonary/Chest: Effort normal and breath sounds normal. No respiratory distress.   Abdominal: She exhibits no distension.   Musculoskeletal: Normal range of motion. She exhibits no edema.   Neurological: She is alert. No cranial nerve deficit. She exhibits normal muscle tone. Coordination normal.   Skin: Skin is warm and dry. No rash noted. She is not diaphoretic. No erythema.   Psychiatric: She has a normal mood and affect. Her behavior is normal.   Vitals reviewed.      Assessment:       1. Acute serous otitis media of left ear, recurrence not specified    2. Essential hypertension    3. Morbid obesity        Plan:       Cara was seen today for otalgia and medication refill.    Diagnoses and all orders for this visit:    Acute serous otitis media of left ear, recurrence not specified  -     promethazine (PHENERGAN) 12.5 MG Tab; Take 2 tablets (25 mg total) by mouth every 6 (six) hours as needed.  -     methylPREDNISolone (MEDROL DOSEPACK) 4 mg tablet; use as directed    Essential hypertension   Observe    Morbid obesity   Encouraged weight loss    Follow-up in about 1 month (around 8/23/2018).

## 2018-07-24 ENCOUNTER — PATIENT MESSAGE (OUTPATIENT)
Dept: NEUROLOGY | Facility: CLINIC | Age: 58
End: 2018-07-24

## 2018-07-24 ENCOUNTER — PATIENT MESSAGE (OUTPATIENT)
Dept: INTERNAL MEDICINE | Facility: CLINIC | Age: 58
End: 2018-07-24

## 2018-08-03 ENCOUNTER — TELEPHONE (OUTPATIENT)
Dept: FAMILY MEDICINE | Facility: CLINIC | Age: 58
End: 2018-08-03

## 2018-08-03 ENCOUNTER — HOSPITAL ENCOUNTER (OUTPATIENT)
Facility: HOSPITAL | Age: 58
Discharge: HOME | End: 2018-08-04
Attending: EMERGENCY MEDICINE | Admitting: PSYCHIATRY & NEUROLOGY
Payer: MEDICARE

## 2018-08-03 DIAGNOSIS — I63.231 CEREBRAL INFARCTION DUE TO UNSPECIFIED OCCLUSION OR STENOSIS OF RIGHT CAROTID ARTERIES: ICD-10-CM

## 2018-08-03 DIAGNOSIS — H53.9 VISUAL DISTURBANCE: ICD-10-CM

## 2018-08-03 LAB
ALBUMIN SERPL BCP-MCNC: 3.8 G/DL
ALP SERPL-CCNC: 63 U/L
ALT SERPL W/O P-5'-P-CCNC: 9 U/L
ANION GAP SERPL CALC-SCNC: 9 MMOL/L
AST SERPL-CCNC: 14 U/L
BASOPHILS # BLD AUTO: 0.04 K/UL
BASOPHILS NFR BLD: 0.6 %
BILIRUB SERPL-MCNC: 0.4 MG/DL
BILIRUB UR QL STRIP: NEGATIVE
BUN SERPL-MCNC: 13 MG/DL
CALCIUM SERPL-MCNC: 9.4 MG/DL
CHLORIDE SERPL-SCNC: 101 MMOL/L
CLARITY UR REFRACT.AUTO: CLEAR
CO2 SERPL-SCNC: 27 MMOL/L
COLOR UR AUTO: NORMAL
CREAT SERPL-MCNC: 0.7 MG/DL
DIFFERENTIAL METHOD: ABNORMAL
EOSINOPHIL # BLD AUTO: 0.2 K/UL
EOSINOPHIL NFR BLD: 2.6 %
ERYTHROCYTE [DISTWIDTH] IN BLOOD BY AUTOMATED COUNT: 14.6 %
EST. GFR  (AFRICAN AMERICAN): >60 ML/MIN/1.73 M^2
EST. GFR  (NON AFRICAN AMERICAN): >60 ML/MIN/1.73 M^2
ESTIMATED AVG GLUCOSE: 105 MG/DL
GLUCOSE SERPL-MCNC: 101 MG/DL
GLUCOSE UR QL STRIP: NEGATIVE
HBA1C MFR BLD HPLC: 5.3 %
HCT VFR BLD AUTO: 40.1 %
HGB BLD-MCNC: 13.7 G/DL
HGB UR QL STRIP: NEGATIVE
IMM GRANULOCYTES # BLD AUTO: 0.01 K/UL
IMM GRANULOCYTES NFR BLD AUTO: 0.2 %
INR PPP: 1
KETONES UR QL STRIP: NEGATIVE
LEUKOCYTE ESTERASE UR QL STRIP: NEGATIVE
LYMPHOCYTES # BLD AUTO: 2.8 K/UL
LYMPHOCYTES NFR BLD: 42 %
MCH RBC QN AUTO: 29.4 PG
MCHC RBC AUTO-ENTMCNC: 34.2 G/DL
MCV RBC AUTO: 86 FL
MONOCYTES # BLD AUTO: 0.7 K/UL
MONOCYTES NFR BLD: 10 %
NEUTROPHILS # BLD AUTO: 2.9 K/UL
NEUTROPHILS NFR BLD: 44.6 %
NITRITE UR QL STRIP: NEGATIVE
NRBC BLD-RTO: 0 /100 WBC
PH UR STRIP: 5 [PH] (ref 5–8)
PLATELET # BLD AUTO: 252 K/UL
PMV BLD AUTO: 10.9 FL
POTASSIUM SERPL-SCNC: 4 MMOL/L
PROT SERPL-MCNC: 7.1 G/DL
PROT UR QL STRIP: NEGATIVE
PROTHROMBIN TIME: 10.5 SEC
RBC # BLD AUTO: 4.66 M/UL
SODIUM SERPL-SCNC: 137 MMOL/L
SP GR UR STRIP: 1.01 (ref 1–1.03)
TSH SERPL DL<=0.005 MIU/L-ACNC: 1.54 UIU/ML
URN SPEC COLLECT METH UR: NORMAL
UROBILINOGEN UR STRIP-ACNC: NEGATIVE EU/DL
WBC # BLD AUTO: 6.57 K/UL

## 2018-08-03 PROCEDURE — 80053 COMPREHEN METABOLIC PANEL: CPT

## 2018-08-03 PROCEDURE — 81003 URINALYSIS AUTO W/O SCOPE: CPT

## 2018-08-03 PROCEDURE — 63600175 PHARM REV CODE 636 W HCPCS: Performed by: PHYSICIAN ASSISTANT

## 2018-08-03 PROCEDURE — 25000003 PHARM REV CODE 250: Performed by: PHYSICIAN ASSISTANT

## 2018-08-03 PROCEDURE — 99220 PR INITIAL OBSERVATION CARE,LEVL III: CPT | Mod: ,,, | Performed by: PSYCHIATRY & NEUROLOGY

## 2018-08-03 PROCEDURE — 93010 ELECTROCARDIOGRAM REPORT: CPT | Mod: ,,, | Performed by: INTERNAL MEDICINE

## 2018-08-03 PROCEDURE — G0378 HOSPITAL OBSERVATION PER HR: HCPCS

## 2018-08-03 PROCEDURE — 83036 HEMOGLOBIN GLYCOSYLATED A1C: CPT

## 2018-08-03 PROCEDURE — 85610 PROTHROMBIN TIME: CPT

## 2018-08-03 PROCEDURE — 25500020 PHARM REV CODE 255: Performed by: EMERGENCY MEDICINE

## 2018-08-03 PROCEDURE — A4216 STERILE WATER/SALINE, 10 ML: HCPCS | Performed by: PHYSICIAN ASSISTANT

## 2018-08-03 PROCEDURE — 84443 ASSAY THYROID STIM HORMONE: CPT

## 2018-08-03 PROCEDURE — 85025 COMPLETE CBC W/AUTO DIFF WBC: CPT

## 2018-08-03 PROCEDURE — 99285 EMERGENCY DEPT VISIT HI MDM: CPT | Mod: 25

## 2018-08-03 PROCEDURE — 99285 EMERGENCY DEPT VISIT HI MDM: CPT | Mod: ,,, | Performed by: EMERGENCY MEDICINE

## 2018-08-03 PROCEDURE — 93005 ELECTROCARDIOGRAM TRACING: CPT

## 2018-08-03 RX ORDER — ESCITALOPRAM OXALATE 20 MG/1
20 TABLET ORAL DAILY
Status: DISCONTINUED | OUTPATIENT
Start: 2018-08-04 | End: 2018-08-04 | Stop reason: HOSPADM

## 2018-08-03 RX ORDER — ATORVASTATIN CALCIUM 20 MG/1
40 TABLET, FILM COATED ORAL DAILY
Status: DISCONTINUED | OUTPATIENT
Start: 2018-08-04 | End: 2018-08-04 | Stop reason: HOSPADM

## 2018-08-03 RX ORDER — LABETALOL HYDROCHLORIDE 5 MG/ML
10 INJECTION, SOLUTION INTRAVENOUS
Status: DISCONTINUED | OUTPATIENT
Start: 2018-08-03 | End: 2018-08-04 | Stop reason: HOSPADM

## 2018-08-03 RX ORDER — GABAPENTIN 400 MG/1
400 CAPSULE ORAL 2 TIMES DAILY
Status: DISCONTINUED | OUTPATIENT
Start: 2018-08-04 | End: 2018-08-04 | Stop reason: HOSPADM

## 2018-08-03 RX ORDER — LEVOTHYROXINE SODIUM 75 UG/1
75 TABLET ORAL
Status: DISCONTINUED | OUTPATIENT
Start: 2018-08-04 | End: 2018-08-04 | Stop reason: HOSPADM

## 2018-08-03 RX ORDER — SODIUM CHLORIDE 0.9 % (FLUSH) 0.9 %
3 SYRINGE (ML) INJECTION EVERY 8 HOURS
Status: DISCONTINUED | OUTPATIENT
Start: 2018-08-03 | End: 2018-08-04 | Stop reason: HOSPADM

## 2018-08-03 RX ORDER — GABAPENTIN 400 MG/1
800 CAPSULE ORAL NIGHTLY
Status: DISCONTINUED | OUTPATIENT
Start: 2018-08-03 | End: 2018-08-04 | Stop reason: HOSPADM

## 2018-08-03 RX ORDER — HEPARIN SODIUM 5000 [USP'U]/ML
5000 INJECTION, SOLUTION INTRAVENOUS; SUBCUTANEOUS EVERY 8 HOURS
Status: DISCONTINUED | OUTPATIENT
Start: 2018-08-03 | End: 2018-08-04 | Stop reason: HOSPADM

## 2018-08-03 RX ORDER — GABAPENTIN 400 MG/1
400 CAPSULE ORAL 3 TIMES DAILY
Status: DISCONTINUED | OUTPATIENT
Start: 2018-08-03 | End: 2018-08-03

## 2018-08-03 RX ORDER — CLOPIDOGREL BISULFATE 75 MG/1
75 TABLET ORAL DAILY
Status: DISCONTINUED | OUTPATIENT
Start: 2018-08-03 | End: 2018-08-04 | Stop reason: HOSPADM

## 2018-08-03 RX ORDER — PANTOPRAZOLE SODIUM 40 MG/1
40 TABLET, DELAYED RELEASE ORAL DAILY
Status: DISCONTINUED | OUTPATIENT
Start: 2018-08-04 | End: 2018-08-04 | Stop reason: HOSPADM

## 2018-08-03 RX ORDER — ONDANSETRON 8 MG/1
8 TABLET, ORALLY DISINTEGRATING ORAL EVERY 8 HOURS PRN
Status: DISCONTINUED | OUTPATIENT
Start: 2018-08-03 | End: 2018-08-04 | Stop reason: HOSPADM

## 2018-08-03 RX ORDER — ACETAMINOPHEN 325 MG/1
650 TABLET ORAL EVERY 6 HOURS PRN
Status: DISCONTINUED | OUTPATIENT
Start: 2018-08-03 | End: 2018-08-04 | Stop reason: HOSPADM

## 2018-08-03 RX ADMIN — HEPARIN SODIUM 5000 UNITS: 5000 INJECTION, SOLUTION INTRAVENOUS; SUBCUTANEOUS at 09:08

## 2018-08-03 RX ADMIN — GABAPENTIN 800 MG: 400 CAPSULE ORAL at 09:08

## 2018-08-03 RX ADMIN — SODIUM CHLORIDE, PRESERVATIVE FREE 3 ML: 5 INJECTION INTRAVENOUS at 09:08

## 2018-08-03 RX ADMIN — CLOPIDOGREL 75 MG: 75 TABLET, FILM COATED ORAL at 04:08

## 2018-08-03 RX ADMIN — IOHEXOL 100 ML: 350 INJECTION, SOLUTION INTRAVENOUS at 05:08

## 2018-08-03 RX ADMIN — ACETAMINOPHEN 650 MG: 325 TABLET, FILM COATED ORAL at 06:08

## 2018-08-03 NOTE — ASSESSMENT & PLAN NOTE
Pt reports she is intolerant of ASA, NSAIDs due to her hx  Ordered Plavix instead  Continued home Protonix

## 2018-08-03 NOTE — ASSESSMENT & PLAN NOTE
58 y.o. female with PMHx glaucoma, anxiety, hypothyroidism, chronic pancreatitis, adrenal adenoma and HTN who developed R vision abnormalities on 8/2. Pt saw her ophthalmologist who took pictures and told pt she had evidence of a stroke; instructed her to present for further evaluation.   Suspect possible embolic stroke involving Right Ophthalmic artery. Pt admitted to Vascular Neurology for acute stroke workup.      Antithrombotics for secondary stroke prevention: Antiplatelets: Clopidogrel: 75 mg daily; Pt reports she cannot take ASA as it upsets her stomach  Statins for secondary stroke prevention and hyperlipidemia, if present:   Statins: Atorvastatin- 40 mg daily  Aggressive risk factor modification: HTN, Diet, Exercise, Obesity  Rehab efforts: PT/OT/SLP to evaluate and treat, PM&R consult   Diagnostics ordered/pending: CTA Head to assess vasculature , CTA Neck/Arch to assess vasculature, HgbA1C to assess blood glucose levels, Lipid Profile to assess cholesterol levels, MRI head without contrast to assess brain parenchyma, TTE to assess cardiac function/status , TSH to assess thyroid function  VTE prophylaxis: Heparin 5000 units SQ every 8 hours  BP parameters: Infarct: No intervention, SBP <220

## 2018-08-03 NOTE — H&P
[facility]  Vascular Neurology  Comprehensive Stroke Center  History & Physical    Inpatient consult to Vascular (Stroke) Neurology  Consult performed by: SHANTAL JUDD  Consult ordered by: EBONI TOWNSEND        Assessment/Plan:     Patient is a 58 y.o. year old female with:    * Acute ischemic right internal carotid artery (ICA) stroke    58 y.o. female with PMHx glaucoma, anxiety, hypothyroidism, chronic pancreatitis, adrenal adenoma and HTN who developed R vision abnormalities on 8/2. Pt saw her ophthalmologist who took pictures and told pt she had evidence of a stroke; instructed her to present for further evaluation.   Suspect possible embolic stroke involving Right Ophthalmic artery. Pt admitted to Vascular Neurology for acute stroke workup.      Antithrombotics for secondary stroke prevention: Antiplatelets: Clopidogrel: 75 mg daily; Pt reports she cannot take ASA as it upsets her stomach  Statins for secondary stroke prevention and hyperlipidemia, if present:   Statins: Atorvastatin- 40 mg daily  Aggressive risk factor modification: HTN, Diet, Exercise, Obesity  Rehab efforts: PT/OT/SLP to evaluate and treat, PM&R consult   Diagnostics ordered/pending: CTA Head to assess vasculature , CTA Neck/Arch to assess vasculature, HgbA1C to assess blood glucose levels, Lipid Profile to assess cholesterol levels, MRI head without contrast to assess brain parenchyma, TTE to assess cardiac function/status , TSH to assess thyroid function  VTE prophylaxis: Heparin 5000 units SQ every 8 hours  BP parameters: Infarct: No intervention, SBP <220        Essential hypertension    Stroke risk factor  SBP < 220 acutely  Home HCTX held        Chronic pancreatitis    Pt reports she is intolerant of ASA, NSAIDs due to her hx  Ordered Plavix instead  Continued home Protonix        Morbid obesity    Stroke risk factor   pt on importance of diet, exercise, lifestyle modifications for secondary stroke  prevention        Hypothyroidism    Stroke risk factor  TSH WNL  Continue home Synthroid            STROKE DOCUMENTATION          NIH Scale:  1a. Level Of Consciousness: 0-->Alert: keenly responsive  1b. LOC Questions: 0-->Answers both questions correctly  1c. LOC Commands: 0-->Performs both tasks correctly  2. Best Gaze: 0-->Normal  3. Visual: 0-->No visual loss  4. Facial Palsy: 0-->Normal symmetrical movements  5a. Motor Arm, Left: 0-->No drift: limb holds 90 (or 45) degrees for full 10 secs  5b. Motor Arm, Right: 0-->No drift: limb holds 90 (or 45) degrees for full 10 secs  6a. Motor Leg, Left: 0-->No drift: leg holds 30 degree position for full 5 secs  6b. Motor Leg, Right: 0-->No drift: leg holds 30 degree position for full 5 secs  7. Limb Ataxia: 0-->Absent  8. Sensory: 0-->Normal: no sensory loss  9. Best Language: 0-->No aphasia: normal  10. Dysarthria: 0-->Normal  11. Extinction and Inattention (formerly Neglect): 0-->No abnormality  Total (NIH Stroke Scale): 0     Modified [place] Score: 0  B[place]ly Coma Scale:    ABCD2 Score:    [id number] Score:   HAS -BLED Score:   ICH Score:   Hunt & Leon Classification:      Thrombolysis Candidate? No, Out of window       Interventional Revascularization Candidate?   Is the patient eligible for mechanical endovascular reperfusion (LULA)?  No; No significant neurological deficit    Hemorrhagic change of an Ischemic Stroke: Does this patient have an ischemic stroke with hemorrhagic changes? No         Subjective:     History of Present Illness:  Cara Mendoza is a 58 y.o. female with PMHx glaucoma, anxiety, hypothyroidism, chronic pancreatitis, adrenal adenoma and HTN who is being evaluated by the Vascular Neurology service after developing visual abnormalities yesterday afternoon. Pt reports she had seen her ophthalmologist Monday and at that time was without issues. Then developed vision changes in the R eye on 8/2 and went back to ophthalmologist. He took pictures  "of her eye which reportedly showed evidence of acute infarction and "a large plaque." Pt was instructed to present for further evaluation. Pt also c/o R-sided HA, but denies speech changes, numbness/tingling, extremity weakness, or N/V.  Pt denies personal hx blood clots, denies hx cardiac arrythmia and denies use of antithrombotics at home. She lives with others and performs all ADLs independently. Pt denies tobacco, alcohol, or drug use.        Past Medical History:   Diagnosis Date    Adrenal adenoma     Gastric ulcer     Glaucoma     Hypothyroidism     Kidney stones     Pancreatitis     Traumatic compression fracture of L1 lumbar vertebra      Past Surgical History:   Procedure Laterality Date    CHOLECYSTECTOMY      COLONOSCOPY N/A 2016    Procedure: COLONOSCOPY;  Surgeon: Sathya Strickland MD;  Location: [facility] ([address] [place] [address]);  Service: Endoscopy;  Laterality: N/A;    CYSTOSCOPY      HYSTERECTOMY      KYPHOSIS SURGERY  7/20/15    right adrenal gland remove      tonsillectomy      TONSILLECTOMY      TUBAL LIGATION      x2        Family History   Problem Relation Age of Onset    Diabetes Father         borderline    Heart attack Father      Social History   Substance Use Topics    Smoking status: Never Smoker    Smokeless tobacco: Never Used    Alcohol use No     Review of patient's allergies indicates:   Allergen Reactions    Ciprofloxacin     Codeine Nausea And Vomiting and Hives    Compazine [prochlorperazine edisylate] Anaphylaxis and Nausea And Vomiting    Demerol [meperidine] Anaphylaxis and Nausea And Vomiting    Morpholine analogues Anaphylaxis and Nausea And Vomiting     vomit    Pcn [penicillins] Other (See Comments) and Anaphylaxis     Stomach cramps    Percocet [oxycodone-acetaminophen] Nausea And Vomiting    Corticosteroids (glucocorticoids) Itching     Agitation, "skin crawling" sensation    Cortisone Itching     Agitation, "skin crawling" sensation    Fentanyl " Nausea And Vomiting and Swelling     Fentanyl patch made lips numb and swollen    Morphine Nausea And Vomiting    Nsaids (non-steroidal anti-inflammatory drug) Other (See Comments) and Nausea And Vomiting     Cluster ulcers    Toradol [ketorolac] Nausea And Vomiting     ulcers    Tramadol Nausea And Vomiting       Medications: I have reviewed the current medication administration record.      (Not in a hospital admission)    Review of Systems   Constitutional: Negative for chills and fever.   HENT: Negative for drooling and nosebleeds.    Eyes: Positive for pain and visual disturbance. Negative for discharge.   Respiratory: Negative for choking and stridor.    Cardiovascular: Negative for chest pain and palpitations.   Gastrointestinal: Negative for nausea and vomiting.   Musculoskeletal: Negative for gait problem and neck stiffness.   Skin: Negative for color change and rash.   Neurological: Positive for headaches. Negative for speech difficulty and weakness.   Psychiatric/Behavioral: Negative for agitation and confusion.     Objective:     Vital Signs (Most Recent):  Temp: 98 °F (36.7 °C) (08/03/18 1530)  Pulse: 76 (08/03/18 1350)  Resp: 18 (08/03/18 1350)  BP: 139/73 (08/03/18 1350)  SpO2: 98 % (08/03/18 1350)    Vital Signs Range (Last 24H):  Temp:  [98 °F (36.7 °C)]   Pulse:  [76]   Resp:  [18]   BP: (139)/(73)   SpO2:  [98 %]     Physical Exam   Constitutional: She is oriented to person, place, and time. She appears well-developed and well-nourished. No distress.   HENT:   Head: Normocephalic and atraumatic.   Eyes: Conjunctivae and EOM are normal.   Visual fields intact, but pt desribes a gray haze inhibiliting acuity in the R inferior quadrant of the R eye.  Pt reports R eye pain upon looking to Left.   Cardiovascular: Normal rate.    Pulmonary/Chest: Effort normal. No respiratory distress.   Musculoskeletal: Normal range of motion. She exhibits no edema or deformity.   Neurological: She is alert and  oriented to person, place, and time. A cranial nerve deficit is present. No sensory deficit. She exhibits normal muscle tone. Coordination normal.   Skin: Skin is warm and dry.   Psychiatric: She has a normal mood and affect. Her behavior is normal.       Neurological Exam:   LOC: alert  Attention Span: Good   Language: No aphasia  Articulation: No dysarthria  Orientation: Person, Place, Time   Visual Fields: Full  EOM (CN III, IV, VI): Full/intact  Facial Movement (CN VII): Symmetric facial expression    Motor: 5/5 throughout  Cebellar: No evidence of appendicular or axial ataxia  Sensation: Intact to light touch, temp  Tone: Normal tone throughout      Laboratory:  CMP:   Recent Labs  Lab 08/03/18  1503   CALCIUM 9.4   ALBUMIN 3.8   PROT 7.1      K 4.0   CO2 27      BUN 13   CREATININE 0.7   ALKPHOS 63   ALT 9*   AST 14   BILITOT 0.4     CBC:   Recent Labs  Lab 08/03/18  1503   WBC 6.57   RBC 4.66   HGB 13.7   HCT 40.1      MCV 86   MCH 29.4   MCHC 34.2     Lipid Panel: No results for input(s): CHOL, LDLCALC, HDL, TRIG in the last 168 hours.  Coagulation:   Recent Labs  Lab 08/03/18  1503   INR 1.0     Hgb A1C: No results for input(s): HGBA1C in the last 168 hours.  TSH:   Recent Labs  Lab 08/03/18  1503   TSH 1.544       Diagnostic Results:      Brain/Vessel imaging:    MRI, CTA pending      Cardiac Evaluation:     Echo pending        Xochilt Arango PA-C  [facility] St[facility]  Department of Vascular Neurology   [facility]

## 2018-08-03 NOTE — TELEPHONE ENCOUNTER
Pt states that she was seen by Dr Ramirez this week and was told that she has had a stroke and she needed to be seen by her physician.  He has ordered a carotid ultrasound and she will be having that done at [facility].  ]    She was recently seen by Dr Carrillo, but she does not want to return there.  She states that she is a family friend.  She is Jomar Gallagher's sister.      She is requesting to be worked in with you as soon as possible.      Please advise!

## 2018-08-03 NOTE — CONSULTS
Inpatient consult to Physical Medicine Rehab  Consult performed by: JAMIL PRUITT  Consult ordered by: SHANTAL JUDD  Reason for consult: assess rehab needs        Reviewed patient history and current admission.  Rehab team following.  Full consult to follow.    KAYLEY Pace, FNP-C  Physical Medicine & Rehabilitation   08/03/2018  Spectralink: [id number]

## 2018-08-03 NOTE — ED NOTES
Pt identifiers Cara Mendoza were checked and are correct  LOC: The patient is awake, alert, aware of environment with an appropriate affect. Oriented x4, speaking appropriately  APPEARANCE: Pt rates right side headache a 6/10 , in no acute distress, pt is clean and well groomed, clothing properly fastened  SKIN: Skin warm, dry and intact, normal skin turgor, moist mucus membranes  RESPIRATORY: Airway is open and patent, respirations are spontaneous, even and unlabored, normal effort and rate  Lung fields clear saw to all lung fields on auscultation  CARDIAC: Normal rate and rhythm, no peripheral edema noted, capillary refill < 3 seconds, bilateral radial pulses 2+  ABDOMEN: Soft, nontender, nondistended. Bowel sounds present to all four quad of abd on auscultation  NEUROLOGIC: PERRL approx. 3 mm in size round with brisk reaction to light , facial expression is symmetrical, patient moving all extremities spontaneously, normal sensation in all extremities when touched with a finger.  Follows all commands appropriately  MUSCULOSKELETAL: No obvious deformities.

## 2018-08-03 NOTE — ED PROVIDER NOTES
"Encounter Date: 8/3/2018    SCRIBE #1 NOTE: I, Viry Gonsalez, am scribing for, and in the presence of,  Dr. Manuel. I have scribed the entire note.       History     Chief Complaint   Patient presents with    Eye Problem     yesterday woke up with partial vision in my R yes, told had small stroke, told to get carotid ultrasound    Headache     Time patient was seen by the provider: 2:29 PM      The patient is a 58 y.o. female with co-morbidities including: glaucoma, who presents to the ED with a complaint of partial vision loss in right eye that began yesterday morning after waking up. The patient reports she went to see her ophthalmologist yesterday who examined her eye and was told she may have had a possible small stroke. Patient also endorses right-sided head pain and neck pain. She denies dizziness, SOB, chest pain, nausea. Patient is right handed. Patient denies history of previous stroke.        The history is provided by the patient and medical records.     Review of patient's allergies indicates:   Allergen Reactions    Ciprofloxacin     Codeine Nausea And Vomiting and Hives    Compazine [prochlorperazine edisylate] Anaphylaxis and Nausea And Vomiting    Demerol [meperidine] Anaphylaxis and Nausea And Vomiting    Morpholine analogues Anaphylaxis and Nausea And Vomiting     vomit    Pcn [penicillins] Other (See Comments) and Anaphylaxis     Stomach cramps    Percocet [oxycodone-acetaminophen] Nausea And Vomiting    Corticosteroids (glucocorticoids) Itching     Agitation, "skin crawling" sensation    Cortisone Itching     Agitation, "skin crawling" sensation    Fentanyl Nausea And Vomiting and Swelling     Fentanyl patch made lips numb and swollen    Morphine Nausea And Vomiting    Nsaids (non-steroidal anti-inflammatory drug) Other (See Comments) and Nausea And Vomiting     Cluster ulcers    Toradol [ketorolac] Nausea And Vomiting     ulcers    Tramadol Nausea And Vomiting     Past Medical " History:   Diagnosis Date    Adrenal adenoma     Gastric ulcer     Glaucoma     Hypothyroidism     Kidney stones     Pancreatitis     Traumatic compression fracture of L1 lumbar vertebra      Past Surgical History:   Procedure Laterality Date    CHOLECYSTECTOMY      COLONOSCOPY N/A 2016    Procedure: COLONOSCOPY;  Surgeon: Sathya Strickland MD;  Location: [address];  Service: Endoscopy;  Laterality: N/A;    CYSTOSCOPY      HYSTERECTOMY      KYPHOSIS SURGERY  7/20/15    right adrenal gland remove      tonsillectomy      TONSILLECTOMY      TUBAL LIGATION      x2        Family History   Problem Relation Age of Onset    Diabetes Father         borderline    Heart attack Father      Social History   Substance Use Topics    Smoking status: Never Smoker    Smokeless tobacco: Never Used    Alcohol use No     Review of Systems   Constitutional: Negative for fever.   HENT: Negative for sore throat.    Eyes: Positive for visual disturbance (partial vision loss in right eye).   Respiratory: Negative for shortness of breath.    Cardiovascular: Negative for chest pain.   Gastrointestinal: Negative for nausea.   Genitourinary: Negative for dysuria.   Musculoskeletal: Positive for neck pain. Negative for back pain.   Skin: Negative for rash.   Neurological: Positive for headaches. Negative for dizziness and weakness.   Hematological: Does not bruise/bleed easily.       Physical Exam     Initial Vitals [18 1350]   BP Pulse Resp Temp SpO2   139/73 76 18 98 °F (36.7 °C) 98 %      MAP       --         Physical Exam    Vitals reviewed.  Constitutional: She appears well-developed and well-nourished. She is Obese . No distress.   58 y.o.  female in no acute distress   HENT:   Head: Normocephalic and atraumatic.   Eyes: EOM are normal. Pupils are equal, round, and reactive to light.   No intraorbital lesions noted    Neck: No tracheal deviation present. No JVD present.   Moderate  palpation to the right cervical paraspinal musculoskeletal tenderness.    Cardiovascular: Normal rate, regular rhythm, normal heart sounds and intact distal pulses.   Pulmonary/Chest: Breath sounds normal. No stridor. No respiratory distress.   Abdominal: Soft. She exhibits no distension. There is no tenderness.   Musculoskeletal: Normal range of motion. She exhibits no edema.   Moving all 4 extremities with equal flexion and strength. No peripheral edema.   Neurological: She is alert and oriented to person, place, and time.   Psychiatric: Her behavior is normal. Thought content normal.   Normal speech and is cooperative         ED Course   Procedures  Labs Reviewed   CBC W/ AUTO DIFFERENTIAL - Abnormal; Notable for the following:        Result Value    RDW 14.6 (*)     All other components within normal limits   COMPREHENSIVE METABOLIC PANEL - Abnormal; Notable for the following:     ALT 9 (*)     All other components within normal limits   URINALYSIS, REFLEX TO URINE CULTURE    Narrative:     Preferred Collection Type->Urine, Clean Catch   PROTIME-INR   TSH   HEMOGLOBIN A1C   HEMOGLOBIN A1C    Narrative:     ADD ON [facility] PER:DELL MAXWELL RN AND EBONI TOWNSEND MD    08/03/2018  16:55      EKG Readings: (Independently Interpreted)   Initial Reading: No STEMI. Heart Rate: 68. ST Segments: Normal ST Segments. Axis: Normal.   Sinus rhythm.       Imaging Results          MRI BRAIN WITHOUT CONTRAST (Final result)  Result time 08/03/18 20:03:05    Final result by Mahendra Reynolds MD (08/03/18 20:03:05)                 Impression:      No acute intracranial abnormality.    Electronically signed by resident: Bulmaro Villalobos  Date:    08/03/2018  Time:    19:53    Electronically signed by: Mahendra Reynolds MD  Date:    08/03/2018  Time:    20:03             Narrative:    EXAMINATION:  MRI BRAIN WITHOUT CONTRAST    CLINICAL HISTORY:  Stroke;.    TECHNIQUE:  Multiplanar multisequence MR imaging of the brain was performed  without contrast.    COMPARISON:  MRI brain with/without contrast 01/14/2018    FINDINGS:  Intracranial compartment:    Ventricles are normal in size and configuration without evidence for hydrocephalus.    There are a couple punctate T2/FLAIR hyperintensities throughout the supratentorial white matter suggesting mild chronic microvascular ischemic change.  Brain parenchyma is otherwise normal.  No evidence for recent or remote major vascular territory infarction.  No edema, parenchymal mass, or parenchymal hemorrhage    No extra-axial hemorrhage or abnormal fluid collections.  Sella and parasellar regions are within normal limits.    Normal vascular flow voids are preserved.  Craniocervical junction is within normal limits.    Skull/extracranial contents (limited evaluation): Bone marrow signal intensity is normal.  There is mucous membrane thickening of the left posterior sphenoid sinus.                               CTA Head and Neck (xpd) (Final result)  Result time 08/03/18 19:03:15    Final result by Mahendra Reynolds MD (08/03/18 19:03:15)                 Impression:      No acute intracranial abnormality.  No high-grade stenosis or major vessel occlusion.    Electronically signed by resident: Bill Palacios  Date:    08/03/2018  Time:    18:16    Electronically signed by: Mahendra Reynolds MD  Date:    08/03/2018  Time:    19:03             Narrative:    EXAMINATION:  CTA HEAD AND NECK (XPD)    CLINICAL HISTORY:  Stroke;    TECHNIQUE:  Non contrast low dose axial images were obtained though the head. CT angiogram was performed from the level of the anel to the top of the head following the IV administration of 75mL of Omnipaque 350.   Sagittal and coronal reconstructions and maximum intensity projection reconstructions were performed. Arterial stenosis percentages are based on NASCET measurement criteria.    COMPARISON:  MRI brain 01/14/2018, CT head 08/22/2017    FINDINGS:  Intracranial Compartment:    Ventricles  and sulci are normal in size for age without evidence of hydrocephalus. No extra-axial blood or fluid collections.    The brain parenchyma appears normal for age.  No parenchymal mass, hemorrhage, edema, or major vascular distribution infarct.    Skull/Extracranial Contents (limited evaluation): No fracture. Mastoid air cells and paranasal sinuses are essentially clear.    Non-Vascular Structures of the Neck/Thoracic Inlet (limited evaluation): Normal.    Aorta: Normal 3 vessel arch.    Extracranial carotid circulation: No hemodynamically significant stenosis, aneurysmal dilatation, or dissection.    Extracranial vertebral circulation: No hemodynamically significant stenosis, aneurysmal dilatation, or dissection.    Intracranial Arteries: No focal high-grade stenosis, occlusion, or aneurysm.    Venous structures (limited evaluation): Normal.                                 Medical Decision Making:   History:   Old Medical Records: I decided to obtain old medical records.  Differential Diagnosis:   Subacute embolic stroke, visual disturbance, tension headache, cervical spasm.   Independently Interpreted Test(s):   I have ordered and independently interpreted EKG Reading(s) - see prior notes  Clinical Tests:   Lab Tests: Ordered and Reviewed  Medical Tests: Ordered and Reviewed  Other:   I have discussed this case with another health care provider.       <> Summary of the Discussion: Vascular (Stroke) Neurology            Scribe Attestation:   Scribe #1: I performed the above scribed service and the documentation accurately describes the services I performed. I attest to the accuracy of the note.    Attending Attestation:             Attending ED Notes:   Patient was urgently evaluated by vascular stroke and will admit to their service for further evaluation of subacute stroke symptoms/visual disturbance.              Clinical Impression:   Diagnoses of Visual disturbance and Cerebral infarction due to unspecified  occlusion or stenosis of right carotid arteries were pertinent to this visit.      Disposition:   Disposition: Admitted                        Jonatan Manuel MD  08/05/18 [id number]

## 2018-08-03 NOTE — ED TRIAGE NOTES
Pt was sent by her ophthamologist to the and told she had a stroke caused by plaque in right eye   Yesterday morning pt awakened with partial vision loss in right eye  States she has glaucoma and did not thing very much of her loss of vision in right eye  She saw her ophthamologist yesterday afternoon and told she had a stroke

## 2018-08-03 NOTE — HPI
"Cara Mendoza is a 58 y.o. female with PMHx glaucoma, anxiety, hypothyroidism, chronic pancreatitis, adrenal adenoma and HTN who is being evaluated by the Vascular Neurology service after developing visual abnormalities yesterday afternoon. Pt reports she had seen her ophthalmologist Monday and at that time was without issues. Then developed vision changes in the R eye on 8/2 and went back to ophthalmologist. He took pictures of her eye which reportedly showed evidence of acute infarction and "a large plaque." Pt was instructed to present for further evaluation. Pt also c/o R-sided HA, but denies speech changes, numbness/tingling, extremity weakness, or N/V.  Pt denies personal hx blood clots, denies hx cardiac arrythmia and denies use of antithrombotics at home. She lives with others and performs all ADLs independently. Pt denies tobacco, alcohol, or drug use.  "

## 2018-08-03 NOTE — PHARMACY MED REC
"Admission Medication Reconciliation - Pharmacy Consult Note    The home medication history was taken by Meseret Barney, Pharmacist.  Based on information gathered and subsequent review by the clinical pharmacist, the items below may need attention.     You may go to "Admission" then "Reconcile Home Medications" tabs to review and/or act upon these items.     Potentially problematic discrepancies with current MAR  o Patient IS taking the following which was not ordered upon admit  o Estradiol 1 mg by mouth once daily  o Hydrochlorothiazide 12.5 mg by mouth once daily  o Travatan 0.004 % opthalmic 1 drop in both eyes once nightly  o Patient IS NOT taking the following which was ordered upon admit  o Atorvastatin 40 mg by mouth once daily  o Clopidogrel 75 mg by mouth once daily    Please address this information as you see fit.  Feel free to contact us if you have any questions or require assistance.    Meseret Barney, PharmD, PGY-1 Pharmacy Resident  EXT: [id number]              .    .            "

## 2018-08-03 NOTE — ASSESSMENT & PLAN NOTE
Stroke risk factor   pt on importance of diet, exercise, lifestyle modifications for secondary stroke prevention

## 2018-08-03 NOTE — SUBJECTIVE & OBJECTIVE
"    Past Medical History:   Diagnosis Date    Adrenal adenoma     Gastric ulcer     Glaucoma     Hypothyroidism     Kidney stones     Pancreatitis     Traumatic compression fracture of L1 lumbar vertebra      Past Surgical History:   Procedure Laterality Date    CHOLECYSTECTOMY      COLONOSCOPY N/A 2016    Procedure: COLONOSCOPY;  Surgeon: Sathya Strickland MD;  Location: [address];  Service: Endoscopy;  Laterality: N/A;    CYSTOSCOPY      HYSTERECTOMY      KYPHOSIS SURGERY  7/20/15    right adrenal gland remove      tonsillectomy      TONSILLECTOMY      TUBAL LIGATION      x2        Family History   Problem Relation Age of Onset    Diabetes Father         borderline    Heart attack Father      Social History   Substance Use Topics    Smoking status: Never Smoker    Smokeless tobacco: Never Used    Alcohol use No     Review of patient's allergies indicates:   Allergen Reactions    Ciprofloxacin     Codeine Nausea And Vomiting and Hives    Compazine [prochlorperazine edisylate] Anaphylaxis and Nausea And Vomiting    Demerol [meperidine] Anaphylaxis and Nausea And Vomiting    Morpholine analogues Anaphylaxis and Nausea And Vomiting     vomit    Pcn [penicillins] Other (See Comments) and Anaphylaxis     Stomach cramps    Percocet [oxycodone-acetaminophen] Nausea And Vomiting    Corticosteroids (glucocorticoids) Itching     Agitation, "skin crawling" sensation    Cortisone Itching     Agitation, "skin crawling" sensation    Fentanyl Nausea And Vomiting and Swelling     Fentanyl patch made lips numb and swollen    Morphine Nausea And Vomiting    Nsaids (non-steroidal anti-inflammatory drug) Other (See Comments) and Nausea And Vomiting     Cluster ulcers    Toradol [ketorolac] Nausea And Vomiting     ulcers    Tramadol Nausea And Vomiting       Medications: I have reviewed the current medication administration record.      (Not in a hospital admission)    Review of " Systems   Constitutional: Negative for chills and fever.   HENT: Negative for drooling and nosebleeds.    Eyes: Positive for pain and visual disturbance. Negative for discharge.   Respiratory: Negative for choking and stridor.    Cardiovascular: Negative for chest pain and palpitations.   Gastrointestinal: Negative for nausea and vomiting.   Musculoskeletal: Negative for gait problem and neck stiffness.   Skin: Negative for color change and rash.   Neurological: Positive for headaches. Negative for speech difficulty and weakness.   Psychiatric/Behavioral: Negative for agitation and confusion.     Objective:     Vital Signs (Most Recent):  Temp: 98 °F (36.7 °C) (08/03/18 1530)  Pulse: 76 (08/03/18 1350)  Resp: 18 (08/03/18 1350)  BP: 139/73 (08/03/18 1350)  SpO2: 98 % (08/03/18 1350)    Vital Signs Range (Last 24H):  Temp:  [98 °F (36.7 °C)]   Pulse:  [76]   Resp:  [18]   BP: (139)/(73)   SpO2:  [98 %]     Physical Exam   Constitutional: She is oriented to person, place, and time. She appears well-developed and well-nourished. No distress.   HENT:   Head: Normocephalic and atraumatic.   Eyes: Conjunctivae and EOM are normal.   Visual fields intact, but pt desribes a gray haze inhibiliting acuity in the R inferior quadrant of the R eye.  Pt reports R eye pain upon looking to Left.   Cardiovascular: Normal rate.    Pulmonary/Chest: Effort normal. No respiratory distress.   Musculoskeletal: Normal range of motion. She exhibits no edema or deformity.   Neurological: She is alert and oriented to person, place, and time. A cranial nerve deficit is present. No sensory deficit. She exhibits normal muscle tone. Coordination normal.   Skin: Skin is warm and dry.   Psychiatric: She has a normal mood and affect. Her behavior is normal.       Neurological Exam:   LOC: alert  Attention Span: Good   Language: No aphasia  Articulation: No dysarthria  Orientation: Person, Place, Time   Visual Fields: Full  EOM (CN III, IV, VI):  Full/intact  Facial Movement (CN VII): Symmetric facial expression    Motor: 5/5 throughout  Cebellar: No evidence of appendicular or axial ataxia  Sensation: Intact to light touch, temp  Tone: Normal tone throughout      Laboratory:  CMP:   Recent Labs  Lab 08/03/18  1503   CALCIUM 9.4   ALBUMIN 3.8   PROT 7.1      K 4.0   CO2 27      BUN 13   CREATININE 0.7   ALKPHOS 63   ALT 9*   AST 14   BILITOT 0.4     CBC:   Recent Labs  Lab 08/03/18  1503   WBC 6.57   RBC 4.66   HGB 13.7   HCT 40.1      MCV 86   MCH 29.4   MCHC 34.2     Lipid Panel: No results for input(s): CHOL, LDLCALC, HDL, TRIG in the last 168 hours.  Coagulation:   Recent Labs  Lab 08/03/18  1503   INR 1.0     Hgb A1C: No results for input(s): HGBA1C in the last 168 hours.  TSH:   Recent Labs  Lab 08/03/18  1503   TSH 1.544       Diagnostic Results:      Brain/Vessel imaging:    MRI, CTA pending      Cardiac Evaluation:     Echo pending

## 2018-08-03 NOTE — TELEPHONE ENCOUNTER
----- Message from Helga Duong sent at 8/3/2018 10:05 AM CDT -----  Contact: Self. [phone]  New patient would like to speak with you about getting an appointment to see the doctor as soon as possible because she had a stroke a couple of days ago. Patient can not wait til the next available appointment on 8/27/18. New patient states the doctor is her personal friend and knows her by Cara Lozada. Please advise

## 2018-08-04 VITALS
DIASTOLIC BLOOD PRESSURE: 76 MMHG | HEIGHT: 60 IN | SYSTOLIC BLOOD PRESSURE: 146 MMHG | HEART RATE: 78 BPM | WEIGHT: 193.38 LBS | OXYGEN SATURATION: 95 % | BODY MASS INDEX: 37.97 KG/M2 | RESPIRATION RATE: 18 BRPM | TEMPERATURE: 97 F

## 2018-08-04 LAB
ALBUMIN SERPL BCP-MCNC: 3.6 G/DL
ALP SERPL-CCNC: 59 U/L
ALT SERPL W/O P-5'-P-CCNC: 9 U/L
ANION GAP SERPL CALC-SCNC: 9 MMOL/L
APTT BLDCRRT: 24.8 SEC
AST SERPL-CCNC: 11 U/L
BASOPHILS # BLD AUTO: 0.02 K/UL
BASOPHILS NFR BLD: 0.3 %
BILIRUB SERPL-MCNC: 0.4 MG/DL
BUN SERPL-MCNC: 14 MG/DL
CALCIUM SERPL-MCNC: 9.2 MG/DL
CHLORIDE SERPL-SCNC: 99 MMOL/L
CK MB SERPL-MCNC: 0.5 NG/ML
CK MB SERPL-RTO: 1.9 %
CK SERPL-CCNC: 27 U/L
CO2 SERPL-SCNC: 30 MMOL/L
CREAT SERPL-MCNC: 0.8 MG/DL
DIASTOLIC DYSFUNCTION: NO
DIFFERENTIAL METHOD: ABNORMAL
EOSINOPHIL # BLD AUTO: 0.2 K/UL
EOSINOPHIL NFR BLD: 3.2 %
ERYTHROCYTE [DISTWIDTH] IN BLOOD BY AUTOMATED COUNT: 14.6 %
EST. GFR  (AFRICAN AMERICAN): >60 ML/MIN/1.73 M^2
EST. GFR  (NON AFRICAN AMERICAN): >60 ML/MIN/1.73 M^2
ESTIMATED PA SYSTOLIC PRESSURE: 21.66
GLUCOSE SERPL-MCNC: 93 MG/DL
HCT VFR BLD AUTO: 39.2 %
HGB BLD-MCNC: 13.1 G/DL
IMM GRANULOCYTES # BLD AUTO: 0.01 K/UL
IMM GRANULOCYTES NFR BLD AUTO: 0.2 %
INR PPP: 1
LYMPHOCYTES # BLD AUTO: 3.1 K/UL
LYMPHOCYTES NFR BLD: 51.4 %
MAGNESIUM SERPL-MCNC: 2 MG/DL
MCH RBC QN AUTO: 28.8 PG
MCHC RBC AUTO-ENTMCNC: 33.4 G/DL
MCV RBC AUTO: 86 FL
MONOCYTES # BLD AUTO: 0.6 K/UL
MONOCYTES NFR BLD: 9.7 %
NEUTROPHILS # BLD AUTO: 2.1 K/UL
NEUTROPHILS NFR BLD: 35.2 %
NRBC BLD-RTO: 0 /100 WBC
PHOSPHATE SERPL-MCNC: 3.9 MG/DL
PLATELET # BLD AUTO: 230 K/UL
PMV BLD AUTO: 11 FL
POTASSIUM SERPL-SCNC: 3.7 MMOL/L
PROT SERPL-MCNC: 6.4 G/DL
PROTHROMBIN TIME: 10.7 SEC
RBC # BLD AUTO: 4.55 M/UL
RETIRED EF AND QEF - SEE NOTES: 65 (ref 55–65)
SODIUM SERPL-SCNC: 138 MMOL/L
TRICUSPID VALVE REGURGITATION: NORMAL
TROPONIN I SERPL DL<=0.01 NG/ML-MCNC: <0.006 NG/ML
WBC # BLD AUTO: 5.97 K/UL

## 2018-08-04 PROCEDURE — G8997 SWALLOW GOAL STATUS: HCPCS | Mod: CH

## 2018-08-04 PROCEDURE — G8996 SWALLOW CURRENT STATUS: HCPCS | Mod: CH

## 2018-08-04 PROCEDURE — 93306 TTE W/DOPPLER COMPLETE: CPT | Mod: 26,,, | Performed by: INTERNAL MEDICINE

## 2018-08-04 PROCEDURE — 85610 PROTHROMBIN TIME: CPT

## 2018-08-04 PROCEDURE — G8979 MOBILITY GOAL STATUS: HCPCS | Mod: CH

## 2018-08-04 PROCEDURE — G8988 SELF CARE GOAL STATUS: HCPCS | Mod: CH

## 2018-08-04 PROCEDURE — 82550 ASSAY OF CK (CPK): CPT

## 2018-08-04 PROCEDURE — G0378 HOSPITAL OBSERVATION PER HR: HCPCS

## 2018-08-04 PROCEDURE — 99900038 HC OT GENERIC THERAPY SCREENING (STAT)

## 2018-08-04 PROCEDURE — 80053 COMPREHEN METABOLIC PANEL: CPT

## 2018-08-04 PROCEDURE — G8980 MOBILITY D/C STATUS: HCPCS | Mod: CH

## 2018-08-04 PROCEDURE — 85730 THROMBOPLASTIN TIME PARTIAL: CPT

## 2018-08-04 PROCEDURE — 93306 TTE W/DOPPLER COMPLETE: CPT

## 2018-08-04 PROCEDURE — 92523 SPEECH SOUND LANG COMPREHEN: CPT

## 2018-08-04 PROCEDURE — G8989 SELF CARE D/C STATUS: HCPCS | Mod: CH

## 2018-08-04 PROCEDURE — 36415 COLL VENOUS BLD VENIPUNCTURE: CPT

## 2018-08-04 PROCEDURE — 84484 ASSAY OF TROPONIN QUANT: CPT

## 2018-08-04 PROCEDURE — 63600175 PHARM REV CODE 636 W HCPCS: Performed by: PHYSICIAN ASSISTANT

## 2018-08-04 PROCEDURE — G8998 SWALLOW D/C STATUS: HCPCS | Mod: CH

## 2018-08-04 PROCEDURE — 25000003 PHARM REV CODE 250: Performed by: NURSE PRACTITIONER

## 2018-08-04 PROCEDURE — G8987 SELF CARE CURRENT STATUS: HCPCS | Mod: CH

## 2018-08-04 PROCEDURE — 99217 PR OBSERVATION CARE DISCHARGE: CPT | Mod: ,,, | Performed by: PSYCHIATRY & NEUROLOGY

## 2018-08-04 PROCEDURE — 85025 COMPLETE CBC W/AUTO DIFF WBC: CPT

## 2018-08-04 PROCEDURE — 82553 CREATINE MB FRACTION: CPT

## 2018-08-04 PROCEDURE — 25000003 PHARM REV CODE 250: Performed by: PHYSICIAN ASSISTANT

## 2018-08-04 PROCEDURE — G8978 MOBILITY CURRENT STATUS: HCPCS | Mod: CH

## 2018-08-04 PROCEDURE — 84100 ASSAY OF PHOSPHORUS: CPT

## 2018-08-04 PROCEDURE — 83735 ASSAY OF MAGNESIUM: CPT

## 2018-08-04 PROCEDURE — 99900037 HC PT THERAPY SCREENING (STAT)

## 2018-08-04 PROCEDURE — A4216 STERILE WATER/SALINE, 10 ML: HCPCS | Performed by: PHYSICIAN ASSISTANT

## 2018-08-04 RX ORDER — ATORVASTATIN CALCIUM 40 MG/1
40 TABLET, FILM COATED ORAL DAILY
Qty: 90 TABLET | Refills: 3 | Status: SHIPPED | OUTPATIENT
Start: 2018-08-05 | End: 2018-09-13

## 2018-08-04 RX ORDER — ALPRAZOLAM 0.25 MG/1
0.25 TABLET ORAL ONCE
Status: COMPLETED | OUTPATIENT
Start: 2018-08-04 | End: 2018-08-04

## 2018-08-04 RX ORDER — CLOPIDOGREL BISULFATE 75 MG/1
75 TABLET ORAL DAILY
Qty: 30 TABLET | Refills: 11 | Status: SHIPPED | OUTPATIENT
Start: 2018-08-05 | End: 2019-01-15

## 2018-08-04 RX ADMIN — HEPARIN SODIUM 5000 UNITS: 5000 INJECTION, SOLUTION INTRAVENOUS; SUBCUTANEOUS at 05:08

## 2018-08-04 RX ADMIN — PANTOPRAZOLE SODIUM 40 MG: 40 TABLET, DELAYED RELEASE ORAL at 08:08

## 2018-08-04 RX ADMIN — LEVOTHYROXINE SODIUM 75 MCG: 75 TABLET ORAL at 05:08

## 2018-08-04 RX ADMIN — ALPRAZOLAM 0.25 MG: 0.25 TABLET ORAL at 01:08

## 2018-08-04 RX ADMIN — SODIUM CHLORIDE, PRESERVATIVE FREE 3 ML: 5 INJECTION INTRAVENOUS at 05:08

## 2018-08-04 RX ADMIN — GABAPENTIN 400 MG: 400 CAPSULE ORAL at 12:08

## 2018-08-04 RX ADMIN — GABAPENTIN 400 MG: 400 CAPSULE ORAL at 08:08

## 2018-08-04 RX ADMIN — CLOPIDOGREL 75 MG: 75 TABLET, FILM COATED ORAL at 08:08

## 2018-08-04 RX ADMIN — ACETAMINOPHEN 650 MG: 325 TABLET, FILM COATED ORAL at 12:08

## 2018-08-04 RX ADMIN — ESCITALOPRAM 20 MG: 20 TABLET, FILM COATED ORAL at 08:08

## 2018-08-04 RX ADMIN — ATORVASTATIN CALCIUM 40 MG: 20 TABLET, FILM COATED ORAL at 08:08

## 2018-08-04 NOTE — PT/OT/SLP EVAL
Occupational Therapy   Screen and Discharge Note    Name: Cara Mendoza  MRN: [id number]  Admitting Diagnosis:  Acute ischemic right internal carotid artery (ICA) stroke      Recommendations:     Discharge Recommendations:  home no OT needed  Barriers to discharge:   none    History:     Pt reports that she was independent prior to admit with ADL's, household activities, driving, & ambulation.    Past Medical History:   Diagnosis Date    Adrenal adenoma     Gastric ulcer     Glaucoma     Hypothyroidism     Kidney stones     Pancreatitis     Traumatic compression fracture of L1 lumbar vertebra        Past Surgical History:   Procedure Laterality Date    CHOLECYSTECTOMY      COLONOSCOPY N/A 2016    Procedure: COLONOSCOPY;  Surgeon: Sathya Strickland MD;  Location: [address];  Service: Endoscopy;  Laterality: N/A;    CYSTOSCOPY      HYSTERECTOMY      KYPHOSIS SURGERY  7/20/15    right adrenal gland remove      tonsillectomy      TONSILLECTOMY      TUBAL LIGATION      x2          Subjective     Chief Complaint: being ready for discharge  Patient/Family stated goals: to go home  Communicated with: RN prior to session.  Pain/Comfort:  ·  0/10 pain prior to, during, & after session    Patients cultural, spiritual, Jehovah's witness conflicts given the current situation:      Objective:     Patient found with:  all lines intact, visitor present    General Precautions: Standard,   cardiac, aspiration, visual deficits    Occupational Performance:  Pt observed to be mobilizing unit independently after screen completed.    Cognitive/Visual Perceptual:  Intact      Patient left supine with all lines intact, call button in reach, RN notified, visitor  present and white board updated.    [facility] 6 Click:  AMPA Total Score:  24    Treatment & Education:  Discussed overall mobility & vision with pt.  Pt, PT, & RN report that pt has been mobilizing in room, to bathroom & back across unit, showered this  "morning & was rearranging furniture in room today without (A) & with (I).  Pt reported new visual deficits (chronic glaucoma with decreased peripheral vision to the right with right eye) are deficits with lower half of vision for right eye.  Pt reported that she has been compensating without any difficulty & knows that she should not drive currently due to decreased right eye vision.  Pt denied any further need of therapy at this time.  Education:    Assessment:     Cara Mendoza is a 58 y.o. female with a medical diagnosis of Acute ischemic right internal carotid artery (ICA) stroke. At this time, patient is functioning at their prior level of function and does not require further acute OT services.     Clinical Decision Makin.  OT Low:  "Pt evaluation falls under low complexity for evaluation coding due to performance deficits noted in 1-3 areas as stated above and 0 co-morbities affecting current functional status. Data obtained from problem focused assessments. No modifications or assistance was required for completion of evaluation. Only brief occupational profile and history review completed."     Plan:     During this hospitalization, patient does not require further acute OT services.  Please re-consult if situation changes.    · Plan of Care Reviewed with:  pt    This Plan of care has been discussed with the patient who was involved in its development and understands and is in agreement with the identified goals and treatment plan      Time Tracking:     OT Date of Treatment:   18  OT Start Time:  1300  OT Stop Time:   1318  OT Total Time (min):   10    Billable Minutes:Evaluation/Screen 10    JAZMÍN Ratliff  2018    "

## 2018-08-04 NOTE — PLAN OF CARE
Problem: Patient Care Overview  Goal: Plan of Care Review  Outcome: Ongoing (interventions implemented as appropriate)  Pt's VSS, NAD noted. Pt is sinus rhythm on cardiac monitor. Pt eager for discharge. Bed locked in lowest position, side rails upx2, call bell within reach, nonskid socks on pt. Pt ambulates independently with even gait.

## 2018-08-04 NOTE — PT/OT/SLP EVAL
"Physical Therapy Evaluation/Screen  Discharge Note    Patient Name:  Cara Mendoza   MRN:  [id number]    Recommendations:     Discharge Recommendations:  home   Discharge Equipment Recommendations: none   Barriers to discharge: none    Assessment:     Cara Mendoza is a 58 y.o. female admitted with a medical diagnosis of Acute ischemic right internal carotid artery (ICA) stroke. She has minimal visual deficits in the R eye which do not affect her safety or independence with ADLs or functional mobility.   At this time, patient is functioning at their prior level of function and does not require further acute PT services. Recommend d/c home with no additional skilled PT follow up when medically appropriate.     Recent Surgery: * No surgery found *      Plan:     During this hospitalization, patient does not require further acute PT services.  Please re-consult if situation changes.     Plan of Care Reviewed with: patient, spouse    History:     Past Medical History:   Diagnosis Date    Adrenal adenoma     Gastric ulcer     Glaucoma     Hypothyroidism     Kidney stones     Pancreatitis     Traumatic compression fracture of L1 lumbar vertebra        Past Surgical History:   Procedure Laterality Date    CHOLECYSTECTOMY      COLONOSCOPY N/A 2016    Procedure: COLONOSCOPY;  Surgeon: Sathya Strickland MD;  Location: [address];  Service: Endoscopy;  Laterality: N/A;    CYSTOSCOPY      HYSTERECTOMY      KYPHOSIS SURGERY  7/20/15    right adrenal gland remove      tonsillectomy      TONSILLECTOMY      TUBAL LIGATION      x2          Subjective     Communicated with RN prior to session.      Patient comments/goals: "I have been walking back and forth to our community bathroom without a problem. I even gave myself a bath and reorganized the room.  The only time the nurses have to check on me is to give medicines."  Pain/Comfort:  · Pain Rating 1: 0/10  · Pain Rating Post-Intervention 1: " 0/10    Objective:     Patient found with: telemetry     General Precautions: Standard, aspiration, fall     The patient was found supine in bed with her  at bedside. She is independent with mobility and ambulating safety and independently to and from the bathroom without an assistive device.  RN confirms the patient has been safe and independent with mobility since admit. The patient declined participation in full PT evaluation d/t lack of deficits (besides vision).  She wrote her name with no problems and christina a clock with appropriate positioning of all numbers.      AM-PAC 6 CLICK MOBILITY  Total Score:24     Patient left supine in bed with all lines intact, call bell in reach,  at bedside.     GOALS:    Physical Therapy Goals     Not on file          Multidisciplinary Problems (Resolved)        Problem: Physical Therapy Goal    Goal Priority Disciplines Outcome Goal Variances Interventions   Physical Therapy Goal   (Resolved)     PT/OT, PT Outcome(s) achieved                     Clinical Decision Making:   COMPLEXITY OF PT EXAMINATION:  HISTORY  - Comorbidities that affect the PT plan of care or the patient's ability to participate in/progress with therapy:  1. Obesity   2. PTSD  3. Remote L1 compression fracture s/p kyphoplasty  - Personal Factors:   EXAMINATION  - Body Systems:  1. Communication ability, affect, cognition, language, and learning style: the assessment of the ability to make needs known, consciousness, orientation, expected emotional /behavioral responses, and learning preferences  - Activity or participation limitations:   CLINICAL PRESENTATION: Stable and/or uncomplicated  LEVEL OF COMPLEXITY: screen     Time Tracking:     PT Received On: 08/04/18  PT Start Time: 0910     PT Stop Time: 0924  PT Total Time (min): 14 min     Billable Minutes: Evaluation/Screen 14      Shey Padilla, PT  08/04/2018

## 2018-08-04 NOTE — DISCHARGE SUMMARY
"[facility]  Vascular Neurology  Comprehensive Stroke Center  Discharge Summary     Summary:     Admit Date: 8/3/2018  2:19 PM    Discharge Date and Time: No discharge date for patient encounter.    Attending Physician: Patti Villaseñor MD     Discharge Provider: Karen Pink NP    History of Present Illness: Cara Mendoza is a 58 y.o. female with PMHx glaucoma, anxiety, hypothyroidism, chronic pancreatitis, adrenal adenoma and HTN who is being evaluated by the Vascular Neurology service after developing visual abnormalities yesterday afternoon. Pt reports she had seen her ophthalmologist Monday and at that time was without issues. Then developed vision changes in the R eye on 8/2 and went back to ophthalmologist. He took pictures of her eye which reportedly showed evidence of acute infarction and "a large plaque." Pt was instructed to present for further evaluation. Pt also c/o R-sided HA, but denies speech changes, numbness/tingling, extremity weakness, or N/V.  Pt denies personal hx blood clots, denies hx cardiac arrythmia and denies use of antithrombotics at home. She lives with others and performs all ADLs independently. Pt denies tobacco, alcohol, or drug use.    Hospital Course (synopsis of major diagnoses, care, treatment, and services provided during the course of the hospital stay): 57 y/o female with field cut to right eye who saw opthomaologist who states that she had a CRAO came to ED to be evaluated.  All work/up complete and negative so far.   8-4-18 will order 2D Echo with bubble as outpatient, seen by therapy and no needs identified, patient is stable for discharge home today    STROKE DOCUMENTATION         NIH Scale:  1a. Level Of Consciousness: 0-->Alert: keenly responsive  1b. LOC Questions: 0-->Answers both questions correctly  1c. LOC Commands: 0-->Performs both tasks correctly  2. Best Gaze: 0-->Normal  3. Visual: 0-->No visual loss  4. Facial Palsy: 0-->Normal " symmetrical movements  5a. Motor Arm, Left: 0-->No drift: limb holds 90 (or 45) degrees for full 10 secs  5b. Motor Arm, Right: 0-->No drift: limb holds 90 (or 45) degrees for full 10 secs  6a. Motor Leg, Left: 0-->No drift: leg holds 30 degree position for full 5 secs  6b. Motor Leg, Right: 0-->No drift: leg holds 30 degree position for full 5 secs  7. Limb Ataxia: 0-->Absent  8. Sensory: 0-->Normal: no sensory loss  9. Best Language: 0-->No aphasia: normal  10. Dysarthria: 0-->Normal  11. Extinction and Inattention (formerly Neglect): 0-->No abnormality  Total (NIH Stroke Scale): 0        Modified [place] Score: 1  [place] Coma Scale:    ABCD2 Score:    [id number] Score:   HAS -BLED Score:   ICH Score:   Hunt & [place] Classification:       Assessment/Plan:     Diagnostic Results      Brain Imaging   MRI Brain w/o contrast 8-4-18 results:  No acute intracranial abnormality     Vessel Imaging   CTA  head and neck  contrast 8-3-18 results:  No acute intracranial abnormality.  No high-grade stenosis or major vessel occlusion.     Cardiac Imaging   2D Echo 8-4-18 results:  The left atrial volume index is normal    1 - Normal left ventricular systolic function (EF 60-65%).     2 - Concentric remodeling.     3 - No wall motion abnormalities.     4 - Normal left ventricular diastolic function.     5 - Normal right ventricular systolic function .     6 - The estimated PA systolic pressure is 22 mmHg.     7 - Trivial to mild tricuspid regurgitation.     Interventions: None    Complications: None    Disposition: Home or Self Care    Final Active Diagnoses:    Diagnosis Date Noted POA    PRINCIPAL PROBLEM:  Acute ischemic right internal carotid artery (ICA) stroke [I63.231] 08/03/2018 Unknown    Visual disturbance [H53.9] 08/03/2018 Yes    Morbid obesity [E66.01] 07/23/2018 Yes    Chronic pancreatitis [K86.1] 09/16/2015 Yes    Essential hypertension [I10] 04/26/2015 Yes     Chronic    Hypothyroidism [E03.9] 02/25/2013  Yes     Chronic      Problems Resolved During this Admission:    Diagnosis Date Noted Date Resolved POA     No new Assessment & Plan notes have been filed under this hospital service since the last note was generated.  Service: Vascular Neurology      Recommendations:     Post-discharge complication risks: Falls    Stroke Education given to: patient    Follow-up in Stroke Clinic in 4-6 weeks       Discharge Plan:  Antithrombotics: Clopidogrel 75mg  Statin: Atorvastatin 40 mg  Aggresive risk factor modification:  High Cholesterol  Diet  Exercise  Obesity  Hypothyroid    Follow Up:  Follow-up Information     Asael Carrillo MD.    Specialty:  Internal Medicine  Why:  need to follow up regarding 2 D echo with bubble  Contact information:  [address] [facility]  [address]  [place] [address]  [phone]             [facility] VASCULAR NEUROLOGY In 6 weeks.    Specialty:  Vascular Neurology  Why:  See Dr Villaseñor  Contact information:  [address]  [facility] [address]  [phone]                 Patient Instructions:     Diet Cardiac     Notify your health care provider if you experience any of the following:   Order Comments: Call 911 for any stroke like symtpoms     2D echo with color flow doppler and bubble contrast   Standing Status: Future  Standing Exp. Date: 08/04/19     Activity as tolerated         Medications:  Reconciled Home Medications:      Medication List      START taking these medications    atorvastatin 40 MG tablet  Commonly known as:  LIPITOR  Take 1 tablet (40 mg total) by mouth once daily.  Start taking on:  8/5/2018     clopidogrel 75 mg tablet  Commonly known as:  PLAVIX  Take 1 tablet (75 mg total) by mouth once daily.  Start taking on:  8/5/2018        CHANGE how you take these medications    gabapentin 400 MG capsule  Commonly known as:  NEURONTIN  Take 1 capsule AM and afternoon.  Take 2 capsules nightly.  What changed:  · how much to take  · how to take this  · when to take  this  · additional instructions        CONTINUE taking these medications    ALPRAZolam 0.5 MG tablet  Commonly known as:  XANAX  Take 1 tablet (0.5 mg total) by mouth 2 (two) times daily as needed for Anxiety.     diphenhydramine-acetaminophen  mg Tab  Commonly known as:  TYLENOL PM  Take 1 tablet by mouth nightly as needed.     escitalopram oxalate 20 MG tablet  Commonly known as:  LEXAPRO  Take 1 tablet (20 mg total) by mouth once daily.     estradiol 1 MG tablet  Commonly known as:  ESTRACE  Take 1 mg by mouth once daily.     hydroCHLOROthiazide 12.5 mg capsule  Commonly known as:  MICROZIDE  Take 1 capsule (12.5 mg total) by mouth once daily.     levothyroxine 75 MCG tablet  Commonly known as:  SYNTHROID  Take 1 tablet (75 mcg total) by mouth before breakfast.     pantoprazole 40 MG tablet  Commonly known as:  PROTONIX  Take 40 mg by mouth once daily.     travoprost (benzalkonium) 0.004 % ophthalmic solution  Commonly known as:  TRAVATAN  Place 1 drop into both eyes every evening.            Karen Pink NP  [facility] Stroke Center  Department of Vascular Neurology   [facility]

## 2018-08-04 NOTE — PLAN OF CARE
Problem: SLP Goal  Goal: SLP Goal  Bedside swallow study and speech language cognitive eval completed. No further ST recommended at this time.   Kelley Ontiveros CCC-SLP  8/4/2018

## 2018-08-04 NOTE — PT/OT/SLP EVAL
"Speech Language Pathology Evaluation  Cognitive/Bedside Swallow  Discharge    Patient Name:  Cara Mendoza   MRN:  [id number]  Admitting Diagnosis: Acute ischemic right internal carotid artery (ICA) stroke    Recommendations:                  General Recommendations:  Follow-up not indicated  Diet recommendations:  Regular, Thin   Aspiration Precautions: Standard aspiration precautions   General Precautions: Standard, fall  Communication strategies:  none    History:     Past Medical History:   Diagnosis Date    Adrenal adenoma     Gastric ulcer     Glaucoma     Hypothyroidism     Kidney stones     Pancreatitis     Traumatic compression fracture of L1 lumbar vertebra        Past Surgical History:   Procedure Laterality Date    CHOLECYSTECTOMY      COLONOSCOPY N/A 2016    Procedure: COLONOSCOPY;  Surgeon: Sathya Strickland MD;  Location: [address]);  Service: Endoscopy;  Laterality: N/A;    CYSTOSCOPY      HYSTERECTOMY      KYPHOSIS SURGERY  7/20/15    right adrenal gland remove      tonsillectomy      TONSILLECTOMY      TUBAL LIGATION      x2          Social History: Patient lives with spouse.    Prior diet: regular/thin.    Subjective     Awake/alert  " I'm ready to get home."     Pain/Comfort:  · Pain Rating 1: 0/10  · Pain Rating Post-Intervention 1: 0/10    Objective:     Cognitive Status:    Orientation Oriented x4  Memory Immediate Recall 5 numbers/5 words and Delayed3/3 ind'ly  Problem Solving Conclusions 100%, Categories 100%, Sequencing 4/4 word set and Compare/contrast 100%      Receptive Language:   Comprehension:   Questions Open ended questions 100%  Commands  complex/abstract commands 100%    Pragmatics:    WFL    Expressive Language:  Verbal:    WFL  Nonverbal:   WFL      Motor Speech:  WFL    Voice:   WFL    Visual-Spatial:  WFL    Reading:   WFL       Oral Musculature Evaluation  · Oral Musculature: WFL  · Dentition: present and adequate  · Mucosal Quality: " good  · Oral Labial Strength and Mobility: WFL  · Lingual Strength and Mobility: WFL  · Volitional Cough: adequate  · Volitional Swallow: timely  · Voice Prior to PO Intake: clear    Bedside Swallow Screen  Consistencies Assessed:  · Thin liquids x5 cup/straw  · Solids x2     Oral Phase:   · WFL    Pharyngeal Phase:   · no overt clinical signs/symptoms of aspiration      Assessment:     Cara Mendoza is a 58 y.o. female with speech, language, cognitive aspects and oral/pharyngeal phases of swallow deemed WFL. No further ST recommended at this time.     Goals:    SLP Goals        Problem: SLP Goal    Goal Priority Disciplines Outcome   SLP Goal     SLP                    Plan:     · Plan of Care reviewed with:  patient, spouse   · SLP Follow-Up:  No       Discharge recommendations:  Discharge Facility/Level Of Care Needs: home     Time Tracking:     SLP Treatment Date:   08/04/18  Speech Start Time:  0746  Speech Stop Time:  0754     Speech Total Time (min):  8 min    Billable Minutes: Eval 8 + swallow screen     Kelley Ontiveros CCC-SLP  08/04/2018

## 2018-08-04 NOTE — PROGRESS NOTES
Karen Pink NP with Stroke aware pt is requesting home medication of 0.25mg Alprazolam BID. NP stated pt could not have a dose until assessed by Stroke Team. Will update pt.

## 2018-08-04 NOTE — CONSULTS
Diet Education: Cardiac  Time Spent: 15 min  Learners: Pt     Nutrition Education provided with handouts: Cardiac-[facility] Nutrition Therapy    Comments: Consult received for cardiac diet education. Provided and explained handout detailing heart healthy fats and increasing omega 3 fatty acids and fiber intake. Pt voiced understanding.     Follow-Up: 1x weekly     Please re-consult as needed

## 2018-08-04 NOTE — HOSPITAL COURSE
59 y/o female with field cut to right eye who saw opthomaologist who states that she had a CRAO came to ED to be evaluated.  All work/up complete and negative so far.   8-4-18 will order 2D Echo with bubble as outpatient, seen by therapy and no needs identified, patient is stable for discharge home today

## 2018-08-04 NOTE — ASSESSMENT & PLAN NOTE
58 y.o. female with PMHx glaucoma, anxiety, hypothyroidism, chronic pancreatitis, adrenal adenoma and HTN who developed R vision abnormalities on 8/2. Pt saw her ophthalmologist who took pictures and told pt she had evidence of a stroke; instructed her to present for further evaluation.   Suspect possible embolic stroke involving Right Ophthalmic artery. Pt admitted to Vascular Neurology for acute stroke workup.      Antithrombotics for secondary stroke prevention: Antiplatelets: Clopidogrel: 75 mg daily; Pt reports she cannot take ASA as it upsets her stomach  Statins for secondary stroke prevention and hyperlipidemia, if present:   Statins: Atorvastatin- 40 mg daily  Aggressive risk factor modification: HTN, Diet, Exercise, Obesity  Rehab efforts: PT/OT/SLP to evaluate and treat, PM&R consult   Diagnostics ordered/pending: None   VTE prophylaxis: Heparin 5000 units SQ every 8 hours  BP parameters: Infarct: No intervention, SBP <220

## 2018-08-04 NOTE — PLAN OF CARE
Problem: Physical Therapy Goal  Goal: Physical Therapy Goal  Outcome: Outcome(s) achieved Date Met: 08/04/18  Initial eval completed.  Results, POC, and therapy recommendations discussed with patient and .   Complete evaluation documentation to follow.    Mobility Recommendations: safe to ambulate without assistance  D/c recommendations: home, no needs     Shey Padilla, PT  8/4/2018  [phone] (pager)

## 2018-08-04 NOTE — SUBJECTIVE & OBJECTIVE
Neurologic Chief Complaint: Right eye field cut    Subjective:     Interval History: Patient is seen for follow-up neurological assessment and treatment recommendations: No issues overnight, stable and ready for discharge    HPI, Past Medical, Family, and Social History remains the same as documented in the initial encounter.     Review of Systems   Constitutional: Negative for chills and fever.   Eyes: Positive for visual disturbance.   Cardiovascular: Negative for chest pain.   Neurological: Negative for weakness and light-headedness.     Scheduled Meds:   atorvastatin  40 mg Oral Daily    clopidogrel  75 mg Oral Daily    escitalopram oxalate  20 mg Oral Daily    gabapentin  400 mg Oral BID    gabapentin  800 mg Oral QHS    heparin (porcine)  5,000 Units Subcutaneous Q8H    levothyroxine  75 mcg Oral Before breakfast    pantoprazole  40 mg Oral Daily    sodium chloride 0.9%  3 mL Intravenous Q8H     Continuous Infusions:   sodium chloride 0.9%       PRN Meds:acetaminophen, labetalol, ondansetron, sodium chloride 0.9%    Objective:     Vital Signs (Most Recent):  Temp: 97.9 °F (36.6 °C) (08/04/18 1200)  Pulse: 69 (08/04/18 1201)  Resp: 18 (08/04/18 1200)  BP: (!) 142/68 (08/04/18 1200)  SpO2: (!) 94 % (08/04/18 1200)  BP Location: Left arm    Vital Signs Range (Last 24H):  Temp:  [97.4 °F (36.3 °C)-98 °F (36.7 °C)]   Pulse:  [62-76]   Resp:  [17-20]   BP: (129-142)/(62-74)   SpO2:  [94 %-98 %]   BP Location: Left arm    Physical Exam   Constitutional: She is oriented to person, place, and time. She appears well-developed and well-nourished.   HENT:   Head: Normocephalic and atraumatic.   Eyes: EOM are normal. Pupils are equal, round, and reactive to light.   Field cut right   Neurological: She is alert and oriented to person, place, and time.   Nursing note and vitals reviewed.      Neurological Exam:   LOC: alert  Attention Span: Good   Language: No aphasia  Articulation: No dysarthria  Orientation:  Person, Place, Time   Visual Fields: right field cut  EOM (CN III, IV, VI): Full/intact  Motor: Arm left  Normal 5/5  Leg left  Normal 5/5  Arm right  Normal 5/5  Leg right Normal 5/5  Tone: Normal tone throughout    Laboratory:  CMP:   Recent Labs  Lab 08/04/18  0424   CALCIUM 9.2   ALBUMIN 3.6   PROT 6.4      K 3.7   CO2 30*   CL 99   BUN 14   CREATININE 0.8   ALKPHOS 59   ALT 9*   AST 11   BILITOT 0.4     CBC:   Recent Labs  Lab 08/04/18  0424   WBC 5.97   RBC 4.55   HGB 13.1   HCT 39.2      MCV 86   MCH 28.8   MCHC 33.4     Lipid Panel: No results for input(s): CHOL, LDLCALC, HDL, TRIG in the last 168 hours.  Coagulation:   Recent Labs  Lab 08/04/18 0424   INR 1.0   APTT 24.8     Platelet Aggregation Study: No results for input(s): PLTAGG, PLTAGINTERP, PLTAGREGLACO, ADPPLTAGGREG in the last 168 hours.  Hgb A1C:   Recent Labs  Lab 08/03/18  1503   HGBA1C 5.3     TSH:   Recent Labs  Lab 08/03/18  1503   TSH 1.544       Diagnostic Results     Brain Imaging   MRI Brain w/o contrast 8-4-18 results:  No acute intracranial abnormality    Vessel Imaging   CTA  head and neck  contrast 8-3-18 results:  No acute intracranial abnormality.  No high-grade stenosis or major vessel occlusion.    Cardiac Imaging   2D Echo 8-4-18 results:  The left atrial volume index is normal    1 - Normal left ventricular systolic function (EF 60-65%).     2 - Concentric remodeling.     3 - No wall motion abnormalities.     4 - Normal left ventricular diastolic function.     5 - Normal right ventricular systolic function .     6 - The estimated PA systolic pressure is 22 mmHg.     7 - Trivial to mild tricuspid regurgitation.

## 2018-08-04 NOTE — PROGRESS NOTES
[facility]  Vascular Neurology  Comprehensive Stroke Center  Progress Note    Assessment/Plan:     * Acute ischemic right internal carotid artery (ICA) stroke    58 y.o. female with PMHx glaucoma, anxiety, hypothyroidism, chronic pancreatitis, adrenal adenoma and HTN who developed R vision abnormalities on 8/2. Pt saw her ophthalmologist who took pictures and told pt she had evidence of a stroke; instructed her to present for further evaluation.   Suspect possible embolic stroke involving Right Ophthalmic artery. Pt admitted to Vascular Neurology for acute stroke workup.      Antithrombotics for secondary stroke prevention: Antiplatelets: Clopidogrel: 75 mg daily; Pt reports she cannot take ASA as it upsets her stomach  Statins for secondary stroke prevention and hyperlipidemia, if present:   Statins: Atorvastatin- 40 mg daily  Aggressive risk factor modification: HTN, Diet, Exercise, Obesity  Rehab efforts: PT/OT/SLP to evaluate and treat, PM&R consult   Diagnostics ordered/pending: None   VTE prophylaxis: Heparin 5000 units SQ every 8 hours  BP parameters: Infarct: No intervention, SBP <220        Morbid obesity    Stroke risk factor   pt on importance of diet, exercise, lifestyle modifications for secondary stroke prevention        Chronic pancreatitis    Pt reports she is intolerant of ASA, NSAIDs due to her hx  Ordered Plavix instead  Continued home Protonix        Essential hypertension    Stroke risk factor  SBP < 220 acutely  Home HCTX held        Hypothyroidism    Stroke risk factor  TSH WNL  Continue home Synthroid             57 y/o female with field cut to right eye who saw opthomaologist who states that she had a CRAO came to ED to be evaluated.  All work/up complete and negative so far.   8-4-18 will order 2D Echo with bubble as outpatient, seen by therapy and no needs identified, patient is stable for discharge home today    STROKE DOCUMENTATION        NIH Scale:  1a. Level  Of Consciousness: 0-->Alert: keenly responsive  1b. LOC Questions: 0-->Answers both questions correctly  1c. LOC Commands: 0-->Performs both tasks correctly  2. Best Gaze: 0-->Normal  3. Visual: 0-->No visual loss  4. Facial Palsy: 0-->Normal symmetrical movements  5a. Motor Arm, Left: 0-->No drift: limb holds 90 (or 45) degrees for full 10 secs  5b. Motor Arm, Right: 0-->No drift: limb holds 90 (or 45) degrees for full 10 secs  6a. Motor Leg, Left: 0-->No drift: leg holds 30 degree position for full 5 secs  6b. Motor Leg, Right: 0-->No drift: leg holds 30 degree position for full 5 secs  7. Limb Ataxia: 0-->Absent  8. Sensory: 0-->Normal: no sensory loss  9. Best Language: 0-->No aphasia: normal  10. Dysarthria: 0-->Normal  11. Extinction and Inattention (formerly Neglect): 0-->No abnormality  Total (NIH Stroke Scale): 0       Modified Cindi Score: 0  Drea Coma Scale:    ABCD2 Score:    [id number] Score:   HAS -BLED Score:   ICH Score:   Hunt & Leon Classification:      Hemorrhagic change of an Ischemic Stroke: Does this patient have an ischemic stroke with hemorrhagic changes? No     Neurologic Chief Complaint: Right eye field cut    Subjective:     Interval History: Patient is seen for follow-up neurological assessment and treatment recommendations: No issues overnight, stable and ready for discharge    HPI, Past Medical, Family, and Social History remains the same as documented in the initial encounter.     Review of Systems   Constitutional: Negative for chills and fever.   Eyes: Positive for visual disturbance.   Cardiovascular: Negative for chest pain.   Neurological: Negative for weakness and light-headedness.     Scheduled Meds:   atorvastatin  40 mg Oral Daily    clopidogrel  75 mg Oral Daily    escitalopram oxalate  20 mg Oral Daily    gabapentin  400 mg Oral BID    gabapentin  800 mg Oral QHS    heparin (porcine)  5,000 Units Subcutaneous Q8H    levothyroxine  75 mcg Oral Before breakfast     pantoprazole  40 mg Oral Daily    sodium chloride 0.9%  3 mL Intravenous Q8H     Continuous Infusions:   sodium chloride 0.9%       PRN Meds:acetaminophen, labetalol, ondansetron, sodium chloride 0.9%    Objective:     Vital Signs (Most Recent):  Temp: 97.9 °F (36.6 °C) (08/04/18 1200)  Pulse: 69 (08/04/18 1201)  Resp: 18 (08/04/18 1200)  BP: (!) 142/68 (08/04/18 1200)  SpO2: (!) 94 % (08/04/18 1200)  BP Location: Left arm    Vital Signs Range (Last 24H):  Temp:  [97.4 °F (36.3 °C)-98 °F (36.7 °C)]   Pulse:  [62-76]   Resp:  [17-20]   BP: (129-142)/(62-74)   SpO2:  [94 %-98 %]   BP Location: Left arm    Physical Exam   Constitutional: She is oriented to person, place, and time. She appears well-developed and well-nourished.   HENT:   Head: Normocephalic and atraumatic.   Eyes: EOM are normal. Pupils are equal, round, and reactive to light.   Field cut right   Neurological: She is alert and oriented to person, place, and time.   Nursing note and vitals reviewed.      Neurological Exam:   LOC: alert  Attention Span: Good   Language: No aphasia  Articulation: No dysarthria  Orientation: Person, Place, Time   Visual Fields: right field cut  EOM (CN III, IV, VI): Full/intact  Motor: Arm left  Normal 5/5  Leg left  Normal 5/5  Arm right  Normal 5/5  Leg right Normal 5/5  Tone: Normal tone throughout    Laboratory:  CMP:   Recent Labs  Lab 08/04/18  0424   CALCIUM 9.2   ALBUMIN 3.6   PROT 6.4      K 3.7   CO2 30*   CL 99   BUN 14   CREATININE 0.8   ALKPHOS 59   ALT 9*   AST 11   BILITOT 0.4     CBC:   Recent Labs  Lab 08/04/18  0424   WBC 5.97   RBC 4.55   HGB 13.1   HCT 39.2      MCV 86   MCH 28.8   MCHC 33.4     Lipid Panel: No results for input(s): CHOL, LDLCALC, HDL, TRIG in the last 168 hours.  Coagulation:   Recent Labs  Lab 08/04/18  0424   INR 1.0   APTT 24.8     Platelet Aggregation Study: No results for input(s): PLTAGG, PLTAGINTERP, PLTAGREGLACO, ADPPLTAGGREG in the last 168 hours.  Hgb A1C:    Recent Labs  Lab 08/03/18  1503   HGBA1C 5.3     TSH:   Recent Labs  Lab 08/03/18  1503   TSH 1.544       Diagnostic Results     Brain Imaging   MRI Brain w/o contrast 8-4-18 results:  No acute intracranial abnormality    Vessel Imaging   CTA  head and neck  contrast 8-3-18 results:  No acute intracranial abnormality.  No high-grade stenosis or major vessel occlusion.    Cardiac Imaging   2D Echo 8-4-18 results:  The left atrial volume index is normal    1 - Normal left ventricular systolic function (EF 60-65%).     2 - Concentric remodeling.     3 - No wall motion abnormalities.     4 - Normal left ventricular diastolic function.     5 - Normal right ventricular systolic function .     6 - The estimated PA systolic pressure is 22 mmHg.     7 - Trivial to mild tricuspid regurgitation.       Karen Pink NP  Comprehensive Stroke Center  Department of Vascular Neurology   [facility]

## 2018-08-04 NOTE — PROGRESS NOTES
Pt discharged and ambulated off unit with sister. VSS, NAD noted. IV removed with catheter intact. Discharge instructions reviewed and pt verbalized understanding.

## 2018-08-07 ENCOUNTER — TELEPHONE (OUTPATIENT)
Dept: FAMILY MEDICINE | Facility: CLINIC | Age: 58
End: 2018-08-07

## 2018-08-07 ENCOUNTER — TELEPHONE (OUTPATIENT)
Dept: NEUROSURGERY | Facility: HOSPITAL | Age: 58
End: 2018-08-07

## 2018-08-07 ENCOUNTER — OFFICE VISIT (OUTPATIENT)
Dept: FAMILY MEDICINE | Facility: CLINIC | Age: 58
End: 2018-08-07
Payer: MEDICARE

## 2018-08-07 VITALS
HEART RATE: 78 BPM | BODY MASS INDEX: 38.95 KG/M2 | HEIGHT: 60 IN | OXYGEN SATURATION: 97 % | TEMPERATURE: 98 F | WEIGHT: 198.38 LBS

## 2018-08-07 DIAGNOSIS — I10 ESSENTIAL HYPERTENSION: Chronic | ICD-10-CM

## 2018-08-07 DIAGNOSIS — I63.231: Primary | ICD-10-CM

## 2018-08-07 DIAGNOSIS — H53.9 VISUAL DISTURBANCE: ICD-10-CM

## 2018-08-07 DIAGNOSIS — E78.5 HYPERLIPIDEMIA LDL GOAL <70: ICD-10-CM

## 2018-08-07 DIAGNOSIS — E03.9 HYPOTHYROIDISM, UNSPECIFIED TYPE: Chronic | ICD-10-CM

## 2018-08-07 PROCEDURE — 99214 OFFICE O/P EST MOD 30 MIN: CPT | Mod: S$GLB,,, | Performed by: FAMILY MEDICINE

## 2018-08-07 NOTE — TELEPHONE ENCOUNTER
----- Message from Lucretia Ellison sent at 8/7/2018  9:43 AM CDT -----  Contact: [phone]/ self  Patient requesting to speak with you regarding establishing care. Pt wanted to inform you that she was admitted to [facility] for observation. Pt stated Meena is aware of situation and prefers to speak with her. Please advise.

## 2018-08-07 NOTE — TELEPHONE ENCOUNTER
Spoke with patient. Risk factors to patient for stroke discussed with teach back method implemented. Patient verbalized understanding of discharge instructions and medications. Follow up appointment discussed and transferred to clinic to schedule an appt. Warning signs were discussed with teach back method implemented. Instructed patient to seek medical help via 911 if new symptoms occur.

## 2018-08-07 NOTE — PROGRESS NOTES
Subjective:       Patient ID: Cara Mendoza is a 58 y.o. female.    Chief Complaint:   Chief Complaint   Patient presents with    Hospital Follow Up     Pt was seen on last week - s/p cva.  She has right sided head pain.         Patient presents for hospital f/u.  She was admitted on Aug 3 after she noticed a change in her on 8/2.  She saw her opthomologist who noted an ischemic event in her eye and she went to the ER for further evaluation. MRI was normal.  CTA of the head and neck was normal.  2D echo showed EF of 60-65%trivial to mild tricuspid regurg.  She was discharged to home on plavix, and atorvastatin.  She was told to modify risk factors by changing her diet, exercising daily, losing weight and careful control of her thyroid.   She reports that she is still having pain on the right side of her head and her vision remains cut on the right side.    She is very anxious about this because there is a strong family history of stroke.  She has not filled her medications that were prescribed yet.        Review of Systems   Constitutional: Negative for activity change, appetite change, chills and fatigue.   HENT: Negative for congestion, ear discharge, ear pain, rhinorrhea, sinus pain, sore throat and trouble swallowing.    Eyes: Positive for visual disturbance. Negative for photophobia, pain, redness and itching.   Respiratory: Negative for cough, chest tightness, shortness of breath and wheezing.    Cardiovascular: Negative for chest pain, palpitations and leg swelling.   Gastrointestinal: Negative for abdominal distention, abdominal pain, blood in stool, diarrhea, nausea and vomiting.   Genitourinary: Negative for dysuria, pelvic pain, vaginal bleeding, vaginal discharge and vaginal pain.   Musculoskeletal: Negative for arthralgias, back pain, gait problem and neck pain.   Skin: Negative for color change, pallor and rash.   Neurological: Positive for numbness and headaches. Negative for dizziness, tremors,  weakness and light-headedness.   Psychiatric/Behavioral: Negative for agitation, behavioral problems, confusion and sleep disturbance.       Objective:       Pulse 78   Temp 98.3 °F (36.8 °C)   Ht 5' (1.524 m)   Wt 90 kg (198 lb 6.4 oz)   SpO2 97%   BMI 38.75 kg/m²     Physical Exam   Constitutional: She is oriented to person, place, and time. She appears well-developed and well-nourished. No distress.   HENT:   Head: Normocephalic and atraumatic.   Right Ear: External ear normal.   Left Ear: External ear normal.   Mouth/Throat: Oropharynx is clear and moist. No oropharyngeal exudate.   Eyes: Conjunctivae and EOM are normal. Pupils are equal, round, and reactive to light. Right eye exhibits no discharge. Left eye exhibits no discharge.   Neck: Normal range of motion. Neck supple. No tracheal deviation present. No thyromegaly present.   Cardiovascular: Normal rate, regular rhythm, normal heart sounds and intact distal pulses.  Exam reveals no gallop and no friction rub.    No murmur heard.  Pulmonary/Chest: Effort normal and breath sounds normal. No respiratory distress. She has no wheezes. She has no rales. She exhibits no tenderness.   Abdominal: Soft. Bowel sounds are normal. She exhibits no distension. There is no tenderness. There is no guarding.   Genitourinary: There is no rash, tenderness or lesion on the right labia. There is no rash, tenderness or lesion on the left labia. Cervix exhibits no motion tenderness and no discharge. Right adnexum displays no mass and no tenderness. Left adnexum displays no mass and no tenderness. No erythema or tenderness in the vagina. No signs of injury around the vagina.   Musculoskeletal: Normal range of motion. She exhibits no edema, tenderness or deformity.   Neurological: She is alert and oriented to person, place, and time. She displays normal reflexes. No cranial nerve deficit. She exhibits normal muscle tone. Coordination normal.   Skin: Skin is warm and dry.  Capillary refill takes less than 2 seconds. No rash noted. She is not diaphoretic. No erythema. No pallor.   Psychiatric: She has a normal mood and affect. Her behavior is normal. Judgment normal.       Assessment:       Acute ischemic right internal carotid artery (ICA) stroke  -     We discussed reducing risk factors.  We will keep her cholesterol goal at 70 or less, lower her 's/70's.  I encouraged her to eat a healthier diet and to exercise daily and lose weight.    - We discussed them importance of taking medications as ordered.   - Bubble study was scheduled for tomorrow at [facility] and I will have her f/u with me shortly after the study is done.     Visual disturbance    Essential hypertension  -     TSH; Future; Expected date: 11/07/2018    Hypothyroidism, unspecified type    Hyperlipidemia LDL goal <70  -     CBC auto differential; Future; Expected date: 11/07/2018  -     Lipid panel; Future; Expected date: 11/07/2018  -     Comprehensive metabolic panel; Future; Expected date: 11/07/2018

## 2018-08-07 NOTE — TELEPHONE ENCOUNTER
Meena Su said you are aware of MS Mendoza situation.  She need hospital f/u  You have nothing available this week  Do you want me to schedule her with dr mondragon or do you want me to add her in some where   Please advise

## 2018-08-07 NOTE — TELEPHONE ENCOUNTER
Patient scheduled to see ; but states she will switch to Dr. De La Torre because he's a friend of the family.

## 2018-08-08 ENCOUNTER — HOSPITAL ENCOUNTER (OUTPATIENT)
Dept: CARDIOLOGY | Facility: CLINIC | Age: 58
Discharge: HOME | End: 2018-08-08
Attending: NURSE PRACTITIONER
Payer: MEDICARE

## 2018-08-08 ENCOUNTER — DOCUMENTATION ONLY (OUTPATIENT)
Dept: CARDIOLOGY | Facility: CLINIC | Age: 58
End: 2018-08-08

## 2018-08-08 DIAGNOSIS — I63.231 CEREBRAL INFARCTION DUE TO UNSPECIFIED OCCLUSION OR STENOSIS OF RIGHT CAROTID ARTERIES: ICD-10-CM

## 2018-08-08 PROCEDURE — 93306 TTE W/DOPPLER COMPLETE: CPT | Mod: PBBFAC | Performed by: INTERNAL MEDICINE

## 2018-08-08 NOTE — PROGRESS NOTES
Patient identified by 2 identifiers.   Allergies reviewed.  20 g IV placed to Rt AC.  Bubble study explained to patient, patient verbalized understanding.  Bubble performed X 2, with & without Valsalva.  Pt tolerated procedure well.  IV d/c with catheter intact, pressure dsg applied.

## 2018-08-09 ENCOUNTER — OFFICE VISIT (OUTPATIENT)
Dept: FAMILY MEDICINE | Facility: CLINIC | Age: 58
End: 2018-08-09
Payer: MEDICARE

## 2018-08-09 VITALS
OXYGEN SATURATION: 97 % | BODY MASS INDEX: 39.39 KG/M2 | HEIGHT: 60 IN | WEIGHT: 200.63 LBS | TEMPERATURE: 99 F | DIASTOLIC BLOOD PRESSURE: 82 MMHG | SYSTOLIC BLOOD PRESSURE: 126 MMHG | HEART RATE: 98 BPM

## 2018-08-09 DIAGNOSIS — I63.231: Primary | ICD-10-CM

## 2018-08-09 DIAGNOSIS — H53.9 VISUAL DISTURBANCE: ICD-10-CM

## 2018-08-09 LAB
DIASTOLIC DYSFUNCTION: NO
ESTIMATED PA SYSTOLIC PRESSURE: 25.09
MITRAL VALVE MOBILITY: NORMAL
RETIRED EF AND QEF - SEE NOTES: 60 (ref 55–65)

## 2018-08-09 PROCEDURE — 99212 OFFICE O/P EST SF 10 MIN: CPT | Mod: S$GLB,,, | Performed by: FAMILY MEDICINE

## 2018-08-09 NOTE — PROGRESS NOTES
Subjective:       Patient ID: Cara Mendoza is a 58 y.o. female.    Chief Complaint:   Chief Complaint   Patient presents with    Discuss Echo       Patient presents for f/u after a echo with bubble study.  There was no evidence of a right to left shunt on the study.  We discussed the significane of his result.  So far none of the studies done have shown a cause of her stroke.  She is scheduled to f/u with the neurologist in 5-6 weeks.    She was admitted on Aug 3 after she noticed a change in her on 8/2.  She saw her opthomologist who noted an ischemic event in her eye and she went to the ER for further evaluation. MRI was normal.  CTA of the head and neck was normal.  2D echo showed EF of 60-65%trivial to mild tricuspid regurg.  She was discharged to home on plavix, and atorvastatin.  She was told to modify risk factors by changing her diet, exercising daily, losing weight and careful control of her thyroid.   She reports that she is still having pain on the right side of her head and her vision remains cut on the right side.    She is very anxious about this because there is a strong family history of stroke.  She has not filled her medications that were prescribed yet.  I encouraged her to strt these medications ASAP.       Review of Systems   Constitutional: Negative for activity change, appetite change, chills and fatigue.   HENT: Negative for congestion, ear discharge, ear pain, rhinorrhea, sinus pain, sore throat and trouble swallowing.    Eyes: Positive for visual disturbance. Negative for photophobia, pain, redness and itching.   Respiratory: Negative for cough, chest tightness, shortness of breath and wheezing.    Cardiovascular: Negative for chest pain, palpitations and leg swelling.   Gastrointestinal: Negative for abdominal distention, abdominal pain, blood in stool, diarrhea, nausea and vomiting.   Genitourinary: Negative for dysuria, pelvic pain, vaginal bleeding, vaginal discharge and vaginal  pain.   Musculoskeletal: Negative for arthralgias, back pain, gait problem and neck pain.   Skin: Negative for color change, pallor and rash.   Neurological: Positive for numbness and headaches. Negative for dizziness, tremors, weakness and light-headedness.   Psychiatric/Behavioral: Negative for agitation, behavioral problems, confusion and sleep disturbance.       Objective:     /82   Pulse 98   Temp 98.5 °F (36.9 °C)   Ht 5' (1.524 m)   Wt 91 kg (200 lb 9.6 oz)   SpO2 97%   BMI 39.18 kg/m²       Physical Exam   Constitutional: She is oriented to person, place, and time. She appears well-developed and well-nourished. No distress.   HENT:   Head: Normocephalic and atraumatic.   Right Ear: External ear normal.   Left Ear: External ear normal.   Mouth/Throat: Oropharynx is clear and moist. No oropharyngeal exudate.   Eyes: Conjunctivae and EOM are normal. Pupils are equal, round, and reactive to light. Right eye exhibits no discharge. Left eye exhibits no discharge.   Neck: Normal range of motion. Neck supple. No tracheal deviation present. No thyromegaly present.   Cardiovascular: Normal rate, regular rhythm, normal heart sounds and intact distal pulses.  Exam reveals no gallop and no friction rub.    No murmur heard.  Pulmonary/Chest: Effort normal and breath sounds normal. No respiratory distress. She has no wheezes. She has no rales. She exhibits no tenderness.   Abdominal: Soft. Bowel sounds are normal. She exhibits no distension. There is no tenderness. There is no guarding.   Genitourinary: There is no rash, tenderness or lesion on the right labia. There is no rash, tenderness or lesion on the left labia. Cervix exhibits no motion tenderness and no discharge. Right adnexum displays no mass and no tenderness. Left adnexum displays no mass and no tenderness. No erythema or tenderness in the vagina. No signs of injury around the vagina.   Musculoskeletal: Normal range of motion. She exhibits no edema,  tenderness or deformity.   Neurological: She is alert and oriented to person, place, and time. She displays normal reflexes. No cranial nerve deficit. She exhibits normal muscle tone. Coordination normal.   Skin: Skin is warm and dry. Capillary refill takes less than 2 seconds. No rash noted. She is not diaphoretic. No erythema. No pallor.   Psychiatric: She has a normal mood and affect. Her behavior is normal. Judgment normal.       Assessment:     Acute ischemic right internal carotid artery (ICA) stroke    Visual disturbance

## 2018-08-13 ENCOUNTER — TELEPHONE (OUTPATIENT)
Dept: FAMILY MEDICINE | Facility: CLINIC | Age: 58
End: 2018-08-13

## 2018-08-13 ENCOUNTER — PATIENT MESSAGE (OUTPATIENT)
Dept: FAMILY MEDICINE | Facility: CLINIC | Age: 58
End: 2018-08-13

## 2018-08-13 NOTE — TELEPHONE ENCOUNTER
----- Message from Bailey Delgado sent at 8/13/2018  8:58 AM CDT -----  Contact: self   Patient is requesting a another referral for cardiology. Please call at [phone].

## 2018-08-13 NOTE — TELEPHONE ENCOUNTER
Good morning. Please send referral  to  at [facility]. I called this morning  to schedule an appointment  with him and I was told a referral had been sent and then canceled. I will not be able to see him without the referral. I really need to see him to discuss all of this  and have any testing done to determine plaque levels and my family's  history with heart disease. This will give me complete peace of mind. Thank you so very much.

## 2018-08-14 ENCOUNTER — TELEPHONE (OUTPATIENT)
Dept: NEUROLOGY | Facility: CLINIC | Age: 58
End: 2018-08-14

## 2018-08-14 DIAGNOSIS — I63.50 CEREBROVASCULAR ACCIDENT (CVA) DUE TO OCCLUSION OF CEREBRAL ARTERY: Primary | ICD-10-CM

## 2018-08-24 ENCOUNTER — TELEPHONE (OUTPATIENT)
Dept: NEUROLOGY | Facility: CLINIC | Age: 58
End: 2018-08-24

## 2018-09-06 DIAGNOSIS — F41.9 ANXIETY: Chronic | ICD-10-CM

## 2018-09-06 DIAGNOSIS — G44.86 CERVICOGENIC HEADACHE: ICD-10-CM

## 2018-09-06 DIAGNOSIS — S06.0X0S CONCUSSION WITHOUT LOSS OF CONSCIOUSNESS, SEQUELA: ICD-10-CM

## 2018-09-06 DIAGNOSIS — E55.9 VITAMIN D DEFICIENCY: ICD-10-CM

## 2018-09-06 DIAGNOSIS — I10 ESSENTIAL HYPERTENSION: Chronic | ICD-10-CM

## 2018-09-06 DIAGNOSIS — F43.10 PTSD (POST-TRAUMATIC STRESS DISORDER): ICD-10-CM

## 2018-09-06 RX ORDER — GABAPENTIN 400 MG/1
CAPSULE ORAL
Qty: 120 CAPSULE | Refills: 5 | Status: SHIPPED | OUTPATIENT
Start: 2018-09-06 | End: 2018-09-26 | Stop reason: SDUPTHER

## 2018-09-11 ENCOUNTER — OFFICE VISIT (OUTPATIENT)
Dept: CARDIOLOGY | Facility: CLINIC | Age: 58
End: 2018-09-11
Payer: MEDICARE

## 2018-09-11 VITALS
SYSTOLIC BLOOD PRESSURE: 156 MMHG | HEIGHT: 60 IN | WEIGHT: 200.63 LBS | DIASTOLIC BLOOD PRESSURE: 89 MMHG | HEART RATE: 70 BPM | BODY MASS INDEX: 39.39 KG/M2

## 2018-09-11 DIAGNOSIS — I63.231: Primary | ICD-10-CM

## 2018-09-11 DIAGNOSIS — E66.01 MORBID OBESITY: ICD-10-CM

## 2018-09-11 DIAGNOSIS — I10 ESSENTIAL HYPERTENSION: Chronic | ICD-10-CM

## 2018-09-11 PROCEDURE — 99213 OFFICE O/P EST LOW 20 MIN: CPT | Mod: PBBFAC,PO | Performed by: INTERNAL MEDICINE

## 2018-09-11 PROCEDURE — 99999 PR PBB SHADOW E&M-EST. PATIENT-LVL III: CPT | Mod: PBBFAC,,, | Performed by: INTERNAL MEDICINE

## 2018-09-11 PROCEDURE — 99204 OFFICE O/P NEW MOD 45 MIN: CPT | Mod: S$PBB,,, | Performed by: INTERNAL MEDICINE

## 2018-09-11 NOTE — LETTER
September 11, 2018      Mary Mccarty, DO  [address] [place] [address]           [facility]  [address] [facility] [address]  Phone: [phone]          Patient: Cara Mendoza   MR Number: [id number]   YOB: 1960   Date of Visit: 9/11/2018       Dear Dr. Mary Mccarty:    Thank you for referring Cara Mendoza to me for evaluation. Attached you will find relevant portions of my assessment and plan of care.    If you have questions, please do not hesitate to call me. I look forward to following Cara Mendoza along with you.    Sincerely,    Humberto Pedersen MD    Enclosure  CC:  No Recipients    If you would like to receive this communication electronically, please contact [email or web address] or [phone] to request more information on [employer] Link access.    For providers and/or their staff who would like to refer a patient to [facility], please contact us through our one-stop-shop provider referral line, [facility], at [phone].    If you feel you have received this communication in error or would no longer like to receive these types of communications, please e-mail [email or web address]

## 2018-09-11 NOTE — PROGRESS NOTES
Subjective:    Patient ID:  Cara Mendoza is a 58 y.o. female who presents for follow-up of Hyperlipidemia      HPI     Referred by Dr Mccarty    Patient presents for f/u after a echo with bubble study.  There was no evidence of a right to left shunt on the study.  We discussed the significane of his result.  So far none of the studies done have shown a cause of her stroke.  She is scheduled to f/u with the neurologist in 5-6 weeks.    She was admitted on Aug 3 after she noticed a change in her on 8/2.  She saw her opthomologist who noted an ischemic event in her eye and she went to the ER for further evaluation. MRI was normal.  CTA of the head and neck was normal.  2D echo showed EF of 60-65%trivial to mild tricuspid regurg.  She was discharged to home on plavix, and atorvastatin.  She was told to modify risk factors by changing her diet, exercising daily, losing weight and careful control of her thyroid.   She reports that she is still having pain on the right side of her head and her vision remains cut on the right side.    She is very anxious about this because there is a strong family history of stroke.  She has not filled her medications that were prescribed yet.  I encouraged her to strt these medications ASAP.     Echo 8/8/18    1 - Normal left ventricular systolic function (EF 60-65%).     2 - No wall motion abnormalities.     3 - Normal left ventricular diastolic function.     4 - Normal right ventricular systolic function .     5 - The estimated PA systolic pressure is 25 mmHg.     6 - No evidence of intracardiac shunt.     Reports GABRIEL  Denies CP        Review of Systems   Constitution: Negative for decreased appetite.   HENT: Negative for ear discharge.    Eyes: Negative for blurred vision.   Respiratory: Negative for hemoptysis.    Endocrine: Negative for polyphagia.   Hematologic/Lymphatic: Negative for adenopathy.   Skin: Negative for color change.   Musculoskeletal: Negative for joint  swelling.   Neurological: Negative for brief paralysis.   Psychiatric/Behavioral: Negative for hallucinations.        Objective:    Physical Exam   Constitutional: She is oriented to person, place, and time. She appears well-developed and well-nourished.   HENT:   Head: Normocephalic and atraumatic.   Eyes: Conjunctivae are normal. Pupils are equal, round, and reactive to light.   Neck: Normal range of motion. Neck supple.   Cardiovascular: Normal rate, normal heart sounds and intact distal pulses.   Pulmonary/Chest: Effort normal and breath sounds normal.   Abdominal: Soft. Bowel sounds are normal.   Musculoskeletal: Normal range of motion.   Neurological: She is alert and oriented to person, place, and time.   Skin: Skin is warm and dry.         Assessment:       1. Acute ischemic right internal carotid artery (ICA) stroke    2. Essential hypertension    3. Morbid obesity         Plan:       Discussed placing an ILR or event monitor to screen for possible PAF - she declines at this time.  Suspect GABRIEL is related to weight and lack of exercise  Will observe symptoms for now  OV 6 months

## 2018-09-13 ENCOUNTER — OFFICE VISIT (OUTPATIENT)
Dept: FAMILY MEDICINE | Facility: CLINIC | Age: 58
End: 2018-09-13
Payer: MEDICARE

## 2018-09-13 VITALS
OXYGEN SATURATION: 96 % | BODY MASS INDEX: 39.87 KG/M2 | DIASTOLIC BLOOD PRESSURE: 76 MMHG | SYSTOLIC BLOOD PRESSURE: 118 MMHG | HEIGHT: 60 IN | HEART RATE: 91 BPM | TEMPERATURE: 98 F | WEIGHT: 203.06 LBS

## 2018-09-13 DIAGNOSIS — E78.5 HYPERLIPIDEMIA LDL GOAL <70: ICD-10-CM

## 2018-09-13 DIAGNOSIS — D35.02 ADENOMA OF LEFT ADRENAL GLAND: ICD-10-CM

## 2018-09-13 DIAGNOSIS — F43.10 PTSD (POST-TRAUMATIC STRESS DISORDER): ICD-10-CM

## 2018-09-13 DIAGNOSIS — F41.9 ANXIETY: ICD-10-CM

## 2018-09-13 DIAGNOSIS — K29.50 CHRONIC GASTRITIS WITHOUT BLEEDING, UNSPECIFIED GASTRITIS TYPE: ICD-10-CM

## 2018-09-13 DIAGNOSIS — I10 ESSENTIAL HYPERTENSION: Primary | ICD-10-CM

## 2018-09-13 DIAGNOSIS — E03.9 HYPOTHYROIDISM, UNSPECIFIED TYPE: ICD-10-CM

## 2018-09-13 PROBLEM — G47.09 OTHER INSOMNIA: Status: RESOLVED | Noted: 2018-01-19 | Resolved: 2018-09-13

## 2018-09-13 PROCEDURE — 99213 OFFICE O/P EST LOW 20 MIN: CPT | Mod: 25,S$GLB,, | Performed by: FAMILY MEDICINE

## 2018-09-13 NOTE — PROGRESS NOTES
Patient ID: Cara Mendoza is a 58 y.o. female.    Chief Complaint: Establish Care    HPI      Cara Mendoza is a 58 y.o. female     Adrenal ademoma - observing at this time  Chronic gastritis  On ppi gi in [facility]  Hypothyroidism - on meds    HTN esssential  Post tramatic HA     Anxiety.              Review of Symptoms    Constitutional  Neg activity change, No chills /fever   Resp  Neg hemoptysis, stridor, choking  CVS  Neg chest pain, palpitations    Physical Exam    Constitutional:  Oriented to person, place, and time.appears well-developed and well-nourished.  No distress.      HENT  Head: Normocephalic and atraumatic  Right Ear: External ear normal.   Left Ear: External ear normal.   Nose: External nose normal.   Mouth:  Moist mucus membranes.    Eyes:  Conjunctivae are normal. Right eye exhibits no discharge.  Left eye exhibits no discharge. No scleral icterus.  No periorbital edema    Cardiovascular:  Regular rate and rhythm with normal S1 and S2       Pulmonary/Chest:   Clear to auscultation bilaterally without wheezes, rhonchi or rales    Musculoskeletal:  No edema. No obvious deformity No wasting       Neurological:  Alert and oriented to person, place, and time.   Coordination normal.     Skin:   Skin is warm and dry.  No diaphoresis.   No rash noted.     Psychiatric: Normal mood and affect. Behavior is normal.  Judgment and thought content normal.     Complete Blood Count  Lab Results   Component Value Date    RBC 4.55 08/04/2018    HGB 13.1 08/04/2018    HCT 39.2 08/04/2018    MCV 86 08/04/2018    MCH 28.8 08/04/2018    MCHC 33.4 08/04/2018    RDW 14.6 (H) 08/04/2018     08/04/2018    MPV 11.0 08/04/2018    GRAN 2.1 08/04/2018    GRAN 35.2 (L) 08/04/2018    LYMPH 3.1 08/04/2018    LYMPH 51.4 (H) 08/04/2018    MONO 0.6 08/04/2018    MONO 9.7 08/04/2018    EOS 0.2 08/04/2018    BASO 0.02 08/04/2018    EOSINOPHIL 3.2 08/04/2018    BASOPHIL 0.3 08/04/2018    DIFFMETHOD Automated 08/04/2018        Comprehensive Metabolic Panel  Lab Results   Component Value Date    GLU 93 08/04/2018    BUN 14 08/04/2018    CREATININE 0.8 08/04/2018     08/04/2018    K 3.7 08/04/2018    CL 99 08/04/2018    PROT 6.4 08/04/2018    ALBUMIN 3.6 08/04/2018    BILITOT 0.4 08/04/2018    AST 11 08/04/2018    ALKPHOS 59 08/04/2018    CO2 30 (H) 08/04/2018    ALT 9 (L) 08/04/2018    ANIONGAP 9 08/04/2018    EGFRNONAA >60.0 08/04/2018    ESTGFRAFRICA >60.0 08/04/2018       TSH  Lab Results   Component Value Date    TSH 1.544 08/03/2018       Assessment / Plan:      ICD-10-CM ICD-9-CM   1. Essential hypertension I10 401.9   2. Hypothyroidism, unspecified type E03.9 244.9   3. Hyperlipidemia LDL goal <70 E78.5 272.4   4. PTSD (post-traumatic stress disorder) F43.10 309.81   5. Adenoma of left adrenal gland D35.02 227.0   6. Chronic gastritis without bleeding, unspecified gastritis type K29.50 535.10   7. Anxiety F41.9 300.00     Essential hypertension    Hypothyroidism, unspecified type    Hyperlipidemia LDL goal <70    PTSD (post-traumatic stress disorder)    Adenoma of left adrenal gland    Chronic gastritis without bleeding, unspecified gastritis type    Anxiety      At this point in time continue current medications-stable hypertension hypothyroidism hyperlipidemia PTSD an adenoma    Discussed keeping adenoma because intolerant to steroids if removed would have difficulty replacing steroids

## 2018-09-26 ENCOUNTER — OFFICE VISIT (OUTPATIENT)
Dept: NEUROLOGY | Facility: CLINIC | Age: 58
End: 2018-09-26
Payer: MEDICARE

## 2018-09-26 VITALS
SYSTOLIC BLOOD PRESSURE: 116 MMHG | BODY MASS INDEX: 38.91 KG/M2 | DIASTOLIC BLOOD PRESSURE: 81 MMHG | HEART RATE: 74 BPM | HEIGHT: 60 IN | WEIGHT: 198.19 LBS

## 2018-09-26 DIAGNOSIS — I10 ESSENTIAL HYPERTENSION: Chronic | ICD-10-CM

## 2018-09-26 DIAGNOSIS — F41.9 ANXIETY: Chronic | ICD-10-CM

## 2018-09-26 DIAGNOSIS — I63.231: ICD-10-CM

## 2018-09-26 DIAGNOSIS — G44.321 INTRACTABLE CHRONIC POST-TRAUMATIC HEADACHE: Primary | ICD-10-CM

## 2018-09-26 DIAGNOSIS — E66.01 SEVERE OBESITY (BMI 35.0-39.9) WITH COMORBIDITY: ICD-10-CM

## 2018-09-26 DIAGNOSIS — F43.10 PTSD (POST-TRAUMATIC STRESS DISORDER): ICD-10-CM

## 2018-09-26 DIAGNOSIS — G44.86 CERVICOGENIC HEADACHE: ICD-10-CM

## 2018-09-26 PROCEDURE — 99213 OFFICE O/P EST LOW 20 MIN: CPT | Mod: PBBFAC | Performed by: NURSE PRACTITIONER

## 2018-09-26 PROCEDURE — 99999 PR PBB SHADOW E&M-EST. PATIENT-LVL III: CPT | Mod: PBBFAC,,, | Performed by: NURSE PRACTITIONER

## 2018-09-26 PROCEDURE — 99214 OFFICE O/P EST MOD 30 MIN: CPT | Mod: S$PBB,,, | Performed by: NURSE PRACTITIONER

## 2018-09-26 RX ORDER — GABAPENTIN 400 MG/1
CAPSULE ORAL
Qty: 120 CAPSULE | Refills: 11 | Status: SHIPPED | OUTPATIENT
Start: 2018-09-26 | End: 2019-10-04 | Stop reason: SDUPTHER

## 2018-09-26 NOTE — PROGRESS NOTES
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    11:52    Results for orders placed or performed during the hospital encounter of 08/22/17   CT Head Without Contrast    Addendum: 10/23/2017    All CT scans at this facility use dose modulation, iterative reconstruction and/or weight based dosing when appropriate to reduce radiation dose to as low as reasonably achievable.      Electronically signed by: BRENDON VELEZ MD  Date:     10/23/17  Time:    14:58       Narrative    CT OF THE HEAD WITHOUT CONTRAST:     Technique: 5 mm axial images were obtained from the skullbase to the vertex, without administration of contrast.    Comparison: None.    History:  Trauma    Findings:    No intracranial hemorrhage, extra-axial fluid collection, midline shift, or mass effect.  No evidence of an acute cortical infarct or of an infarct in a major vascular territory.    There is mild prominence of the ventricles and sulci compatible with diffuse cerebral volume loss.  Patchy hypoattenuation in the periventricular white matter regions is nonspecific, but most commonly seen with chronic microvascular ischemic changes. Vascular calcifications noted.    The calvarium is unremarkable. Visualized portions of the paranasal sinuses are clear. Visualized mastoid air cells are clear. Visualized orbits are unremarkable.    Impression         No acute abnormalities          Electronically signed by: BRENDON VELEZ MD  Date:     08/22/17  Time:    07:53      Note: I have independently reviewed any/all imaging/labs/tests and agree with the report (s) as documented.  Any discrepancies will be as noted/demarcated by free text.  AMY BUSTAMANTE 9/26/2018    ASSESSMENT:  1. Intractable chronic post-traumatic headache    2. Cervicogenic headache    3. Essential hypertension    4. Severe obesity (BMI 35.0-39.9) with comorbidity    5. Acute ischemic right internal carotid artery (ICA) stroke    6. PTSD (post-traumatic stress disorder)    7. Anxiety      PLAN:  - Continue Gabapentin 400/400/800 mg daily   - Continue tracking headaches   - HTN - blood pressure well controlled on HCTZ, per PCP   - Obesity - encouraged her to continue daily exercise and following Keto diet, has lost 8 pounds (per patient) since starting her diet  - Stroke - continue plavix daily, recommend she f/up with vascular neurology   - PTSD - well controlled on lexapro, per PCP   - Anxiety - has prn xanax, management per PCP   - All care returned back to primary care, have provided patient with 1 year of refills on Gabapentin, all further refills will need to be filled by PCP   - RTC as needed     Orders Placed This Encounter    gabapentin (NEURONTIN) 400 MG capsule     Questions and concerns were sought and answered to the patient's stated verbal satisfaction.  The patient verbalizes understanding and agreement with the above stated treatment plan.     CC: MD Jennifer Lee, DAMASOP-C  [facility] [place]   [phone]    Dr. Hall was available during today's encounter.

## 2018-11-07 DIAGNOSIS — F41.9 ANXIETY: ICD-10-CM

## 2018-11-07 RX ORDER — ALPRAZOLAM 0.5 MG/1
0.5 TABLET ORAL 2 TIMES DAILY PRN
Qty: 180 TABLET | Refills: 1 | Status: SHIPPED | OUTPATIENT
Start: 2018-11-07 | End: 2019-04-25 | Stop reason: SDUPTHER

## 2018-11-07 NOTE — TELEPHONE ENCOUNTER
Patient's last OV on 09/13/2018    ----- Message from Jossie Krishnan sent at 11/7/2018 11:27 AM CST -----  Contact: self, [phone]  Patient requests her 3 months supply of Alprazolam 0.5 mg prescription refill sent to the [facility] in [place]. States she takes one pill two times per day, one in the morning and one at night.

## 2018-11-08 DIAGNOSIS — F41.9 ANXIETY: ICD-10-CM

## 2018-11-09 ENCOUNTER — PATIENT MESSAGE (OUTPATIENT)
Dept: FAMILY MEDICINE | Facility: CLINIC | Age: 58
End: 2018-11-09

## 2018-11-09 RX ORDER — ALPRAZOLAM 0.5 MG/1
0.5 TABLET ORAL 2 TIMES DAILY PRN
Qty: 180 TABLET | Refills: 1 | OUTPATIENT
Start: 2018-11-09 | End: 2018-12-09

## 2018-11-15 DIAGNOSIS — Z12.31 ENCOUNTER FOR SCREENING MAMMOGRAM FOR MALIGNANT NEOPLASM OF BREAST: Primary | ICD-10-CM

## 2019-01-11 ENCOUNTER — NURSE TRIAGE (OUTPATIENT)
Dept: ADMINISTRATIVE | Facility: CLINIC | Age: 59
End: 2019-01-11

## 2019-01-11 NOTE — TELEPHONE ENCOUNTER
Reason for Disposition   [1] Skipped or extra beat(s) AND [2] occurs < 4 times / minute    Protocols used: ST HEART RATE AND HEART BEAT [id number]    Patient called with concerns about heart rate. She checked it twice and no symptoms. First check with fit bit watch was 200 and manually it is 78. Education done. Patient verbalized understanding.

## 2019-01-15 ENCOUNTER — OFFICE VISIT (OUTPATIENT)
Dept: FAMILY MEDICINE | Facility: CLINIC | Age: 59
End: 2019-01-15
Payer: MEDICARE

## 2019-01-15 ENCOUNTER — TELEPHONE (OUTPATIENT)
Dept: FAMILY MEDICINE | Facility: CLINIC | Age: 59
End: 2019-01-15

## 2019-01-15 VITALS
WEIGHT: 195.56 LBS | OXYGEN SATURATION: 97 % | HEIGHT: 60 IN | TEMPERATURE: 98 F | SYSTOLIC BLOOD PRESSURE: 104 MMHG | HEART RATE: 76 BPM | BODY MASS INDEX: 38.39 KG/M2 | DIASTOLIC BLOOD PRESSURE: 68 MMHG

## 2019-01-15 DIAGNOSIS — J30.1 ALLERGIC RHINITIS DUE TO POLLEN, UNSPECIFIED SEASONALITY: Primary | ICD-10-CM

## 2019-01-15 DIAGNOSIS — H65.90 FLUID LEVEL BEHIND TYMPANIC MEMBRANE, UNSPECIFIED LATERALITY: ICD-10-CM

## 2019-01-15 PROCEDURE — 99213 PR OFFICE/OUTPT VISIT, EST, LEVL III, 20-29 MIN: ICD-10-PCS | Mod: S$GLB,,, | Performed by: FAMILY MEDICINE

## 2019-01-15 PROCEDURE — 99213 OFFICE O/P EST LOW 20 MIN: CPT | Mod: S$GLB,,, | Performed by: FAMILY MEDICINE

## 2019-01-15 RX ORDER — FLUTICASONE PROPIONATE 50 MCG
1 SPRAY, SUSPENSION (ML) NASAL DAILY
Qty: 16 G | Refills: 0 | Status: SHIPPED | OUTPATIENT
Start: 2019-01-15 | End: 2019-03-11

## 2019-01-15 RX ORDER — TIZANIDINE 2 MG/1
TABLET ORAL
COMMUNITY
Start: 2018-12-16 | End: 2019-03-11

## 2019-01-15 NOTE — TELEPHONE ENCOUNTER
----- Message from Maria Teresa Guidry sent at 1/15/2019  8:07 AM CST -----  Contact: [phone]/self  Patient would like to be seen today. Please advise.

## 2019-01-20 NOTE — PROGRESS NOTES
Patient ID: Cara Mendoza is a 58 y.o. female.    Chief Complaint: Ear pain; foot pain    HPI       Cara Mendoza is a 58 y.o. female complains that she has in year infection.  Scant drainage from ear which has resolved.  Feels a little fullness.  No fever chills no nausea vomiting diarrhea.  Occasional foot pain-plantar surface-especially when she gets up to move.  Not bothering her much at this time.      Review of Symptoms    Constitutional  Neg activity change, No chills/ fever   Resp  Neg hemoptysis, stridor, choking  CVS  Neg chest pain, palpitations        Physical Exam    Vitals:    01/15/19 1130   BP: 104/68   Pulse: 76   Temp: 98.2 °F (36.8 °C)       Constitutional:   Oriented to person, place, and time.appears well-developed and well-nourished.   No distress.     HENT:   Head: Normocephalic and atraumatic.     Right Ear: Tympanic membrane, external ear and ear canal normal     Left Ear: Tympanic membrane, external ear and ear canal normal    Nose: External Normal. Normal turbinates, No rhinorrhea     Mouth/Throat: Uvula is midline, oropharynx is clear and moist and mucous membranes are normal.     Neck: supple no anterior cervical adenopathy    Eyes:   Conjunctivae are normal. Right eye exhibits no discharge. Left eye exhibits no discharge. No scleral icterus. No periorbital edema       Cardiovascular:  Regular rate and rhythm with normal S1 and S2     Pulmonary/Chest:   Clear to auscultation bilaterally without wheezes, rhonchi or rales    Musculoskeletal:  No edema. No obvious deformity No wasting     Neurological:   Alert and oriented to person, place, and time. Coordination normal.     Skin:   Skin is warm and dry.   No diaphoresis.   No rash noted.    Psychiatric: Normal mood and affect. Behavior is normal. Judgment and thought content normal.       Assessment / Plan:      ICD-10-CM ICD-9-CM   1. Allergic rhinitis due to pollen, unspecified seasonality J30.1 477.0   2. Fluid level behind  tympanic membrane, unspecified laterality H65.90 381.4     Allergic rhinitis due to pollen, unspecified seasonality    Fluid level behind tympanic membrane, unspecified laterality    Other orders  -     fluticasone (FLONASE) 50 mcg/actuation nasal spray; 1 spray (50 mcg total) by Each Nare route once daily.  Dispense: 16 g; Refill: 0     Discussed rolling foot over frozen ice stretching leg

## 2019-01-29 ENCOUNTER — PATIENT MESSAGE (OUTPATIENT)
Dept: FAMILY MEDICINE | Facility: CLINIC | Age: 59
End: 2019-01-29

## 2019-01-29 RX ORDER — ESCITALOPRAM OXALATE 20 MG/1
20 TABLET ORAL DAILY
Qty: 90 TABLET | Refills: 2 | Status: SHIPPED | OUTPATIENT
Start: 2019-01-29 | End: 2019-10-28 | Stop reason: SDUPTHER

## 2019-02-03 ENCOUNTER — NURSE TRIAGE (OUTPATIENT)
Dept: ADMINISTRATIVE | Facility: CLINIC | Age: 59
End: 2019-02-03

## 2019-02-03 ENCOUNTER — HOSPITAL ENCOUNTER (EMERGENCY)
Facility: HOSPITAL | Age: 59
Discharge: HOME | End: 2019-02-03
Attending: SURGERY
Payer: MEDICARE

## 2019-02-03 VITALS
WEIGHT: 186 LBS | HEART RATE: 86 BPM | TEMPERATURE: 99 F | SYSTOLIC BLOOD PRESSURE: 117 MMHG | HEIGHT: 60 IN | BODY MASS INDEX: 36.52 KG/M2 | RESPIRATION RATE: 19 BRPM | DIASTOLIC BLOOD PRESSURE: 68 MMHG | OXYGEN SATURATION: 96 %

## 2019-02-03 DIAGNOSIS — J10.1 INFLUENZA A: Primary | ICD-10-CM

## 2019-02-03 DIAGNOSIS — J11.1 FLU: ICD-10-CM

## 2019-02-03 LAB
FLUAV AG SPEC QL IA: POSITIVE
FLUBV AG SPEC QL IA: NEGATIVE
SPECIMEN SOURCE: ABNORMAL

## 2019-02-03 PROCEDURE — 87400 INFLUENZA A/B EACH AG IA: CPT | Mod: 59,ER

## 2019-02-03 PROCEDURE — 99284 EMERGENCY DEPT VISIT MOD MDM: CPT | Mod: ER,25

## 2019-02-03 PROCEDURE — 25000003 PHARM REV CODE 250: Mod: ER | Performed by: SURGERY

## 2019-02-03 RX ORDER — HYDROCODONE BITARTRATE AND HOMATROPINE METHYLBROMIDE ORAL SOLUTION 5; 1.5 MG/5ML; MG/5ML
5 LIQUID ORAL EVERY 4 HOURS PRN
Qty: 120 ML | Refills: 0 | Status: SHIPPED | OUTPATIENT
Start: 2019-02-03 | End: 2019-02-03

## 2019-02-03 RX ORDER — ONDANSETRON 4 MG/1
8 TABLET, ORALLY DISINTEGRATING ORAL
Status: COMPLETED | OUTPATIENT
Start: 2019-02-03 | End: 2019-02-03

## 2019-02-03 RX ORDER — HYDROCODONE BITARTRATE AND ACETAMINOPHEN 7.5; 325 MG/15ML; MG/15ML
10 SOLUTION ORAL
Status: COMPLETED | OUTPATIENT
Start: 2019-02-03 | End: 2019-02-03

## 2019-02-03 RX ORDER — OSELTAMIVIR PHOSPHATE 75 MG/1
75 CAPSULE ORAL 2 TIMES DAILY
Qty: 10 CAPSULE | Refills: 0 | Status: SHIPPED | OUTPATIENT
Start: 2019-02-03 | End: 2019-02-08

## 2019-02-03 RX ORDER — HYDROCODONE BITARTRATE AND ACETAMINOPHEN 5; 325 MG/1; MG/1
1 TABLET ORAL EVERY 8 HOURS PRN
Qty: 10 TABLET | Refills: 0 | Status: SHIPPED | OUTPATIENT
Start: 2019-02-03 | End: 2019-02-06

## 2019-02-03 RX ADMIN — ONDANSETRON 8 MG: 4 TABLET, ORALLY DISINTEGRATING ORAL at 07:02

## 2019-02-03 RX ADMIN — HYDROCODONE BITARTRATE AND ACETAMINOPHEN 10 ML: 7.5; 325 SOLUTION ORAL at 07:02

## 2019-02-03 NOTE — ED PROVIDER NOTES
"Encounter Date: 2/3/2019       History     Chief Complaint   Patient presents with    Influenza     Pt c/o flu like symptoms X 4 days.     Complaint of flu-like symptoms 4 days.  Symptoms include cough congestion generalized pain and fatigue and low-grade fever      The history is provided by the patient.   Influenza   This is a new problem. The current episode started more than 2 days ago. The problem occurs hourly. The problem has not changed since onset.Associated symptoms include headaches. Pertinent negatives include no chest pain and no abdominal pain. Nothing aggravates the symptoms. Nothing relieves the symptoms. She has tried acetaminophen for the symptoms. The treatment provided no relief.     Review of patient's allergies indicates:   Allergen Reactions    Ciprofloxacin     Codeine Nausea And Vomiting and Hives    Compazine [prochlorperazine edisylate] Anaphylaxis and Nausea And Vomiting    Demerol [meperidine] Anaphylaxis and Nausea And Vomiting    Morpholine analogues Anaphylaxis and Nausea And Vomiting     vomit    Pcn [penicillins] Other (See Comments) and Anaphylaxis     Stomach cramps    Percocet [oxycodone-acetaminophen] Nausea And Vomiting    Corticosteroids (glucocorticoids) Itching     Agitation, "skin crawling" sensation    Cortisone Itching     Agitation, "skin crawling" sensation    Fentanyl Nausea And Vomiting and Swelling     Fentanyl patch made lips numb and swollen    Morphine Nausea And Vomiting    Nsaids (non-steroidal anti-inflammatory drug) Other (See Comments) and Nausea And Vomiting     Cluster ulcers    Toradol [ketorolac] Nausea And Vomiting     ulcers    Tramadol Nausea And Vomiting     Past Medical History:   Diagnosis Date    Adrenal adenoma     Gastric ulcer     Glaucoma     Hypothyroidism     Kidney stones     Pancreatitis     Traumatic compression fracture of L1 lumbar vertebra      Past Surgical History:   Procedure Laterality Date    CHOLECYSTECTOMY  "     COLONOSCOPY N/A 2016    Performed by [facility] at [facility] ENDO (4TH FLR)    CYSTOSCOPY      EGD  N/A 9/10/2014    Per[facility] by [facility] at [facility]    ESOPHAGOGASTRODUODENOSCOPY (EGD) N/A 2016    Performed by [facility] Strickland [facility] at [facility] ENDO (4TH FLR)    ESOPHAGOGASTRODUODENOSCOPY (EGD) N/A 2016    Per[facility] by [facility] END[facility]    ESOPHAGOGASTRODUODENOSCOPY (EGD) N/A 7/15/2014    Per[facility] ENDO    HYSTERECTOMY      KYPHOPLASTY N/A 2015    Performed by [facility]ed Montes MD at [facility] OR 2ND FLR    KYPHOSIS SURGERY  7/20/15    right adrenal gland remove      tonsillectomy      TONSILLECTOMY      TUBAL LIGATION      ULTRASOUND-ENDOSCOPIC-UPPER N/A 2017    Performed by Chong Ortiz MD at [facility] ENDO (2ND FLR)    ULTRASOUND-ENDOSCOPIC-UPPER N/A 2016    [facility] ENDO    ULTRASOUND-ENDOSCOPIC-UPPER N/A 7/15/2014    Per[facility] by [facility] ENDO    x2        Family History   Problem Relation Age of Onset    Diabetes Father         borderline    Heart attack Father      Social History     Tobacco Use    Smoking status: Never Smoker    Smokeless tobacco: Never Used   Substance Use Topics    Alcohol use: No    Drug use: No     Review of Systems   Constitutional: Positive for fatigue and fever.   HENT: Positive for congestion.    Eyes: Negative.    Respiratory: Negative.    Cardiovascular: Negative.  Negative for chest pain.   Gastrointestinal: Negative.  Negative for abdominal pain.   Endocrine: Negative.    Genitourinary: Negative.    Musculoskeletal: Positive for myalgias.   Skin: Negative.    Allergic/Immunologic: Negative.    Neurological: Positive for headaches.   Hematological: Negative.    Psychiatric/Behavioral: Negative.        Physical Exam     Initial Vitals [19 0633]   BP Pulse Resp Temp SpO2   117/68 86 19 98.5 °F (36.9 °C) 96 %      MAP       --          Physical Exam    Nursing note and vitals reviewed.  Constitutional: She appears well-developed and well-nourished.   HENT:   Head: Normocephalic.   Eyes: Conjunctivae are normal.   Neck: Normal range of motion.   Cardiovascular: Normal rate, regular rhythm, normal heart sounds and intact distal pulses.   Pulmonary/Chest: Breath sounds normal.   Abdominal: Soft.   Musculoskeletal: Normal range of motion.   Neurological: She is alert and oriented to person, place, and time.   Skin: Skin is warm and dry. Capillary refill takes less than 2 seconds.   Psychiatric: She has a normal mood and affect.         ED Course   Procedures  Labs Reviewed   INFLUENZA A AND B ANTIGEN - Abnormal; Notable for the following components:       Result Value    Influenza A Ag, EIA Positive (*)     All other components within normal limits          Imaging Results          X-Ray Chest PA And Lateral (Final result)  Result time 02/03/19 08:02:37    Final result by Pastor Land III, MD (02/03/19 08:02:37)                 Impression:      No acute disease identified in the chest.      Electronically signed by: Pastor Land MD  Date:    02/03/2019  Time:    08:02             Narrative:    EXAMINATION:  XR CHEST PA AND LATERAL    CLINICAL HISTORY:  Influenza due to unidentified influenza virus with other respiratory manifestations    COMPARISON:  08/22/2017    FINDINGS:  The cardiomediastinal silhouette is within normal limits.  Symmetric hazy opacities projecting over the lung bases are consistent with summation artifact from overlying breast attenuation and pulmonary vasculature.  No acute appearing infiltrate, pleural effusion or pneumothorax identified.  No acute osseous abnormality identified.                                 Medical Decision Making:   Initial Assessment:   Influenza a without evidence of pneumonia  ED Management:  Recommend Tamiflu and cough syrup with decongestants                      Clinical Impression:   The  primary encounter diagnosis was Influenza A. A diagnosis of Flu was also pertinent to this visit.      Disposition:   Disposition: Discharged  Condition: Raad Riggs III, MD  02/03/19 7794

## 2019-03-08 ENCOUNTER — HOSPITAL ENCOUNTER (EMERGENCY)
Facility: HOSPITAL | Age: 59
Discharge: HOME | End: 2019-03-09
Attending: EMERGENCY MEDICINE
Payer: MEDICARE

## 2019-03-08 DIAGNOSIS — M94.0 COSTOCHONDRITIS: Primary | ICD-10-CM

## 2019-03-08 DIAGNOSIS — J40 BRONCHITIS: ICD-10-CM

## 2019-03-08 DIAGNOSIS — R06.02 SHORTNESS OF BREATH: ICD-10-CM

## 2019-03-08 LAB
ALBUMIN SERPL BCP-MCNC: 4 G/DL
ALP SERPL-CCNC: 77 U/L
ALT SERPL W/O P-5'-P-CCNC: 13 U/L
ANION GAP SERPL CALC-SCNC: 6 MMOL/L
AST SERPL-CCNC: 20 U/L
BASOPHILS # BLD AUTO: 0.02 K/UL
BASOPHILS NFR BLD: 0.2 %
BILIRUB SERPL-MCNC: 0.4 MG/DL
BUN SERPL-MCNC: 17 MG/DL
CALCIUM SERPL-MCNC: 8.9 MG/DL
CHLORIDE SERPL-SCNC: 96 MMOL/L
CO2 SERPL-SCNC: 32 MMOL/L
CREAT SERPL-MCNC: 0.64 MG/DL
D DIMER PPP FEU-MCNC: <200 NG/ML
DIFFERENTIAL METHOD: ABNORMAL
EOSINOPHIL # BLD AUTO: 0.2 K/UL
EOSINOPHIL NFR BLD: 1.8 %
ERYTHROCYTE [DISTWIDTH] IN BLOOD BY AUTOMATED COUNT: 15.1 %
EST. GFR  (AFRICAN AMERICAN): >60 ML/MIN/1.73 M^2
EST. GFR  (NON AFRICAN AMERICAN): >60 ML/MIN/1.73 M^2
GLUCOSE SERPL-MCNC: 93 MG/DL
HCT VFR BLD AUTO: 40.2 %
HGB BLD-MCNC: 13.2 G/DL
LYMPHOCYTES # BLD AUTO: 3 K/UL
LYMPHOCYTES NFR BLD: 28.1 %
MCH RBC QN AUTO: 28.3 PG
MCHC RBC AUTO-ENTMCNC: 32.8 G/DL
MCV RBC AUTO: 86 FL
MONOCYTES # BLD AUTO: 1.1 K/UL
MONOCYTES NFR BLD: 10.1 %
NEUTROPHILS # BLD AUTO: 6.3 K/UL
NEUTROPHILS NFR BLD: 59.5 %
NT-PROBNP: 37 PG/ML
PLATELET # BLD AUTO: 239 K/UL
PMV BLD AUTO: 10.5 FL
POTASSIUM SERPL-SCNC: 3.6 MMOL/L
PROT SERPL-MCNC: 7.1 G/DL
RBC # BLD AUTO: 4.67 M/UL
SODIUM SERPL-SCNC: 134 MMOL/L
TROPONIN I SERPL DL<=0.01 NG/ML-MCNC: <0.012 NG/ML
TROPONIN I SERPL DL<=0.01 NG/ML-MCNC: <0.012 NG/ML
WBC # BLD AUTO: 10.62 K/UL

## 2019-03-08 PROCEDURE — 93010 EKG 12-LEAD: ICD-10-PCS | Mod: ,,, | Performed by: INTERNAL MEDICINE

## 2019-03-08 PROCEDURE — 93010 ELECTROCARDIOGRAM REPORT: CPT | Mod: ,,, | Performed by: INTERNAL MEDICINE

## 2019-03-08 PROCEDURE — 94640 AIRWAY INHALATION TREATMENT: CPT | Mod: ER

## 2019-03-08 PROCEDURE — 80053 COMPREHEN METABOLIC PANEL: CPT | Mod: ER

## 2019-03-08 PROCEDURE — 25000003 PHARM REV CODE 250: Mod: ER | Performed by: EMERGENCY MEDICINE

## 2019-03-08 PROCEDURE — 85379 FIBRIN DEGRADATION QUANT: CPT | Mod: ER

## 2019-03-08 PROCEDURE — 25500020 PHARM REV CODE 255: Mod: ER | Performed by: EMERGENCY MEDICINE

## 2019-03-08 PROCEDURE — 84484 ASSAY OF TROPONIN QUANT: CPT | Mod: 91,ER

## 2019-03-08 PROCEDURE — 99285 EMERGENCY DEPT VISIT HI MDM: CPT | Mod: 25,ER

## 2019-03-08 PROCEDURE — 93005 ELECTROCARDIOGRAM TRACING: CPT | Mod: ER

## 2019-03-08 PROCEDURE — 25000242 PHARM REV CODE 250 ALT 637 W/ HCPCS: Mod: ER | Performed by: EMERGENCY MEDICINE

## 2019-03-08 PROCEDURE — 83880 ASSAY OF NATRIURETIC PEPTIDE: CPT | Mod: ER

## 2019-03-08 PROCEDURE — 85025 COMPLETE CBC W/AUTO DIFF WBC: CPT | Mod: ER

## 2019-03-08 RX ORDER — ALBUTEROL SULFATE 90 UG/1
1-2 AEROSOL, METERED RESPIRATORY (INHALATION) EVERY 6 HOURS PRN
Qty: 1 INHALER | Refills: 0 | Status: SHIPPED | OUTPATIENT
Start: 2019-03-08 | End: 2019-03-11

## 2019-03-08 RX ORDER — IPRATROPIUM BROMIDE AND ALBUTEROL SULFATE 2.5; .5 MG/3ML; MG/3ML
3 SOLUTION RESPIRATORY (INHALATION)
Status: COMPLETED | OUTPATIENT
Start: 2019-03-08 | End: 2019-03-08

## 2019-03-08 RX ORDER — LORAZEPAM 1 MG/1
1 TABLET ORAL
Status: COMPLETED | OUTPATIENT
Start: 2019-03-08 | End: 2019-03-08

## 2019-03-08 RX ADMIN — IOHEXOL 100 ML: 350 INJECTION, SOLUTION INTRAVENOUS at 11:03

## 2019-03-08 RX ADMIN — LORAZEPAM 1 MG: 1 TABLET ORAL at 09:03

## 2019-03-08 RX ADMIN — IPRATROPIUM BROMIDE AND ALBUTEROL SULFATE 3 ML: .5; 3 SOLUTION RESPIRATORY (INHALATION) at 08:03

## 2019-03-09 VITALS
HEART RATE: 116 BPM | WEIGHT: 196 LBS | TEMPERATURE: 98 F | OXYGEN SATURATION: 94 % | BODY MASS INDEX: 38.48 KG/M2 | SYSTOLIC BLOOD PRESSURE: 137 MMHG | DIASTOLIC BLOOD PRESSURE: 87 MMHG | HEIGHT: 60 IN | RESPIRATION RATE: 19 BRPM

## 2019-03-09 RX ORDER — DOXYCYCLINE 100 MG/1
100 CAPSULE ORAL 2 TIMES DAILY
Qty: 20 CAPSULE | Refills: 0 | Status: SHIPPED | OUTPATIENT
Start: 2019-03-09 | End: 2019-03-11

## 2019-03-09 NOTE — ED NOTES
Patient reports feeling jittery after receiving breathing treatment and reports her heart is beating fast. Educated patient on side effects albuterol. Patient acknowledged understanding.

## 2019-03-09 NOTE — ED PROVIDER NOTES
"Encounter Date: 3/8/2019       History     Chief Complaint   Patient presents with    Shortness of Breath     complaining of stinging "lung pain" that started last night after taking a shower. Difficulty taking deep breaths and GABRIEL at time and when talking. Influenza + a couple of weeks ago. denies cough, smoking     Patient was recently diagnosed with the flu 4 weeks ago.  She said that she is feeling better from the fluid is still having some shortness of breath. She states that it also hurts to breathe.      The history is provided by the patient.   Shortness of Breath   This is a new problem. The problem occurs frequently.The current episode started more than 1 week ago. The problem has not changed since onset.Associated symptoms include cough. Pertinent negatives include no fever, no headaches, no rhinorrhea, no sore throat, no ear pain, no neck pain, no sputum production, no hemoptysis, no wheezing, no PND, no orthopnea, no chest pain, no syncope, no leg swelling and no claudication.     Review of patient's allergies indicates:   Allergen Reactions    Ciprofloxacin     Codeine Nausea And Vomiting and Hives    Compazine [prochlorperazine edisylate] Anaphylaxis and Nausea And Vomiting    Demerol [meperidine] Anaphylaxis and Nausea And Vomiting    Morpholine analogues Anaphylaxis and Nausea And Vomiting     vomit    Pcn [penicillins] Other (See Comments) and Anaphylaxis     Stomach cramps    Percocet [oxycodone-acetaminophen] Nausea And Vomiting    Corticosteroids (glucocorticoids) Itching     Agitation, "skin crawling" sensation    Cortisone Itching     Agitation, "skin crawling" sensation    Fentanyl Nausea And Vomiting and Swelling     Fentanyl patch made lips numb and swollen    Morphine Nausea And Vomiting    Nsaids (non-steroidal anti-inflammatory drug) Other (See Comments) and Nausea And Vomiting     Cluster ulcers    Toradol [ketorolac] Nausea And Vomiting     ulcers    Tramadol Nausea And " Vomiting     Past Medical History:   Diagnosis Date    Adrenal adenoma     Gastric ulcer     Glaucoma     Hypothyroidism     Kidney stones     Pancreatitis     Traumatic compression fracture of L1 lumbar vertebra      Past Surgical History:   Procedure Laterality Date    CHOLECYSTECTOMY      COLONOSCOPY N/A 2016    Performed by [facility] at [facility] ENDO (4TH FLR)    CYSTOSCOPY      EGD  N/A 9/10/2014    [facility] at [facility]    ESOPHAGOGASTRODUODENOSCOPY (EGD) N/A 2016    Performed by [facility] at [facility] ENDO (4TH FLR)    ESOPHAGOGASTRODUODENOSCOPY (EGD) N/A 2016    Per[facility] by [facility] at [facility]    ESOPHAGOGASTRODUODENOSCOPY (EGD) N/A 7/15/2014    [facility]formed by [facility] at [facility]    HYSTERECTOMY      KYPHOPLASTY N/A 2015    Performed by [facility]aveed Mnotes [facility] at [facility] OR 2ND FLR    KYPHOSIS SURGERY  7/20/15    right adrenal gland remove      tonsillectomy      TONSILLECTOMY      TUBAL LIGATION      ULTRASOUND-ENDOSCOPIC-UPPER N/A 2017    Performed by Chong Ortiz MD at [facility] ENDO (2ND FLR)    ULTRASOUND-ENDOSCOPIC-UPPER N/A 2016    Per[facility] by [facility] at [facility] ENDO    ULTRASOUND-ENDOSCOPIC-UPPER N/A 7/15/2014    Performed by [facility] at [facility] ENDO    x2        Family History   Problem Relation Age of Onset    Diabetes Father         borderline    Heart attack Father      Social History     Tobacco Use    Smoking status: Never Smoker    Smokeless tobacco: Never Used   Substance Use Topics    Alcohol use: No    Drug use: No     Review of Systems   Constitutional: Negative for fever.   HENT: Negative for ear pain, rhinorrhea and sore throat.    Respiratory: Positive for cough and shortness of breath. Negative for hemoptysis, sputum production and wheezing.    Cardiovascular: Negative for chest pain, orthopnea, claudication, leg swelling, syncope and PND.   Musculoskeletal: Negative  for neck pain.   Neurological: Negative for headaches.   All other systems reviewed and are negative.      Physical Exam     Initial Vitals   BP Pulse Resp Temp SpO2   03/08/19 1752 03/08/19 1752 03/08/19 1752 03/08/19 1754 03/08/19 1752   137/76 85 18 98.4 °F (36.9 °C) 98 %      MAP       --                Physical Exam    Nursing note and vitals reviewed.  Constitutional: She appears well-developed and well-nourished.   HENT:   Head: Normocephalic and atraumatic.   Eyes: Conjunctivae and EOM are normal.   Neck: Normal range of motion. Neck supple.   Cardiovascular: Normal rate, regular rhythm and normal heart sounds.   Pulmonary/Chest: Breath sounds normal. No respiratory distress. She has no wheezes. She has no rhonchi. She has no rales.   Abdominal: Soft. She exhibits no distension. There is no tenderness. There is no rebound and no guarding.   Musculoskeletal: Normal range of motion.   Neurological: She is alert and oriented to person, place, and time. GCS score is 15. GCS eye subscore is 4. GCS verbal subscore is 5. GCS motor subscore is 6.   Skin: Skin is warm and dry. Capillary refill takes less than 2 seconds.   Psychiatric: She has a normal mood and affect. Her behavior is normal. Judgment and thought content normal.         ED Course   Procedures  Labs Reviewed   CBC W/ AUTO DIFFERENTIAL   COMPREHENSIVE METABOLIC PANEL   TROPONIN I   NT-PRO NATRIURETIC PEPTIDE     EKG Readings: (Independently Interpreted)   Rhythm: Normal Sinus Rhythm. Ectopy: No Ectopy. Conduction: Normal. ST Segments: Normal ST Segments. T Waves: Normal. Clinical Impression: Normal Sinus Rhythm       Imaging Results          X-Ray Chest AP Portable (In process)               X-Rays:   Independently Interpreted Readings:   Chest X-Ray: Normal heart size.  No infiltrates.  No acute abnormalities.   Other Readings:  CTA of the chest was negative.    Medical Decision Making:   Clinical Tests:   Lab Tests: Ordered and Reviewed  Radiological  Study: Ordered and Reviewed  Medical Tests: Ordered and Reviewed                      Clinical Impression:       ICD-10-CM ICD-9-CM   1. Costochondritis M94.0 733.6   2. Shortness of breath R06.02 786.05         Disposition:   Disposition: Discharged  Condition: Stable                        Rachel Ding MD  03/08/19 2108       Rachel Ding MD  03/09/19 0010

## 2019-03-10 ENCOUNTER — NURSE TRIAGE (OUTPATIENT)
Dept: ADMINISTRATIVE | Facility: CLINIC | Age: 59
End: 2019-03-10

## 2019-03-10 NOTE — TELEPHONE ENCOUNTER
"  Reason for Disposition   [1] MILD difficulty breathing (e.g., minimal/no SOB at rest, SOB with walking, pulse <100) AND [2] NEW-onset or WORSE than normal    Protocols used: BREATHING DIFFICULTY-A-AH    Pt states Friday she felt like she had lung pain and was seen in the ED for complaint. Pt states after receiving breathing treatment in ED she felt like breathing worsened and increased HR into 160s. Pt states she is calling today d/t "pain in lungs is still there." Pt also states when she turns her air on in her house it smells moldy and pt is concerned she could have "mold spore" in lungs. Pt also expresses frustrations with care at ED. Pt advised per protocol and pt verbalizes understanding.   " ----- Message from Poppy Ruiz sent at 8/28/2017  9:16 AM EDT -----  Contact: SELF  LEELEE GONZALES CALLING FOR HIS SON GIO GONZALES. HE IS NEEDING A REFILL FOR ADDERALL. HE WILL RUN OUT OF THIS ON Monday AND THEY WANT TO KNOW IF HE CAN GET THE RX ON Friday AS WE ARE CLOSED Monday. HE CAN BE REACHED -[phone]

## 2019-03-11 ENCOUNTER — OFFICE VISIT (OUTPATIENT)
Dept: FAMILY MEDICINE | Facility: CLINIC | Age: 59
End: 2019-03-11
Payer: MEDICARE

## 2019-03-11 ENCOUNTER — PATIENT MESSAGE (OUTPATIENT)
Dept: FAMILY MEDICINE | Facility: CLINIC | Age: 59
End: 2019-03-11

## 2019-03-11 VITALS
OXYGEN SATURATION: 96 % | HEIGHT: 60 IN | DIASTOLIC BLOOD PRESSURE: 88 MMHG | WEIGHT: 188.69 LBS | TEMPERATURE: 98 F | SYSTOLIC BLOOD PRESSURE: 128 MMHG | BODY MASS INDEX: 37.05 KG/M2 | HEART RATE: 87 BPM

## 2019-03-11 DIAGNOSIS — F41.9 ANXIETY: ICD-10-CM

## 2019-03-11 DIAGNOSIS — R06.02 SHORTNESS OF BREATH: Primary | ICD-10-CM

## 2019-03-11 PROCEDURE — 99214 PR OFFICE/OUTPT VISIT, EST, LEVL IV, 30-39 MIN: ICD-10-PCS | Mod: S$GLB,,, | Performed by: FAMILY MEDICINE

## 2019-03-11 PROCEDURE — 99214 OFFICE O/P EST MOD 30 MIN: CPT | Mod: S$GLB,,, | Performed by: FAMILY MEDICINE

## 2019-03-11 NOTE — TELEPHONE ENCOUNTER
HANNAH   See the patients portal message  I called the pt and offered appt this morning with Np bc I feel she needed to be seen sooner then I could schedule her to see Dr Domingo   She declined.   I scheduled her to see Dr Domingo this afternoon at 340    Pt was in ER Friday and had CT for PE

## 2019-03-22 NOTE — PROGRESS NOTES
Patient ID: Cara Mendoza is a 58 y.o. female.    Chief Complaint: Shortness of Breath (SOB with pain in lungs ) and Fatigue    HPI       Cara Mendoza is a 58 y.o. female here co of shortness of breath which continues on and off post ER discharge.  No cause found in ER. CT angio done.      Review of Symptoms    Constitutional  Neg activity change, No chills/ fever   Resp  Neg hemoptysis, stridor, choking  CVS  Neg chest pain, palpitations        Physical Exam    Vitals:    03/11/19 1706   BP: 128/88   Pulse:    Temp:        Constitutional:   Oriented to person, place, and time.appears well-developed and well-nourished.   No distress.     HENT:   Head: Normocephalic and atraumatic.     Right Ear: Tympanic membrane, external ear and ear canal normal     Left Ear: Tympanic membrane, external ear and ear canal normal    Nose: External Normal. Normal turbinates, No rhinorrhea     Mouth/Throat: Uvula is midline, oropharynx is clear and moist and mucous membranes are normal.     Neck: supple no anterior cervical adenopathy    Eyes:   Conjunctivae are normal. Right eye exhibits no discharge. Left eye exhibits no discharge. No scleral icterus. No periorbital edema       Cardiovascular:  Regular rate and rhythm with normal S1 and S2     Pulmonary/Chest:   Clear to auscultation bilaterally without wheezes, rhonchi or rales    Musculoskeletal:  No edema. No obvious deformity No wasting     Neurological:   Alert and oriented to person, place, and time. Coordination normal.     Skin:   Skin is warm and dry.   No diaphoresis.   No rash noted.    Psychiatric: Normal mood and affect. Behavior is normal. Judgment and thought content normal.       Assessment / Plan:      ICD-10-CM ICD-9-CM   1. Shortness of breath R06.02 786.05   2. Anxiety F41.9 300.00     Shortness of breath    Anxiety      Exam review of labs and imaging   Reassurance probably anxiety and not and acute exacerbation of a problem.

## 2019-04-25 ENCOUNTER — PATIENT MESSAGE (OUTPATIENT)
Dept: FAMILY MEDICINE | Facility: CLINIC | Age: 59
End: 2019-04-25

## 2019-04-25 DIAGNOSIS — F41.9 ANXIETY: ICD-10-CM

## 2019-04-25 RX ORDER — LEVOTHYROXINE SODIUM 75 UG/1
75 TABLET ORAL
Qty: 90 TABLET | Refills: 3 | Status: SHIPPED | OUTPATIENT
Start: 2019-04-25 | End: 2020-04-23

## 2019-04-25 RX ORDER — ALPRAZOLAM 0.5 MG/1
0.5 TABLET ORAL 2 TIMES DAILY PRN
Qty: 180 TABLET | Refills: 1 | OUTPATIENT
Start: 2019-04-25 | End: 2019-05-25

## 2019-04-25 RX ORDER — ALPRAZOLAM 0.5 MG/1
0.5 TABLET ORAL 2 TIMES DAILY PRN
Qty: 180 TABLET | Refills: 1 | Status: SHIPPED | OUTPATIENT
Start: 2019-04-25 | End: 2019-10-16 | Stop reason: SDUPTHER

## 2019-04-25 NOTE — TELEPHONE ENCOUNTER
----- Message from Errol Juarez sent at 4/25/2019 12:34 PM CDT -----  Contact: [phone]  Type:  RX Refill Request    Who Called: Pt    Refill or New Rx: Refill    RX Name and Strength:ALPRAZolam (XANAX) 0.5 MG tablet     How is the patient currently taking it? (ex. 1XDay):Take 1 tablet (0.5 mg total) by mouth 2 (two) times daily as needed for Anxiety    Is this a 30 day or 90 day RX:    Preferred Pharmacy with phone number: [id number] - [place], [place]  [address] [place] [address]    Local or Mail Order:Local    Ordering Provider:Dr. De La Torre    Would the patient rather a call back or a response via [facility]? Call back    Best Call Back Number: [phone]    Additional Information: N/A

## 2019-04-29 ENCOUNTER — PATIENT MESSAGE (OUTPATIENT)
Dept: FAMILY MEDICINE | Facility: CLINIC | Age: 59
End: 2019-04-29

## 2019-04-29 RX ORDER — PROMETHAZINE HYDROCHLORIDE 25 MG/1
25 TABLET ORAL EVERY 4 HOURS
Qty: 15 TABLET | Refills: 0 | Status: SHIPPED | OUTPATIENT
Start: 2019-04-29 | End: 2019-08-21

## 2019-06-18 NOTE — ASSESSMENT & PLAN NOTE
Physical Therapy Daily Treatment Note    Date: 2019  Patient Name: Rodo Sparks  : 1983   MRN: [id number]  DOInjury: 2019   DOSx: 2019  Referring Provider: Shelby Judge DO  1933 [address]  [facility]     Medical Diagnosis:      Diagnosis Orders   1. Rupture of left Achilles tendon, initial encounter     S/P Achilles Repair 19    Outcome Measure:   Lower Extremity Functional Scale (LEFS) 36% impairment    S: States he has been to the gym using [employer]-type equipment for UE, back, and abdominal exercise. He also says he has been working around the job site carrying block and building materials. Near end of Rx he added that is ankle is always sore, but thinks he may be on it too much. O:   Time 8253-7818     Visit 2 Repeat outcome measure at mid point and end. Pain 0/10  3/10 heel with gait in boot only     ROM 0 DF, 30 PF, 30 INV, 10 EV     Modalities      Ice   MO   Stretch         TE      TE   Exercise      Bike (seat 8) X 8 min  TE   Standing FWD leans Next    TE   T-band PF, INV, EV   TE    BOSU seated  PF/DF, INV/EV, CW/CCW 2 x 20 Can add at gym TE   BAPS standing   TE   Seated CR 3 x 20 Can add at gym TE   Standing CR on floor   TE   Soleus with Knee Extension Machine      Standing CR on step      [] TG  [] Leg Press 2-leg Next   TE   [] TG  [] Leg Press 1-leg   TE   Step-ups - FWD Next   TA   Step-ups - LAT Next   TA   Step-ups - BWD Next   TA   Squats  3 x 15 Can add at gym TA      TA      TA               A:  Tolerated well. Above added to written HEP to perform once a day at gym or at home. Patient instructed to NOT carry additional loads (dumbbells, block, building materials) at this time. He was told to make sure he does not push off any equipment and add unintentional load (such as getting in/out of a machine). He was told he could ride a stationary bike, however he wants to Spin and race.   He was told he is to NOT race, to use very little escitalopram 20mg daily  NP referral, urgent  Counselor recommendation  PCP     tension, and to ride as though he was going through a stroll in the park with a baby. He was instructed to get off his feet 2-3 times a day for 30-45 minutes. This is to include ice and elevation time for 15-20 minutes. Considerable time was spent going over do's and don'ts. Reminded patient about precautions.    P: Continue with rehab plan  Ivelisse Brito, PT    Treatment Charges: Mins Units   Initial Evaluation     Re-Evaluation     Ther Exercise         TE 50 3   Manual Therapy     MT     Ther Activities        TA     Gait Training          GT     Neuro Re-education NR     Modalities     Non-Billable Service Time     Other     Total Time/Units 50 3

## 2019-07-31 RX ORDER — HYDROCHLOROTHIAZIDE 12.5 MG/1
12.5 CAPSULE ORAL DAILY
Qty: 90 CAPSULE | Refills: 4 | Status: SHIPPED | OUTPATIENT
Start: 2019-07-31 | End: 2020-11-04

## 2019-07-31 NOTE — TELEPHONE ENCOUNTER
----- Message from Sheeba Lockett sent at 7/31/2019 10:00 AM CDT -----  Contact: [phone]/self  Type:  RX Refill Request    Who Called: pt  Refill or New Rx: refill  RX Name and Strength: hydroCHLOROthiazide (MICROZIDE) 12.5 mg capsule  How is the patient currently taking it? (ex. 1XDay): Take 1 capsule (12.5 mg total) by mouth once daily.  Is this a 30 day or 90 day RX: 90 day  Preferred Pharmacy with phone number: [facility], [place]  Local or Mail Order: Local  Ordering Provider: Dr. Carrillo  Would the patient rather a call back or a response via [facility]? Call back  Best Call Back Number: [phone]  Additional Information:

## 2019-08-21 ENCOUNTER — OFFICE VISIT (OUTPATIENT)
Dept: FAMILY MEDICINE | Facility: CLINIC | Age: 59
End: 2019-08-21
Payer: MEDICARE

## 2019-08-21 VITALS
HEART RATE: 83 BPM | DIASTOLIC BLOOD PRESSURE: 88 MMHG | WEIGHT: 193.56 LBS | SYSTOLIC BLOOD PRESSURE: 130 MMHG | TEMPERATURE: 98 F | OXYGEN SATURATION: 96 % | BODY MASS INDEX: 38 KG/M2 | HEIGHT: 60 IN

## 2019-08-21 DIAGNOSIS — J06.9 URI, ACUTE: ICD-10-CM

## 2019-08-21 DIAGNOSIS — E66.01 MORBID OBESITY: Primary | ICD-10-CM

## 2019-08-21 DIAGNOSIS — J30.1 SEASONAL ALLERGIC RHINITIS DUE TO POLLEN: ICD-10-CM

## 2019-08-21 PROCEDURE — 99213 OFFICE O/P EST LOW 20 MIN: CPT | Mod: S$GLB,,, | Performed by: FAMILY MEDICINE

## 2019-08-21 PROCEDURE — 99213 PR OFFICE/OUTPT VISIT, EST, LEVL III, 20-29 MIN: ICD-10-PCS | Mod: S$GLB,,, | Performed by: FAMILY MEDICINE

## 2019-08-21 RX ORDER — AZITHROMYCIN 250 MG/1
TABLET, FILM COATED ORAL
Qty: 6 TABLET | Refills: 0 | Status: SHIPPED | OUTPATIENT
Start: 2019-08-21 | End: 2020-11-16

## 2019-08-21 RX ORDER — LORATADINE 10 MG/1
10 TABLET ORAL DAILY
Qty: 30 TABLET | Refills: 0 | Status: SHIPPED | OUTPATIENT
Start: 2019-08-21 | End: 2020-11-16

## 2019-08-21 NOTE — PROGRESS NOTES
Subjective:       Patient ID: Cara Mendoza is a 59 y.o. female.    Chief Complaint: Sore throat; Ear pain    58 y/o female with hx of Hypothyroidism, and mild obesity with c/o sore throat, post nasal drip x 4 days, no fever, worsening symptoms now, no fever or chills,  Feels scratchy throat, no cough, minimal congestion,     Review of Systems    Objective:      Physical Exam   Constitutional: She is oriented to person, place, and time. She appears well-developed and well-nourished. She is cooperative.  Non-toxic appearance. She does not appear ill.   HENT:   Head: Normocephalic and atraumatic.   Right Ear: Hearing, tympanic membrane, external ear and ear canal normal.   Left Ear: Hearing, tympanic membrane, external ear and ear canal normal.   Nose: Nose normal. No mucosal edema, rhinorrhea or nasal deformity. No epistaxis. Right sinus exhibits no maxillary sinus tenderness and no frontal sinus tenderness. Left sinus exhibits no maxillary sinus tenderness and no frontal sinus tenderness.   Mouth/Throat: Uvula is midline, oropharynx is clear and moist and mucous membranes are normal. No trismus in the jaw. Normal dentition. No uvula swelling. No oropharyngeal exudate or posterior oropharyngeal erythema.   Eyes: Conjunctivae and lids are normal. Right eye exhibits no discharge. Left eye exhibits no discharge. No scleral icterus.   Sclera clear bilat   Neck: Trachea normal, normal range of motion, full passive range of motion without pain and phonation normal. Neck supple. No JVD present. No tracheal deviation present.   Cardiovascular: Normal rate, regular rhythm, normal heart sounds, intact distal pulses and normal pulses. Exam reveals no gallop and no friction rub.   No murmur heard.  Pulmonary/Chest: Effort normal and breath sounds normal. No stridor. No respiratory distress. She has no wheezes. She has no rales.   Abdominal: Soft. Normal appearance and bowel sounds are normal. She exhibits no distension, no  pulsatile midline mass and no mass. There is no tenderness.   Musculoskeletal: Normal range of motion. She exhibits no edema or deformity.   Lymphadenopathy:     She has no cervical adenopathy.   Neurological: She is alert and oriented to person, place, and time. She exhibits normal muscle tone. Coordination normal.   Skin: Skin is warm, dry and intact. She is not diaphoretic. No pallor.   Psychiatric: She has a normal mood and affect. Her speech is normal and behavior is normal. Judgment and thought content normal. Cognition and memory are normal.   Nursing note and vitals reviewed.      Assessment:       1. Morbid obesity    2. URI, acute    3. Seasonal allergic rhinitis due to pollen        Plan:         Cara was seen today for sore throat; ear pain.    Diagnoses and all orders for this visit:    Morbid obesity    URI, acute    Seasonal allergic rhinitis due to pollen    Other orders  -     loratadine (CLARITIN) 10 mg tablet; Take 1 tablet (10 mg total) by mouth once daily.  -     azithromycin (ZITHROMAX Z-HUBERT) 250 MG tablet; Take 2 tablets (500 mg) on  Day 1,  followed by 1 tablet (250 mg) once daily on Days 2 through 5.     Pt advised to hold off on z-pack for now, try claritin , if no improvement in one week then start z-pack

## 2019-10-04 DIAGNOSIS — E66.01 SEVERE OBESITY (BMI 35.0-39.9) WITH COMORBIDITY: ICD-10-CM

## 2019-10-04 DIAGNOSIS — I10 ESSENTIAL HYPERTENSION: Chronic | ICD-10-CM

## 2019-10-04 DIAGNOSIS — G44.86 CERVICOGENIC HEADACHE: ICD-10-CM

## 2019-10-04 RX ORDER — GABAPENTIN 400 MG/1
CAPSULE ORAL
Qty: 120 CAPSULE | Refills: 11 | OUTPATIENT
Start: 2019-10-04

## 2019-10-04 RX ORDER — GABAPENTIN 400 MG/1
CAPSULE ORAL
Qty: 120 CAPSULE | Refills: 11 | Status: SHIPPED | OUTPATIENT
Start: 2019-10-04 | End: 2020-07-31 | Stop reason: SDUPTHER

## 2019-10-16 DIAGNOSIS — F41.9 ANXIETY: ICD-10-CM

## 2019-10-16 RX ORDER — ALPRAZOLAM 0.5 MG/1
TABLET ORAL
Qty: 180 TABLET | Refills: 1 | Status: SHIPPED | OUTPATIENT
Start: 2019-10-16 | End: 2020-03-25 | Stop reason: SDUPTHER

## 2019-10-28 RX ORDER — ESCITALOPRAM OXALATE 20 MG/1
TABLET ORAL
Qty: 90 TABLET | Refills: 2 | Status: SHIPPED | OUTPATIENT
Start: 2019-10-28 | End: 2020-07-27

## 2019-12-13 ENCOUNTER — PATIENT OUTREACH (OUTPATIENT)
Dept: ADMINISTRATIVE | Facility: HOSPITAL | Age: 59
End: 2019-12-13

## 2020-01-20 ENCOUNTER — PATIENT OUTREACH (OUTPATIENT)
Dept: ADMINISTRATIVE | Facility: HOSPITAL | Age: 60
End: 2020-01-20

## 2020-01-20 DIAGNOSIS — Z12.31 ENCOUNTER FOR SCREENING MAMMOGRAM FOR BREAST CANCER: Primary | ICD-10-CM

## 2020-01-28 ENCOUNTER — HOSPITAL ENCOUNTER (OUTPATIENT)
Dept: RADIOLOGY | Facility: HOSPITAL | Age: 60
Discharge: HOME | End: 2020-01-28
Attending: FAMILY MEDICINE
Payer: MEDICARE

## 2020-01-28 DIAGNOSIS — Z12.31 ENCOUNTER FOR SCREENING MAMMOGRAM FOR BREAST CANCER: ICD-10-CM

## 2020-01-28 PROCEDURE — 77067 SCR MAMMO BI INCL CAD: CPT | Mod: TC,PO

## 2020-01-29 ENCOUNTER — TELEPHONE (OUTPATIENT)
Dept: FAMILY MEDICINE | Facility: CLINIC | Age: 60
End: 2020-01-29

## 2020-01-29 NOTE — TELEPHONE ENCOUNTER
----- Message from Giacomo Domingo MD sent at 1/28/2020  5:52 PM CST -----  Results are normal -repeat in one year

## 2020-03-16 ENCOUNTER — PATIENT MESSAGE (OUTPATIENT)
Dept: FAMILY MEDICINE | Facility: CLINIC | Age: 60
End: 2020-03-16

## 2020-03-16 DIAGNOSIS — F41.9 ANXIETY: ICD-10-CM

## 2020-03-18 ENCOUNTER — PATIENT MESSAGE (OUTPATIENT)
Dept: FAMILY MEDICINE | Facility: CLINIC | Age: 60
End: 2020-03-18

## 2020-03-25 RX ORDER — ALPRAZOLAM 0.5 MG/1
0.5 TABLET ORAL 3 TIMES DAILY PRN
Qty: 90 TABLET | Refills: 0 | Status: SHIPPED | OUTPATIENT
Start: 2020-04-06 | End: 2020-05-06

## 2020-03-25 RX ORDER — ALPRAZOLAM 0.5 MG/1
TABLET ORAL
Qty: 180 TABLET | Refills: 0 | Status: SHIPPED | OUTPATIENT
Start: 2020-05-06 | End: 2020-07-27

## 2020-03-26 ENCOUNTER — PATIENT MESSAGE (OUTPATIENT)
Dept: FAMILY MEDICINE | Facility: CLINIC | Age: 60
End: 2020-03-26

## 2020-04-09 ENCOUNTER — PATIENT MESSAGE (OUTPATIENT)
Dept: FAMILY MEDICINE | Facility: CLINIC | Age: 60
End: 2020-04-09

## 2020-04-22 ENCOUNTER — PATIENT MESSAGE (OUTPATIENT)
Dept: FAMILY MEDICINE | Facility: CLINIC | Age: 60
End: 2020-04-22

## 2020-04-23 RX ORDER — LEVOTHYROXINE SODIUM 75 UG/1
TABLET ORAL
Qty: 90 TABLET | Refills: 3 | Status: SHIPPED | OUTPATIENT
Start: 2020-04-23 | End: 2021-04-18

## 2020-07-14 ENCOUNTER — OUTPATIENT CASE MANAGEMENT (OUTPATIENT)
Dept: ADMINISTRATIVE | Facility: OTHER | Age: 60
End: 2020-07-14

## 2020-07-15 NOTE — PROGRESS NOTES
07/15/20-OPCM placed call to pt/cg to introduce, offer, and enroll into OPCM program from [facility] High Risk Score list. Pt was contacted, spoken to briefly. Pt stated she could not speak at length with OPCM RN requested call at a later date.  07/17/20-OPCM placed call to pt/cg to introduce, offer, and enroll into OPCM program from [facility] High Risk Score list. Pt was contacted, declined participation in program stated all her medical needs were being managed.

## 2020-07-31 DIAGNOSIS — G44.86 CERVICOGENIC HEADACHE: ICD-10-CM

## 2020-07-31 DIAGNOSIS — I10 ESSENTIAL HYPERTENSION: Chronic | ICD-10-CM

## 2020-07-31 DIAGNOSIS — E66.01 SEVERE OBESITY (BMI 35.0-39.9) WITH COMORBIDITY: ICD-10-CM

## 2020-07-31 RX ORDER — GABAPENTIN 400 MG/1
CAPSULE ORAL
Qty: 270 CAPSULE | Refills: 1 | Status: SHIPPED | OUTPATIENT
Start: 2020-07-31 | End: 2020-09-23

## 2020-08-13 ENCOUNTER — PATIENT OUTREACH (OUTPATIENT)
Dept: ADMINISTRATIVE | Facility: HOSPITAL | Age: 60
End: 2020-08-13

## 2020-09-23 DIAGNOSIS — G44.86 CERVICOGENIC HEADACHE: ICD-10-CM

## 2020-09-23 DIAGNOSIS — I10 ESSENTIAL HYPERTENSION: Chronic | ICD-10-CM

## 2020-09-23 DIAGNOSIS — E66.01 SEVERE OBESITY (BMI 35.0-39.9) WITH COMORBIDITY: ICD-10-CM

## 2020-09-23 RX ORDER — GABAPENTIN 400 MG/1
CAPSULE ORAL
Qty: 120 CAPSULE | Refills: 3 | Status: SHIPPED | OUTPATIENT
Start: 2020-09-23 | End: 2020-11-16 | Stop reason: SDUPTHER

## 2020-09-26 ENCOUNTER — PATIENT MESSAGE (OUTPATIENT)
Dept: FAMILY MEDICINE | Facility: CLINIC | Age: 60
End: 2020-09-26

## 2020-09-29 ENCOUNTER — PATIENT MESSAGE (OUTPATIENT)
Dept: OTHER | Facility: OTHER | Age: 60
End: 2020-09-29

## 2020-10-05 ENCOUNTER — PATIENT MESSAGE (OUTPATIENT)
Dept: INTERNAL MEDICINE | Facility: CLINIC | Age: 60
End: 2020-10-05

## 2020-11-04 RX ORDER — HYDROCHLOROTHIAZIDE 12.5 MG/1
CAPSULE ORAL
Qty: 90 CAPSULE | Refills: 4 | Status: SHIPPED | OUTPATIENT
Start: 2020-11-04 | End: 2021-11-04

## 2020-11-16 ENCOUNTER — OFFICE VISIT (OUTPATIENT)
Dept: FAMILY MEDICINE | Facility: CLINIC | Age: 60
End: 2020-11-16
Payer: MEDICARE

## 2020-11-16 VITALS
OXYGEN SATURATION: 97 % | HEIGHT: 60 IN | HEART RATE: 78 BPM | WEIGHT: 201.75 LBS | TEMPERATURE: 97 F | BODY MASS INDEX: 39.61 KG/M2 | SYSTOLIC BLOOD PRESSURE: 136 MMHG | DIASTOLIC BLOOD PRESSURE: 66 MMHG

## 2020-11-16 DIAGNOSIS — E66.01 SEVERE OBESITY (BMI 35.0-39.9) WITH COMORBIDITY: ICD-10-CM

## 2020-11-16 DIAGNOSIS — F41.9 ANXIETY: ICD-10-CM

## 2020-11-16 DIAGNOSIS — E03.9 HYPOTHYROIDISM, UNSPECIFIED TYPE: ICD-10-CM

## 2020-11-16 DIAGNOSIS — K86.1 OTHER CHRONIC PANCREATITIS: ICD-10-CM

## 2020-11-16 DIAGNOSIS — G44.86 CERVICOGENIC HEADACHE: ICD-10-CM

## 2020-11-16 DIAGNOSIS — I10 ESSENTIAL HYPERTENSION: Primary | ICD-10-CM

## 2020-11-16 DIAGNOSIS — E66.01 MORBID OBESITY: ICD-10-CM

## 2020-11-16 DIAGNOSIS — K29.50 CHRONIC GASTRITIS WITHOUT BLEEDING, UNSPECIFIED GASTRITIS TYPE: ICD-10-CM

## 2020-11-16 DIAGNOSIS — E27.9 DISORDER OF ADRENAL GLAND, UNSPECIFIED: ICD-10-CM

## 2020-11-16 DIAGNOSIS — F43.10 PTSD (POST-TRAUMATIC STRESS DISORDER): ICD-10-CM

## 2020-11-16 PROBLEM — S06.0X0A CONCUSSION WITH NO LOSS OF CONSCIOUSNESS: Status: ACTIVE | Noted: 2020-11-16

## 2020-11-16 PROCEDURE — 99214 OFFICE O/P EST MOD 30 MIN: CPT | Mod: S$GLB,,, | Performed by: FAMILY MEDICINE

## 2020-11-16 PROCEDURE — 99214 PR OFFICE/OUTPT VISIT, EST, LEVL IV, 30-39 MIN: ICD-10-PCS | Mod: S$GLB,,, | Performed by: FAMILY MEDICINE

## 2020-11-16 RX ORDER — GABAPENTIN 400 MG/1
CAPSULE ORAL
Qty: 120 CAPSULE | Refills: 3 | Status: SHIPPED | OUTPATIENT
Start: 2020-11-16 | End: 2021-01-13

## 2020-11-23 NOTE — PROGRESS NOTES
Patient ID: Cara Mendoza is a 60 y.o. female.    Chief Complaint: over due f/u and GI Problem    HPI     Cara Mendoza is a 60 y.o. female. here for for multiple problems including hypothyroidism anxiety hypertension PTSD and chronic gastritis.  She is being followed by GI regarding gastritis.  Dr. Correa at [facility]      Review of Symptoms    Constitutional: Negative.    HENT: Negative.    Eyes: Negative.    Respiratory: Negative.    Cardiovascular: Negative.    Gastrointestinal: Negative.    Endocrine: Negative.    Genitourinary: Negative.    Musculoskeletal: Negative.    Skin: Negative.    Allergic/Immunologic: Negative.    Neurological: Negative.    Hematological: Negative.    Psychiatric/Behavioral: Negative.      Except as above in HPI    Current Outpatient Medications on File Prior to Visit   Medication Sig Dispense Refill    ALPRAZolam (XANAX) 0.5 MG tablet TAKE 1 TABLET BY MOUTH TWO TIMES A DAY AS NEEDED FOR ANXIETY 180 tablet 1    escitalopram oxalate (LEXAPRO) 20 MG tablet TAKE 1 TABLET BY MOUTH ONCE A DAY 90 tablet 2    estradiol (ESTRACE) 1 MG tablet Take 1 mg by mouth once daily.       hydroCHLOROthiazide (MICROZIDE) 12.5 mg capsule TAKE 1 CAPSULE BY MOUTH ONCE A DAY 90 capsule 4    levothyroxine (SYNTHROID) 75 MCG tablet TAKE 1 TABLET BY MOUTH EVERY DAY BEFORE BREAKFAST 90 tablet 3    pantoprazole (PROTONIX) 40 MG tablet Take 40 mg by mouth once daily.        No current facility-administered medications on file prior to visit.          Physical  Exam    Vitals:    11/16/20 1052   BP: 136/66   BP Location: Left arm   Patient Position: Sitting   Pulse: 78   Temp: 97.1 °F (36.2 °C)   TempSrc: Oral   SpO2: 97%   Weight: 91.5 kg (201 lb 11.5 oz)   Height: 5' (1.524 m)          Constitutional:  Oriented to person, place, and time. Appears well-developed and well-nourished.     HENT:   Head: Normocephalic and atraumatic.     Right Ear: External ear normal     Left Ear: External ear normal       Nose: Nose normal. No rhinorrhea or nasal deformity.     Mouth/Throat: Moist mucus membranes      Eyes: Conjunctivae are normal. Right eye exhibits no discharge. Left eye exhibits no  discharge. No scleral icterus.     Neck:  No JVD present. No tracheal deviation  [x]  Neck supple.   Carotid Arteries  []  No Bruit    Cardiovascular:  Regular rate and rhythm with normal S1 and S2     Pulmonary/Chest:   Clear to auscultation bilaterally without wheezes, rhonchi or rales    Abdominal: Soft. No distension and no mass.  No tenderness. No rebound and No guarding.     Musculoskeletal: Normal range of motion. No edema or tenderness.   No deformity     Lymphadenopathy:   []  No cervical adenopathy.  []  No inguinal adenopathy    Neurological:  Alert and oriented to person, place, and time. Coordination normal.     Skin: Skin is warm and dry. No rash noted. No bruising     Psychiatric: Normal mood and affect. Speech is normal and behavior is normal. Judgment and thought content normal.       Assessment / Plan:      ICD-10-CM ICD-9-CM   1. Essential hypertension  I10 401.9   2. Morbid obesity  E66.01 278.01   3. Disorder of adrenal gland, unspecified  E27.9 255.9   4. Other chronic pancreatitis  K86.1 577.1   5. Anxiety  F41.9 300.00   6. Hypothyroidism, unspecified type  E03.9 244.9   7. Cervicogenic headache  R51.9 784.0   8. Severe obesity (BMI 35.0-39.9) with comorbidity  E66.01 278.01   9. Chronic gastritis without bleeding, unspecified gastritis type  K29.50 535.10   10. PTSD (post-traumatic stress disorder)  F43.10 309.81     Essential hypertension  -     gabapentin (NEURONTIN) 400 MG capsule; TAKE 1 CAPSULE BY MOUTH IN THE MORNING AND AFFTERNOON THEN TAKE 2 CAPSULES BY MOUTH NIGHTLY  Dispense: 120 capsule; Refill: 3    Morbid obesity    Disorder of adrenal gland, unspecified    Other chronic pancreatitis    Anxiety    Hypothyroidism, unspecified type    Cervicogenic headache  -     gabapentin (NEURONTIN) 400 MG  capsule; TAKE 1 CAPSULE BY MOUTH IN THE MORNING AND AFFTERNOON THEN TAKE 2 CAPSULES BY MOUTH NIGHTLY  Dispense: 120 capsule; Refill: 3    Severe obesity (BMI 35.0-39.9) with comorbidity  -     gabapentin (NEURONTIN) 400 MG capsule; TAKE 1 CAPSULE BY MOUTH IN THE MORNING AND AFFTERNOON THEN TAKE 2 CAPSULES BY MOUTH NIGHTLY  Dispense: 120 capsule; Refill: 3    Chronic gastritis without bleeding, unspecified gastritis type    PTSD (post-traumatic stress disorder)          Discussed how to stay healthy including: diet, exercise, refraining from smoking and discussed screening exams / tests needed for age, sex and family Hx.              Giacoom Brice M.D.

## 2020-12-11 ENCOUNTER — PATIENT MESSAGE (OUTPATIENT)
Dept: OTHER | Facility: OTHER | Age: 60
End: 2020-12-11

## 2021-01-12 DIAGNOSIS — I10 ESSENTIAL HYPERTENSION: ICD-10-CM

## 2021-01-12 DIAGNOSIS — E66.01 SEVERE OBESITY (BMI 35.0-39.9) WITH COMORBIDITY: ICD-10-CM

## 2021-01-12 DIAGNOSIS — G44.86 CERVICOGENIC HEADACHE: ICD-10-CM

## 2021-01-13 RX ORDER — GABAPENTIN 400 MG/1
CAPSULE ORAL
Qty: 120 CAPSULE | Refills: 3 | Status: SHIPPED | OUTPATIENT
Start: 2021-01-13 | End: 2021-05-10

## 2021-01-18 DIAGNOSIS — F41.9 ANXIETY: ICD-10-CM

## 2021-01-18 RX ORDER — ALPRAZOLAM 0.5 MG/1
TABLET ORAL
Qty: 180 TABLET | Refills: 0 | Status: SHIPPED | OUTPATIENT
Start: 2021-01-18 | End: 2021-04-18

## 2021-02-14 ENCOUNTER — TELEPHONE (OUTPATIENT)
Dept: FAMILY MEDICINE | Facility: CLINIC | Age: 61
End: 2021-02-14

## 2021-02-15 ENCOUNTER — TELEPHONE (OUTPATIENT)
Dept: FAMILY MEDICINE | Facility: CLINIC | Age: 61
End: 2021-02-15

## 2021-03-25 ENCOUNTER — TELEPHONE (OUTPATIENT)
Dept: NEUROSURGERY | Facility: CLINIC | Age: 61
End: 2021-03-25

## 2021-03-25 DIAGNOSIS — S32.010S COMPRESSION FRACTURE OF L1 VERTEBRA, SEQUELA: Primary | ICD-10-CM

## 2021-03-28 ENCOUNTER — NURSE TRIAGE (OUTPATIENT)
Dept: ADMINISTRATIVE | Facility: CLINIC | Age: 61
End: 2021-03-28

## 2021-03-29 ENCOUNTER — TELEPHONE (OUTPATIENT)
Dept: NEUROSURGERY | Facility: CLINIC | Age: 61
End: 2021-03-29

## 2021-04-05 ENCOUNTER — TELEPHONE (OUTPATIENT)
Dept: NEUROSURGERY | Facility: CLINIC | Age: 61
End: 2021-04-05

## 2021-04-06 ENCOUNTER — OFFICE VISIT (OUTPATIENT)
Dept: NEUROSURGERY | Facility: CLINIC | Age: 61
End: 2021-04-06
Payer: MEDICARE

## 2021-04-06 ENCOUNTER — HOSPITAL ENCOUNTER (OUTPATIENT)
Dept: RADIOLOGY | Facility: HOSPITAL | Age: 61
Discharge: HOME | End: 2021-04-06
Attending: NEUROLOGICAL SURGERY
Payer: MEDICARE

## 2021-04-06 VITALS
SYSTOLIC BLOOD PRESSURE: 141 MMHG | HEIGHT: 60 IN | HEART RATE: 76 BPM | WEIGHT: 198 LBS | TEMPERATURE: 98 F | BODY MASS INDEX: 38.87 KG/M2 | DIASTOLIC BLOOD PRESSURE: 65 MMHG

## 2021-04-06 DIAGNOSIS — S32.010S COMPRESSION FRACTURE OF L1 VERTEBRA, SEQUELA: Primary | ICD-10-CM

## 2021-04-06 DIAGNOSIS — M54.9 DORSALGIA, UNSPECIFIED: ICD-10-CM

## 2021-04-06 DIAGNOSIS — S32.010S COMPRESSION FRACTURE OF L1 VERTEBRA, SEQUELA: ICD-10-CM

## 2021-04-06 PROCEDURE — 99204 PR OFFICE/OUTPT VISIT, NEW, LEVL IV, 45-59 MIN: ICD-10-PCS | Mod: S$PBB,,, | Performed by: NEUROLOGICAL SURGERY

## 2021-04-06 PROCEDURE — 99999 PR PBB SHADOW E&M-EST. PATIENT-LVL IV: ICD-10-PCS | Mod: PBBFAC,,, | Performed by: NEUROLOGICAL SURGERY

## 2021-04-06 PROCEDURE — 99999 PR PBB SHADOW E&M-EST. PATIENT-LVL IV: CPT | Mod: PBBFAC,,, | Performed by: NEUROLOGICAL SURGERY

## 2021-04-06 PROCEDURE — 72100 XR LUMBAR SPINE AP AND LATERAL: ICD-10-PCS | Mod: 26,,, | Performed by: RADIOLOGY

## 2021-04-06 PROCEDURE — 72100 X-RAY EXAM L-S SPINE 2/3 VWS: CPT | Mod: TC

## 2021-04-06 PROCEDURE — 99204 OFFICE O/P NEW MOD 45 MIN: CPT | Mod: S$PBB,,, | Performed by: NEUROLOGICAL SURGERY

## 2021-04-06 PROCEDURE — 72100 X-RAY EXAM L-S SPINE 2/3 VWS: CPT | Mod: 26,,, | Performed by: RADIOLOGY

## 2021-04-06 PROCEDURE — 99214 OFFICE O/P EST MOD 30 MIN: CPT | Mod: PBBFAC,25 | Performed by: NEUROLOGICAL SURGERY

## 2021-04-06 RX ORDER — DIAZEPAM 5 MG/1
5 TABLET ORAL EVERY 12 HOURS PRN
Qty: 60 TABLET | Refills: 0 | Status: SHIPPED | OUTPATIENT
Start: 2021-04-06 | End: 2021-05-10

## 2021-04-06 RX ORDER — PROMETHAZINE HYDROCHLORIDE 25 MG/1
25 TABLET ORAL EVERY 6 HOURS PRN
Qty: 60 TABLET | Refills: 0 | Status: SHIPPED | OUTPATIENT
Start: 2021-04-06 | End: 2021-07-05 | Stop reason: SDUPTHER

## 2021-04-06 RX ORDER — HYDROCODONE BITARTRATE AND ACETAMINOPHEN 5; 325 MG/1; MG/1
1 TABLET ORAL EVERY 8 HOURS PRN
Qty: 60 TABLET | Refills: 0 | Status: SHIPPED | OUTPATIENT
Start: 2021-04-06 | End: 2021-05-10

## 2021-04-12 ENCOUNTER — TELEPHONE (OUTPATIENT)
Dept: NEUROSURGERY | Facility: CLINIC | Age: 61
End: 2021-04-12

## 2021-04-13 ENCOUNTER — TELEPHONE (OUTPATIENT)
Dept: NEUROSURGERY | Facility: CLINIC | Age: 61
End: 2021-04-13

## 2021-04-16 ENCOUNTER — HOSPITAL ENCOUNTER (OUTPATIENT)
Dept: RADIOLOGY | Facility: HOSPITAL | Age: 61
Discharge: HOME | End: 2021-04-16
Attending: NEUROLOGICAL SURGERY
Payer: MEDICARE

## 2021-04-16 DIAGNOSIS — M54.9 DORSALGIA, UNSPECIFIED: ICD-10-CM

## 2021-04-16 PROCEDURE — 72148 MRI LUMBAR SPINE W/O DYE: CPT | Mod: TC,PO

## 2021-04-16 PROCEDURE — 72131 CT LUMBAR SPINE W/O DYE: CPT | Mod: TC,PO

## 2021-04-18 DIAGNOSIS — F41.9 ANXIETY: ICD-10-CM

## 2021-04-18 RX ORDER — ALPRAZOLAM 0.5 MG/1
TABLET ORAL
Qty: 180 TABLET | Refills: 0 | Status: SHIPPED | OUTPATIENT
Start: 2021-04-18 | End: 2021-10-11 | Stop reason: SDUPTHER

## 2021-04-18 RX ORDER — LEVOTHYROXINE SODIUM 75 UG/1
TABLET ORAL
Qty: 90 TABLET | Refills: 3 | Status: SHIPPED | OUTPATIENT
Start: 2021-04-18 | End: 2022-01-19

## 2021-04-19 ENCOUNTER — TELEPHONE (OUTPATIENT)
Dept: NEUROSURGERY | Facility: CLINIC | Age: 61
End: 2021-04-19

## 2021-04-22 ENCOUNTER — PATIENT MESSAGE (OUTPATIENT)
Dept: NEUROSURGERY | Facility: CLINIC | Age: 61
End: 2021-04-22

## 2021-04-26 ENCOUNTER — PATIENT MESSAGE (OUTPATIENT)
Dept: ADMINISTRATIVE | Facility: HOSPITAL | Age: 61
End: 2021-04-26

## 2021-04-26 ENCOUNTER — PATIENT OUTREACH (OUTPATIENT)
Dept: ADMINISTRATIVE | Facility: HOSPITAL | Age: 61
End: 2021-04-26

## 2021-04-26 DIAGNOSIS — Z12.39 ENCOUNTER FOR SCREENING FOR MALIGNANT NEOPLASM OF BREAST, UNSPECIFIED SCREENING MODALITY: Primary | ICD-10-CM

## 2021-04-26 DIAGNOSIS — Z12.31 ENCOUNTER FOR SCREENING MAMMOGRAM FOR MALIGNANT NEOPLASM OF BREAST: ICD-10-CM

## 2021-04-28 ENCOUNTER — PATIENT MESSAGE (OUTPATIENT)
Dept: NEUROSURGERY | Facility: CLINIC | Age: 61
End: 2021-04-28

## 2021-04-30 RX ORDER — ESCITALOPRAM OXALATE 20 MG/1
TABLET ORAL
Qty: 90 TABLET | Refills: 2 | Status: SHIPPED | OUTPATIENT
Start: 2021-04-30 | End: 2022-01-28 | Stop reason: SDUPTHER

## 2021-05-04 ENCOUNTER — OFFICE VISIT (OUTPATIENT)
Dept: NEUROSURGERY | Facility: CLINIC | Age: 61
End: 2021-05-04
Payer: MEDICARE

## 2021-05-04 VITALS
HEART RATE: 66 BPM | SYSTOLIC BLOOD PRESSURE: 136 MMHG | HEIGHT: 60 IN | TEMPERATURE: 98 F | WEIGHT: 192 LBS | BODY MASS INDEX: 37.69 KG/M2 | DIASTOLIC BLOOD PRESSURE: 80 MMHG

## 2021-05-04 DIAGNOSIS — S32.000S LUMBAR COMPRESSION FRACTURE, SEQUELA: Primary | ICD-10-CM

## 2021-05-04 PROCEDURE — 99213 OFFICE O/P EST LOW 20 MIN: CPT | Mod: PBBFAC | Performed by: PHYSICIAN ASSISTANT

## 2021-05-04 PROCEDURE — 99999 PR PBB SHADOW E&M-EST. PATIENT-LVL III: CPT | Mod: PBBFAC,,, | Performed by: PHYSICIAN ASSISTANT

## 2021-05-04 PROCEDURE — 99999 PR PBB SHADOW E&M-EST. PATIENT-LVL III: ICD-10-PCS | Mod: PBBFAC,,, | Performed by: PHYSICIAN ASSISTANT

## 2021-05-04 PROCEDURE — 99214 OFFICE O/P EST MOD 30 MIN: CPT | Mod: S$PBB,,, | Performed by: PHYSICIAN ASSISTANT

## 2021-05-04 PROCEDURE — 99214 PR OFFICE/OUTPT VISIT, EST, LEVL IV, 30-39 MIN: ICD-10-PCS | Mod: S$PBB,,, | Performed by: PHYSICIAN ASSISTANT

## 2021-05-10 ENCOUNTER — OFFICE VISIT (OUTPATIENT)
Dept: FAMILY MEDICINE | Facility: CLINIC | Age: 61
End: 2021-05-10
Payer: MEDICARE

## 2021-05-10 ENCOUNTER — TELEPHONE (OUTPATIENT)
Dept: FAMILY MEDICINE | Facility: CLINIC | Age: 61
End: 2021-05-10

## 2021-05-10 VITALS
HEIGHT: 60 IN | TEMPERATURE: 98 F | DIASTOLIC BLOOD PRESSURE: 80 MMHG | OXYGEN SATURATION: 95 % | BODY MASS INDEX: 39.05 KG/M2 | SYSTOLIC BLOOD PRESSURE: 136 MMHG | HEART RATE: 83 BPM | WEIGHT: 198.88 LBS

## 2021-05-10 DIAGNOSIS — Z00.00 ROUTINE HEALTH MAINTENANCE: Primary | ICD-10-CM

## 2021-05-10 DIAGNOSIS — S06.0X0S CONCUSSION WITHOUT LOSS OF CONSCIOUSNESS, SEQUELA: ICD-10-CM

## 2021-05-10 DIAGNOSIS — E66.01 SEVERE OBESITY (BMI 35.0-39.9) WITH COMORBIDITY: ICD-10-CM

## 2021-05-10 DIAGNOSIS — K86.1 OTHER CHRONIC PANCREATITIS: ICD-10-CM

## 2021-05-10 DIAGNOSIS — G44.86 CERVICOGENIC HEADACHE: ICD-10-CM

## 2021-05-10 DIAGNOSIS — I10 ESSENTIAL HYPERTENSION: ICD-10-CM

## 2021-05-10 DIAGNOSIS — E27.9 DISORDER OF ADRENAL GLAND, UNSPECIFIED: ICD-10-CM

## 2021-05-10 PROBLEM — S06.0X0A CONCUSSION WITH NO LOSS OF CONSCIOUSNESS: Status: ACTIVE | Noted: 2021-05-10

## 2021-05-10 PROCEDURE — 99214 PR OFFICE/OUTPT VISIT, EST, LEVL IV, 30-39 MIN: ICD-10-PCS | Mod: S$GLB,,, | Performed by: FAMILY MEDICINE

## 2021-05-10 PROCEDURE — 99214 OFFICE O/P EST MOD 30 MIN: CPT | Mod: S$GLB,,, | Performed by: FAMILY MEDICINE

## 2021-05-10 RX ORDER — GABAPENTIN 400 MG/1
CAPSULE ORAL
Qty: 270 CAPSULE | Refills: 3 | Status: SHIPPED | OUTPATIENT
Start: 2021-05-10 | End: 2021-08-16 | Stop reason: SDUPTHER

## 2021-05-10 RX ORDER — ROSUVASTATIN CALCIUM 10 MG/1
10 TABLET, COATED ORAL DAILY
Qty: 90 TABLET | Refills: 3 | Status: SHIPPED | OUTPATIENT
Start: 2021-05-10 | End: 2022-05-13

## 2021-05-10 RX ORDER — GABAPENTIN 400 MG/1
CAPSULE ORAL
Qty: 120 CAPSULE | Refills: 3 | Status: SHIPPED | OUTPATIENT
Start: 2021-05-10 | End: 2021-05-10 | Stop reason: SDUPTHER

## 2021-05-10 RX ORDER — ESTRADIOL 1 MG/1
1 TABLET ORAL DAILY
Qty: 90 TABLET | Refills: 3 | Status: SHIPPED | OUTPATIENT
Start: 2021-05-10 | End: 2022-05-05

## 2021-05-18 ENCOUNTER — HOSPITAL ENCOUNTER (EMERGENCY)
Facility: HOSPITAL | Age: 61
Discharge: HOME | End: 2021-05-18
Attending: EMERGENCY MEDICINE
Payer: MEDICARE

## 2021-05-18 VITALS
RESPIRATION RATE: 18 BRPM | SYSTOLIC BLOOD PRESSURE: 158 MMHG | HEART RATE: 77 BPM | WEIGHT: 186 LBS | DIASTOLIC BLOOD PRESSURE: 75 MMHG | TEMPERATURE: 98 F | BODY MASS INDEX: 36.52 KG/M2 | HEIGHT: 60 IN | OXYGEN SATURATION: 96 %

## 2021-05-18 DIAGNOSIS — S39.012A BACK STRAIN, INITIAL ENCOUNTER: ICD-10-CM

## 2021-05-18 DIAGNOSIS — G89.29 CHRONIC MIDLINE LOW BACK PAIN WITHOUT SCIATICA: Primary | ICD-10-CM

## 2021-05-18 DIAGNOSIS — M54.50 CHRONIC MIDLINE LOW BACK PAIN WITHOUT SCIATICA: Primary | ICD-10-CM

## 2021-05-18 PROCEDURE — 99284 EMERGENCY DEPT VISIT MOD MDM: CPT | Mod: ,,, | Performed by: EMERGENCY MEDICINE

## 2021-05-18 PROCEDURE — 86703 HIV-1/HIV-2 1 RESULT ANTBDY: CPT | Performed by: EMERGENCY MEDICINE

## 2021-05-18 PROCEDURE — 99284 EMERGENCY DEPT VISIT MOD MDM: CPT

## 2021-05-18 PROCEDURE — 99284 PR EMERGENCY DEPT VISIT,LEVEL IV: ICD-10-PCS | Mod: ,,, | Performed by: EMERGENCY MEDICINE

## 2021-05-18 PROCEDURE — 25000003 PHARM REV CODE 250: Performed by: EMERGENCY MEDICINE

## 2021-05-18 PROCEDURE — 86803 HEPATITIS C AB TEST: CPT | Performed by: EMERGENCY MEDICINE

## 2021-05-18 RX ORDER — LIDOCAINE 50 MG/G
1 PATCH TOPICAL DAILY
Qty: 15 PATCH | Refills: 0 | Status: SHIPPED | OUTPATIENT
Start: 2021-05-18 | End: 2022-05-13

## 2021-05-18 RX ORDER — HYDROCODONE BITARTRATE AND ACETAMINOPHEN 5; 325 MG/1; MG/1
1 TABLET ORAL EVERY 6 HOURS PRN
Qty: 8 TABLET | Refills: 0 | Status: SHIPPED | OUTPATIENT
Start: 2021-05-18 | End: 2021-05-21

## 2021-05-18 RX ORDER — KETOROLAC TROMETHAMINE 30 MG/ML
10 INJECTION, SOLUTION INTRAMUSCULAR; INTRAVENOUS
Status: DISCONTINUED | OUTPATIENT
Start: 2021-05-18 | End: 2021-05-18 | Stop reason: HOSPADM

## 2021-05-18 RX ORDER — HYDROCODONE BITARTRATE AND ACETAMINOPHEN 5; 325 MG/1; MG/1
1 TABLET ORAL
Status: COMPLETED | OUTPATIENT
Start: 2021-05-18 | End: 2021-05-18

## 2021-05-18 RX ORDER — ONDANSETRON 4 MG/1
4 TABLET, ORALLY DISINTEGRATING ORAL
Status: DISCONTINUED | OUTPATIENT
Start: 2021-05-18 | End: 2021-05-18 | Stop reason: HOSPADM

## 2021-05-18 RX ADMIN — HYDROCODONE BITARTRATE AND ACETAMINOPHEN 1 TABLET: 5; 325 TABLET ORAL at 04:05

## 2021-05-19 ENCOUNTER — TELEPHONE (OUTPATIENT)
Dept: FAMILY MEDICINE | Facility: CLINIC | Age: 61
End: 2021-05-19

## 2021-05-19 LAB
HCV AB SERPL QL IA: NEGATIVE
HIV 1+2 AB+HIV1 P24 AG SERPL QL IA: NEGATIVE

## 2021-05-21 ENCOUNTER — OFFICE VISIT (OUTPATIENT)
Dept: FAMILY MEDICINE | Facility: CLINIC | Age: 61
End: 2021-05-21
Payer: MEDICARE

## 2021-05-21 VITALS
SYSTOLIC BLOOD PRESSURE: 128 MMHG | TEMPERATURE: 98 F | HEART RATE: 96 BPM | HEIGHT: 60 IN | WEIGHT: 200.19 LBS | OXYGEN SATURATION: 95 % | BODY MASS INDEX: 39.3 KG/M2 | DIASTOLIC BLOOD PRESSURE: 80 MMHG

## 2021-05-21 DIAGNOSIS — M54.50 LUMBAR BACK PAIN: Primary | ICD-10-CM

## 2021-05-21 PROCEDURE — 99213 OFFICE O/P EST LOW 20 MIN: CPT | Mod: S$GLB,,, | Performed by: FAMILY MEDICINE

## 2021-05-21 PROCEDURE — 99213 PR OFFICE/OUTPT VISIT, EST, LEVL III, 20-29 MIN: ICD-10-PCS | Mod: S$GLB,,, | Performed by: FAMILY MEDICINE

## 2021-05-21 RX ORDER — HYDROCODONE BITARTRATE AND ACETAMINOPHEN 10; 325 MG/1; MG/1
1 TABLET ORAL EVERY 6 HOURS PRN
Qty: 30 TABLET | Refills: 0 | Status: SHIPPED | OUTPATIENT
Start: 2021-05-21 | End: 2021-06-07 | Stop reason: SDUPTHER

## 2021-06-04 ENCOUNTER — TELEPHONE (OUTPATIENT)
Dept: FAMILY MEDICINE | Facility: CLINIC | Age: 61
End: 2021-06-04

## 2021-06-07 ENCOUNTER — OFFICE VISIT (OUTPATIENT)
Dept: FAMILY MEDICINE | Facility: CLINIC | Age: 61
End: 2021-06-07
Payer: MEDICARE

## 2021-06-07 VITALS
WEIGHT: 197.06 LBS | HEIGHT: 60 IN | BODY MASS INDEX: 38.69 KG/M2 | DIASTOLIC BLOOD PRESSURE: 74 MMHG | HEART RATE: 87 BPM | TEMPERATURE: 98 F | OXYGEN SATURATION: 95 % | SYSTOLIC BLOOD PRESSURE: 126 MMHG

## 2021-06-07 DIAGNOSIS — M71.311 OTHER BURSAL CYST, RIGHT SHOULDER: Primary | ICD-10-CM

## 2021-06-07 PROCEDURE — 99214 PR OFFICE/OUTPT VISIT, EST, LEVL IV, 30-39 MIN: ICD-10-PCS | Mod: S$GLB,,, | Performed by: FAMILY MEDICINE

## 2021-06-07 PROCEDURE — 99214 OFFICE O/P EST MOD 30 MIN: CPT | Mod: S$GLB,,, | Performed by: FAMILY MEDICINE

## 2021-06-07 RX ORDER — HYDROCODONE BITARTRATE AND ACETAMINOPHEN 10; 325 MG/1; MG/1
1 TABLET ORAL EVERY 6 HOURS PRN
Qty: 30 TABLET | Refills: 0 | Status: SHIPPED | OUTPATIENT
Start: 2021-06-07 | End: 2021-07-01 | Stop reason: SDUPTHER

## 2021-06-27 ENCOUNTER — PATIENT MESSAGE (OUTPATIENT)
Dept: FAMILY MEDICINE | Facility: CLINIC | Age: 61
End: 2021-06-27

## 2021-06-27 DIAGNOSIS — M71.311 OTHER BURSAL CYST, RIGHT SHOULDER: Primary | ICD-10-CM

## 2021-06-29 ENCOUNTER — PATIENT MESSAGE (OUTPATIENT)
Dept: FAMILY MEDICINE | Facility: CLINIC | Age: 61
End: 2021-06-29

## 2021-06-29 ENCOUNTER — HOSPITAL ENCOUNTER (OUTPATIENT)
Dept: RADIOLOGY | Facility: HOSPITAL | Age: 61
Discharge: HOME | End: 2021-06-29
Attending: FAMILY MEDICINE
Payer: MEDICARE

## 2021-06-29 DIAGNOSIS — Z12.31 ENCOUNTER FOR SCREENING MAMMOGRAM FOR MALIGNANT NEOPLASM OF BREAST: ICD-10-CM

## 2021-06-29 DIAGNOSIS — Z12.39 ENCOUNTER FOR SCREENING FOR MALIGNANT NEOPLASM OF BREAST, UNSPECIFIED SCREENING MODALITY: ICD-10-CM

## 2021-06-29 PROCEDURE — 77067 SCR MAMMO BI INCL CAD: CPT | Mod: TC,PO

## 2021-06-30 ENCOUNTER — TELEPHONE (OUTPATIENT)
Dept: FAMILY MEDICINE | Facility: CLINIC | Age: 61
End: 2021-06-30

## 2021-06-30 ENCOUNTER — PATIENT MESSAGE (OUTPATIENT)
Dept: FAMILY MEDICINE | Facility: CLINIC | Age: 61
End: 2021-06-30

## 2021-06-30 DIAGNOSIS — R92.8 ABNORMAL MAMMOGRAM: Primary | ICD-10-CM

## 2021-07-01 ENCOUNTER — PATIENT MESSAGE (OUTPATIENT)
Dept: FAMILY MEDICINE | Facility: CLINIC | Age: 61
End: 2021-07-01

## 2021-07-01 RX ORDER — HYDROCODONE BITARTRATE AND ACETAMINOPHEN 10; 325 MG/1; MG/1
1 TABLET ORAL EVERY 6 HOURS PRN
Qty: 30 TABLET | Refills: 0 | Status: SHIPPED | OUTPATIENT
Start: 2021-07-01 | End: 2021-07-12 | Stop reason: SDUPTHER

## 2021-07-05 ENCOUNTER — PATIENT MESSAGE (OUTPATIENT)
Dept: FAMILY MEDICINE | Facility: CLINIC | Age: 61
End: 2021-07-05

## 2021-07-05 RX ORDER — PROMETHAZINE HYDROCHLORIDE 25 MG/1
25 TABLET ORAL EVERY 6 HOURS PRN
Qty: 30 TABLET | Refills: 0 | Status: SHIPPED | OUTPATIENT
Start: 2021-07-05 | End: 2021-07-18 | Stop reason: SDUPTHER

## 2021-07-08 ENCOUNTER — TELEPHONE (OUTPATIENT)
Dept: FAMILY MEDICINE | Facility: CLINIC | Age: 61
End: 2021-07-08

## 2021-07-08 ENCOUNTER — HOSPITAL ENCOUNTER (OUTPATIENT)
Dept: RADIOLOGY | Facility: HOSPITAL | Age: 61
Discharge: HOME | End: 2021-07-08
Attending: FAMILY MEDICINE
Payer: MEDICARE

## 2021-07-08 DIAGNOSIS — R92.8 ABNORMAL MAMMOGRAM: ICD-10-CM

## 2021-07-08 PROCEDURE — 77061 BREAST TOMOSYNTHESIS UNI: CPT | Mod: TC,PO,LT

## 2021-07-11 ENCOUNTER — PATIENT MESSAGE (OUTPATIENT)
Dept: FAMILY MEDICINE | Facility: CLINIC | Age: 61
End: 2021-07-11

## 2021-07-12 RX ORDER — HYDROCODONE BITARTRATE AND ACETAMINOPHEN 10; 325 MG/1; MG/1
1 TABLET ORAL EVERY 6 HOURS PRN
Qty: 30 TABLET | Refills: 0 | Status: SHIPPED | OUTPATIENT
Start: 2021-07-12 | End: 2022-05-13

## 2021-07-18 ENCOUNTER — PATIENT MESSAGE (OUTPATIENT)
Dept: FAMILY MEDICINE | Facility: CLINIC | Age: 61
End: 2021-07-18

## 2021-07-18 RX ORDER — PROMETHAZINE HYDROCHLORIDE 25 MG/1
25 TABLET ORAL EVERY 6 HOURS PRN
Qty: 30 TABLET | Refills: 0 | Status: SHIPPED | OUTPATIENT
Start: 2021-07-18

## 2021-07-19 ENCOUNTER — PATIENT MESSAGE (OUTPATIENT)
Dept: FAMILY MEDICINE | Facility: CLINIC | Age: 61
End: 2021-07-19

## 2021-08-13 ENCOUNTER — PATIENT MESSAGE (OUTPATIENT)
Dept: FAMILY MEDICINE | Facility: CLINIC | Age: 61
End: 2021-08-13

## 2021-08-16 DIAGNOSIS — E66.01 SEVERE OBESITY (BMI 35.0-39.9) WITH COMORBIDITY: ICD-10-CM

## 2021-08-16 DIAGNOSIS — I10 ESSENTIAL HYPERTENSION: ICD-10-CM

## 2021-08-16 DIAGNOSIS — G44.86 CERVICOGENIC HEADACHE: ICD-10-CM

## 2021-08-16 RX ORDER — GABAPENTIN 400 MG/1
CAPSULE ORAL
Qty: 180 CAPSULE | Refills: 3 | Status: SHIPPED | OUTPATIENT
Start: 2021-08-16 | End: 2021-12-27 | Stop reason: SDUPTHER

## 2021-08-17 DIAGNOSIS — E66.01 SEVERE OBESITY (BMI 35.0-39.9) WITH COMORBIDITY: ICD-10-CM

## 2021-08-17 DIAGNOSIS — G44.86 CERVICOGENIC HEADACHE: ICD-10-CM

## 2021-08-17 DIAGNOSIS — I10 ESSENTIAL HYPERTENSION: ICD-10-CM

## 2021-08-17 RX ORDER — GABAPENTIN 400 MG/1
CAPSULE ORAL
Qty: 180 CAPSULE | Refills: 3 | Status: CANCELLED | OUTPATIENT
Start: 2021-08-17

## 2021-08-19 ENCOUNTER — TELEPHONE (OUTPATIENT)
Dept: FAMILY MEDICINE | Facility: CLINIC | Age: 61
End: 2021-08-19

## 2021-08-25 ENCOUNTER — TELEPHONE (OUTPATIENT)
Dept: FAMILY MEDICINE | Facility: CLINIC | Age: 61
End: 2021-08-25

## 2021-08-26 ENCOUNTER — PATIENT MESSAGE (OUTPATIENT)
Dept: FAMILY MEDICINE | Facility: CLINIC | Age: 61
End: 2021-08-26

## 2021-09-03 ENCOUNTER — PATIENT MESSAGE (OUTPATIENT)
Dept: NEUROSURGERY | Facility: CLINIC | Age: 61
End: 2021-09-03

## 2021-09-06 ENCOUNTER — HOSPITAL ENCOUNTER (EMERGENCY)
Facility: HOSPITAL | Age: 61
Discharge: HOME | End: 2021-09-07
Attending: EMERGENCY MEDICINE
Payer: MEDICARE

## 2021-09-06 DIAGNOSIS — K52.9 GASTROENTERITIS: Primary | ICD-10-CM

## 2021-09-06 DIAGNOSIS — R11.10 VOMITING: ICD-10-CM

## 2021-09-06 DIAGNOSIS — K29.70 GASTRITIS, PRESENCE OF BLEEDING UNSPECIFIED, UNSPECIFIED CHRONICITY, UNSPECIFIED GASTRITIS TYPE: ICD-10-CM

## 2021-09-06 PROCEDURE — 25000003 PHARM REV CODE 250: Mod: ER | Performed by: EMERGENCY MEDICINE

## 2021-09-06 PROCEDURE — 93010 ELECTROCARDIOGRAM REPORT: CPT | Mod: ,,, | Performed by: INTERNAL MEDICINE

## 2021-09-06 PROCEDURE — 93010 EKG 12-LEAD: ICD-10-PCS | Mod: ,,, | Performed by: INTERNAL MEDICINE

## 2021-09-06 PROCEDURE — 96366 THER/PROPH/DIAG IV INF ADDON: CPT | Mod: ER

## 2021-09-06 PROCEDURE — 93005 ELECTROCARDIOGRAM TRACING: CPT | Mod: ER

## 2021-09-06 PROCEDURE — 99285 EMERGENCY DEPT VISIT HI MDM: CPT | Mod: 25,ER

## 2021-09-06 RX ORDER — ONDANSETRON 4 MG/1
8 TABLET, ORALLY DISINTEGRATING ORAL
Status: COMPLETED | OUTPATIENT
Start: 2021-09-06 | End: 2021-09-06

## 2021-09-06 RX ADMIN — ONDANSETRON 8 MG: 4 TABLET, ORALLY DISINTEGRATING ORAL at 11:09

## 2021-09-07 VITALS
SYSTOLIC BLOOD PRESSURE: 151 MMHG | TEMPERATURE: 98 F | OXYGEN SATURATION: 98 % | RESPIRATION RATE: 18 BRPM | HEART RATE: 85 BPM | DIASTOLIC BLOOD PRESSURE: 84 MMHG | WEIGHT: 198 LBS | BODY MASS INDEX: 38.87 KG/M2 | HEIGHT: 60 IN

## 2021-09-07 LAB
ALBUMIN SERPL BCP-MCNC: 4.3 G/DL (ref 3.5–5.2)
ALP SERPL-CCNC: 68 U/L (ref 38–126)
ALT SERPL W/O P-5'-P-CCNC: 11 U/L (ref 10–44)
AMPHET+METHAMPHET UR QL: NEGATIVE
ANION GAP SERPL CALC-SCNC: 7 MMOL/L (ref 8–16)
AST SERPL-CCNC: 22 U/L (ref 15–46)
BARBITURATES UR QL SCN>200 NG/ML: NEGATIVE
BASOPHILS # BLD AUTO: 0.04 K/UL (ref 0–0.2)
BASOPHILS NFR BLD: 0.4 % (ref 0–1.9)
BENZODIAZ UR QL SCN>200 NG/ML: ABNORMAL
BILIRUB SERPL-MCNC: 0.5 MG/DL (ref 0.1–1)
BILIRUB UR QL STRIP: NEGATIVE
BZE UR QL SCN: NEGATIVE
CALCIUM SERPL-MCNC: 9.4 MG/DL (ref 8.7–10.5)
CANNABINOIDS UR QL SCN: ABNORMAL
CHLORIDE SERPL-SCNC: 99 MMOL/L (ref 95–110)
CLARITY UR REFRACT.AUTO: CLEAR
CO2 SERPL-SCNC: 33 MMOL/L (ref 23–29)
COLOR UR AUTO: NORMAL
CREAT SERPL-MCNC: 0.54 MG/DL (ref 0.5–1.4)
CREAT UR-MCNC: 90.1 MG/DL (ref 15–325)
DIFFERENTIAL METHOD: ABNORMAL
EOSINOPHIL # BLD AUTO: 0.1 K/UL (ref 0–0.5)
EOSINOPHIL NFR BLD: 1.1 % (ref 0–8)
ERYTHROCYTE [DISTWIDTH] IN BLOOD BY AUTOMATED COUNT: 14.3 % (ref 11.5–14.5)
EST. GFR  (AFRICAN AMERICAN): >60 ML/MIN/1.73 M^2
EST. GFR  (NON AFRICAN AMERICAN): >60 ML/MIN/1.73 M^2
GLUCOSE SERPL-MCNC: 114 MG/DL (ref 70–110)
GLUCOSE UR QL STRIP: NEGATIVE
HCT VFR BLD AUTO: 41.1 % (ref 37–48.5)
HGB BLD-MCNC: 13.6 G/DL (ref 12–16)
HGB UR QL STRIP: NEGATIVE
IMM GRANULOCYTES # BLD AUTO: 0.02 K/UL (ref 0–0.04)
IMM GRANULOCYTES NFR BLD AUTO: 0.2 % (ref 0–0.5)
KETONES UR QL STRIP: NEGATIVE
LEUKOCYTE ESTERASE UR QL STRIP: NEGATIVE
LIPASE SERPL-CCNC: 82 U/L (ref 23–300)
LYMPHOCYTES # BLD AUTO: 1.3 K/UL (ref 1–4.8)
LYMPHOCYTES NFR BLD: 14.3 % (ref 18–48)
MCH RBC QN AUTO: 29 PG (ref 27–31)
MCHC RBC AUTO-ENTMCNC: 33.1 G/DL (ref 32–36)
MCV RBC AUTO: 88 FL (ref 82–98)
METHADONE UR QL SCN>300 NG/ML: NEGATIVE
MONOCYTES # BLD AUTO: 0.7 K/UL (ref 0.3–1)
MONOCYTES NFR BLD: 8 % (ref 4–15)
NEUTROPHILS # BLD AUTO: 7 K/UL (ref 1.8–7.7)
NEUTROPHILS NFR BLD: 76 % (ref 38–73)
NITRITE UR QL STRIP: NEGATIVE
NRBC BLD-RTO: 0 /100 WBC
OPIATES UR QL SCN: ABNORMAL
PCP UR QL SCN>25 NG/ML: NEGATIVE
PH UR STRIP: 6 [PH] (ref 5–8)
PLATELET # BLD AUTO: 238 K/UL (ref 150–450)
PMV BLD AUTO: 10.8 FL (ref 9.2–12.9)
POTASSIUM SERPL-SCNC: 3.8 MMOL/L (ref 3.5–5.1)
PROT SERPL-MCNC: 7.2 G/DL (ref 6–8.4)
PROT UR QL STRIP: NEGATIVE
RBC # BLD AUTO: 4.69 M/UL (ref 4–5.4)
SARS-COV-2 RDRP RESP QL NAA+PROBE: NEGATIVE
SODIUM SERPL-SCNC: 139 MMOL/L (ref 136–145)
SP GR UR STRIP: 1.01 (ref 1–1.03)
TOXICOLOGY INFORMATION: ABNORMAL
URN SPEC COLLECT METH UR: NORMAL
UROBILINOGEN UR STRIP-ACNC: NEGATIVE EU/DL
UUN UR-MCNC: 13 MG/DL (ref 7–17)
WBC # BLD AUTO: 9.24 K/UL (ref 3.9–12.7)

## 2021-09-07 PROCEDURE — 80307 DRUG TEST PRSMV CHEM ANLYZR: CPT | Mod: ER | Performed by: EMERGENCY MEDICINE

## 2021-09-07 PROCEDURE — 63600175 PHARM REV CODE 636 W HCPCS: Mod: ER | Performed by: EMERGENCY MEDICINE

## 2021-09-07 PROCEDURE — 85025 COMPLETE CBC W/AUTO DIFF WBC: CPT | Mod: ER | Performed by: EMERGENCY MEDICINE

## 2021-09-07 PROCEDURE — U0002 COVID-19 LAB TEST NON-CDC: HCPCS | Mod: ER | Performed by: EMERGENCY MEDICINE

## 2021-09-07 PROCEDURE — 81003 URINALYSIS AUTO W/O SCOPE: CPT | Mod: ER,59 | Performed by: EMERGENCY MEDICINE

## 2021-09-07 PROCEDURE — 25000003 PHARM REV CODE 250: Mod: ER | Performed by: EMERGENCY MEDICINE

## 2021-09-07 PROCEDURE — 83690 ASSAY OF LIPASE: CPT | Mod: ER | Performed by: EMERGENCY MEDICINE

## 2021-09-07 PROCEDURE — 80053 COMPREHEN METABOLIC PANEL: CPT | Mod: ER | Performed by: EMERGENCY MEDICINE

## 2021-09-07 PROCEDURE — 96365 THER/PROPH/DIAG IV INF INIT: CPT | Mod: ER

## 2021-09-07 RX ORDER — PROCHLORPERAZINE EDISYLATE 5 MG/ML
INJECTION INTRAMUSCULAR; INTRAVENOUS
Status: DISCONTINUED
Start: 2021-09-07 | End: 2021-09-07 | Stop reason: WASHOUT

## 2021-09-07 RX ORDER — FAMOTIDINE 20 MG/1
20 TABLET, FILM COATED ORAL
Status: COMPLETED | OUTPATIENT
Start: 2021-09-07 | End: 2021-09-07

## 2021-09-07 RX ORDER — PANTOPRAZOLE SODIUM 40 MG/1
40 TABLET, DELAYED RELEASE ORAL
Status: COMPLETED | OUTPATIENT
Start: 2021-09-07 | End: 2021-09-07

## 2021-09-07 RX ADMIN — LIDOCAINE HYDROCHLORIDE: 20 SOLUTION ORAL; TOPICAL at 01:09

## 2021-09-07 RX ADMIN — PANTOPRAZOLE SODIUM 40 MG: 40 TABLET, DELAYED RELEASE ORAL at 01:09

## 2021-09-07 RX ADMIN — PROMETHAZINE HYDROCHLORIDE 25 MG: 25 INJECTION INTRAMUSCULAR; INTRAVENOUS at 12:09

## 2021-09-07 RX ADMIN — FAMOTIDINE 20 MG: 20 TABLET ORAL at 01:09

## 2021-09-07 RX ADMIN — SODIUM CHLORIDE 2000 ML: 0.9 INJECTION, SOLUTION INTRAVENOUS at 12:09

## 2021-09-10 ENCOUNTER — TELEPHONE (OUTPATIENT)
Dept: FAMILY MEDICINE | Facility: CLINIC | Age: 61
End: 2021-09-10

## 2021-09-10 RX ORDER — DIPHENOXYLATE HYDROCHLORIDE AND ATROPINE SULFATE 2.5; .025 MG/1; MG/1
1 TABLET ORAL 4 TIMES DAILY PRN
Qty: 40 TABLET | Refills: 0 | Status: SHIPPED | OUTPATIENT
Start: 2021-09-10 | End: 2021-09-10 | Stop reason: SDUPTHER

## 2021-09-10 RX ORDER — DIPHENOXYLATE HYDROCHLORIDE AND ATROPINE SULFATE 2.5; .025 MG/1; MG/1
1 TABLET ORAL 4 TIMES DAILY PRN
Qty: 40 TABLET | Refills: 0 | Status: SHIPPED | OUTPATIENT
Start: 2021-09-10 | End: 2021-09-20

## 2021-10-11 ENCOUNTER — PATIENT MESSAGE (OUTPATIENT)
Dept: FAMILY MEDICINE | Facility: CLINIC | Age: 61
End: 2021-10-11

## 2021-10-11 DIAGNOSIS — F41.9 ANXIETY: ICD-10-CM

## 2021-10-11 RX ORDER — ALPRAZOLAM 0.5 MG/1
TABLET ORAL
Qty: 180 TABLET | Refills: 0 | Status: SHIPPED | OUTPATIENT
Start: 2021-10-11 | End: 2022-01-10

## 2021-11-04 RX ORDER — HYDROCHLOROTHIAZIDE 12.5 MG/1
CAPSULE ORAL
Qty: 90 CAPSULE | Refills: 4 | Status: SHIPPED | OUTPATIENT
Start: 2021-11-04 | End: 2023-01-27

## 2021-12-27 ENCOUNTER — TELEPHONE (OUTPATIENT)
Dept: FAMILY MEDICINE | Facility: CLINIC | Age: 61
End: 2021-12-27
Payer: MEDICARE

## 2021-12-27 DIAGNOSIS — G44.86 CERVICOGENIC HEADACHE: ICD-10-CM

## 2021-12-27 DIAGNOSIS — I10 ESSENTIAL HYPERTENSION: ICD-10-CM

## 2021-12-27 DIAGNOSIS — E66.01 SEVERE OBESITY (BMI 35.0-39.9) WITH COMORBIDITY: ICD-10-CM

## 2021-12-27 RX ORDER — GABAPENTIN 400 MG/1
CAPSULE ORAL
Qty: 180 CAPSULE | Refills: 3 | Status: SHIPPED | OUTPATIENT
Start: 2021-12-27 | End: 2022-04-25

## 2022-01-12 NOTE — ED PROVIDER NOTES
"Encounter Date: 1/21/2017       History     Chief Complaint   Patient presents with    Vomiting     N/V/D since yestrday at 1300 after eating a hamburger.  Patient states she is not urinating, unable to keep any liquids down.  [place] [facility] and given IVF and Phenergan which she says did help for a short time, given a script for Tramadol and Phenergan but patient did not have filled.     Diarrhea     Review of patient's allergies indicates:   Allergen Reactions    Codeine Nausea And Vomiting    Compazine [prochlorperazine edisylate] Anaphylaxis and Nausea And Vomiting    Demerol [meperidine] Anaphylaxis and Nausea And Vomiting    Morpholine analogues Anaphylaxis and Nausea And Vomiting     vomit    Percocet [oxycodone-acetaminophen] Nausea And Vomiting    Phenothiazines Anaphylaxis     Takes Phenergan daily w/o problems.      Corticosteroids (glucocorticoids) Itching     Agitation, "skin crawling" sensation    Cortisone Itching     Agitation, "skin crawling" sensation    Fentanyl Nausea And Vomiting and Swelling     Fentanyl patch made lips numb and swollen    Morphine Nausea And Vomiting    Nsaids (non-steroidal anti-inflammatory drug) Other (See Comments)     Cluster ulcers    Pcn [penicillins] Other (See Comments)     Stomach cramps    Toradol [ketorolac] Nausea And Vomiting     ulcers    Tramadol Nausea And Vomiting     Patient is a 56 y.o. female presenting with the following complaint: vomiting. The history is provided by the patient.   Emesis    This is a new problem. The current episode started two days ago. The problem occurs 2 - 4 times per day. The problem has been unchanged. The emesis has an appearance of stomach contents. Associated symptoms include abdominal pain, chills and diarrhea. Pertinent negatives include no arthralgias, no fever, no headaches, no myalgias and no sweats.     Past Medical History   Diagnosis Date    Adrenal adenoma     Gastric ulcer     " Need repeat echo    Hypothyroidism     Kidney stones     Pancreatitis     Traumatic compression fracture of L1 lumbar vertebra      Past Medical History Pertinent Negatives   Diagnosis Date Noted    Depression 10/19/2014    GERD (gastroesophageal reflux disease) 10/19/2014     Past Surgical History   Procedure Laterality Date    Tonsillectomy      Cholecystectomy      Cystoscopy      X2       Right adrenal gland remove      Tonsillectomy      Tubal ligation      Hysterectomy      Kyphosis surgery  7/20/15    Colonoscopy N/A 2016     Procedure: COLONOSCOPY;  Surgeon: Sathya Strickland MD;  Location: [address];  Service: Endoscopy;  Laterality: N/A;     Family History   Problem Relation Age of Onset    Diabetes Father      borderline     Social History   Substance Use Topics    Smoking status: Never Smoker    Smokeless tobacco: Never Used    Alcohol use No     Review of Systems   Constitutional: Positive for chills. Negative for fever.   Gastrointestinal: Positive for abdominal pain, diarrhea and vomiting.   Musculoskeletal: Negative for arthralgias and myalgias.   Neurological: Negative for headaches.   All other systems reviewed and are negative.      Physical Exam   Initial Vitals   BP Pulse Resp Temp SpO2   170 170 17   135/88 93 18 98.9 °F (37.2 °C) 100 %     Physical Exam    Nursing note and vitals reviewed.  Constitutional: She appears well-developed and well-nourished.   HENT:   Head: Normocephalic and atraumatic.   Eyes: EOM are normal.   Neck: Normal range of motion. Neck supple.   Cardiovascular: Normal rate, regular rhythm, normal heart sounds and intact distal pulses.   Pulmonary/Chest: Breath sounds normal.   Abdominal: Soft.   Musculoskeletal: Normal range of motion.   Neurological: She is alert and oriented to person, place, and time.   Skin: Skin is warm and dry.   Psychiatric: She has a normal mood and affect. Her behavior is  normal. Judgment and thought content normal.         ED Course   Procedures  Labs Reviewed - No data to display                            ED Course     Clinical Impression:   The encounter diagnosis was Gastroenteritis.    Disposition:   Disposition: Discharged  Condition: Stable       Rachel Ding MD  01/21/17 9050

## 2022-01-19 RX ORDER — LEVOTHYROXINE SODIUM 75 UG/1
TABLET ORAL
Qty: 90 TABLET | Refills: 3 | Status: SHIPPED | OUTPATIENT
Start: 2022-01-19 | End: 2023-01-12 | Stop reason: SDUPTHER

## 2022-01-19 NOTE — TELEPHONE ENCOUNTER
Care Due:                  Date            Visit Type   Department     Provider  --------------------------------------------------------------------------------                                             [facility]  Last Visit: 06-      None         MEDICINE       Giacomo Domingo  Next Visit: None Scheduled  None         None Found                                                            Last  Test          Frequency    Reason                     Performed    Due Date  --------------------------------------------------------------------------------    Lipid Panel.  12 months..  rosuvastatin.............  Not Found    Overdue    Powered by [employer]. Reference number: [id number].   1/19/2022 12:31:38 PM CST

## 2022-01-25 NOTE — TELEPHONE ENCOUNTER
No new care gaps identified.  Powered by [employer] by [employer]. Reference number: [id number].   1/25/2022 11:42:30 AM CST

## 2022-01-28 ENCOUNTER — PATIENT MESSAGE (OUTPATIENT)
Dept: FAMILY MEDICINE | Facility: CLINIC | Age: 62
End: 2022-01-28
Payer: MEDICARE

## 2022-01-28 RX ORDER — ESCITALOPRAM OXALATE 20 MG/1
20 TABLET ORAL DAILY
Qty: 90 TABLET | Refills: 2 | Status: SHIPPED | OUTPATIENT
Start: 2022-01-28 | End: 2022-10-11

## 2022-02-09 RX ORDER — ESCITALOPRAM OXALATE 20 MG/1
TABLET ORAL
Qty: 90 TABLET | Refills: 2 | OUTPATIENT
Start: 2022-02-09

## 2022-02-09 NOTE — TELEPHONE ENCOUNTER
This medication has been reordered by the PCP already.  Will remove duplicate and close out encounter.

## 2022-04-08 ENCOUNTER — TELEPHONE (OUTPATIENT)
Dept: FAMILY MEDICINE | Facility: CLINIC | Age: 62
End: 2022-04-08
Payer: MEDICARE

## 2022-04-08 DIAGNOSIS — F41.9 ANXIETY: ICD-10-CM

## 2022-04-08 RX ORDER — ALPRAZOLAM 0.5 MG/1
TABLET ORAL
Qty: 180 TABLET | Refills: 0 | Status: SHIPPED | OUTPATIENT
Start: 2022-05-08 | End: 2022-10-13 | Stop reason: SDUPTHER

## 2022-04-08 RX ORDER — ALPRAZOLAM 0.5 MG/1
TABLET ORAL
Qty: 90 TABLET | Refills: 0 | Status: SHIPPED | OUTPATIENT
Start: 2022-05-08 | End: 2022-04-08 | Stop reason: SDUPTHER

## 2022-04-08 RX ORDER — ALPRAZOLAM 0.5 MG/1
TABLET ORAL
Qty: 180 TABLET | Refills: 0 | Status: SHIPPED | OUTPATIENT
Start: 2022-05-08 | End: 2022-04-08 | Stop reason: SDUPTHER

## 2022-04-08 RX ORDER — ALPRAZOLAM 0.5 MG/1
TABLET ORAL
Qty: 90 TABLET | Refills: 0 | Status: SHIPPED | OUTPATIENT
Start: 2022-05-08 | End: 2022-04-09 | Stop reason: SDUPTHER

## 2022-04-08 NOTE — TELEPHONE ENCOUNTER
Spoke to pt she stated that her sister is dying and she is taking care of her dad. Pt would like a increase on xanax and lexapro is not helping. Pt is stressed and crying all the time.    Pt stated she would like to make a appointment for herself and 21 year old grandson. Pt stated the grandson Harman Howell [birth year]01 has medicaid. However in his chart it shows pt has bcbs. I informed that [facility] does not have open slots for new medicaid pts, and I gave the information for [facility] in [place]. Destinee insisted that you see her granddson the same day as her.     Please advise!        ----- Message from Agatha Rae sent at 4/8/2022  9:56 AM CDT -----  Regarding: appt  Contact: [phone]  Type:  Sooner Apoointment Request    Caller is requesting a sooner appointment.  Caller is requesting a message be sent to doctor.  Name of Caller: self   When is the first available appointment?   Symptoms: her sister is in hospice and taking care of her 95 year old dad. she is stressed out and crying all day.   Would the patient rather a call back or a response via [facility]?  call  Best Call Back Number: [phone]  Additional Information:

## 2022-04-08 NOTE — TELEPHONE ENCOUNTER
Notified pt of use of xanax. I gave pt the phone number to psych. I also informed pt that [facility] does not have available slots for new medicaid for her grandson. I gave pt the phone for [facility] in [place]. The phone I gave is [phone].

## 2022-04-08 NOTE — TELEPHONE ENCOUNTER
ALPRAZolam (XANAX) 0.5 MG tablet 90 tablet 0 5/8/2022 5/8/2022    Sig: TAKE 1 TABLET BY MOUTH THREE TIMES A DAY  AS NEEDED FOR ANXIETY    Sent to pharmacy as: ALPRAZolam (XANAX) 0.5 MG tablet    E-Prescribing Status: Transmission to pharmacy failed (4/8/2022  1:59 PM CDT)

## 2022-04-08 NOTE — TELEPHONE ENCOUNTER
I sent in a prescription for and increasing your Xanax from one tablet 2 times daily to one tablet 3 times daily-prescription sent in today 90 tablets should last use 30 days.  Then I sent in your regular prescription on May 8th for 180 tablets which is enough for 2 times a day for three months.      I would like to send you to the psychiatrist to help with your anxiety symptoms-I think that is the best place for you to go and visit.  Secondly we cannot see your grandson-as we do not take Medicaid insurance at our location.

## 2022-04-09 ENCOUNTER — TELEPHONE (OUTPATIENT)
Dept: FAMILY MEDICINE | Facility: CLINIC | Age: 62
End: 2022-04-09
Payer: MEDICARE

## 2022-04-09 DIAGNOSIS — F41.9 ANXIETY: ICD-10-CM

## 2022-04-09 RX ORDER — ALPRAZOLAM 0.5 MG/1
TABLET ORAL
Qty: 90 TABLET | Refills: 0 | Status: SHIPPED | OUTPATIENT
Start: 2022-05-08 | End: 2022-07-07

## 2022-04-11 RX ORDER — ALPRAZOLAM 0.5 MG/1
TABLET ORAL
Qty: 180 TABLET | Refills: 0 | OUTPATIENT
Start: 2022-05-08

## 2022-04-11 NOTE — TELEPHONE ENCOUNTER
Outpatient Medication Detail     Disp Refills Start End    ALPRAZolam (XANAX) 0.5 MG tablet 180 tablet 0 5/8/2022     Sig: TAKE 1 TABLET BY MOUTH BID AS NEEDED FOR ANXIETY (3 MO SUPPLY)    Sent to pharmacy as: ALPRAZolam (XANAX) 0.5 MG tablet    E-Prescribing Status: Transmission to pharmacy failed (4/8/2022  4:29 PM CDT)

## 2022-04-11 NOTE — TELEPHONE ENCOUNTER
Care Due:                  Date            Visit Type   Department     Provider  --------------------------------------------------------------------------------                                EP -                              PRIMARY      [facility]  Last Visit: 06-      CARE ([facility])   MEDICINE       Giacomo Domingo                              EP -                              PRIMARY      [facility]  Next Visit: 05-      CARE ([facility])   [facility]       Giacomo Domingo                                                            Last  Test          Frequency    Reason                     Performed    Due Date  --------------------------------------------------------------------------------    Lipid Panel.  12 months..  rosuvastatin.............  Not Found    Overdue    Powered by [employer] by [employer]. Reference number: [id number].   4/11/2022 8:12:42 AM CDT

## 2022-04-14 RX ORDER — PANTOPRAZOLE SODIUM 40 MG/1
40 TABLET, DELAYED RELEASE ORAL DAILY
Qty: 90 TABLET | Refills: 3 | Status: SHIPPED | OUTPATIENT
Start: 2022-04-14 | End: 2023-04-06

## 2022-04-14 NOTE — TELEPHONE ENCOUNTER
No new care gaps identified.  Powered by [employer]. Reference number: [id number].   4/14/2022 10:18:07 AM CDT

## 2022-04-14 NOTE — TELEPHONE ENCOUNTER
----- Message from Agatha Rae sent at 4/14/2022  9:32 AM CDT -----  Regarding: advice  Contact: [phone]  Type:  Needs Medical Advice    Who Called:  self   Symptoms (please be specific):  she has stomach ulcers and needs a refill of her    pantoprazole (PROTONIX) 40 MG tablets. Take 40 mg by mouth once daily.  - Oral  How long has patient had these symptoms:     Pharmacy name and phone #:    [id number] [address];  Would the patient rather a call back or a response via [facility]?    Best Call Back Number:  [phone]  Additional Information:  Dr. Mirian Robles/gastro wrote the original rx and she would like to have all of her medications written by Dr. De La Torre    Patient is requesting orders for Annual lab work sent to [facility].   Would the patient rather a call back or a response via [facility]?  call  Best Call Back Number:  [phone]  Additional Information:

## 2022-04-25 ENCOUNTER — PATIENT OUTREACH (OUTPATIENT)
Dept: ADMINISTRATIVE | Facility: HOSPITAL | Age: 62
End: 2022-04-25
Payer: MEDICARE

## 2022-04-25 DIAGNOSIS — G44.86 CERVICOGENIC HEADACHE: ICD-10-CM

## 2022-04-25 DIAGNOSIS — I10 ESSENTIAL HYPERTENSION: ICD-10-CM

## 2022-04-25 DIAGNOSIS — E66.01 SEVERE OBESITY (BMI 35.0-39.9) WITH COMORBIDITY: ICD-10-CM

## 2022-04-25 RX ORDER — GABAPENTIN 400 MG/1
CAPSULE ORAL
Qty: 180 CAPSULE | Refills: 3 | Status: SHIPPED | OUTPATIENT
Start: 2022-04-25 | End: 2022-08-22

## 2022-04-25 NOTE — TELEPHONE ENCOUNTER
No new care gaps identified.  Powered by [employer]. Reference number: [id number].   4/25/2022 10:03:02 AM CDT

## 2022-04-27 ENCOUNTER — TELEPHONE (OUTPATIENT)
Dept: FAMILY MEDICINE | Facility: CLINIC | Age: 62
End: 2022-04-27
Payer: MEDICARE

## 2022-04-27 DIAGNOSIS — E66.01 SEVERE OBESITY (BMI 35.0-39.9) WITH COMORBIDITY: ICD-10-CM

## 2022-04-27 DIAGNOSIS — R73.9 HYPERGLYCEMIA: ICD-10-CM

## 2022-04-27 DIAGNOSIS — I10 ESSENTIAL HYPERTENSION: Primary | ICD-10-CM

## 2022-04-27 DIAGNOSIS — E03.9 HYPOTHYROIDISM, UNSPECIFIED TYPE: ICD-10-CM

## 2022-04-27 NOTE — TELEPHONE ENCOUNTER
----- Message from Jessi Talavera sent at 4/27/2022  2:55 PM CDT -----  Needs advice from nurse:      Who Called:pt  Regarding:has annual in may needs lab orders put in  Would the patient rather a call back or VIA [facility]?  Best Call Back number:[phone]  Additional Info:

## 2022-05-05 RX ORDER — ESTRADIOL 1 MG/1
TABLET ORAL
Qty: 90 TABLET | Refills: 3 | Status: SHIPPED | OUTPATIENT
Start: 2022-05-05 | End: 2023-05-02 | Stop reason: SDUPTHER

## 2022-05-13 ENCOUNTER — OFFICE VISIT (OUTPATIENT)
Dept: FAMILY MEDICINE | Facility: CLINIC | Age: 62
End: 2022-05-13
Payer: MEDICARE

## 2022-05-13 ENCOUNTER — LAB VISIT (OUTPATIENT)
Dept: LAB | Facility: HOSPITAL | Age: 62
End: 2022-05-13
Attending: FAMILY MEDICINE
Payer: MEDICARE

## 2022-05-13 VITALS
TEMPERATURE: 98 F | HEART RATE: 69 BPM | BODY MASS INDEX: 35.84 KG/M2 | HEIGHT: 60 IN | WEIGHT: 182.56 LBS | SYSTOLIC BLOOD PRESSURE: 116 MMHG | DIASTOLIC BLOOD PRESSURE: 70 MMHG | OXYGEN SATURATION: 96 %

## 2022-05-13 DIAGNOSIS — Z12.31 ENCOUNTER FOR SCREENING MAMMOGRAM FOR BREAST CANCER: ICD-10-CM

## 2022-05-13 DIAGNOSIS — E03.9 HYPOTHYROIDISM, UNSPECIFIED TYPE: ICD-10-CM

## 2022-05-13 DIAGNOSIS — I10 ESSENTIAL HYPERTENSION: Primary | ICD-10-CM

## 2022-05-13 DIAGNOSIS — F41.9 ANXIETY: ICD-10-CM

## 2022-05-13 DIAGNOSIS — E66.01 SEVERE OBESITY (BMI 35.0-39.9) WITH COMORBIDITY: ICD-10-CM

## 2022-05-13 DIAGNOSIS — I10 ESSENTIAL HYPERTENSION: ICD-10-CM

## 2022-05-13 DIAGNOSIS — R73.9 HYPERGLYCEMIA: ICD-10-CM

## 2022-05-13 LAB
CHOLEST SERPL-MCNC: 225 MG/DL (ref 120–199)
CHOLEST/HDLC SERPL: 3.9 {RATIO} (ref 2–5)
ESTIMATED AVG GLUCOSE: 108 MG/DL (ref 68–131)
HBA1C MFR BLD: 5.4 % (ref 4–5.6)
HDLC SERPL-MCNC: 57 MG/DL (ref 40–75)
HDLC SERPL: 25.3 % (ref 20–50)
LDLC SERPL CALC-MCNC: 147 MG/DL (ref 63–159)
NONHDLC SERPL-MCNC: 168 MG/DL
T4 FREE SERPL-MCNC: 1.08 NG/DL (ref 0.71–1.51)
TRIGL SERPL-MCNC: 105 MG/DL (ref 30–150)
TSH SERPL DL<=0.005 MIU/L-ACNC: 0.84 UIU/ML (ref 0.4–4)

## 2022-05-13 PROCEDURE — 84443 ASSAY THYROID STIM HORMONE: CPT | Mod: PO | Performed by: FAMILY MEDICINE

## 2022-05-13 PROCEDURE — 83036 HEMOGLOBIN GLYCOSYLATED A1C: CPT | Performed by: FAMILY MEDICINE

## 2022-05-13 PROCEDURE — 36415 COLL VENOUS BLD VENIPUNCTURE: CPT | Mod: PO | Performed by: FAMILY MEDICINE

## 2022-05-13 PROCEDURE — 84439 ASSAY OF FREE THYROXINE: CPT | Performed by: FAMILY MEDICINE

## 2022-05-13 PROCEDURE — 99214 OFFICE O/P EST MOD 30 MIN: CPT | Mod: S$GLB,,, | Performed by: FAMILY MEDICINE

## 2022-05-13 PROCEDURE — 99214 PR OFFICE/OUTPT VISIT, EST, LEVL IV, 30-39 MIN: ICD-10-PCS | Mod: S$GLB,,, | Performed by: FAMILY MEDICINE

## 2022-05-13 PROCEDURE — 80061 LIPID PANEL: CPT | Performed by: FAMILY MEDICINE

## 2022-05-13 NOTE — PROGRESS NOTES
Patient ID: Cara Mendoza is a 61 y.o. female.    Chief Complaint: Annual Exam and Mass    HPI      Cara Mendoza is a 61 y.o. female. here for annual exam.   Currently anxiety is under good control at this time no new problems.          Review of Symptoms    Constitutional: Negative.    HENT: Negative.    Eyes: Negative.    Respiratory: Negative.    Cardiovascular: Negative.    Gastrointestinal: Negative.    Endocrine: Negative.    Genitourinary: Negative.    Musculoskeletal: Negative.    Skin: Negative.    Allergic/Immunologic: Negative.    Neurological: Negative.    Hematological: Negative.    Psychiatric/Behavioral: Negative.      Except as above in HPI      Vitals:    05/13/22 1144   BP: 116/70   BP Location: Left arm   Patient Position: Sitting   Pulse: 69   Temp: 98.1 °F (36.7 °C)   TempSrc: Oral   SpO2: 96%   Weight: 82.8 kg (182 lb 8.7 oz)   Height: 5' (1.524 m)        Physical  Exam      Constitutional:  Oriented to person, place, and time. Appears well-developed and well-nourished.     HENT:   Head: Normocephalic and atraumatic.     Right Ear: Tympanic membrane, ear canal and External ear normal     Left Ear: Tympanic membrane, ear canal and External ear normal     Nose: Nose normal. No rhinorrhea or nasal deformity.     Mouth/Throat: Uvula is midline, oropharynx is clear and moist and mucous membranes are normal.      Eyes: Conjunctivae are normal. Right eye exhibits no discharge. Left eye exhibits no discharge. No scleral icterus.     Neck:  No JVD present. No tracheal deviation  []  Neck supple.   []  No Carotid bruit    Cardiovascular:  Regular rate and rhythm with normal S1 and S2     Pulmonary/Chest:   Clear to auscultation bilaterally without wheezes, rhonchi or rales    Musculoskeletal: Normal range of motion. No edema or tenderness.   No deformity     Lymphadenopathy:  No cervical adenopathy.     Neurological:  Alert and oriented to person, place, and time. Coordination normal.     Skin:  Skin is warm and dry. No rash noted.     Psychiatric: Normal mood and affect. Speech is normal and behavior is normal. Judgment and thought content normal.     Complete Blood Count  Lab Results   Component Value Date    RBC 4.69 09/07/2021    HGB 13.6 09/07/2021    HCT 41.1 09/07/2021    MCV 88 09/07/2021    MCH 29.0 09/07/2021    MCHC 33.1 09/07/2021    RDW 14.3 09/07/2021     09/07/2021    MPV 10.8 09/07/2021    GRAN 7.0 09/07/2021    GRAN 76.0 (H) 09/07/2021    LYMPH 1.3 09/07/2021    LYMPH 14.3 (L) 09/07/2021    MONO 0.7 09/07/2021    MONO 8.0 09/07/2021    EOS 0.1 09/07/2021    BASO 0.04 09/07/2021    EOSINOPHIL 1.1 09/07/2021    BASOPHIL 0.4 09/07/2021    DIFFMETHOD Automated 09/07/2021       Comprehensive Metabolic Panel  No results found for: GLU, BUN, CREATININE, NA, K, CL, PROT, ALBUMIN, BILITOT, AST, ALKPHOS, CO2, ALT, ANIONGAP, EGFRNONAA, ESTGFRAFRICA    TSH  Lab Results   Component Value Date    TSH 0.844 05/13/2022       Assessment / Plan:      ICD-10-CM ICD-9-CM   1. Essential hypertension  I10 401.9   2. Encounter for screening mammogram for breast cancer  Z12.31 V76.12   3. Severe obesity (BMI 35.0-39.9) with comorbidity  E66.01 278.01   4. Hypothyroidism, unspecified type  E03.9 244.9   5. Anxiety  F41.9 300.00     Essential hypertension    Encounter for screening mammogram for breast cancer  -     Mammo Digital Screening Bilat w/ Mark; Future; Expected date: 05/13/2022    Severe obesity (BMI 35.0-39.9) with comorbidity    Hypothyroidism, unspecified type    Anxiety          Discussed how to stay healthy including: diet, and exercise.

## 2022-05-14 ENCOUNTER — TELEPHONE (OUTPATIENT)
Dept: FAMILY MEDICINE | Facility: CLINIC | Age: 62
End: 2022-05-14
Payer: MEDICARE

## 2022-05-14 NOTE — TELEPHONE ENCOUNTER
Your labs are good overall but your cholesterol is high and we should get it lower  Can we put you back on a cholesterol lowering medication?

## 2022-05-16 NOTE — TELEPHONE ENCOUNTER
I LM for pt to rtn call to discuss lab results below      Jessi Romero Staff  Caller: Unspecified (Today,  2:07 PM)  Needs advice from nurse:       Who Called:pt   Regarding:returning a call to Cinthia   Would the patient rather a call back or [email or web address]?   Best Call Back number:[phone]   Additional Info:

## 2022-05-18 NOTE — TELEPHONE ENCOUNTER
Agatha DAUGHERTY [facility] Staff  Caller: [phone]/self (Today,  8:13 AM)  Type:  Patient Returning Call     Who Called: self   Who Left Message for Patient: Cammie   Does the patient know what this is regarding?: test results   Would the patient rather a call back or a response via [facility]?  Call   Best Call Back Number: [phone]/self   Additional Information:

## 2022-05-18 NOTE — TELEPHONE ENCOUNTER
Spoke to pt and notified that cholesterol is high. I asked pt per MD if she wanted to get back on a cholesterol lowering medication? Pt declined stating she will try and lower cholesterol herself.

## 2022-05-23 ENCOUNTER — TELEPHONE (OUTPATIENT)
Dept: FAMILY MEDICINE | Facility: CLINIC | Age: 62
End: 2022-05-23
Payer: MEDICARE

## 2022-05-23 DIAGNOSIS — L72.9 CYST OF SKIN: Primary | ICD-10-CM

## 2022-05-23 NOTE — TELEPHONE ENCOUNTER
----- Message from Suma Galvez sent at 5/23/2022  8:46 AM CDT -----  Contact: [phone]/ Self  Type:  Sooner Appointment Request     Caller is requesting a sooner appointment.  Caller declined first available appointment listed below.  Caller will not accept being placed on the waitlist and is requesting a message be sent to doctor.  Name of Caller: pt  When is the first available appointment? 06/20/2022  Symptoms or Reason: removal on back   Would the patient rather a call back or a response via [facility]? Call back  Best Call Back Number: [phone]  Additional Information: Pt requesting appt this week , states she is pain       She has a cyst on her back that needs to be removed. Dr patel has seen it already   It is painful  It has grown and you can see it through her shirt  She can lean back in the chair bc of the pain  Can I schedule her Thursday end of morning?

## 2022-05-26 ENCOUNTER — TELEPHONE (OUTPATIENT)
Dept: FAMILY MEDICINE | Facility: CLINIC | Age: 62
End: 2022-05-26
Payer: MEDICARE

## 2022-05-26 NOTE — TELEPHONE ENCOUNTER
Spoke to Leora from [facility]. Dr. Vela ordered a CT for a pt and want recent BUN and creatine. Informed leora those labs were not drawn. Leora will notify Dr. Vela.      ----- Message from [employer] sent at 5/26/2022  1:35 PM CDT -----  Contact: Leora arnett/[facility]  [phone] ext [phone]  Leora arnett/[facility] calling to speak with you regarding what type of labs the patient received recently   Please call back to assist at [phone] ext [phone]

## 2022-06-10 ENCOUNTER — TELEPHONE (OUTPATIENT)
Dept: NEUROSURGERY | Facility: CLINIC | Age: 62
End: 2022-06-10
Payer: MEDICARE

## 2022-06-10 DIAGNOSIS — S32.000S LUMBAR COMPRESSION FRACTURE, SEQUELA: Primary | ICD-10-CM

## 2022-06-10 NOTE — TELEPHONE ENCOUNTER
Spoke to pt. She stated she had a recent fall and is having terrible low pain that radiates all over. Confirmed scheduled times and date for imaging and follow up.      ----- Message from Marlyn Rascon sent at 6/10/2022  9:54 AM CDT -----  Regarding: Sooner Appt  Contact: Patient  Type:  Sooner Apoointment Request    Caller is requesting a sooner appointment.  Caller declined first available appointment listed below.  Caller will not accept being placed on the waitlist and is requesting a message be sent to doctor.  Name of Caller: Patient   When is the first available appointment?no appts generated   Symptoms: back pain   Would the patient rather a call back or a response via [facility]? Call back   Best Call Back Number: [phone]  Additional Information: Pt stated she believes she may have re injured herself and would like to be seen as soon as possible

## 2022-06-14 ENCOUNTER — OFFICE VISIT (OUTPATIENT)
Dept: NEUROSURGERY | Facility: CLINIC | Age: 62
End: 2022-06-14
Payer: MEDICARE

## 2022-06-14 ENCOUNTER — HOSPITAL ENCOUNTER (OUTPATIENT)
Dept: RADIOLOGY | Facility: HOSPITAL | Age: 62
Discharge: HOME | End: 2022-06-14
Attending: NEUROLOGICAL SURGERY
Payer: MEDICARE

## 2022-06-14 VITALS
SYSTOLIC BLOOD PRESSURE: 141 MMHG | DIASTOLIC BLOOD PRESSURE: 80 MMHG | WEIGHT: 182 LBS | HEIGHT: 60 IN | HEART RATE: 73 BPM | BODY MASS INDEX: 35.73 KG/M2

## 2022-06-14 DIAGNOSIS — S32.000S LUMBAR COMPRESSION FRACTURE, SEQUELA: ICD-10-CM

## 2022-06-14 DIAGNOSIS — S32.000S LUMBAR COMPRESSION FRACTURE, SEQUELA: Primary | ICD-10-CM

## 2022-06-14 DIAGNOSIS — M47.816 LUMBAR SPONDYLOSIS: ICD-10-CM

## 2022-06-14 PROCEDURE — 99999 PR PBB SHADOW E&M-EST. PATIENT-LVL III: CPT | Mod: PBBFAC,,, | Performed by: PHYSICIAN ASSISTANT

## 2022-06-14 PROCEDURE — 72110 XR LUMBAR SPINE AP AND LAT WITH FLEX/EXT: ICD-10-PCS | Mod: 26,,, | Performed by: RADIOLOGY

## 2022-06-14 PROCEDURE — 72110 X-RAY EXAM L-2 SPINE 4/>VWS: CPT | Mod: 26,,, | Performed by: RADIOLOGY

## 2022-06-14 PROCEDURE — 99999 PR PBB SHADOW E&M-EST. PATIENT-LVL III: ICD-10-PCS | Mod: PBBFAC,,, | Performed by: PHYSICIAN ASSISTANT

## 2022-06-14 PROCEDURE — 99213 OFFICE O/P EST LOW 20 MIN: CPT | Mod: PBBFAC | Performed by: PHYSICIAN ASSISTANT

## 2022-06-14 PROCEDURE — 99213 OFFICE O/P EST LOW 20 MIN: CPT | Mod: S$PBB,,, | Performed by: PHYSICIAN ASSISTANT

## 2022-06-14 PROCEDURE — 72110 X-RAY EXAM L-2 SPINE 4/>VWS: CPT | Mod: TC

## 2022-06-14 PROCEDURE — 99213 PR OFFICE/OUTPT VISIT, EST, LEVL III, 20-29 MIN: ICD-10-PCS | Mod: S$PBB,,, | Performed by: PHYSICIAN ASSISTANT

## 2022-06-14 RX ORDER — ALPRAZOLAM 0.5 MG/1
0.5 TABLET ORAL 3 TIMES DAILY PRN
COMMUNITY
Start: 2022-06-06 | End: 2022-10-13 | Stop reason: SDUPTHER

## 2022-06-14 NOTE — PROGRESS NOTES
"Neurosurgery  Established Patient    SUBJECTIVE:     History of Present Illness:  62-year-old female with h/o kyphoplasty for a L1 compression fracture in 2015.  Patient presents with gradual increase in mid-low back pain over past couple months.  Denies trauma.  Denies pain, paraesthesias, weakness in extremities.  Denies bowel/bladder dysfunction.        Review of patient's allergies indicates:   Allergen Reactions    Ciprofloxacin     Codeine Nausea And Vomiting and Hives    Compazine [prochlorperazine edisylate] Anaphylaxis and Nausea And Vomiting    Demerol [meperidine] Anaphylaxis and Nausea And Vomiting    Morpholine analogues Anaphylaxis and Nausea And Vomiting     vomit    Pcn [penicillins] Other (See Comments) and Anaphylaxis     Stomach cramps    Percocet [oxycodone-acetaminophen] Nausea And Vomiting    Corticosteroids (glucocorticoids) Itching     Agitation, "skin crawling" sensation    Cortisone Itching     Agitation, "skin crawling" sensation    Prochlorperazine     Albuterol sulfate Palpitations    Fentanyl Nausea And Vomiting and Swelling     Fentanyl patch made lips numb and swollen    Morphine Nausea And Vomiting    Nsaids (non-steroidal anti-inflammatory drug) Other (See Comments) and Nausea And Vomiting     Cluster ulcers    Toradol [ketorolac] Nausea And Vomiting     ulcers    Tramadol Nausea And Vomiting       Current Outpatient Medications   Medication Sig Dispense Refill    ALPRAZolam (XANAX) 0.5 MG tablet TAKE 1 TABLET BY MOUTH BID AS NEEDED FOR ANXIETY (3 MO SUPPLY) 180 tablet 0    EScitalopram oxalate (LEXAPRO) 20 MG tablet Take 1 tablet (20 mg total) by mouth once daily. 90 tablet 2    estradioL (ESTRACE) 1 MG tablet TAKE 1 TABLET BY MOUTH EVERY DAY 90 tablet 3    gabapentin (NEURONTIN) 400 MG capsule TAKE 1 CAPSULE BY MOUTH EVERY MORNING AND 1 BY MOUTH EACH AFTERNOON AND UP TO 4 CAPSULES AT BEDTIME 180 capsule 3    hydroCHLOROthiazide (MICROZIDE) 12.5 mg capsule TAKE " 1 CAPSULE BY MOUTH ONCE A DAY 90 capsule 4    levothyroxine (SYNTHROID) 75 MCG tablet TAKE 1 TABLET BY MOUTH EVERY DAY BEFORE BREAKFAST 90 tablet 3    pantoprazole (PROTONIX) 40 MG tablet Take 1 tablet (40 mg total) by mouth once daily. 90 tablet 3    promethazine (PHENERGAN) 25 MG tablet Take 1 tablet (25 mg total) by mouth every 6 (six) hours as needed for Nausea. 30 tablet 0    ALPRAZolam (XANAX) 0.5 MG tablet Take 0.5 mg by mouth 3 (three) times daily as needed.      linaCLOtide (LINZESS) 72 mcg Cap capsule Linzess 72 mcg capsule   Take 1 capsule every day by oral route in the morning for 90 days.       No current facility-administered medications for this visit.       Past Medical History:   Diagnosis Date    Adrenal adenoma     Gastric ulcer     Glaucoma     Hypothyroidism     Kidney stones     Pancreatitis     Traumatic compression fracture of L1 lumbar vertebra      Past Surgical History:   Procedure Laterality Date    CHOLECYSTECTOMY      COLONOSCOPY N/A 2016    Procedure: COLONOSCOPY;  Surgeon: Sathya Strickland MD;  Location: [address];  Service: Endoscopy;  Laterality: N/A;    CYSTOSCOPY      HYSTERECTOMY      KYPHOSIS SURGERY  7/20/15    right adrenal gland remove      tonsillectomy      TONSILLECTOMY      TUBAL LIGATION      x2        Family History     Problem Relation (Age of Onset)    Alzheimer's disease Sister    Dementia Mother    Diabetes Father    Heart attack Father        Social History     Socioeconomic History    Marital status:    Tobacco Use    Smoking status: Never Smoker    Smokeless tobacco: Never Used   Substance and Sexual Activity    Alcohol use: No    Drug use: No    Sexual activity: Not Currently       Review of Systems  Constitutional: no fever or chills  Eyes: no visual changes  ENT: no nasal congestion or sore throat  Respiratory: no cough or shortness of breath  Cardiovascular: no chest pain or palpitations  Gastrointestinal:  no nausea or vomiting  Genitourinary: no hematuria or dysuria  Integument/Breast: no rash or pruritis  Hematologic/Lymphatic: no easy bruising or lymphadenopathy  Musculoskeletal: no arthralgias or myalgias  Neurological: no seizures or tremors    OBJECTIVE:     Vital Signs  Pulse: 73  BP: (!) 141/80  Pain Score:   8  Height: 5' (152.4 cm)  Weight: 82.6 kg (182 lb)  Body mass index is 35.54 kg/m².    Neurosurgery Physical Exam  General: no distress  Neurologic: Alert and oriented. Thought content appropriate.  Head: normocephalic  GCS: Motor: 6/Verbal: 5/Eyes: 4 GCS Total: 15  Cranial nerves: face symmetric, tongue midline, pupils equal, round, reactive to light with accomodation, extraocular muscles intact  Sensory: response to light touch throughout  Motor Strength:full strength upper and lower extremities  Pronator Drift: no drift noted  Finger to nose normal  No focal numbness or weakness  Lungs:  normal respiratory effort  Abdomen: soft, non-tender   Extremities: no cyanosis or edema, or clubbing      Diagnostic Results: personally reviewed  EXAMINATION:  XR LUMBAR SPINE AP AND LAT WITH FLEX/EXT     CLINICAL HISTORY:  Wedge compression fracture of unspecified lumbar vertebra, sequela     TECHNIQUE:  Three views of the lumbar spine plus flexion extension views were performed.     COMPARISON:  04/06/2021     FINDINGS:  Alignment: Mild levoconvex curvature.  Grade 1 anterolisthesis of T12-L1.  Grade 1 retrolisthesis of L1-L2, noting instability between flexion and extension.     Vertebrae: Compression fracture of the L1 vertebral body with vertebroplasty cement, similar to prior study.  No new compression fracture.  No suspicious appearing lytic or blastic lesions.     Discs and facets: Right moderate disc height loss at T12-L1. Facet joints are unremarkable.     Miscellaneous: Vascular calcifications and right upper quadrant surgical clips noted.     Impression:     As above.        Electronically signed by:  Migue Davis MD  Date:                                            06/14/2022  Time:                                           14:39    ASSESSMENT/PLAN:     62-year-old female who we last treated back in 2015 for a L1 compression fracture.  Pt with increased mid-low back pain, neurologically intact wihtout foal deficits.  xrays show stable know L1 Compression fx compared to prior study, facet arthropathy in  Lower lumbar spine, and mild disc disease.   PT referral placed, f/u in 3 months.  Pt unable to take nasids 2/2 to gastric ulcer.  She was encouraged to call the clinic with any questions or concerns.    Sridhar Jaramillo Jr. SKY  [facility]  Department of Neurosurgery        Note dictated with voice recognition software, please excuse any grammatical errors.

## 2022-06-15 ENCOUNTER — TELEPHONE (OUTPATIENT)
Dept: FAMILY MEDICINE | Facility: CLINIC | Age: 62
End: 2022-06-15
Payer: MEDICARE

## 2022-06-20 ENCOUNTER — OFFICE VISIT (OUTPATIENT)
Dept: FAMILY MEDICINE | Facility: CLINIC | Age: 62
End: 2022-06-20
Payer: MEDICARE

## 2022-06-20 VITALS
HEART RATE: 66 BPM | BODY MASS INDEX: 39.02 KG/M2 | SYSTOLIC BLOOD PRESSURE: 128 MMHG | WEIGHT: 180.88 LBS | HEIGHT: 57 IN | DIASTOLIC BLOOD PRESSURE: 76 MMHG | TEMPERATURE: 98 F | OXYGEN SATURATION: 98 %

## 2022-06-20 DIAGNOSIS — J01.40 ACUTE NON-RECURRENT PANSINUSITIS: Primary | ICD-10-CM

## 2022-06-20 DIAGNOSIS — J02.9 SORE THROAT: ICD-10-CM

## 2022-06-20 LAB
CTP QC/QA: YES
CTP QC/QA: YES
S PYO RRNA THROAT QL PROBE: NEGATIVE
SARS-COV-2 RDRP RESP QL NAA+PROBE: NEGATIVE

## 2022-06-20 PROCEDURE — 99212 PR OFFICE/OUTPT VISIT, EST, LEVL II, 10-19 MIN: ICD-10-PCS | Mod: S$PBB,,, | Performed by: NURSE PRACTITIONER

## 2022-06-20 PROCEDURE — 99214 OFFICE O/P EST MOD 30 MIN: CPT | Mod: PBBFAC,PN | Performed by: NURSE PRACTITIONER

## 2022-06-20 PROCEDURE — 87880 STREP A ASSAY W/OPTIC: CPT | Mod: PBBFAC,PN | Performed by: NURSE PRACTITIONER

## 2022-06-20 PROCEDURE — 99999 PR PBB SHADOW E&M-EST. PATIENT-LVL IV: CPT | Mod: PBBFAC,,, | Performed by: NURSE PRACTITIONER

## 2022-06-20 PROCEDURE — 99212 OFFICE O/P EST SF 10 MIN: CPT | Mod: S$PBB,,, | Performed by: NURSE PRACTITIONER

## 2022-06-20 PROCEDURE — 99999 PR PBB SHADOW E&M-EST. PATIENT-LVL IV: ICD-10-PCS | Mod: PBBFAC,,, | Performed by: NURSE PRACTITIONER

## 2022-06-20 PROCEDURE — U0002 COVID-19 LAB TEST NON-CDC: HCPCS | Mod: PBBFAC,PN | Performed by: NURSE PRACTITIONER

## 2022-06-20 NOTE — PATIENT INSTRUCTIONS
You have a viral infection  Start Mucinex (guaifenesin)  You are contagious while you're symptomatic. Stay home and rest until your symptoms improve. Drink plenty of fluids

## 2022-06-20 NOTE — PROGRESS NOTES
Subjective:      Patient ID: Cara Mendoza is a 62 y.o. female.    Chief Complaint: URI (Sorethroat/swollen gland/post nasal congestion/ear pain) and Generalized Body Aches    Sore Throat   This is a new problem. Episode onset: 2 days ago. The problem has been unchanged. There has been no fever. The pain is mild. Associated symptoms include congestion, coughing, ear pain, headaches and swollen glands. Pertinent negatives include no abdominal pain or shortness of breath. Treatments tried: Dayquil. The treatment provided mild relief.     Review of Systems   Constitutional: Positive for chills. Negative for fever.   HENT: Positive for congestion, ear pain, postnasal drip, rhinorrhea, sinus pressure, sinus pain and sore throat.    Respiratory: Positive for cough. Negative for shortness of breath and wheezing.    Gastrointestinal: Negative for abdominal pain.   Neurological: Positive for headaches.        Objective:     Vitals:    06/20/22 0809   BP: 128/76   Pulse: 66   Temp: 98.1 °F (36.7 °C)      Physical Exam  Vitals reviewed.   Constitutional:       General: She is not in acute distress.     Appearance: Normal appearance.   HENT:      Right Ear: A middle ear effusion is present.      Left Ear: A middle ear effusion is present.      Nose: Rhinorrhea present.      Right Sinus: Maxillary sinus tenderness and frontal sinus tenderness present.      Left Sinus: Maxillary sinus tenderness and frontal sinus tenderness present.   Cardiovascular:      Rate and Rhythm: Normal rate and regular rhythm.      Heart sounds: Normal heart sounds.   Pulmonary:      Effort: Pulmonary effort is normal. No respiratory distress.      Breath sounds: Normal breath sounds.   Neurological:      Mental Status: She is alert.        Assessment:         1. Acute non-recurrent pansinusitis    2. Sore throat          Plan:   1. Acute non-recurrent pansinusitis  Discussed appropriate antibiotic use. Start Mucinex. Encouraged to drink plenty of  fluids and rest.  2. Sore throat  - POCT Rapid Strep A  - POCT COVID-19 Rapid Screening         Gypsy Abad   [facility]   6/20/22

## 2022-06-30 ENCOUNTER — TELEPHONE (OUTPATIENT)
Dept: FAMILY MEDICINE | Facility: CLINIC | Age: 62
End: 2022-06-30
Payer: MEDICARE

## 2022-08-15 ENCOUNTER — TELEPHONE (OUTPATIENT)
Dept: NEUROSURGERY | Facility: CLINIC | Age: 62
End: 2022-08-15
Payer: MEDICARE

## 2022-08-15 NOTE — TELEPHONE ENCOUNTER
----- Message from Ksenia Montiel sent at 8/15/2022  1:33 PM CDT -----  Regarding: Apptt  Contact: [phone]  Patient is calling to schedule grisel ppt for the first available. Please contact pt

## 2022-08-22 DIAGNOSIS — I10 ESSENTIAL HYPERTENSION: ICD-10-CM

## 2022-08-22 DIAGNOSIS — E66.01 SEVERE OBESITY (BMI 35.0-39.9) WITH COMORBIDITY: ICD-10-CM

## 2022-08-22 DIAGNOSIS — G44.86 CERVICOGENIC HEADACHE: ICD-10-CM

## 2022-08-22 RX ORDER — GABAPENTIN 400 MG/1
CAPSULE ORAL
Qty: 180 CAPSULE | Refills: 3 | Status: SHIPPED | OUTPATIENT
Start: 2022-08-22 | End: 2022-12-20

## 2022-08-22 NOTE — TELEPHONE ENCOUNTER
No new care gaps identified.  [facility] Embedded Care Gaps. Reference number: [id number]. 8/22/2022   4:06:46 PM CDT

## 2022-08-24 ENCOUNTER — PATIENT MESSAGE (OUTPATIENT)
Dept: ADMINISTRATIVE | Facility: HOSPITAL | Age: 62
End: 2022-08-24
Payer: MEDICARE

## 2022-09-05 ENCOUNTER — HOSPITAL ENCOUNTER (EMERGENCY)
Facility: HOSPITAL | Age: 62
Discharge: HOME | End: 2022-09-05
Attending: EMERGENCY MEDICINE
Payer: MEDICARE

## 2022-09-05 VITALS
HEIGHT: 60 IN | TEMPERATURE: 98 F | WEIGHT: 174 LBS | BODY MASS INDEX: 34.16 KG/M2 | DIASTOLIC BLOOD PRESSURE: 76 MMHG | RESPIRATION RATE: 16 BRPM | SYSTOLIC BLOOD PRESSURE: 138 MMHG | HEART RATE: 84 BPM | OXYGEN SATURATION: 99 %
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
## 2022-09-05 DIAGNOSIS — G89.29 CHRONIC BILATERAL LOW BACK PAIN WITHOUT SCIATICA: Primary | ICD-10-CM

## 2022-09-05 DIAGNOSIS — M54.50 CHRONIC BILATERAL LOW BACK PAIN WITHOUT SCIATICA: Primary | ICD-10-CM

## 2022-09-05 PROCEDURE — 99284 EMERGENCY DEPT VISIT MOD MDM: CPT | Mod: ,,, | Performed by: EMERGENCY MEDICINE

## 2022-09-05 PROCEDURE — 99283 EMERGENCY DEPT VISIT LOW MDM: CPT

## 2022-09-05 PROCEDURE — 99284 PR EMERGENCY DEPT VISIT,LEVEL IV: ICD-10-PCS | Mod: ,,, | Performed by: EMERGENCY MEDICINE

## 2022-09-05 PROCEDURE — 25000003 PHARM REV CODE 250: Performed by: EMERGENCY MEDICINE

## 2022-09-05 RX ORDER — HYDROCODONE BITARTRATE AND ACETAMINOPHEN 5; 325 MG/1; MG/1
2 TABLET ORAL
Status: COMPLETED | OUTPATIENT
Start: 2022-09-05 | End: 2022-09-05

## 2022-09-05 RX ORDER — GABAPENTIN 400 MG/1
400 CAPSULE ORAL
Status: COMPLETED | OUTPATIENT
Start: 2022-09-05 | End: 2022-09-05

## 2022-09-05 RX ORDER — HYDROCODONE BITARTRATE AND ACETAMINOPHEN 10; 325 MG/1; MG/1
1 TABLET ORAL
Status: COMPLETED | OUTPATIENT
Start: 2022-09-05 | End: 2022-09-05

## 2022-09-05 RX ADMIN — GABAPENTIN 400 MG: 400 CAPSULE ORAL at 12:09

## 2022-09-05 RX ADMIN — HYDROCODONE BITARTRATE AND ACETAMINOPHEN 2 TABLET: 5; 325 TABLET ORAL at 11:09

## 2022-09-05 RX ADMIN — HYDROCODONE BITARTRATE AND ACETAMINOPHEN 1 TABLET: 10; 325 TABLET ORAL at 01:09

## 2022-09-05 NOTE — ED PROVIDER NOTES
"Encounter Date: 9/5/2022    SCRIBE #1 NOTE: I, Margo Rowan, am scribing for, and in the presence of,  Tyrone Mcallister DO. I have scribed the following portions of the note - Other sections scribed: HPI, ROS.     History     Chief Complaint   Patient presents with    Fall     Slipped and fell on my behind, no loc     The history is provided by the patient and medical records. No  was used.     Cara Mendoza is a 62 y.o. female with a prior medical history of hypothyroid, kidney stones, chronic pancreatitis, arthritis in lower back, traumatic compression fracture in L1 presenting with fall with onset earlier today after slipping on steps. She explains that she fell on her buttock and did not hit her head or lose consciousness. Since then, she has been having lower back pain.Pain is moderately severe, but no associated weakness, paresthesias or paralysis.  Patient denies any bowel or bladder dysfunction,, fevers, chills, hematochezia, melena, hemoptysis or hematemesis.  No other aggravating alleviating factors.  She is able to walk, ambulate and bear weight.    Review of patient's allergies indicates:   Allergen Reactions    Ciprofloxacin     Codeine Nausea And Vomiting and Hives    Compazine [prochlorperazine edisylate] Anaphylaxis and Nausea And Vomiting    Demerol [meperidine] Anaphylaxis and Nausea And Vomiting    Morpholine analogues Anaphylaxis and Nausea And Vomiting     vomit    Pcn [penicillins] Other (See Comments) and Anaphylaxis     Stomach cramps    Percocet [oxycodone-acetaminophen] Nausea And Vomiting    Corticosteroids (glucocorticoids) Itching     Agitation, "skin crawling" sensation    Cortisone Itching     Agitation, "skin crawling" sensation    Prochlorperazine     Albuterol sulfate Palpitations    Fentanyl Nausea And Vomiting and Swelling     Fentanyl patch made lips numb and swollen    Morphine Nausea And Vomiting    Nsaids (non-steroidal anti-inflammatory drug) Other " (See Comments) and Nausea And Vomiting     Cluster ulcers    Sulfa (sulfonamide antibiotics) Nausea And Vomiting and Other (See Comments)     Bad stomach cramps    Toradol [ketorolac] Nausea And Vomiting     ulcers    Tramadol Nausea And Vomiting     Past Medical History:   Diagnosis Date    Adrenal adenoma     Gastric ulcer     Glaucoma     Hypothyroidism     Kidney stones     Pancreatitis     Traumatic compression fracture of L1 lumbar vertebra      Past Surgical History:   Procedure Laterality Date    CHOLECYSTECTOMY      COLONOSCOPY N/A 2016    Procedure: COLONOSCOPY;  Surgeon: Sathya Strickland MD;  Location: [address];  Service: Endoscopy;  Laterality: N/A;    CYSTOSCOPY      HYSTERECTOMY      KYPHOSIS SURGERY  7/20/15    right adrenal gland remove      tonsillectomy      TONSILLECTOMY      TUBAL LIGATION      x2        Family History   Problem Relation Age of Onset    Diabetes Father         borderline    Heart attack Father     Dementia Mother     Alzheimer's disease Sister      Social History     Tobacco Use    Smoking status: Never    Smokeless tobacco: Never   Substance Use Topics    Alcohol use: No    Drug use: No     Review of Systems   Constitutional:  Negative for diaphoresis, fatigue and fever.   HENT:  Negative for congestion and sore throat.    Eyes:  Negative for photophobia, pain and visual disturbance.   Respiratory:  Negative for chest tightness, shortness of breath and wheezing.    Cardiovascular:  Negative for chest pain and palpitations.   Gastrointestinal:  Negative for abdominal pain, nausea and vomiting.   Genitourinary:  Negative for dysuria, frequency and hematuria.   Musculoskeletal:  Positive for back pain (lower). Negative for neck pain and neck stiffness.   Skin:  Negative for color change and wound.   Neurological:  Negative for tremors and light-headedness.   Psychiatric/Behavioral:  Negative for confusion.      Physical Exam     Initial Vitals [22 1003]    BP Pulse Resp Temp SpO2   (!) 147/72 78 18 98.1 °F (36.7 °C) 98 %      MAP       --         Physical Exam    Nursing note and vitals reviewed.    Gen/Constitutional: Interactive. No acute distress  Head: Normocephalic, Atraumatic  Neck: supple, no masses or LAD, no JVD  Eyes: PERRLA, conjunctiva clear  Ears, Nose and Throat: No rhinorrhea or stridor.  Cardiac:  Regular rate, Reg Rhythm, No murmur  Pulmonary: CTA Bilat, no wheezes, rhonchi, rales.  No increased work of breathing.  GI: Abdomen soft, non-tender, non-distended; no rebound or guarding  : No CVA tenderness.  Musculoskeletal: Extremities warm, well perfused, no erythema, no edema  Spine:  tender along the coccyx and sacrum, no bony changes, no step-offs, no midline spinal tenderness there are no overlying skin changes, 5/5 strength, no saddle anesthesia reflexes intact.  Skin: No rashes, cyanosis or jaundice.  Neuro: Alert and Oriented x 3; No focal motor or sensory deficits.    Psych: Normal affect     ED Course   Procedures  Labs Reviewed   HIV 1 / 2 ANTIBODY   HEPATITIS C ANTIBODY          Imaging Results    None       X-Rays:   Independently Interpreted Readings:   Other Readings:  X-ray sacrum and coccyx, lumbar spine:  No acute fracture or dislocation  Medications   HYDROcodone-acetaminophen 5-325 mg per tablet 2 tablet (2 tablets Oral Given 9/5/22 1124)     Medical Decision Making:   History:   Old Medical Records: I decided to obtain old medical records.  Initial Assessment:   Cara Mendoza is a 62 y.o. female with a prior medical history of hypothyroid, kidney stones, chronic pancreatitis, arthritis in lower back, traumatic compression fracture if L1 presenting with fall with onset earlier today after slipping on steps.   Differential Diagnosis:   Contusion, sprain, strain, fracture, dislocation  Independently Interpreted Test(s):   I have ordered and independently interpreted X-rays - see prior notes.  Clinical Tests:   Radiological Study:  Ordered and Reviewed        Afebrile vital signs are stable.  Physical exam findings remarkable for tenderness along the sacrum and coccyx after patient had a ground level fall from steps onto her bottom/sacrum.  She is ambulatory, able to bear weight, with no obvious bony deformities, open fracture, laceration or noticeable contusion.  Given her history of compression fracture, x-rays of the lumbar, sacrum coccyx were obtained.  Patient was treated with oral opiate pain medication as she has multiple allergies to multiple medications including NSAIDs, an opiate medications.  On repeat evaluation, patient is sitting comfortable, reports chronic pain with acute exacerbation.  Second dose of oral medication given.  X-rays confirm no acute fracture or dislocation.  Patient educated on conservative treatment including stretching exercises, PT OT, warm compresses, massage pads and heating pads. Patient agreeable to discharge plan. Strict ED precautions and return instructions discussed at length and patient verbalized understanding. All questions were answered and ample time was given for questions.      Complexity:  Moderate    Scribe Attestation:   Scribe #1: I performed the above scribed service and the documentation accurately describes the services I performed. I attest to the accuracy of the note.             I, Dr. Tyrone Mcallister, personally performed the services described in this documentation. All medical record entries made by the scribe were at my direction and in my presence.  I have reviewed the chart and agree that the record reflects my personal performance and is accurate and complete.     Clinical Impression:   Final diagnoses:  [W19.XXXA] Fall        Tyrone Mcallister DO, BARBARA  Emergency Staff Physician   Dept of Emergency Medicine   [facility]  Spectralink: [id number]        Disclaimer: This note has been generated using voice-recognition software. There may be typographical errors that have  been missed during proof-reading.      Tyrone Mcallister, DO  09/06/22 0908

## 2022-09-05 NOTE — ED NOTES
"Patient identifiers for Cara Mendoza 62 y.o. female checked and correct.  Chief Complaint   Patient presents with    Fall     Slipped and fell on my behind, no loc     Past Medical History:   Diagnosis Date    Adrenal adenoma     Gastric ulcer     Glaucoma     Hypothyroidism     Kidney stones     Pancreatitis     Traumatic compression fracture of L1 lumbar vertebra      Allergies reported:   Review of patient's allergies indicates:   Allergen Reactions    Ciprofloxacin     Codeine Nausea And Vomiting and Hives    Compazine [prochlorperazine edisylate] Anaphylaxis and Nausea And Vomiting    Demerol [meperidine] Anaphylaxis and Nausea And Vomiting    Morpholine analogues Anaphylaxis and Nausea And Vomiting     vomit    Pcn [penicillins] Other (See Comments) and Anaphylaxis     Stomach cramps    Percocet [oxycodone-acetaminophen] Nausea And Vomiting    Corticosteroids (glucocorticoids) Itching     Agitation, "skin crawling" sensation    Cortisone Itching     Agitation, "skin crawling" sensation    Prochlorperazine     Albuterol sulfate Palpitations    Fentanyl Nausea And Vomiting and Swelling     Fentanyl patch made lips numb and swollen    Morphine Nausea And Vomiting    Nsaids (non-steroidal anti-inflammatory drug) Other (See Comments) and Nausea And Vomiting     Cluster ulcers    Sulfa (sulfonamide antibiotics) Nausea And Vomiting and Other (See Comments)     Bad stomach cramps    Toradol [ketorolac] Nausea And Vomiting     ulcers    Tramadol Nausea And Vomiting         LOC: Patient is awake, alert, and aware of environment with an appropriate affect. Patient is oriented x 4 and speaking appropriately.  APPEARANCE: Patient resting comfortably and in no acute distress. Patient is clean and well groomed, patient's clothing is properly fastened.  HEENT: - JVD, + midline trach, - bruising or lacerations on face or neck.  SKIN: The skin is warm and dry. Patient has normal skin turgor and moist mucus membranes. No " bruising or abrasions to lower back.  MUSKULOSKELETAL: Patient is moving all extremities well, no obvious deformities noted. Pulses intact. PMS x 4. Pt endorses pain upon palpation and at rest of lower back at 10/10. No crepitace or bruising noted.  RESPIRATORY: Airway is open and patent. Respirations are spontaneous and non-labored with normal effort and rate. = CBBS.  CARDIAC: No peripheral edema noted. +2 bilateral pedal and radial pulses, < 3 s cap refill.  ABDOMEN: No distention noted. Soft and non-tender upon palpation.  NEUROLOGICAL: pupils 5 mm, PERRL. Facial expression is symmetrical. Hand grasps are equal bilaterally. Normal sensation in all extremities when touched with finger.

## 2022-09-05 NOTE — ED TRIAGE NOTES
63 y/o F presents tot ER with c/c lwoer back pain. Pt states that she fell from a standing height onto a single step. Pt denies hitting head and LOC. Pt endorses pain rated at 10/10, pain upon palpation, and h/x arthritis of spine.

## 2022-09-05 NOTE — DISCHARGE INSTRUCTIONS
Today, your evaluation for your fall did not show fracture of your sacrum, coccyx or lower lumbar spine.  We have treat your pain a while in the emergency department with your home medication and opiate pain medication.  Please follow-up with your primary care or your outpatient doctor including pain management for any further pain medication.  You may also use conservative measures including warm compresses, heating pads, or analgesic creams.    Our goal in the emergency department is to always give you outstanding care and exceptional service. You may receive a survey by mail or e-mail in the next week regarding your experience in our ED. We would greatly appreciate your completing and returning the survey. Your feedback provides us with a way to recognize our staff who give very good care and it helps us learn how to improve when your experience was below our aspiration of excellence.

## 2022-09-13 ENCOUNTER — OFFICE VISIT (OUTPATIENT)
Dept: FAMILY MEDICINE | Facility: HOSPITAL | Age: 62
End: 2022-09-13
Payer: MEDICARE

## 2022-09-13 DIAGNOSIS — F33.9 EPISODE OF RECURRENT MAJOR DEPRESSIVE DISORDER, UNSPECIFIED DEPRESSION EPISODE SEVERITY: Primary | ICD-10-CM

## 2022-09-13 DIAGNOSIS — F41.9 ANXIETY DISORDER, UNSPECIFIED TYPE: ICD-10-CM

## 2022-09-13 PROCEDURE — 99211 OFF/OP EST MAY X REQ PHY/QHP: CPT | Performed by: PSYCHOLOGIST

## 2022-09-14 DIAGNOSIS — I10 ESSENTIAL HYPERTENSION: ICD-10-CM
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
## 2022-09-20 ENCOUNTER — OFFICE VISIT (OUTPATIENT)
Dept: NEUROSURGERY | Facility: CLINIC | Age: 62
End: 2022-09-20
Payer: MEDICARE

## 2022-09-20 DIAGNOSIS — M47.816 LUMBAR SPONDYLOSIS: ICD-10-CM

## 2022-09-20 DIAGNOSIS — S32.000S LUMBAR COMPRESSION FRACTURE, SEQUELA: Primary | ICD-10-CM

## 2022-09-20 PROCEDURE — 99212 OFFICE O/P EST SF 10 MIN: CPT | Mod: PBBFAC | Performed by: NEUROLOGICAL SURGERY

## 2022-09-20 PROCEDURE — 99999 PR PBB SHADOW E&M-EST. PATIENT-LVL II: CPT | Mod: PBBFAC,,, | Performed by: NEUROLOGICAL SURGERY

## 2022-09-20 PROCEDURE — 99999 PR PBB SHADOW E&M-EST. PATIENT-LVL II: ICD-10-PCS | Mod: PBBFAC,,, | Performed by: NEUROLOGICAL SURGERY

## 2022-09-20 PROCEDURE — 99214 OFFICE O/P EST MOD 30 MIN: CPT | Mod: S$PBB,,, | Performed by: NEUROLOGICAL SURGERY

## 2022-09-20 PROCEDURE — 99214 PR OFFICE/OUTPT VISIT, EST, LEVL IV, 30-39 MIN: ICD-10-PCS | Mod: S$PBB,,, | Performed by: NEUROLOGICAL SURGERY

## 2022-09-20 RX ORDER — HYDROCODONE BITARTRATE AND ACETAMINOPHEN 5; 325 MG/1; MG/1
1 TABLET ORAL EVERY 12 HOURS PRN
Qty: 60 TABLET | Refills: 0 | Status: SHIPPED | OUTPATIENT
Start: 2022-09-20 | End: 2022-09-28 | Stop reason: SDUPTHER

## 2022-09-21 ENCOUNTER — TELEPHONE (OUTPATIENT)
Dept: NEUROSURGERY | Facility: CLINIC | Age: 62
End: 2022-09-21
Payer: MEDICARE

## 2022-09-21 NOTE — TELEPHONE ENCOUNTER
----- Message from Veronique Chana sent at 9/21/2022 10:43 AM CDT -----  Contact: pt  Pt requesting a refill request. Pt stated the pharmacy informed her they can not fill the prescription due to a diagnoses not being listed and needs prior authorization to fill medication. She stated she is in a lot of pain and would like this to be resolved as soon as possible. Pt is requesting Clay to give a call back to verify it has been corrected.     Medication:  HYDROcodone-acetaminophen (NORCO) 5-325 mg per tablet      [id number] - [address] [address]  Phone: [phone] Fax: [phone]    Confirmed contact below:  Contact Name:Cara Mendoza  Phone Number: [phone]

## 2022-09-21 NOTE — TELEPHONE ENCOUNTER
Called the pharmacy gave dx code and authorized 1 weeks worth of medication . She will call next week to get another refill if needed

## 2022-09-27 ENCOUNTER — TELEPHONE (OUTPATIENT)
Dept: NEUROSURGERY | Facility: CLINIC | Age: 62
End: 2022-09-27
Payer: MEDICARE

## 2022-09-27 DIAGNOSIS — S32.010S COMPRESSION FRACTURE OF L1 VERTEBRA, SEQUELA: Primary | ICD-10-CM

## 2022-09-27 NOTE — PROGRESS NOTES
"Neurosurgery  Established Patient    SUBJECTIVE:     History of Present Illness:  62-year-old female back to see us for follow-up after last evaluation her on 06/14/2022.  This is a patient who had history of a kyphoplasty for an L1 compression fracture in 2015.  She would had increasing lower back pain over last couple months.  She is had falls after the kyphoplasty but had done well for many years.  She denies any bowel bladder eyes any actual weakness or numbness of the lower extremities.    Review of patient's allergies indicates:   Allergen Reactions    Ciprofloxacin     Codeine Nausea And Vomiting and Hives    Compazine [prochlorperazine edisylate] Anaphylaxis and Nausea And Vomiting    Demerol [meperidine] Anaphylaxis and Nausea And Vomiting    Morpholine analogues Anaphylaxis and Nausea And Vomiting     vomit    Pcn [penicillins] Other (See Comments) and Anaphylaxis     Stomach cramps    Percocet [oxycodone-acetaminophen] Nausea And Vomiting    Corticosteroids (glucocorticoids) Itching     Agitation, "skin crawling" sensation    Cortisone Itching     Agitation, "skin crawling" sensation    Prochlorperazine     Albuterol sulfate Palpitations    Fentanyl Nausea And Vomiting and Swelling     Fentanyl patch made lips numb and swollen    Morphine Nausea And Vomiting    Nsaids (non-steroidal anti-inflammatory drug) Other (See Comments) and Nausea And Vomiting     Cluster ulcers    Sulfa (sulfonamide antibiotics) Nausea And Vomiting and Other (See Comments)     Bad stomach cramps    Toradol [ketorolac] Nausea And Vomiting     ulcers    Tramadol Nausea And Vomiting       Current Outpatient Medications   Medication Sig Dispense Refill    ALPRAZolam (XANAX) 0.5 MG tablet TAKE 1 TABLET BY MOUTH THREE TIMES A DAY AS NEEDED FOR ANXIETY 90 tablet 0    EScitalopram oxalate (LEXAPRO) 20 MG tablet Take 1 tablet (20 mg total) by mouth once daily. 90 tablet 2    estradioL (ESTRACE) 1 MG tablet TAKE 1 TABLET BY MOUTH EVERY DAY " 90 tablet 3    gabapentin (NEURONTIN) 400 MG capsule TAKE 1 CAPSULE BY MOUTH EVERY MORNING AND AFTERNOON AND UP TO 4 CAPSULES AT BEDTIME 180 capsule 3    hydroCHLOROthiazide (MICROZIDE) 12.5 mg capsule TAKE 1 CAPSULE BY MOUTH ONCE A DAY 90 capsule 4    levothyroxine (SYNTHROID) 75 MCG tablet TAKE 1 TABLET BY MOUTH EVERY DAY BEFORE BREAKFAST 90 tablet 3    pantoprazole (PROTONIX) 40 MG tablet Take 1 tablet (40 mg total) by mouth once daily. 90 tablet 3    ALPRAZolam (XANAX) 0.5 MG tablet TAKE 1 TABLET BY MOUTH BID AS NEEDED FOR ANXIETY (3 MO SUPPLY) 180 tablet 0    ALPRAZolam (XANAX) 0.5 MG tablet Take 0.5 mg by mouth 3 (three) times daily as needed.      HYDROcodone-acetaminophen (NORCO) 5-325 mg per tablet Take 1 tablet by mouth every 12 (twelve) hours as needed for Pain. 60 tablet 0    linaCLOtide (LINZESS) 72 mcg Cap capsule Linzess 72 mcg capsule   Take 1 capsule every day by oral route in the morning for 90 days.      promethazine (PHENERGAN) 25 MG tablet Take 1 tablet (25 mg total) by mouth every 6 (six) hours as needed for Nausea. (Patient not taking: Reported on 2022) 30 tablet 0     No current facility-administered medications for this visit.       Past Medical History:   Diagnosis Date    Adrenal adenoma     Gastric ulcer     Glaucoma     Hypothyroidism     Kidney stones     Pancreatitis     Traumatic compression fracture of L1 lumbar vertebra      Past Surgical History:   Procedure Laterality Date    CHOLECYSTECTOMY      COLONOSCOPY N/A 2016    Procedure: COLONOSCOPY;  Surgeon: Sathya Strickland MD;  Location: [address];  Service: Endoscopy;  Laterality: N/A;    CYSTOSCOPY      HYSTERECTOMY      KYPHOSIS SURGERY  7/20/15    right adrenal gland remove      tonsillectomy      TONSILLECTOMY      TUBAL LIGATION      x2        Family History       Problem Relation (Age of Onset)    Alzheimer's disease Sister    Dementia Mother    Diabetes Father    Heart attack Father          Social  History     Socioeconomic History    Marital status:    Tobacco Use    Smoking status: Never    Smokeless tobacco: Never   Substance and Sexual Activity    Alcohol use: No    Drug use: No    Sexual activity: Not Currently       Review of Systems    OBJECTIVE:     Vital Signs  Pain Score: 10-Worst pain ever  There is no height or weight on file to calculate BMI.    Neurosurgery Physical Exam    She is awake alert appropriate for age  She has no signs of hyperreflexia  She is ambulating normally  She is got upper lumbar lower thoracic pain to palpation  Just full strength lower extremities    Diagnostic Results:  EXAMINATION:  XR LUMBAR SPINE AP AND LATERAL     CLINICAL HISTORY:  fall;     TECHNIQUE:  AP, lateral and spot images were performed of the lumbar spine.     COMPARISON:  06/14/2022     FINDINGS:  Three views lumbar spine.     Lateral imaging demonstrates stable compression deformity and sclerotic change of L1.  The facet joints are aligned noting lower lumbar facet arthropathy.  No additional interval vertebral body height loss.  The sacral segments are aligned.  AP spinal alignment is unremarkable.  Degenerative changes are noted of the sacroiliac joints.  Surgical change projects over the right upper quadrant.     Impression:     1. No acute displaced fracture or dislocation of the lumbar spine noting stable compression deformity of L1 with vertebroplasty change.       ASSESSMENT/PLAN:     62-year-old with a history of L1 compression fracture treated with kyphoplasty.  She is had re-examination of lower back pain recently after a fall several months ago.  X-ray shows post kyphoplasty changes and some kyphosis with no evidence of new fractures.  At this point I would like to treat her with a back brace and then get updated CT scan and MRI scan to see if there is any worsening or new fractures that was not picked up on x-rays.        Note dictated with voice recognition software, please excuse any  grammatical errors.

## 2022-09-28 RX ORDER — HYDROCODONE BITARTRATE AND ACETAMINOPHEN 5; 325 MG/1; MG/1
1 TABLET ORAL EVERY 12 HOURS PRN
Qty: 14 TABLET | Refills: 0 | Status: SHIPPED | OUTPATIENT
Start: 2022-09-28 | End: 2022-10-05

## 2022-09-28 NOTE — TELEPHONE ENCOUNTER
Brace rx sent off to [facility], medications for 1 week sent in and scheduled an appt with pain management 10/07. Scheduled f/u with MRI and ct. Patient confirmed all dates and times

## 2022-09-28 NOTE — PROGRESS NOTES
Pt calling requesting refill on her Norco 5-325mg tabs prescribed q12h prn by Dr. Montes during her recent visit for back pain. Pain medication given for recent fall and acutely worsened pain. Pt unable to take NSAIDS due to history of GI bleeding. 60 tabs were prescribed but pharmacy would only fill 7 days. I will give her a one time refill for 7 days (14 tabs) and refer her to pain management for further treatment options while we continue the workup for her pain as she is currently nonsurgical.       Amanda Phan PA-C  Neurosurgery

## 2022-09-28 NOTE — TELEPHONE ENCOUNTER
----- Message from Trevon Graves sent at 9/28/2022  9:34 AM CDT -----  Contact: [phone]  Pt is calling stating that she spoke with someone on yesterday in ref to get meds refilled and order put in for back brace ---- please give pt a call back [phone]

## 2022-10-03 DIAGNOSIS — S32.010S COMPRESSION FRACTURE OF L1 VERTEBRA, SEQUELA: Primary | ICD-10-CM

## 2022-10-06 DIAGNOSIS — S32.010S COMPRESSION FRACTURE OF L1 VERTEBRA, SEQUELA: Primary | ICD-10-CM

## 2022-10-10 ENCOUNTER — PATIENT MESSAGE (OUTPATIENT)
Dept: ADMINISTRATIVE | Facility: HOSPITAL | Age: 62
End: 2022-10-10
Payer: MEDICARE

## 2022-10-13 ENCOUNTER — LAB VISIT (OUTPATIENT)
Dept: LAB | Facility: HOSPITAL | Age: 62
End: 2022-10-13
Attending: FAMILY MEDICINE
Payer: MEDICARE

## 2022-10-13 ENCOUNTER — OFFICE VISIT (OUTPATIENT)
Dept: FAMILY MEDICINE | Facility: CLINIC | Age: 62
End: 2022-10-13
Payer: MEDICARE

## 2022-10-13 VITALS
TEMPERATURE: 98 F | BODY MASS INDEX: 35.3 KG/M2 | OXYGEN SATURATION: 96 % | HEIGHT: 60 IN | DIASTOLIC BLOOD PRESSURE: 66 MMHG | SYSTOLIC BLOOD PRESSURE: 116 MMHG | HEART RATE: 62 BPM | WEIGHT: 179.81 LBS

## 2022-10-13 DIAGNOSIS — E03.9 HYPOTHYROIDISM, UNSPECIFIED TYPE: ICD-10-CM

## 2022-10-13 DIAGNOSIS — I10 ESSENTIAL HYPERTENSION: ICD-10-CM

## 2022-10-13 DIAGNOSIS — E86.0 DEHYDRATION: ICD-10-CM

## 2022-10-13 DIAGNOSIS — I10 ESSENTIAL HYPERTENSION: Primary | ICD-10-CM

## 2022-10-13 DIAGNOSIS — T67.5XXA HEAT EXHAUSTION, INITIAL ENCOUNTER: ICD-10-CM

## 2022-10-13 LAB
ALBUMIN SERPL BCP-MCNC: 4 G/DL (ref 3.5–5.2)
ALP SERPL-CCNC: 61 U/L (ref 38–126)
ALT SERPL W/O P-5'-P-CCNC: 12 U/L (ref 10–44)
ANION GAP SERPL CALC-SCNC: 7 MMOL/L (ref 8–16)
AST SERPL-CCNC: 41 U/L (ref 15–46)
BASOPHILS # BLD AUTO: 0.04 K/UL (ref 0–0.2)
BASOPHILS NFR BLD: 0.7 % (ref 0–1.9)
BILIRUB SERPL-MCNC: 0.2 MG/DL (ref 0.1–1)
CALCIUM SERPL-MCNC: 8.9 MG/DL (ref 8.7–10.5)
CHLORIDE SERPL-SCNC: 99 MMOL/L (ref 95–110)
CHOLEST SERPL-MCNC: 251 MG/DL (ref 120–199)
CHOLEST/HDLC SERPL: 4.2 {RATIO} (ref 2–5)
CO2 SERPL-SCNC: 32 MMOL/L (ref 23–29)
CREAT SERPL-MCNC: 0.63 MG/DL (ref 0.5–1.4)
DIFFERENTIAL METHOD: NORMAL
EOSINOPHIL # BLD AUTO: 0.2 K/UL (ref 0–0.5)
EOSINOPHIL NFR BLD: 2.6 % (ref 0–8)
ERYTHROCYTE [DISTWIDTH] IN BLOOD BY AUTOMATED COUNT: 13.6 % (ref 11.5–14.5)
EST. GFR  (NO RACE VARIABLE): >60 ML/MIN/1.73 M^2
GLUCOSE SERPL-MCNC: 96 MG/DL (ref 70–110)
HCT VFR BLD AUTO: 39.5 % (ref 37–48.5)
HDLC SERPL-MCNC: 60 MG/DL (ref 40–75)
HDLC SERPL: 23.9 % (ref 20–50)
HGB BLD-MCNC: 13.2 G/DL (ref 12–16)
IMM GRANULOCYTES # BLD AUTO: 0.01 K/UL (ref 0–0.04)
IMM GRANULOCYTES NFR BLD AUTO: 0.2 % (ref 0–0.5)
LDLC SERPL CALC-MCNC: 160.2 MG/DL (ref 63–159)
LYMPHOCYTES # BLD AUTO: 2.7 K/UL (ref 1–4.8)
LYMPHOCYTES NFR BLD: 43.5 % (ref 18–48)
MCH RBC QN AUTO: 29.5 PG (ref 27–31)
MCHC RBC AUTO-ENTMCNC: 33.4 G/DL (ref 32–36)
MCV RBC AUTO: 88 FL (ref 82–98)
MONOCYTES # BLD AUTO: 0.6 K/UL (ref 0.3–1)
MONOCYTES NFR BLD: 9 % (ref 4–15)
NEUTROPHILS # BLD AUTO: 2.7 K/UL (ref 1.8–7.7)
NEUTROPHILS NFR BLD: 44 % (ref 38–73)
NONHDLC SERPL-MCNC: 191 MG/DL
NRBC BLD-RTO: 0 /100 WBC
PLATELET # BLD AUTO: 249 K/UL (ref 150–450)
PMV BLD AUTO: 11.6 FL (ref 9.2–12.9)
POTASSIUM SERPL-SCNC: 4.3 MMOL/L (ref 3.5–5.1)
PROT SERPL-MCNC: 6.7 G/DL (ref 6–8.4)
RBC # BLD AUTO: 4.47 M/UL (ref 4–5.4)
SODIUM SERPL-SCNC: 138 MMOL/L (ref 136–145)
T4 FREE SERPL-MCNC: 1.04 NG/DL (ref 0.71–1.51)
TRIGL SERPL-MCNC: 154 MG/DL (ref 30–150)
TSH SERPL DL<=0.005 MIU/L-ACNC: 1.88 UIU/ML (ref 0.4–4)
UUN UR-MCNC: 16 MG/DL (ref 7–17)
WBC # BLD AUTO: 6.11 K/UL (ref 3.9–12.7)

## 2022-10-13 PROCEDURE — 80053 COMPREHEN METABOLIC PANEL: CPT | Mod: PO | Performed by: FAMILY MEDICINE

## 2022-10-13 PROCEDURE — 99214 PR OFFICE/OUTPT VISIT, EST, LEVL IV, 30-39 MIN: ICD-10-PCS | Mod: S$GLB,,, | Performed by: FAMILY MEDICINE

## 2022-10-13 PROCEDURE — 99214 OFFICE O/P EST MOD 30 MIN: CPT | Mod: S$GLB,,, | Performed by: FAMILY MEDICINE

## 2022-10-13 PROCEDURE — 85025 COMPLETE CBC W/AUTO DIFF WBC: CPT | Mod: PO | Performed by: FAMILY MEDICINE

## 2022-10-13 PROCEDURE — 84439 ASSAY OF FREE THYROXINE: CPT | Performed by: FAMILY MEDICINE

## 2022-10-13 PROCEDURE — 84443 ASSAY THYROID STIM HORMONE: CPT | Mod: PO | Performed by: FAMILY MEDICINE

## 2022-10-13 PROCEDURE — 36415 COLL VENOUS BLD VENIPUNCTURE: CPT | Mod: PO | Performed by: FAMILY MEDICINE

## 2022-10-13 PROCEDURE — 80061 LIPID PANEL: CPT | Performed by: FAMILY MEDICINE

## 2022-10-13 NOTE — PROGRESS NOTES
Patient ID: Cara Mendoza is a 62 y.o. female.    Chief Complaint: Fatigue, Joint Swelling, and Anorexia    HPI      Cara Mendoza is a 62 y.o. female complains of being fatigued.  Several weeks back was out cutting her grass did not drink a lot of fluids and felt hot warm sweaty and then sweat dried up.  Felt exhausted afterwards.  Continues to have some fatigue over last several days to weeks.  Somewhat of an improvement.  Hydrating herself at this point.  No complaints of chest pain or palpitations.  No shortness of breath associated with this.  Is able to increase the amount of physical work she is doing.    Vitals:    10/13/22 0850   BP: 116/66   BP Location: Left arm   Patient Position: Sitting   Pulse: 62   Temp: 97.8 °F (36.6 °C)   TempSrc: Oral   SpO2: 96%   Weight: 81.5 kg (179 lb 12.6 oz)   Height: 5' (1.524 m)            Review of Symptoms      Physical Exam    Constitutional:  Oriented to person, place, and time.appears well-developed and well-nourished.  No distress.      HENT  Head: Normocephalic and atraumatic  Right Ear: External ear normal.   Left Ear: External ear normal.   Nose: External nose normal.   Mouth:  Moist mucus membranes.    Eyes:  Conjunctivae are normal. Right eye exhibits no discharge.  Left eye exhibits no discharge. No scleral icterus.  No periorbital edema    Cardiovascular:  Regular rate and rhythm with normal S1 and S2     Pulmonary/Chest:   Clear to auscultation bilaterally without wheezes, rhonchi or rales      Musculoskeletal:  No edema. No obvious deformity No wasting       Neurological:  Alert and oriented to person, place, and time.   Coordination normal.     Skin:   Skin is warm and dry.  No diaphoresis.   No rash noted.     Psychiatric: Normal mood and affect. Behavior is normal.  Judgment and thought content normal.     Complete Blood Count  Lab Results   Component Value Date    RBC 4.47 10/13/2022    HGB 13.2 10/13/2022    HCT 39.5 10/13/2022    MCV 88  10/13/2022    MCH 29.5 10/13/2022    MCHC 33.4 10/13/2022    RDW 13.6 10/13/2022     10/13/2022    MPV 11.6 10/13/2022    GRAN 2.7 10/13/2022    GRAN 44.0 10/13/2022    LYMPH 2.7 10/13/2022    LYMPH 43.5 10/13/2022    MONO 0.6 10/13/2022    MONO 9.0 10/13/2022    EOS 0.2 10/13/2022    BASO 0.04 10/13/2022    EOSINOPHIL 2.6 10/13/2022    BASOPHIL 0.7 10/13/2022    DIFFMETHOD Automated 10/13/2022       Comprehensive Metabolic Panel  Lab Results   Component Value Date    GLU 96 10/13/2022    BUN 16 10/13/2022    CREATININE 0.63 10/13/2022     10/13/2022    K 4.3 10/13/2022    CL 99 10/13/2022    PROT 6.7 10/13/2022    ALBUMIN 4.0 10/13/2022    BILITOT 0.2 10/13/2022    AST 41 10/13/2022    ALKPHOS 61 10/13/2022    CO2 32 (H) 10/13/2022    ALT 12 10/13/2022    ANIONGAP 7 (L) 10/13/2022       TSH  Lab Results   Component Value Date    TSH 1.880 10/13/2022       Assessment / Plan:      ICD-10-CM ICD-9-CM   1. Essential hypertension  I10 401.9   2. Hypothyroidism, unspecified type  E03.9 244.9   3. Heat exhaustion, initial encounter  T67.5XXA 992.5   4. Dehydration  E86.0 276.51     Essential hypertension  -     Comprehensive Metabolic Panel; Future  -     CBC Auto Differential; Future  -     Lipid Panel; Future  -     TSH; Future    Hypothyroidism, unspecified type  -     Comprehensive Metabolic Panel; Future  -     CBC Auto Differential; Future  -     Lipid Panel; Future  -     TSH; Future  -     T4, Free; Future; Expected date: 10/13/2022    Heat exhaustion, initial encounter    Dehydration      Discussed continued hydration discussed continuing to exercise.  If things do not improve call return to clinic    Anxiety-stable this time no new changes needed.  Hypertension controlled

## 2022-10-25 ENCOUNTER — HOSPITAL ENCOUNTER (OUTPATIENT)
Dept: RADIOLOGY | Facility: HOSPITAL | Age: 62
Discharge: HOME | End: 2022-10-25
Attending: NEUROLOGICAL SURGERY
Payer: MEDICARE

## 2022-10-25 ENCOUNTER — OFFICE VISIT (OUTPATIENT)
Dept: NEUROSURGERY | Facility: CLINIC | Age: 62
End: 2022-10-25
Payer: MEDICARE

## 2022-10-25 DIAGNOSIS — M47.816 LUMBAR SPONDYLOSIS: ICD-10-CM

## 2022-10-25 DIAGNOSIS — S32.010S COMPRESSION FRACTURE OF L1 VERTEBRA, SEQUELA: Primary | ICD-10-CM

## 2022-10-25 DIAGNOSIS — S32.000S LUMBAR COMPRESSION FRACTURE, SEQUELA: ICD-10-CM

## 2022-10-25 PROCEDURE — 72148 MRI LUMBAR SPINE WITHOUT CONTRAST: ICD-10-PCS | Mod: 26,,, | Performed by: RADIOLOGY

## 2022-10-25 PROCEDURE — 72131 CT LUMBAR SPINE W/O DYE: CPT | Mod: TC

## 2022-10-25 PROCEDURE — 99213 OFFICE O/P EST LOW 20 MIN: CPT | Mod: PBBFAC,25 | Performed by: NEUROLOGICAL SURGERY

## 2022-10-25 PROCEDURE — 72131 CT LUMBAR SPINE W/O DYE: CPT | Mod: 26,,, | Performed by: RADIOLOGY

## 2022-10-25 PROCEDURE — 72148 MRI LUMBAR SPINE W/O DYE: CPT | Mod: TC

## 2022-10-25 PROCEDURE — 99999 PR PBB SHADOW E&M-EST. PATIENT-LVL III: ICD-10-PCS | Mod: PBBFAC,,, | Performed by: NEUROLOGICAL SURGERY

## 2022-10-25 PROCEDURE — 72148 MRI LUMBAR SPINE W/O DYE: CPT | Mod: 26,,, | Performed by: RADIOLOGY

## 2022-10-25 PROCEDURE — 99214 OFFICE O/P EST MOD 30 MIN: CPT | Mod: S$PBB,,, | Performed by: NEUROLOGICAL SURGERY

## 2022-10-25 PROCEDURE — 99999 PR PBB SHADOW E&M-EST. PATIENT-LVL III: CPT | Mod: PBBFAC,,, | Performed by: NEUROLOGICAL SURGERY

## 2022-10-25 PROCEDURE — 99214 PR OFFICE/OUTPT VISIT, EST, LEVL IV, 30-39 MIN: ICD-10-PCS | Mod: S$PBB,,, | Performed by: NEUROLOGICAL SURGERY

## 2022-10-25 PROCEDURE — 72131 CT LUMBAR SPINE WITHOUT CONTRAST: ICD-10-PCS | Mod: 26,,, | Performed by: RADIOLOGY

## 2022-10-25 RX ORDER — HYDROCODONE BITARTRATE AND ACETAMINOPHEN 7.5; 325 MG/1; MG/1
1 TABLET ORAL EVERY 6 HOURS PRN
Qty: 60 TABLET | Refills: 0 | Status: SHIPPED | OUTPATIENT
Start: 2022-10-25 | End: 2022-12-17 | Stop reason: SDUPTHER

## 2022-10-25 NOTE — PROGRESS NOTES
"Neurosurgery  Established Patient    SUBJECTIVE:     History of Present Illness:  Patient comes back to see us follow-up after last evaluation the patient on 09/20/2022.  This is a 62-year-old female with a previous history of L1 compression fracture back in 2015 she had a kyphoplasty got better from that but then now has resumption of lower back pain that we wanted to workup more with MRI scan and CT scan.  She is been neurologically stable since last visit she still has a fair amount of back pain.      Review of patient's allergies indicates:   Allergen Reactions    Ciprofloxacin     Codeine Nausea And Vomiting and Hives    Compazine [prochlorperazine edisylate] Anaphylaxis and Nausea And Vomiting    Demerol [meperidine] Anaphylaxis and Nausea And Vomiting    Morpholine analogues Anaphylaxis and Nausea And Vomiting     vomit    Pcn [penicillins] Other (See Comments) and Anaphylaxis     Stomach cramps    Percocet [oxycodone-acetaminophen] Nausea And Vomiting    Corticosteroids (glucocorticoids) Itching     Agitation, "skin crawling" sensation    Cortisone Itching     Agitation, "skin crawling" sensation    Prochlorperazine     Albuterol sulfate Palpitations    Fentanyl Nausea And Vomiting and Swelling     Fentanyl patch made lips numb and swollen    Morphine Nausea And Vomiting    Nsaids (non-steroidal anti-inflammatory drug) Other (See Comments) and Nausea And Vomiting     Cluster ulcers    Sulfa (sulfonamide antibiotics) Nausea And Vomiting and Other (See Comments)     Bad stomach cramps    Toradol [ketorolac] Nausea And Vomiting     ulcers    Tramadol Nausea And Vomiting       Current Outpatient Medications   Medication Sig Dispense Refill    ALPRAZolam (XANAX) 0.5 MG tablet TAKE 1 TABLET BY MOUTH THREE TIMES A DAY AS NEEDED FOR ANXIETY 90 tablet 0    EScitalopram oxalate (LEXAPRO) 20 MG tablet TAKE 1 TABLET BY MOUTH ONCE A DAY 90 tablet 0    estradioL (ESTRACE) 1 MG tablet TAKE 1 TABLET BY MOUTH EVERY DAY 90 " tablet 3    gabapentin (NEURONTIN) 400 MG capsule TAKE 1 CAPSULE BY MOUTH EVERY MORNING AND AFTERNOON AND UP TO 4 CAPSULES AT BEDTIME 180 capsule 3    hydroCHLOROthiazide (MICROZIDE) 12.5 mg capsule TAKE 1 CAPSULE BY MOUTH ONCE A DAY 90 capsule 4    levothyroxine (SYNTHROID) 75 MCG tablet TAKE 1 TABLET BY MOUTH EVERY DAY BEFORE BREAKFAST 90 tablet 3    linaCLOtide (LINZESS) 72 mcg Cap capsule Linzess 72 mcg capsule   Take 1 capsule every day by oral route in the morning for 90 days.      pantoprazole (PROTONIX) 40 MG tablet Take 1 tablet (40 mg total) by mouth once daily. 90 tablet 3    promethazine (PHENERGAN) 25 MG tablet Take 1 tablet (25 mg total) by mouth every 6 (six) hours as needed for Nausea. 30 tablet 0    HYDROcodone-acetaminophen (NORCO) 7.5-325 mg per tablet Take 1 tablet by mouth every 6 (six) hours as needed for Pain. 60 tablet 0     No current facility-administered medications for this visit.       Past Medical History:   Diagnosis Date    Adrenal adenoma     Gastric ulcer     Glaucoma     Hypothyroidism     Kidney stones     Pancreatitis     Traumatic compression fracture of L1 lumbar vertebra      Past Surgical History:   Procedure Laterality Date    CHOLECYSTECTOMY      COLONOSCOPY N/A 2016    Procedure: COLONOSCOPY;  Surgeon: Sathya Strickland MD;  Location: [facility] ([address] [place] [address]);  Service: Endoscopy;  Laterality: N/A;    CYSTOSCOPY      HYSTERECTOMY      KYPHOSIS SURGERY  7/20/15    right adrenal gland remove      tonsillectomy      TONSILLECTOMY      TUBAL LIGATION      x2        Family History       Problem Relation (Age of Onset)    Alzheimer's disease Sister    Dementia Mother    Diabetes Father    Heart attack Father          Social History     Socioeconomic History    Marital status:    Tobacco Use    Smoking status: Never    Smokeless tobacco: Never   Substance and Sexual Activity    Alcohol use: No    Drug use: No    Sexual activity: Not Currently       Review of  Systems    OBJECTIVE:     Vital Signs  Pain Score:   8  There is no height or weight on file to calculate BMI.    Neurosurgery Physical Exam    Is awake alert appropriate  Cranial nerves intact  5/5 in both upper lower extremities    Diagnostic Results:  MRI scan and CT scan were reviewed.  Patient has old healing L1 compression fracture that looks stable she is got multiple level degenerative disc disease but nothing appears to be surgical no significant central foraminal stenosis noted.    ASSESSMENT/PLAN:     Patient with history of L1 compression fracture now with fairly intractable low back pain.  This stage I do not see anything surgical.  She would like to continue with physical therapy which I think is reasonable we will get her another physical therapy.  I think she may benefit from a spinal cord stimulation evaluation.  We will have her see pain management and see Dr. Sarkar for evaluation for possible spinal cord stimulation.        Note dictated with voice recognition software, please excuse any grammatical errors.

## 2022-11-01 ENCOUNTER — PATIENT MESSAGE (OUTPATIENT)
Dept: ADMINISTRATIVE | Facility: HOSPITAL | Age: 62
End: 2022-11-01
Payer: MEDICARE

## 2022-11-01 ENCOUNTER — PATIENT OUTREACH (OUTPATIENT)
Dept: ADMINISTRATIVE | Facility: HOSPITAL | Age: 62
End: 2022-11-01
Payer: MEDICARE

## 2022-11-01 NOTE — LETTER
November 8, 2022    Cara Mendoza  [address] [place] [address] [place] [address]             [place] [id number]  [facility] [address]  Phone: [phone] Dear Cara,     Your [facility] primary care team is dedicated to assisting you achieve your health goals.  In order to do so, scheduling your routine screenings and tests are key to your good health.  Our records indicate you may be overdue for your mammogram screening.  Mammography screening can help find breast cancer at an early stage, when it is most likely to be successfully treated.    Giacomo Domingo MD has entered your screening mammogram order.  We encourage you to schedule your appointment at any of our [facility] imaging locations.  You can schedule this Mammogram exam via [email or web address] or call [phone] and we will be more than happy to assist you in scheduling your mammogram.     If you recently had your mammogram screening outside of [facility], please let your primary care team know so that they can update your health record.  Please send a message to your primary care physician via [email or web address] or contact his/her office at [phone].    Thank you for letting us care for you,        Sincerely,        Your Women's Health Care Team

## 2022-11-01 NOTE — PROGRESS NOTES
Care Everywhere updates requested and reviewed.  Immunizations reconciled. Media reports reviewed.  Duplicate HM overrides and  orders removed.  Overdue HM topic chart audit and/or requested.  Overdue lab testing linked to upcoming lab appointments if applies.    DIS reviewed for Mammogram     Health Maintenance Due   Topic Date Due    COVID-19 Vaccine (1) Never done    Shingles Vaccine (1 of 2) Never done    Mammogram  2022    Influenza Vaccine (1) Never done

## 2022-11-11 ENCOUNTER — TELEPHONE (OUTPATIENT)
Dept: FAMILY MEDICINE | Facility: CLINIC | Age: 62
End: 2022-11-11
Payer: MEDICARE

## 2022-11-11 NOTE — TELEPHONE ENCOUNTER
I spoke with the pt     And she will call back when she decides what med she wants to be on for her cholesterol

## 2022-11-11 NOTE — TELEPHONE ENCOUNTER
----- Message from Giacomo Domingo MD sent at 10/15/2022  8:08 PM CDT -----  Your labs are good   the only problem is your cholesterol has risen      You should be on a cholesterol lowering medication like lipitor-chart does not show this as an allergy  Will you take this to lower your risk of stroke or heart attack

## 2022-11-17 ENCOUNTER — PATIENT MESSAGE (OUTPATIENT)
Dept: FAMILY MEDICINE | Facility: HOSPITAL | Age: 62
End: 2022-11-17
Payer: MEDICARE

## 2022-11-22 ENCOUNTER — PATIENT MESSAGE (OUTPATIENT)
Dept: FAMILY MEDICINE | Facility: CLINIC | Age: 62
End: 2022-11-22
Payer: MEDICARE

## 2022-11-22 DIAGNOSIS — F41.9 ANXIETY: ICD-10-CM

## 2022-11-22 RX ORDER — ALPRAZOLAM 0.5 MG/1
TABLET ORAL
Qty: 90 TABLET | Refills: 0 | Status: SHIPPED | OUTPATIENT
Start: 2022-11-22 | End: 2022-12-20

## 2022-11-22 NOTE — TELEPHONE ENCOUNTER
No new care gaps identified.  [facility] Embedded Care Gaps. Reference number: [id number]. 11/22/2022   11:18:31 AM CST

## 2022-11-22 NOTE — TELEPHONE ENCOUNTER
----- Message from Catalina Lovett sent at 11/22/2022 11:06 AM CST -----  Contact: pt  Type:  RX Refill Request    Who Called:  pt    ALPRAZolam (XANAX) 0.5 MG tablet 90 tablet 0 10/11/2022  No  Sig: TAKE 1 TABLET BY MOUTH THREE TIMES A DAY AS NEEDED FOR ANXIETY  Sent to pharmacy as: ALPRAZolam (XANAX) 0.5 MG tablet  Class: Normal  Order: [id number]  Date/Time Signed: 10/11/2022 15:59      E-Prescribing Status: Receipt confirmed by pharmacy (10/11/2022  3:59 PM CDT)    Pharmacy      [facility] [address] [place] [address]   Associated Diagnoses  Anxiety     Call back or a response via [facility]?   Best Call Back Number:[phone] (H)   Additional Information: f/u from pharmacy request has been sick with the flu too

## 2022-12-16 ENCOUNTER — PATIENT MESSAGE (OUTPATIENT)
Dept: FAMILY MEDICINE | Facility: CLINIC | Age: 62
End: 2022-12-16
Payer: MEDICARE

## 2022-12-17 ENCOUNTER — PATIENT MESSAGE (OUTPATIENT)
Dept: FAMILY MEDICINE | Facility: CLINIC | Age: 62
End: 2022-12-17
Payer: MEDICARE

## 2022-12-17 RX ORDER — HYDROCODONE BITARTRATE AND ACETAMINOPHEN 7.5; 325 MG/1; MG/1
1 TABLET ORAL EVERY 6 HOURS PRN
Qty: 60 TABLET | Refills: 0 | Status: SHIPPED | OUTPATIENT
Start: 2022-12-17 | End: 2022-12-28

## 2022-12-19 ENCOUNTER — PATIENT MESSAGE (OUTPATIENT)
Dept: NEUROSURGERY | Facility: CLINIC | Age: 62
End: 2022-12-19
Payer: MEDICARE

## 2022-12-19 DIAGNOSIS — F41.9 ANXIETY: ICD-10-CM

## 2022-12-19 DIAGNOSIS — I10 ESSENTIAL HYPERTENSION: ICD-10-CM

## 2022-12-19 DIAGNOSIS — G44.86 CERVICOGENIC HEADACHE: ICD-10-CM

## 2022-12-19 DIAGNOSIS — E66.01 SEVERE OBESITY (BMI 35.0-39.9) WITH COMORBIDITY: ICD-10-CM

## 2022-12-20 RX ORDER — ALPRAZOLAM 0.5 MG/1
TABLET ORAL
Qty: 90 TABLET | Refills: 0 | Status: SHIPPED | OUTPATIENT
Start: 2022-12-20 | End: 2023-01-27

## 2022-12-20 RX ORDER — GABAPENTIN 400 MG/1
CAPSULE ORAL
Qty: 180 CAPSULE | Refills: 0 | Status: SHIPPED | OUTPATIENT
Start: 2022-12-20 | End: 2023-01-18 | Stop reason: SDUPTHER

## 2022-12-20 NOTE — TELEPHONE ENCOUNTER
No new care gaps identified.  [facility] Embedded Care Gaps. Reference number: [id number]. 12/19/2022   7:35:09 PM CST

## 2022-12-27 ENCOUNTER — PATIENT MESSAGE (OUTPATIENT)
Dept: FAMILY MEDICINE | Facility: CLINIC | Age: 62
End: 2022-12-27
Payer: MEDICARE

## 2022-12-27 ENCOUNTER — TELEPHONE (OUTPATIENT)
Dept: FAMILY MEDICINE | Facility: CLINIC | Age: 62
End: 2022-12-27
Payer: MEDICARE

## 2022-12-27 NOTE — TELEPHONE ENCOUNTER
----- Message from Penny William sent at 12/27/2022  3:21 PM CST -----  Type:  RX Refill Request    Who Called: Pt   Refill or New Rx:refill   RX Name and Strength:HYDROcodone-acetaminophen (NORCO) 10mg per tablet  How is the patient currently taking it? (ex. 1XDay):1  Is this a 30 day or 90 day RX:30  [facility] with phone number[id number]   Phone: [phone]  Fax:  [phone]  Would the patient rather a call back or a response via [facility]? Call back   Best Call Back Number:[phone]  Additional Information: Pt is requesting to have the 10 mg dosage of medicine... It is the only one available.  No pharmacy has the 7.5

## 2022-12-28 RX ORDER — HYDROCODONE BITARTRATE AND ACETAMINOPHEN 10; 325 MG/1; MG/1
1 TABLET ORAL EVERY 12 HOURS PRN
Qty: 60 TABLET | Refills: 0 | Status: SHIPPED | OUTPATIENT
Start: 2022-12-28 | End: 2023-04-24

## 2022-12-29 ENCOUNTER — PATIENT MESSAGE (OUTPATIENT)
Dept: FAMILY MEDICINE | Facility: CLINIC | Age: 62
End: 2022-12-29
Payer: MEDICARE

## 2023-01-12 ENCOUNTER — PATIENT MESSAGE (OUTPATIENT)
Dept: FAMILY MEDICINE | Facility: CLINIC | Age: 63
End: 2023-01-12
Payer: MEDICARE

## 2023-01-12 ENCOUNTER — PATIENT MESSAGE (OUTPATIENT)
Dept: NEUROSURGERY | Facility: CLINIC | Age: 63
End: 2023-01-12
Payer: MEDICARE

## 2023-01-12 RX ORDER — LEVOTHYROXINE SODIUM 75 UG/1
75 TABLET ORAL
Qty: 90 TABLET | Refills: 3 | Status: SHIPPED | OUTPATIENT
Start: 2023-01-12 | End: 2023-04-24

## 2023-01-12 RX ORDER — ESCITALOPRAM OXALATE 20 MG/1
20 TABLET ORAL DAILY
Qty: 90 TABLET | Refills: 0 | Status: CANCELLED | OUTPATIENT
Start: 2023-01-12

## 2023-01-12 RX ORDER — LEVOTHYROXINE SODIUM 75 UG/1
75 TABLET ORAL
Qty: 90 TABLET | Refills: 3 | Status: CANCELLED | OUTPATIENT
Start: 2023-01-12

## 2023-01-12 RX ORDER — ESCITALOPRAM OXALATE 20 MG/1
20 TABLET ORAL DAILY
Qty: 90 TABLET | Refills: 0 | Status: SHIPPED | OUTPATIENT
Start: 2023-01-12 | End: 2023-01-12 | Stop reason: SDUPTHER

## 2023-01-12 NOTE — TELEPHONE ENCOUNTER
----- Message from Samra Moore sent at 1/12/2023  9:06 AM CST -----  Regarding: refills  Contact: pt  .Type:  Needs Medical Advice    Who Called: pt    Pharmacy name and phone #:  [id number] - [place], [address]   Phone:  [phone]  Fax:  [phone]        Would the patient rather a call back or a response via [facility]? call  Best Call Back Number: [phone]  Additional Information: would like to  paper scripts if medication is still not being sent to pharmacy correctly. Pt is currently all out of meds. Please assist.    levothyroxine (SYNTHROID) 75 MCG tablet  Sig: TAKE 1 TABLET BY MOUTH EVERY DAY BEFORE BREAKFAST    EScitalopram oxalate (LEXAPRO) 20 MG tablet  Sig: TAKE 1 TABLET BY MOUTH ONCE A DAY

## 2023-01-12 NOTE — TELEPHONE ENCOUNTER
No new care gaps identified.  [facility] Embedded Care Gaps. Reference number: [id number]. 1/12/2023   9:52:45 AM CST

## 2023-01-12 NOTE — TELEPHONE ENCOUNTER
No new care gaps identified.  [facility] Embedded Care Gaps. Reference number: [id number]. 1/12/2023   1:27:37 PM CST

## 2023-01-13 ENCOUNTER — PATIENT MESSAGE (OUTPATIENT)
Dept: FAMILY MEDICINE | Facility: CLINIC | Age: 63
End: 2023-01-13
Payer: MEDICARE

## 2023-01-13 NOTE — TELEPHONE ENCOUNTER
I know you printed this prescription yesterday and I gave it to you to sign  Do you still have the script

## 2023-01-17 ENCOUNTER — PATIENT MESSAGE (OUTPATIENT)
Dept: ADMINISTRATIVE | Facility: HOSPITAL | Age: 63
End: 2023-01-17
Payer: MEDICARE

## 2023-01-25 ENCOUNTER — OFFICE VISIT (OUTPATIENT)
Dept: PAIN MEDICINE | Facility: CLINIC | Age: 63
End: 2023-01-25
Attending: NEUROLOGICAL SURGERY
Payer: MEDICARE

## 2023-01-25 VITALS
SYSTOLIC BLOOD PRESSURE: 137 MMHG | HEART RATE: 80 BPM | WEIGHT: 190.06 LBS | BODY MASS INDEX: 37.31 KG/M2 | HEIGHT: 60 IN | RESPIRATION RATE: 19 BRPM | TEMPERATURE: 98 F | DIASTOLIC BLOOD PRESSURE: 74 MMHG

## 2023-01-25 DIAGNOSIS — M47.816 SPONDYLOSIS OF LUMBAR REGION WITHOUT MYELOPATHY OR RADICULOPATHY: ICD-10-CM

## 2023-01-25 DIAGNOSIS — M47.816 LUMBAR SPONDYLOSIS: ICD-10-CM

## 2023-01-25 DIAGNOSIS — S32.010S COMPRESSION FRACTURE OF L1 VERTEBRA, SEQUELA: ICD-10-CM

## 2023-01-25 DIAGNOSIS — M53.3 SACROILIAC DYSFUNCTION: Primary | ICD-10-CM

## 2023-01-25 PROCEDURE — 99999 PR PBB SHADOW E&M-EST. PATIENT-LVL IV: CPT | Mod: PBBFAC,,, | Performed by: ANESTHESIOLOGY

## 2023-01-25 PROCEDURE — 99214 OFFICE O/P EST MOD 30 MIN: CPT | Mod: PBBFAC | Performed by: ANESTHESIOLOGY

## 2023-01-25 PROCEDURE — 99999 PR PBB SHADOW E&M-EST. PATIENT-LVL IV: ICD-10-PCS | Mod: PBBFAC,,, | Performed by: ANESTHESIOLOGY

## 2023-01-25 PROCEDURE — 99205 OFFICE O/P NEW HI 60 MIN: CPT | Mod: S$PBB,GC,, | Performed by: ANESTHESIOLOGY

## 2023-01-25 PROCEDURE — 99205 PR OFFICE/OUTPT VISIT, NEW, LEVL V, 60-74 MIN: ICD-10-PCS | Mod: S$PBB,GC,, | Performed by: ANESTHESIOLOGY
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
## 2023-01-27 ENCOUNTER — PATIENT MESSAGE (OUTPATIENT)
Dept: FAMILY MEDICINE | Facility: CLINIC | Age: 63
End: 2023-01-27
Payer: MEDICARE

## 2023-02-06 ENCOUNTER — PATIENT MESSAGE (OUTPATIENT)
Dept: FAMILY MEDICINE | Facility: CLINIC | Age: 63
End: 2023-02-06
Payer: MEDICARE

## 2023-02-06 ENCOUNTER — PATIENT MESSAGE (OUTPATIENT)
Dept: ADMINISTRATIVE | Facility: HOSPITAL | Age: 63
End: 2023-02-06
Payer: MEDICARE

## 2023-02-06 ENCOUNTER — TELEPHONE (OUTPATIENT)
Dept: FAMILY MEDICINE | Facility: CLINIC | Age: 63
End: 2023-02-06
Payer: MEDICARE

## 2023-02-06 RX ORDER — AZITHROMYCIN 250 MG/1
TABLET, FILM COATED ORAL
Qty: 6 TABLET | Refills: 0 | Status: SHIPPED | OUTPATIENT
Start: 2023-02-06 | End: 2023-02-11

## 2023-02-06 NOTE — TELEPHONE ENCOUNTER
----- Message from Endy Huntley sent at 2/6/2023  9:20 AM CST -----  Contact: pt  .Type:  Same Day Appointment Request    Caller is requesting a same day appointment.  Caller declined first available appointment listed below.    Name of Caller:pt  When is the first available appointment?  Symptoms:  cough, head congestion with trouble breathing  Best Call Back Number:[phone]  Additional Information: Pt. Was seen on yesterday in the emergency room on yesterday and they did not give her anything.  She is requesting to be seen by her provider.     Patient also sent protal message  No available appts today

## 2023-02-06 NOTE — TELEPHONE ENCOUNTER
Did they test her for any viral infections?  Did they do any x-rays or lab work?  If they did all those things then it is most likely viral since they did not give you antibiotics.  Antibiotics are not going to help for viral infections

## 2023-02-06 NOTE — TELEPHONE ENCOUNTER
[facility] - swapped for flu and covid negative    ER dx with bronchitis but did not give abx  Sinus was red and inflamed      She is requesting [facility] shop

## 2023-02-14 ENCOUNTER — TELEPHONE (OUTPATIENT)
Dept: PAIN MEDICINE | Facility: CLINIC | Age: 63
End: 2023-02-14
Payer: MEDICARE

## 2023-02-14 ENCOUNTER — PATIENT MESSAGE (OUTPATIENT)
Dept: PAIN MEDICINE | Facility: OTHER | Age: 63
End: 2023-02-14
Payer: MEDICARE

## 2023-02-14 NOTE — TELEPHONE ENCOUNTER
----- Message from Zelda Gautam sent at 2/14/2023  9:25 AM CST -----  Name of Who is Calling: LINETTE LEWIS [[id number]]              What is the request in detail: Patient requesting a call back to discuss rescheduling procedure due to having bronchitis              Can the clinic reply by [facility]: No              [facility]: [phone]

## 2023-04-24 ENCOUNTER — TELEPHONE (OUTPATIENT)
Dept: FAMILY MEDICINE | Facility: CLINIC | Age: 63
End: 2023-04-24

## 2023-04-24 ENCOUNTER — OFFICE VISIT (OUTPATIENT)
Dept: FAMILY MEDICINE | Facility: CLINIC | Age: 63
End: 2023-04-24
Payer: MEDICARE

## 2023-04-24 VITALS
BODY MASS INDEX: 37.09 KG/M2 | HEART RATE: 81 BPM | TEMPERATURE: 98 F | DIASTOLIC BLOOD PRESSURE: 78 MMHG | SYSTOLIC BLOOD PRESSURE: 142 MMHG | HEIGHT: 60 IN | OXYGEN SATURATION: 95 % | WEIGHT: 188.94 LBS

## 2023-04-24 DIAGNOSIS — F32.A DEPRESSION, UNSPECIFIED DEPRESSION TYPE: ICD-10-CM

## 2023-04-24 DIAGNOSIS — F41.9 ANXIETY: Chronic | ICD-10-CM

## 2023-04-24 DIAGNOSIS — F43.10 POST TRAUMATIC STRESS DISORDER: ICD-10-CM

## 2023-04-24 DIAGNOSIS — I10 ESSENTIAL HYPERTENSION: ICD-10-CM

## 2023-04-24 DIAGNOSIS — E03.9 HYPOTHYROIDISM, UNSPECIFIED TYPE: ICD-10-CM

## 2023-04-24 DIAGNOSIS — Z12.31 ENCOUNTER FOR SCREENING MAMMOGRAM FOR BREAST CANCER: Primary | ICD-10-CM

## 2023-04-24 DIAGNOSIS — R73.9 HYPERGLYCEMIA: ICD-10-CM

## 2023-04-24 PROCEDURE — 99214 PR OFFICE/OUTPT VISIT, EST, LEVL IV, 30-39 MIN: ICD-10-PCS | Mod: S$GLB,,, | Performed by: FAMILY MEDICINE

## 2023-04-24 PROCEDURE — 99214 OFFICE O/P EST MOD 30 MIN: CPT | Mod: S$GLB,,, | Performed by: FAMILY MEDICINE

## 2023-04-24 RX ORDER — ALPRAZOLAM 0.5 MG/1
TABLET ORAL
Qty: 90 TABLET | Refills: 0 | Status: SHIPPED | OUTPATIENT
Start: 2023-05-10 | End: 2023-06-19

## 2023-04-24 RX ORDER — ALPRAZOLAM 1 MG/1
1 TABLET ORAL 3 TIMES DAILY PRN
Qty: 45 TABLET | Refills: 0 | Status: SHIPPED | OUTPATIENT
Start: 2023-04-24 | End: 2023-05-24

## 2023-04-24 NOTE — PROGRESS NOTES
Patient ID: Cara Mendoza is a 62 y.o. female.    Chief Complaint: Anxiety, Stress, Annual Exam, and Abdominal Pain    HPI      Cara Mendoza is a 62 y.o. female lots of stress.  Recently saw grandson have a sz and this is disturbing her.  Since then she is been very anxious.  Taking two Xanax 0.5 milligrams 3 times a day.  Looking to get to see a new  for anxiety.    Follow her for hypertension which is not fully controlled this time hypothyroidism and hyperglycemia.      Vitals:    04/24/23 1006   BP: (!) 142/78   BP Location: Left arm   Patient Position: Sitting   Pulse: 81   Temp: 97.9 °F (36.6 °C)   TempSrc: Oral   SpO2: 95%   Weight: 85.7 kg (188 lb 15 oz)   Height: 5' (1.524 m)            Review of Symptoms      Physical Exam    Constitutional:  Oriented to person, place, and time.appears well-developed and well-nourished.  No distress.      HENT  Head: Normocephalic and atraumatic  Right Ear: External ear normal.   Left Ear: External ear normal.   Nose: External nose normal.   Mouth:  Moist mucus membranes.    Eyes:  Conjunctivae are normal. Right eye exhibits no discharge.  Left eye exhibits no discharge. No scleral icterus.  No periorbital edema    Cardiovascular:  Regular rate and rhythm with normal S1 and S2     Pulmonary/Chest:   Clear to auscultation bilaterally without wheezes, rhonchi or rales      Musculoskeletal:  No edema. No obvious deformity No wasting       Neurological:  Alert and oriented to person, place, and time.   Coordination normal.     Skin:   Skin is warm and dry.  No diaphoresis.   No rash noted.     Psychiatric: she appears sad tearful normal though pattern and no delusion or hallucinations.    Complete Blood Count  Lab Results   Component Value Date    RBC 4.47 10/13/2022    HGB 13.2 10/13/2022    HCT 39.5 10/13/2022    MCV 88 10/13/2022    MCH 29.5 10/13/2022    MCHC 33.4 10/13/2022    RDW 13.6 10/13/2022     10/13/2022    MPV 11.6 10/13/2022    GRAN 2.7  10/13/2022    GRAN 44.0 10/13/2022    LYMPH 2.7 10/13/2022    LYMPH 43.5 10/13/2022    MONO 0.6 10/13/2022    MONO 9.0 10/13/2022    EOS 0.2 10/13/2022    BASO 0.04 10/13/2022    EOSINOPHIL 2.6 10/13/2022    BASOPHIL 0.7 10/13/2022    DIFFMETHOD Automated 10/13/2022       Comprehensive Metabolic Panel  No results found for: GLU, BUN, CREATININE, NA, K, CL, PROT, ALBUMIN, BILITOT, AST, ALKPHOS, CO2, ALT, ANIONGAP, EGFRNONAA, ESTGFRAFRICA    TSH  No results found for: TSH    Assessment / Plan:      ICD-10-CM ICD-9-CM   1. Essential hypertension  I10 401.9   2. Hypothyroidism, unspecified type  E03.9 244.9   3. Anxiety  F41.9 300.00   4. Hyperglycemia  R73.9 790.29   5. Depression, unspecified depression type  F32.A 311   6. Post traumatic stress disorder  F43.10 309.81     Essential hypertension    Hypothyroidism, unspecified type    Anxiety    Hyperglycemia    Depression, unspecified depression type    Post traumatic stress disorder    Keep scheduled appointment  Take current meds  Htn keep current meds  Agreed to you hypertension program to see what home readings may actually be

## 2023-05-01 ENCOUNTER — TELEPHONE (OUTPATIENT)
Dept: FAMILY MEDICINE | Facility: CLINIC | Age: 63
End: 2023-05-01

## 2023-05-01 NOTE — TELEPHONE ENCOUNTER
Ms. Jones,    Can you call about what time you want her to come see you on 5/8/23 about digital medicine    Pt has an appt with Dr. Graves at 8:40 AM on 5/8/23

## 2023-05-01 NOTE — TELEPHONE ENCOUNTER
Process of on boarding, will update blood pressure once set up.    Pt coming in on 5/8/23 to get Digital Medicine set up

## 2023-05-02 NOTE — ED NOTES
Patient requesting pain medication for chest discomfort. Dr. Ding notified.   Benefits, risks, and possible complications of procedure explained to patient/caregiver who verbalized understanding and gave verbal consent.

## 2023-05-04 RX ORDER — ESTRADIOL 1 MG/1
1 TABLET ORAL DAILY
Qty: 90 TABLET | Refills: 3 | Status: SHIPPED | OUTPATIENT
Start: 2023-05-04

## 2023-05-08 ENCOUNTER — TELEPHONE (OUTPATIENT)
Dept: FAMILY MEDICINE | Facility: CLINIC | Age: 63
End: 2023-05-08
Payer: MEDICARE

## 2023-05-08 NOTE — TELEPHONE ENCOUNTER
"Incomplete Hypertension Digital Medicine Enrollment  Received: Today  [facility], System Message  P [facility] Staff  Cara Mendoza was enrolled in the Hypertension Digital Medicine program by Giacomo Domingo on 4/24/2023 but but has not completed the program consent questionnaire or setup of the required Digital Medicine devices.     Please reach out to the patient and inform them that Dr. Giacomo Domingo is expecting their at home readings. The patient should log into their [employer] account to complete the "Hypertension Digital Medicine Patient Consent" questionnaire.     Instructions will automatically be sent via [employer] message after consent is obtained.       If the patient has technical issues, please have her follow up with the Digital Medicine Support Line at [phone].   "

## 2023-05-15 ENCOUNTER — TELEPHONE (OUTPATIENT)
Dept: FAMILY MEDICINE | Facility: CLINIC | Age: 63
End: 2023-05-15

## 2023-05-15 NOTE — TELEPHONE ENCOUNTER
----- Message from Dianne Joshi sent at 5/15/2023  3:30 PM CDT -----  Regarding: med clarification  Contact: Hitesh  Please call Hitesh at [employer] regarding the xanax rx.  Confusion on what strength the pt soul be on.  He can be reached at [phone]      What strength should she be taking  On 4/24/2023 you prescribed 1 mg  On 5/10/23   You prescribed 0.5 mg    Both 1 TID

## 2023-05-31 NOTE — TELEPHONE ENCOUNTER
----- Message from Barbara Damon sent at 1/18/2018 12:32 PM CST -----  _  1st Request  _  2nd Request  _  3rd Request        Who: patient     Why: Requesting a call back in regards to the gabapentin (NEURONTIN) 300 MG capsule is not enough to put her in a deep sleep , pt is asking that you go ahead and call in the sleep medicine. Please call pt and let her know if you are going to do this    What Number to Call Back:[phone]    When to Expect a call back: (Within 24 hours)    Please return the call at earliest convenience. Thanks!                             Patient returning office call, transferring to a nurse

## 2023-06-06 ENCOUNTER — TELEPHONE (OUTPATIENT)
Dept: FAMILY MEDICINE | Facility: CLINIC | Age: 63
End: 2023-06-06
Payer: MEDICARE

## 2023-06-19 DIAGNOSIS — F41.9 ANXIETY: Chronic | ICD-10-CM

## 2023-06-19 RX ORDER — ALPRAZOLAM 0.5 MG/1
TABLET ORAL
Qty: 90 TABLET | Refills: 0 | Status: SHIPPED | OUTPATIENT
Start: 2023-06-19 | End: 2023-07-18

## 2023-06-19 NOTE — TELEPHONE ENCOUNTER
No care due was identified.  [facility] Embedded Care Due Messages. Reference number: [id number].   6/19/2023 10:51:27 AM CDT

## 2023-06-20 ENCOUNTER — PES CALL (OUTPATIENT)
Dept: ADMINISTRATIVE | Facility: CLINIC | Age: 63
End: 2023-06-20
Payer: MEDICARE

## 2023-07-10 ENCOUNTER — TELEPHONE (OUTPATIENT)
Dept: HEPATOLOGY | Facility: CLINIC | Age: 63
End: 2023-07-10
Payer: MEDICARE

## 2023-07-10 NOTE — TELEPHONE ENCOUNTER
Returned patient's call and informed her that her current appointment is the first available but I did help her setup her [facility] raudel just in case an earlie slot opens up.

## 2023-07-10 NOTE — TELEPHONE ENCOUNTER
----- Message from Gypsy Rodriguez sent at 7/10/2023  8:47 AM CDT -----  Regarding: Appt  Contact: [phone]  Patient Destinee is calling. Patient would like to know if she can be seen sooner than her next appt. Patient stated she is having more problems and cant wear her bra. Please call patient at [phone]

## 2023-07-10 NOTE — TELEPHONE ENCOUNTER
----- Message from Gypsy Rodriguez sent at 7/10/2023  8:47 AM CDT -----  Regarding: Appt  Contact: [phone]  Patient Destinee is calling. Patient would like to know if she can be seen sooner than her next appt. Patient stated she is having more problems and cant wear her bra. Please call patient at [phone]

## 2023-07-18 DIAGNOSIS — I10 ESSENTIAL HYPERTENSION: ICD-10-CM

## 2023-07-18 DIAGNOSIS — G44.86 CERVICOGENIC HEADACHE: ICD-10-CM

## 2023-07-18 DIAGNOSIS — E66.01 SEVERE OBESITY (BMI 35.0-39.9) WITH COMORBIDITY: ICD-10-CM

## 2023-07-18 RX ORDER — GABAPENTIN 400 MG/1
CAPSULE ORAL
Qty: 180 CAPSULE | Refills: 3 | Status: SHIPPED | OUTPATIENT
Start: 2023-07-18 | End: 2023-11-15

## 2023-07-18 NOTE — TELEPHONE ENCOUNTER
Care Due:                  Date            Visit Type   Department     Provider  --------------------------------------------------------------------------------                                EP -                              PRIMARY      [facility] FAMILY  Last Visit: 04-      CARE ([facility])   MEDICINE       Giacomo Domingo                              EP -                              PRIMARY      [facility] FAMILY  Next Visit: 10-      CARE ([facility])   MEDICINE       Giacomo Domingo                                                            Last  Test          Frequency    Reason                     Performed    Due Date  --------------------------------------------------------------------------------    CMP.........  12 months..  hydroCHLOROthiazide......  10-   10-    TSH.........  12 months..  levothyroxine............  10-   10-    [facility] Embedded Care Due Messages. Reference number: [id number].   7/18/2023 12:10:45 PM CDT

## 2023-07-26 ENCOUNTER — OFFICE VISIT (OUTPATIENT)
Dept: HEPATOLOGY | Facility: CLINIC | Age: 63
End: 2023-07-26
Payer: MEDICARE

## 2023-07-26 VITALS
SYSTOLIC BLOOD PRESSURE: 129 MMHG | DIASTOLIC BLOOD PRESSURE: 86 MMHG | WEIGHT: 192.44 LBS | BODY MASS INDEX: 37.78 KG/M2 | HEART RATE: 76 BPM | HEIGHT: 60 IN

## 2023-07-26 DIAGNOSIS — R79.89 ABNORMAL LFTS: ICD-10-CM

## 2023-07-26 DIAGNOSIS — K76.0 FATTY (CHANGE OF) LIVER, NOT ELSEWHERE CLASSIFIED: ICD-10-CM

## 2023-07-26 DIAGNOSIS — M79.18 MUSCULOSKELETAL PAIN: ICD-10-CM

## 2023-07-26 DIAGNOSIS — K76.0 FATTY LIVER: Primary | ICD-10-CM

## 2023-07-26 PROCEDURE — 99213 OFFICE O/P EST LOW 20 MIN: CPT | Mod: PBBFAC | Performed by: INTERNAL MEDICINE

## 2023-07-26 PROCEDURE — 99204 PR OFFICE/OUTPT VISIT, NEW, LEVL IV, 45-59 MIN: ICD-10-PCS | Mod: S$PBB,,, | Performed by: INTERNAL MEDICINE

## 2023-07-26 PROCEDURE — 99999 PR PBB SHADOW E&M-EST. PATIENT-LVL III: ICD-10-PCS | Mod: PBBFAC,,, | Performed by: INTERNAL MEDICINE

## 2023-07-26 PROCEDURE — 99999 PR PBB SHADOW E&M-EST. PATIENT-LVL III: CPT | Mod: PBBFAC,,, | Performed by: INTERNAL MEDICINE

## 2023-07-26 PROCEDURE — 99204 OFFICE O/P NEW MOD 45 MIN: CPT | Mod: S$PBB,,, | Performed by: INTERNAL MEDICINE

## 2023-07-26 NOTE — Clinical Note
Please get outside records from patient's gastroenterologist.  Please also send her gastroenterologist a copy of this note.

## 2023-07-26 NOTE — PROGRESS NOTES
Subjective:     Cara Mendoza is here for evaluation of Fatty Liver and RUQ abdominal pain      HPI  Cara Mendoza is here for evaluation of fatty liver as well as chronic right upper quadrant abdominal pain.  She is followed by Dr. Correa for her GI issues.  She apparently has a history of pancreatitis.  She reports having dealt with this on and off for years.  She does acknowledge knowing about fatty liver.  Denies ever having a biopsy or FibroScan.  She denies any history of diabetes or significant dyslipidemia.  She does have a history of obesity.  She reports she has gained significant weight over the past year because of changes in her mobility.  She also reports she will only eat maybe once a day.  She reports significant pain in the right upper quadrant which is her main concern.  This can happen at different times.  It is mostly associated seemingly with touch because she takes off her brought gets better.  Although she does associated sometimes with eating.  This has been occurring for a long time now.  The pain is mostly in the right upper quadrant but sometimes can radiate to the back.    She also reports she is had abnormal LFTs on and off for years.    Of note the patient does care for her 90-year-old father.  She is over there just about every day.  She denies having to lift him a lab it often does have to help him around the house.    Review of Systems    Objective:     Physical Exam  Vitals reviewed.   Constitutional:       General: She is not in acute distress.     Appearance: She is well-developed.   HENT:      Head: Normocephalic and atraumatic.      Mouth/Throat:      Pharynx: No oropharyngeal exudate.   Eyes:      General: No scleral icterus.        Right eye: No discharge.         Left eye: No discharge.      Conjunctiva/sclera: Conjunctivae normal.      Pupils: Pupils are equal, round, and reactive to light.   Pulmonary:      Effort: Pulmonary effort is normal. No respiratory  distress.      Breath sounds: Normal breath sounds. No wheezing.   Abdominal:      General: There is no distension.      Palpations: Abdomen is soft.      Tenderness: There is abdominal tenderness (at RUQ and right flank/back).      Comments: Mostly central adiposity   Musculoskeletal:      Right lower leg: No edema.      Left lower leg: No edema.   Neurological:      Mental Status: She is alert and oriented to person, place, and time.   Psychiatric:         Behavior: Behavior normal.       Computed MELD 3.0 unavailable. Necessary lab results were not found in the last year.  Computed MELD-Na unavailable. Necessary lab results were not found in the last year.      WBC   Date Value Ref Range Status   10/13/2022 6.11 3.90 - 12.70 K/uL Final     Hemoglobin   Date Value Ref Range Status   10/13/2022 13.2 12.0 - 16.0 g/dL Final     Hematocrit   Date Value Ref Range Status   10/13/2022 39.5 37.0 - 48.5 % Final     Platelets   Date Value Ref Range Status   10/13/2022 249 150 - 450 K/uL Final     BUN   Date Value Ref Range Status   10/13/2022 16 7 - 17 mg/dL Final     Creatinine   Date Value Ref Range Status   10/13/2022 0.63 0.50 - 1.40 mg/dL Final     Glucose   Date Value Ref Range Status   10/13/2022 96 70 - 110 mg/dL Final     Calcium   Date Value Ref Range Status   10/13/2022 8.9 8.7 - 10.5 mg/dL Final     Sodium   Date Value Ref Range Status   10/13/2022 138 136 - 145 mmol/L Final     Potassium   Date Value Ref Range Status   10/13/2022 4.3 3.5 - 5.1 mmol/L Final     Chloride   Date Value Ref Range Status   10/13/2022 99 95 - 110 mmol/L Final     Magnesium   Date Value Ref Range Status   08/04/2018 2.0 1.6 - 2.6 mg/dL Final     AST   Date Value Ref Range Status   10/13/2022 41 15 - 46 U/L Final     ALT   Date Value Ref Range Status   10/13/2022 12 10 - 44 U/L Final     Alkaline Phosphatase   Date Value Ref Range Status   10/13/2022 61 38 - 126 U/L Final     Total Bilirubin   Date Value Ref Range Status   10/13/2022  0.2 0.1 - 1.0 mg/dL Final     Comment:     For infants and newborns, interpretation of results should be based  on gestational age, weight and in agreement with clinical  observations.    Premature Infant recommended reference ranges:  Up to 24 hours.............<8.0 mg/dL  Up to 48 hours............<12.0 mg/dL  3-5 days..................<15.0 mg/dL  6-29 days.................<15.0 mg/dL       Albumin   Date Value Ref Range Status   10/13/2022 4.0 3.5 - 5.2 g/dL Final     INR   Date Value Ref Range Status   08/04/2018 1.0 0.8 - 1.2 Final     Comment:     Coumadin Therapy:  2.0 - 3.0 for INR for all indicators except mechanical heart valves  and antiphospholipid syndromes which should use 2.5 - 3.5.           Assessment/Plan:     1. Fatty liver    2. Abnormal LFTs    3. Musculoskeletal pain    4. Fatty (change of) liver, not elsewhere classified      Cara Mendoza is a 63 y.o. female withFatty Liver and RUQ abdominal pain    Fatty liver-based on body mass index and central adiposity patient does have risk factors for nonalcoholic fatty liver disease.  Given she does not have insulin resistance or dyslipidemia lower concern for MENDEZ.  -need to get outside records from patient's gastroenterologist  - Educated patient on spectrum of fatty liver disease and potential for cirrhosis if MENDEZ present  - Advised weight loss (10%), strict glycemic control and lipid control (statins are ok if needed, prescribing doctor will need to monitor LFTs per routine)  -     Hepatitis A antibody, IgG; Future; Expected date: 07/26/2023  -     US Abdomen Limited; Future; Expected date: 07/26/2023  -     Comprehensive Metabolic Panel; Future; Expected date: 07/26/2023  -     CBC Auto Differential; Future; Expected date: 07/26/2023  -     Hepatitis B Surface Ab, Qualitative; Future; Expected date: 07/26/2023  -     Hepatitis B Surface Antigen; Future; Expected date: 07/26/2023  -     Hepatitis C Antibody; Future; Expected date:  07/26/2023  -     FibroScan (Vibration Controlled Transient Elastography); Future  -     Hepatic Function Panel; Future; Expected date: 07/26/2023    Abnormal LFTs-given history of abnormal LFTs recommend full abnormal LFT evaluation  -     Anti-Smooth Muscle Antibody; Future; Expected date: 07/26/2023  -     SABRINA Screen w/Reflex; Future; Expected date: 07/26/2023  -     Iron and TIBC; Future; Expected date: 07/26/2023  -     Ferritin; Future; Expected date: 07/26/2023  -     Ceruloplasmin; Future; Expected date: 07/26/2023  -     Antimitochondrial Antibody; Future; Expected date: 07/26/2023  -     Alpha-1-Antitrypsin; Future; Expected date: 07/26/2023  -     Gamma GT; Future; Expected date: 07/26/2023    Musculoskeletal pain-explained to patient that I do not think her pain is related to the liver.  The liver is not usually a painful organ.  I do not have all of her outside records so will need to review those.  The pain she describes seems more musculoskeletal and or neuropathic in nature.  -advised that she consider whether this could be coming from her back  -also advised that she may need physiatry  -need to get outside records from patient's gastroenterologist    Fatty (change of) liver, not elsewhere classified  -     Gamma GT; Future; Expected date: 07/26/2023      Return to clinic in 3 months with preclinic labs    Lucía Rosado MD

## 2023-08-07 ENCOUNTER — OFFICE VISIT (OUTPATIENT)
Dept: PSYCHIATRY | Facility: CLINIC | Age: 63
End: 2023-08-07
Payer: MEDICARE

## 2023-08-07 DIAGNOSIS — F43.10 PTSD (POST-TRAUMATIC STRESS DISORDER): Primary | ICD-10-CM

## 2023-08-07 PROCEDURE — 90791 PSYCH DIAGNOSTIC EVALUATION: CPT | Mod: 95,,, | Performed by: SOCIAL WORKER

## 2023-08-07 PROCEDURE — 90791 PR PSYCHIATRIC DIAGNOSTIC EVALUATION: ICD-10-PCS | Mod: 95,,, | Performed by: SOCIAL WORKER
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
## 2023-08-09 ENCOUNTER — PATIENT MESSAGE (OUTPATIENT)
Dept: HEPATOLOGY | Facility: CLINIC | Age: 63
End: 2023-08-09

## 2023-08-09 ENCOUNTER — PROCEDURE VISIT (OUTPATIENT)
Dept: HEPATOLOGY | Facility: CLINIC | Age: 63
End: 2023-08-09
Payer: MEDICARE

## 2023-08-09 ENCOUNTER — HOSPITAL ENCOUNTER (OUTPATIENT)
Dept: RADIOLOGY | Facility: HOSPITAL | Age: 63
Discharge: HOME | End: 2023-08-09
Attending: INTERNAL MEDICINE
Payer: MEDICARE

## 2023-08-09 VITALS — BODY MASS INDEX: 37.27 KG/M2 | HEIGHT: 60 IN | WEIGHT: 189.81 LBS

## 2023-08-09 DIAGNOSIS — K76.0 FATTY LIVER: ICD-10-CM

## 2023-08-09 PROCEDURE — 76705 US ABDOMEN LIMITED: ICD-10-PCS | Mod: 26,,, | Performed by: RADIOLOGY

## 2023-08-09 PROCEDURE — 76981 USE PARENCHYMA: CPT | Mod: PBBFAC,59 | Performed by: NURSE PRACTITIONER

## 2023-08-09 PROCEDURE — 91200 LIVER ELASTOGRAPHY: CPT | Mod: PBBFAC | Performed by: INTERNAL MEDICINE

## 2023-08-09 PROCEDURE — 76705 ECHO EXAM OF ABDOMEN: CPT | Mod: 26,,, | Performed by: RADIOLOGY

## 2023-08-09 PROCEDURE — 76705 ECHO EXAM OF ABDOMEN: CPT | Mod: TC,59

## 2023-08-09 PROCEDURE — 76981 PR US, ELASTOGRAPHY, PARENCHYMA: ICD-10-PCS | Mod: 26,S$PBB,, | Performed by: NURSE PRACTITIONER

## 2023-08-09 PROCEDURE — 76981 USE PARENCHYMA: CPT | Mod: 26,S$PBB,, | Performed by: NURSE PRACTITIONER

## 2023-08-09 PROCEDURE — 91200 FIBROSCAN (VIBRATION CONTROLLED TRANSIENT ELASTOGRAPHY): ICD-10-PCS | Mod: 26,S$PBB,, | Performed by: INTERNAL MEDICINE

## 2023-08-09 NOTE — PROCEDURES
Fibroscan Procedure     Name: Cara Mendoza  Date of Procedure : 2023   :: DEEPALI Chiang  Diagnosis: NAFLD    Probe: XL    Fibroscan readin.1 KPa    Fibrosis:F 0-1     CAP readin dB/m    Steatosis: :S3       Interpretation:   Severe steatosis without fibrosis

## 2023-08-09 NOTE — PROCEDURES
FibroScan (Vibration Controlled Transient Elastography)    Date/Time: 8/9/2023 9:30 AM    Performed by: Lucía Rosado MD  Authorized by: Lucía Rosado MD    Probe:     Universal Protocol: Patient's identity, procedure and site were verified, confirmatory pause was performed.  Discussed procedure including risks and potential complications.  Questions answered.  Patient verbalizes understanding and wishes to proceed with VCTE.     Procedure: After providing explanations of the procedure, patient was placed in the supine position with right arm in maximum abduction to allow optimal exposure of right lateral abdomen.  Patient was briefly assessed, Testing was performed in the mid-axillary location, 50Hz Shear Wave pulses were applied and the resulting Shear Wave and Propagation Speed detected with a 3.5 MHz ultrasonic signal, using the FibroScan probe, Skin to liver capsule distance and liver parenchyma were accessed during the entire examination with the FibroScan probe, Patient was instructed to breathe normally and to abstain from sudden movements during the procedure, allowing for random measurements of liver stiffness. At least 10 Shear Waves were produced, Individual measurements of each Shear Wave were calculated.  Patient tolerated the procedure well with no complications.  Meets discharge criteria as was dismissed.  Rates pain 0 out of 10.  Patient will follow up with ordering provider to review results.

## 2023-08-10 ENCOUNTER — PES CALL (OUTPATIENT)
Dept: ADMINISTRATIVE | Facility: CLINIC | Age: 63
End: 2023-08-10
Payer: MEDICARE

## 2023-08-15 ENCOUNTER — TELEPHONE (OUTPATIENT)
Dept: HEPATOLOGY | Facility: CLINIC | Age: 63
End: 2023-08-15
Payer: MEDICARE

## 2023-08-15 NOTE — TELEPHONE ENCOUNTER
"Patient returned my call and lab results were reviewed. Patient states that Dr. Correa performed her last colonoscopy and was told that she doesn't need another for 10 years.    Patient states that she's still "in a significant amount of pain" rates her pain an "8".     Please advise.  "

## 2023-08-17 NOTE — TELEPHONE ENCOUNTER
We discussed at visit that pain unlikely related to liver recommend f/u with PCP or her GI provider.

## 2023-08-25 ENCOUNTER — HOSPITAL ENCOUNTER (EMERGENCY)
Facility: HOSPITAL | Age: 63
Discharge: HOME | End: 2023-08-25
Attending: FAMILY MEDICINE
Payer: MEDICARE

## 2023-08-25 VITALS
SYSTOLIC BLOOD PRESSURE: 185 MMHG | BODY MASS INDEX: 36.91 KG/M2 | HEART RATE: 73 BPM | WEIGHT: 189 LBS | RESPIRATION RATE: 20 BRPM | OXYGEN SATURATION: 96 % | TEMPERATURE: 98 F | DIASTOLIC BLOOD PRESSURE: 88 MMHG

## 2023-08-25 DIAGNOSIS — Z77.098 CHEMICAL EXPOSURE: Primary | ICD-10-CM

## 2023-08-25 PROCEDURE — 88740 TRANSCUTANEOUS CARBOXYHB: CPT | Mod: ER

## 2023-08-25 PROCEDURE — 99282 EMERGENCY DEPT VISIT SF MDM: CPT | Mod: ER
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
## 2023-08-25 NOTE — ED PROVIDER NOTES
"Source of History:  Patient    Chief complaint:  Shortness of Breath (SOB and lung burning from exposure to chemicals. PT reports gagging and throwing up.  )      HPI:  Cara Mendoza is a 63 y.o. female presenting with lung burning and associated shortness of breath after being exposed to chemicals.  Patient states she initially had nose irritation, throat irritation and was gagging due to the smell.  Patient states she just wanted to get evaluated in case.    This is the extent to the patients complaints today here in the emergency department.    ROS: As per HPI and below:  Constitutional: No fever.  No chills.  Eyes: No visual changes.  ENT:  Positive for throat irritation.  Positive for nose irritation.  No ear pain    Cardiovascular:  No chest pain.  Respiratory:  Positive for shortness of breath.  Positive for lung irritation  GI: No abdominal pain.  No nausea or vomiting.  Genitourinary: No abnormal urination.  Neurologic: No headache. No focal weakness.  No numbness.  MSK: no back pain.  Integument: No rashes or lesions.  Hematologic: No easy bruising.  Endocrine: No excessive thirst or urination.    Review of patient's allergies indicates:   Allergen Reactions    Ciprofloxacin     Codeine Nausea And Vomiting and Hives    Compazine [prochlorperazine edisylate] Anaphylaxis and Nausea And Vomiting    Demerol [meperidine] Anaphylaxis and Nausea And Vomiting    Morpholine analogues Anaphylaxis and Nausea And Vomiting     vomit    Pcn [penicillins] Other (See Comments) and Anaphylaxis     Stomach cramps    Percocet [oxycodone-acetaminophen] Nausea And Vomiting    Corticosteroids (glucocorticoids) Itching     Agitation, "skin crawling" sensation    Cortisone Itching     Agitation, "skin crawling" sensation    Prochlorperazine     Albuterol sulfate Palpitations    Fentanyl Nausea And Vomiting and Swelling     Fentanyl patch made lips numb and swollen    Morphine Nausea And Vomiting    Nsaids (non-steroidal " anti-inflammatory drug) Other (See Comments) and Nausea And Vomiting     Cluster ulcers    Sulfa (sulfonamide antibiotics) Nausea And Vomiting and Other (See Comments)     Bad stomach cramps    Toradol [ketorolac] Nausea And Vomiting     ulcers    Tramadol Nausea And Vomiting       PMH:  As per HPI and below:  Past Medical History:   Diagnosis Date    Adrenal adenoma     Gastric ulcer     Glaucoma     Hypothyroidism     Kidney stones     Pancreatitis     Traumatic compression fracture of L1 lumbar vertebra      Past Surgical History:   Procedure Laterality Date    CHOLECYSTECTOMY      COLONOSCOPY N/A 2016    Procedure: COLONOSCOPY;  Surgeon: Sathya Strickland MD;  Location: [address];  Service: Endoscopy;  Laterality: N/A;    CYSTOSCOPY      HYSTERECTOMY      KYPHOSIS SURGERY  7/20/15    right adrenal gland remove      tonsillectomy      TONSILLECTOMY      TUBAL LIGATION      x2          Social History     Tobacco Use    Smoking status: Never     Passive exposure: Never    Smokeless tobacco: Never   Substance Use Topics    Alcohol use: No    Drug use: No       Physical Exam:    BP (!) 185/88   Pulse 73   Temp 98.4 °F (36.9 °C) (Oral)   Resp 20   Wt 85.7 kg (189 lb)   SpO2 96%   Breastfeeding No   BMI 36.91 kg/m²   Nursing note and vital signs reviewed.  Constitutional: No acute distress.  Nontoxic  Eyes: No conjunctival injection.  Extraocular muscles are intact.  ENT: Oropharynx clear.  Cardiovascular:  Regular rate and rhythm no murmurs gallops or rubs  Chest/ Respiratory:  Clear to auscultation bilaterally without wheezing rhonchi or rales  Abdomen: Soft.  Not distended.  Nontender.  No guarding.  No rebound. Non-peritoneal.  Musculoskeletal: Good range of motion all joints.  No deformities.  Neck supple.  No meningismus.  Skin: No rashes seen.  Good turgor.  No abrasions.  No ecchymoses.  Neuro: alert and oriented x3,  no focal neurological deficits.  Psych: Appropriate,  conversant    MDM    Emergent evaluation of a 64 yo female presenting for throat irritation, eye irritation, cough, shortness of breath and chest burning after being exposed to chemicals in the air.  On exam pt is A&Ox3. VSS. Nonfebrile and nontoxic appearing.  Mucous membranes pink and moist. Tonsils with no redness, erythema or exudates. Breath sounds clear bilaterally.  Abdomen soft and nontender. No rebound or guarding appreciated on exam.   BS WNL.  Pt speaking in full sentences.  Steady gait appreciated. Cap refill < 3 seconds.      History Acquisition   Additional history was acquired from other historians.  Chart    The patient's list of active medical problems, social history, medications, and allergies as documented per RN staff has been reviewed.     Differential Diagnoses   Based on available information and the initial assessment, the working differential diagnoses considered during this evaluation include but are not limited to chemical exposure, asthma exacerbation, irritation, others.    I will get carboxy and reassess.        Additional Consideration   All available testing was considered during the course of this workup.  Comorbidities taken into consideration during the patient's evaluation and treatment include weight, age.    Social determinants of health were taken into consideration during development of our treatment plan.    Medications - No data to display      You were seen and evaluated in the ER today.  You may have had exposure to the chemicals in the air.   Inhaling Naphtha can irritate the nose, throat and lungsYou may have had exposure to the chemicals in the air.  Your symptoms do pass in the next few days.  Fresh air will help with your symptoms.  Please follow-up with your PCP as needed.  Please return to the ED for any worsening symptoms such as chest pain, shortness of breath, fever not controlled with Tylenol or ibuprofen or uncontrolled pain.      Our goal in the emergency  department is to always give you outstanding care and exceptional service. You may receive a survey by mail or e-mail in the next week regarding your experience in our ED. We would greatly appreciate your completing and returning the survey. Your feedback provides us with a way to recognize our staff who give very good care and it helps us learn how to improve when your experience was below our aspiration of excellence.     CLINICAL IMPRESSION  1. Chemical exposure         ED Disposition Condition    Discharge Stable            Instruction:  I see no indication of an emergent process beyond that addressed during our encounter but have duly counseled the patient/family regarding the need for prompt follow-up as well as the indications that should prompt immediate return to the emergency room should new or worrisome developments occur.  The patient/family has been provided with verbal and printed direction regarding our final diagnosis(es) as well as instructions regarding use of OTC and/or Rx medications intended to manage the patient's aforementioned conditions including:  ED Prescriptions    None       Patient has been advised of following recommended follow-up instructions:  Follow-up Information       Follow up With Specialties Details Why Contact Info    Giacomo Domingo MD Family Medicine Schedule an appointment as soon as possible for a visit  As needed [address] [place] [id number] [phone]            The patient/family communicates understanding of all this information and all remaining questions and concerns were addressed at this time.      The patient's condition did not warrant review of the  and prescription of controlled substances.      This note was created using dictation software.  This program may occasionally mistype words and phrases.         Anali Dugan NP  08/25/23 [id number]

## 2023-09-05 ENCOUNTER — OFFICE VISIT (OUTPATIENT)
Dept: GASTROENTEROLOGY | Facility: CLINIC | Age: 63
End: 2023-09-05
Payer: MEDICARE

## 2023-09-05 ENCOUNTER — OFFICE VISIT (OUTPATIENT)
Dept: PSYCHIATRY | Facility: CLINIC | Age: 63
End: 2023-09-05
Payer: MEDICARE

## 2023-09-05 VITALS
DIASTOLIC BLOOD PRESSURE: 74 MMHG | HEIGHT: 60 IN | OXYGEN SATURATION: 94 % | HEART RATE: 87 BPM | BODY MASS INDEX: 37.83 KG/M2 | SYSTOLIC BLOOD PRESSURE: 118 MMHG | WEIGHT: 192.69 LBS

## 2023-09-05 DIAGNOSIS — R10.13 EPIGASTRIC PAIN: Primary | ICD-10-CM

## 2023-09-05 DIAGNOSIS — K86.1 OTHER CHRONIC PANCREATITIS: ICD-10-CM

## 2023-09-05 DIAGNOSIS — E66.01 MORBID OBESITY: ICD-10-CM

## 2023-09-05 DIAGNOSIS — K20.90 ESOPHAGITIS: ICD-10-CM

## 2023-09-05 DIAGNOSIS — K29.50 CHRONIC GASTRITIS WITHOUT BLEEDING, UNSPECIFIED GASTRITIS TYPE: ICD-10-CM

## 2023-09-05 DIAGNOSIS — F33.0 MILD EPISODE OF RECURRENT MAJOR DEPRESSIVE DISORDER: Primary | ICD-10-CM

## 2023-09-05 DIAGNOSIS — K76.0 FATTY LIVER: ICD-10-CM

## 2023-09-05 DIAGNOSIS — K21.9 GASTROESOPHAGEAL REFLUX DISEASE, UNSPECIFIED WHETHER ESOPHAGITIS PRESENT: ICD-10-CM

## 2023-09-05 DIAGNOSIS — R11.0 NAUSEA: ICD-10-CM

## 2023-09-05 PROCEDURE — 90834 PR PSYCHOTHERAPY W/PATIENT, 45 MIN: ICD-10-PCS | Mod: 95,,, | Performed by: SOCIAL WORKER

## 2023-09-05 PROCEDURE — 99213 OFFICE O/P EST LOW 20 MIN: CPT | Mod: PBBFAC,PN | Performed by: INTERNAL MEDICINE

## 2023-09-05 PROCEDURE — 99999 PR PBB SHADOW E&M-EST. PATIENT-LVL III: CPT | Mod: PBBFAC,,, | Performed by: INTERNAL MEDICINE

## 2023-09-05 PROCEDURE — 90834 PSYTX W PT 45 MINUTES: CPT | Mod: 95,,, | Performed by: SOCIAL WORKER

## 2023-09-05 PROCEDURE — 99214 OFFICE O/P EST MOD 30 MIN: CPT | Mod: S$PBB,,, | Performed by: INTERNAL MEDICINE

## 2023-09-05 PROCEDURE — 99999 PR PBB SHADOW E&M-EST. PATIENT-LVL III: ICD-10-PCS | Mod: PBBFAC,,, | Performed by: INTERNAL MEDICINE

## 2023-09-05 PROCEDURE — 99214 PR OFFICE/OUTPT VISIT, EST, LEVL IV, 30-39 MIN: ICD-10-PCS | Mod: S$PBB,,, | Performed by: INTERNAL MEDICINE

## 2023-09-05 RX ORDER — PANTOPRAZOLE SODIUM 40 MG/1
40 TABLET, DELAYED RELEASE ORAL 2 TIMES DAILY
Qty: 60 TABLET | Refills: 3 | Status: SHIPPED | OUTPATIENT
Start: 2023-09-05 | End: 2024-01-23

## 2023-09-05 NOTE — PROGRESS NOTES
[facility]  Gastroenterology      PCP: Giacomo Domingo MD    23    HPI       Gastroesophageal Reflux     Additional comments: Pt present today with c/o epigastric pain with nausea and GERD. Dx with fatty liver 2023 by Alonzo Mansfield edited by Milligan, Toni, MA on 2023  9:01 AM.        Reason for Visit: Epigastric Abdominal Pain    Subjective:   Cara Mendoza is a 63 y.o. female referred for upper abdominal pain. Patient has history of chronic abdominal pain, carries diagnosis of chronic pancreatitis. Her last EGD done here in  showed gastritis and duodenitis. EUS in 2017 showed Grade C esophagitis. Pancreas appeared normal with no signs of chronic pancreatitis. Colonoscopy in 2016 was normal and recall of 10 years recommended. She had US RUQ done recently which showed fatty liver and she is s/p cholecystectomy. She has seen hepatology for the fatty liver and her pain was felt to be more MSK in nature. She has most recently been following with Dr. Correa for her chronic GI issues. She is on PPI daily which she reports helped her tremendously until she gained a lot of weight after knee surgery and now stomach pain has returned.       Past Medical History:   Diagnosis Date    Adrenal adenoma     Gastric ulcer     Glaucoma     Hypothyroidism     Kidney stones     Pancreatitis     Traumatic compression fracture of L1 lumbar vertebra        Past Surgical History:   Procedure Laterality Date    CHOLECYSTECTOMY      COLONOSCOPY N/A 2016    Procedure: COLONOSCOPY;  Surgeon: Sathya Strickland MD;  Location: [address]);  Service: Endoscopy;  Laterality: N/A;    CYSTOSCOPY      HYSTERECTOMY      KYPHOSIS SURGERY  7/20/15    right adrenal gland remove      tonsillectomy      TONSILLECTOMY      TUBAL LIGATION      x2          Current Outpatient Medications on File Prior to Visit   Medication Sig Dispense Refill    ALPRAZolam (XANAX) 0.5 MG tablet TAKE 1 TABLET BY  "MOUTH THREE TIMES A DAY AS NEEDED FOR ANXIETY 90 tablet 3    EScitalopram oxalate (LEXAPRO) 20 MG tablet TAKE 1 TABLET BY MOUTH ONCE DAILY 90 tablet 1    estradioL (ESTRACE) 1 MG tablet Take 1 tablet (1 mg total) by mouth once daily. 90 tablet 3    gabapentin (NEURONTIN) 400 MG capsule TAKE 1 CAPSULE BY MOUTH EVERY MORNING AND EVERY EVENING AND UP TO 4 CAPSULES BY MOUTH AT BEDTIME 180 capsule 3    hydroCHLOROthiazide (MICROZIDE) 12.5 mg capsule TAKE 1 CAPSULE BY MOUTH ONCE A DAY 90 capsule 2    levothyroxine (SYNTHROID) 75 MCG tablet Take 1 tablet (75 mcg total) by mouth before breakfast. 90 tablet 3    linaCLOtide (LINZESS) 72 mcg Cap capsule Linzess 72 mcg capsule   Take 1 capsule every day by oral route in the morning for 90 days.      promethazine (PHENERGAN) 25 MG tablet Take 1 tablet (25 mg total) by mouth every 6 (six) hours as needed for Nausea. 30 tablet 0    [DISCONTINUED] pantoprazole (PROTONIX) 40 MG tablet TAKE 1 TABLET BY MOUTH ONCE A DAY 90 tablet 1     No current facility-administered medications on file prior to visit.       Review of patient's allergies indicates:   Allergen Reactions    Ciprofloxacin     Codeine Nausea And Vomiting and Hives    Compazine [prochlorperazine edisylate] Anaphylaxis and Nausea And Vomiting    Demerol [meperidine] Anaphylaxis and Nausea And Vomiting    Morpholine analogues Anaphylaxis and Nausea And Vomiting     vomit    Pcn [penicillins] Other (See Comments) and Anaphylaxis     Stomach cramps    Percocet [oxycodone-acetaminophen] Nausea And Vomiting    Corticosteroids (glucocorticoids) Itching     Agitation, "skin crawling" sensation    Cortisone Itching     Agitation, "skin crawling" sensation    Prochlorperazine     Albuterol sulfate Palpitations    Fentanyl Nausea And Vomiting and Swelling     Fentanyl patch made lips numb and swollen    Morphine Nausea And Vomiting    Nsaids (non-steroidal anti-inflammatory drug) Other (See Comments) and Nausea And Vomiting     " Cluster ulcers    Sulfa (sulfonamide antibiotics) Nausea And Vomiting and Other (See Comments)     Bad stomach cramps    Toradol [ketorolac] Nausea And Vomiting     ulcers    Tramadol Nausea And Vomiting       Social History     Socioeconomic History    Marital status:    Tobacco Use    Smoking status: Never     Passive exposure: Never    Smokeless tobacco: Never   Substance and Sexual Activity    Alcohol use: No    Drug use: No    Sexual activity: Not Currently       Family History   Problem Relation Age of Onset    Diabetes Father         borderline    Heart attack Father     Dementia Mother     Alzheimer's disease Sister        Review of Systems   Constitutional:  Negative for appetite change, fever and unexpected weight change.   HENT:  Negative for postnasal drip, rhinorrhea, sneezing, sore throat and trouble swallowing.    Eyes:  Negative for visual disturbance.   Respiratory:  Negative for cough, shortness of breath and wheezing.    Cardiovascular:  Negative for chest pain, palpitations and leg swelling.   Gastrointestinal:  Positive for abdominal pain. Negative for blood in stool, constipation, diarrhea, nausea and vomiting.   Genitourinary:  Negative for dysuria.   Musculoskeletal:  Negative for arthralgias, joint swelling and myalgias.   Skin:  Negative for color change, pallor and rash.   Neurological:  Negative for weakness, light-headedness, numbness and headaches.   Hematological:  Negative for adenopathy. Does not bruise/bleed easily.   Psychiatric/Behavioral:  Negative for agitation.            Objective:   Vitals:   Vitals:    09/05/23 0903   BP: 118/74   Pulse: 87       Physical Exam  Vitals reviewed.   Constitutional:       General: She is not in acute distress.     Appearance: She is not diaphoretic.   HENT:      Head: Normocephalic and atraumatic.      Mouth/Throat:      Pharynx: No oropharyngeal exudate.   Eyes:      General: No scleral icterus.        Right eye: No discharge.          Left eye: No discharge.      Conjunctiva/sclera: Conjunctivae normal.      Pupils: Pupils are equal, round, and reactive to light.   Cardiovascular:      Rate and Rhythm: Normal rate and regular rhythm.      Heart sounds: Normal heart sounds. No murmur heard.     No friction rub. No gallop.   Pulmonary:      Effort: Pulmonary effort is normal. No respiratory distress.      Breath sounds: Normal breath sounds. No stridor. No wheezing or rales.   Abdominal:      General: Bowel sounds are normal. There is no distension.      Palpations: Abdomen is soft. There is no mass.      Tenderness: There is no abdominal tenderness. There is no guarding.   Musculoskeletal:         General: Normal range of motion.      Cervical back: Normal range of motion.   Skin:     General: Skin is warm and dry.      Coloration: Skin is not pale.      Findings: No erythema or rash.   Neurological:      Mental Status: She is alert and oriented to person, place, and time.         US Abdomen Limited    Result Date: 8/9/2023  EXAM: US ABDOMEN LIMITED CLINICAL HISTORY: Fatty liver TECHNIQUE: Transabdominal ultrasound was performed of the right upper quadrant. Additionally, spectral Doppler and color flow vascular ultrasound was performed. FINDINGS: Comparison ultrasound abdomen, 05/12/2023. There is fatty infiltration present involving the liver.  Liver measures 12 cm.  No focal masses. Spleen measures 9.5 cm in length.  No focal masses.  There is normal hepatopedal flow the main portal vein. Previous cholecystectomy.   Common duct is normal in caliber.  The common bile duct measures  5.5 mm. Examination of the right kidney reveals no evidence of hydronephrosis. Cortical echotexture is normal. The visualized pancreas is unremarkable.       Fatty infiltration of the liver.  No focal masses.  Previous cholecystectomy.     IMPRESSION     Problem List Items Addressed This Visit          Endocrine    Morbid obesity       GI    Chronic gastritis without  bleeding    Chronic pancreatitis    Esophagitis     Other Visit Diagnoses       Epigastric pain    -  Primary    Relevant Orders    Case Request Endoscopy: EGD (ESOPHAGOGASTRODUODENOSCOPY) (Completed)    Ambulatory referral/consult to Endo Procedure     Fatty liver        Gastroesophageal reflux disease, unspecified whether esophagitis present        Relevant Orders    Case Request Endoscopy: EGD (ESOPHAGOGASTRODUODENOSCOPY) (Completed)    Ambulatory referral/consult to Endo Procedure     Nausea        Relevant Orders    Case Request Endoscopy: EGD (ESOPHAGOGASTRODUODENOSCOPY) (Completed)    Ambulatory referral/consult to Endo Procedure             PLANS:    - Increase PPI to BID x 2 months  - Recommend EGD  - Strict reflux precautions discussed- avoidance of caffeine/chocolate/mints, HOB elevation at night and avoidance of eating at least 3 hours before bedtime  - Weight loss recommended  - Obtain OSH records  - Prior EGD and colonoscopy reviewed and findings are as per HPI  - Recommend continued hepatology follow-up for fatty liver    Epigastric pain  -     Case Request Endoscopy: EGD (ESOPHAGOGASTRODUODENOSCOPY)  -     Ambulatory referral/consult to Endo Procedure ; Future; Expected date: 09/06/2023    Esophagitis    Chronic gastritis without bleeding, unspecified gastritis type    Fatty liver    Other chronic pancreatitis    Morbid obesity    Gastroesophageal reflux disease, unspecified whether esophagitis present  -     Case Request Endoscopy: EGD (ESOPHAGOGASTRODUODENOSCOPY)  -     Ambulatory referral/consult to Endo Procedure ; Future; Expected date: 09/06/2023    Nausea  -     Case Request Endoscopy: EGD (ESOPHAGOGASTRODUODENOSCOPY)  -     Ambulatory referral/consult to Endo Procedure ; Future; Expected date: 09/06/2023    Other orders  -     pantoprazole (PROTONIX) 40 MG tablet; Take 1 tablet (40 mg total) by mouth 2 (two) times daily.  Dispense: 60  tablet; Refill: 3      Susanne Spear MD  Gastroenterology

## 2023-09-05 NOTE — PROGRESS NOTES
"Individual Psychotherapy (PhD/LCSW)    2023    Site:  Telemed         Therapeutic Intervention: Met with patient.  Outpatient - Supportive psychotherapy 60 min - CPT Code 91401    Chief complaint/reason for encounter: depression and anxiety     Interval history and content of current session: "I am doing better than I was doing the last time we spoke." States that she has an enlarged, fatty liver. States that she spoke with a doctor today in [place] who is going to check on her esophegus. Talked about her 22 year old grandson who was recently hired at the [employer] in [place]. States that she continues to be worried about family dynamics.  States that she has allowed her grandson and his girlfriend to move in with in. States that she has been happy with them and they are keeping the area clean.     States that her dad's health continues to decline and she states that this is effecting her- states that he won't eat or get out of bed. States that she is burdened with a lot of things. Processed wanting to  a long time friend, "but he hasn't asked."    Processed having to give her dad a shower now and keeping his house clean with little help from her siblings. Dad is 96 years old.     Processed her sister's birthday on - states that it has been almost a year since her sister .     Treatment plan:  Target symptoms: depression, distractability, lack of focus, recurrent depression, anxiety   Why chosen therapy is appropriate versus another modality: relevant to diagnosis, patient responds to this modality, evidence based practice  Outcome monitoring methods: self-report, observation  Therapeutic intervention type: insight oriented psychotherapy, supportive psychotherapy, interactive psychotherapy    Risk parameters:  Patient reports no suicidal ideation  Patient reports no homicidal ideation  Patient reports no self-injurious behavior  Patient reports no violent behavior    Verbal deficits: " None    Patient's response to intervention:  The patient's response to intervention is accepting.    Progress toward goals and other mental status changes:  The patient's progress toward goals is fair .    Diagnosis:     ICD-10-CM ICD-9-CM   1. Mild episode of recurrent major depressive disorder  F33.0 296.31       Plan:  individual psychotherapy    Return to clinic: 3 weeks  Length of Service (minutes): 45    The patient location is: [place], [place]  The chief complaint leading to consultation is: Anxiety/Depression    Visit type: audiovisual    Face to Face time with patient: 45  45 minutes of total time spent on the encounter, which includes face to face time and non-face to face time preparing to see the patient (eg, review of tests), Obtaining and/or reviewing separately obtained history, Documenting clinical information in the electronic or other health record, Independently interpreting results (not separately reported) and communicating results to the patient/family/caregiver, or Care coordination (not separately reported).   Each patient to whom he or she provides medical services by telemedicine is:  (1) informed of the relationship between the physician and patient and the respective role of any other health care provider with respect to management of the patient; and (2) notified that he or she may decline to receive medical services by telemedicine and may withdraw from such care at any time.    Notes:

## 2023-09-07 ENCOUNTER — PATIENT OUTREACH (OUTPATIENT)
Dept: ADMINISTRATIVE | Facility: HOSPITAL | Age: 63
End: 2023-09-07
Payer: MEDICARE

## 2023-09-07 ENCOUNTER — HOSPITAL ENCOUNTER (OUTPATIENT)
Dept: PREADMISSION TESTING | Facility: HOSPITAL | Age: 63
Discharge: HOME | End: 2023-09-07
Attending: INTERNAL MEDICINE
Payer: MEDICARE

## 2023-09-07 DIAGNOSIS — K21.9 GASTROESOPHAGEAL REFLUX DISEASE, UNSPECIFIED WHETHER ESOPHAGITIS PRESENT: ICD-10-CM

## 2023-09-07 DIAGNOSIS — R10.13 EPIGASTRIC PAIN: ICD-10-CM

## 2023-09-07 DIAGNOSIS — R11.0 NAUSEA: ICD-10-CM

## 2023-09-07 RX ORDER — SUCRALFATE 1 G/1
TABLET ORAL
COMMUNITY
Start: 2023-05-05 | End: 2023-10-24

## 2023-09-07 RX ORDER — ALBUTEROL SULFATE 90 UG/1
AEROSOL, METERED RESPIRATORY (INHALATION)
COMMUNITY
End: 2023-10-24

## 2023-09-07 RX ORDER — HYDROCODONE BITARTRATE AND ACETAMINOPHEN 10; 325 MG/1; MG/1
TABLET ORAL
COMMUNITY
Start: 2023-05-31 | End: 2023-10-24

## 2023-09-07 RX ORDER — HYDROMORPHONE HYDROCHLORIDE 2 MG/1
TABLET ORAL
COMMUNITY
End: 2023-10-24

## 2023-09-12 ENCOUNTER — ANESTHESIA EVENT (OUTPATIENT)
Dept: ENDOSCOPY | Facility: HOSPITAL | Age: 63
End: 2023-09-12
Payer: MEDICARE

## 2023-09-12 NOTE — ANESTHESIA PREPROCEDURE EVALUATION
2023  Cara Mendoza is a 63 y.o., female.  18 echo   CONCLUSIONS     1 - Normal left ventricular systolic function (EF 60-65%).     2 - No wall motion abnormalities.     3 - Normal left ventricular diastolic function.     4 - Normal right ventricular systolic function .     5 - The estimated PA systolic pressure is 25 mmHg.     6 - No evidence of intracardiac shunt.     sinus rhythm with 1st Degree AV Block   Otherwise normal ECG   Confirmed by Evin Mitchell MD ([id number]) on 9/15/2021 2:09:45 PM   Past Medical History:   Diagnosis Date    Adrenal adenoma     Gastric ulcer     Glaucoma     Hypothyroidism     Kidney stones     Pancreatitis     Traumatic compression fracture of L1 lumbar vertebra      Past Surgical History:   Procedure Laterality Date    CHOLECYSTECTOMY      COLONOSCOPY N/A 2016    Procedure: COLONOSCOPY;  Surgeon: Sathya Strickland MD;  Location: [address];  Service: Endoscopy;  Laterality: N/A;    CYSTOSCOPY      HYSTERECTOMY      KYPHOSIS SURGERY  7/20/15    right adrenal gland remove      tonsillectomy      TONSILLECTOMY      TUBAL LIGATION      x2      No current facility-administered medications for this encounter.    Current Outpatient Medications:     albuterol (VENTOLIN HFA) 90 mcg/actuation inhaler, , Disp: , Rfl:     ALPRAZolam (XANAX) 0.5 MG tablet, TAKE 1 TABLET BY MOUTH THREE TIMES A DAY AS NEEDED FOR ANXIETY, Disp: 90 tablet, Rfl: 3    EScitalopram oxalate (LEXAPRO) 20 MG tablet, TAKE 1 TABLET BY MOUTH ONCE DAILY, Disp: 90 tablet, Rfl: 1    estradioL (ESTRACE) 1 MG tablet, Take 1 tablet (1 mg total) by mouth once daily., Disp: 90 tablet, Rfl: 3    gabapentin (NEURONTIN) 400 MG capsule, TAKE 1 CAPSULE BY MOUTH EVERY MORNING AND EVERY EVENING AND UP TO 4 CAPSULES BY MOUTH AT BEDTIME, Disp: 180 capsule, Rfl: 3     hydroCHLOROthiazide (MICROZIDE) 12.5 mg capsule, TAKE 1 CAPSULE BY MOUTH ONCE A DAY, Disp: 90 capsule, Rfl: 2    HYDROcodone-acetaminophen (NORCO)  mg per tablet, , Disp: , Rfl:     HYDROmorphone (DILAUDID) 2 MG tablet, , Disp: , Rfl:     levothyroxine (SYNTHROID) 75 MCG tablet, Take 1 tablet (75 mcg total) by mouth before breakfast., Disp: 90 tablet, Rfl: 3    linaCLOtide (LINZESS) 72 mcg Cap capsule, Linzess 72 mcg capsule  Take 1 capsule every day by oral route in the morning for 90 days., Disp: , Rfl:     pantoprazole (PROTONIX) 40 MG tablet, Take 1 tablet (40 mg total) by mouth 2 (two) times daily., Disp: 60 tablet, Rfl: 3    promethazine (PHENERGAN) 25 MG tablet, Take 1 tablet (25 mg total) by mouth every 6 (six) hours as needed for Nausea., Disp: 30 tablet, Rfl: 0    sucralfate (CARAFATE) 1 gram tablet, , Disp: , Rfl:           Pre-op Assessment    I have reviewed the Patient Summary Reports.     I have reviewed the Nursing Notes. I have reviewed the NPO Status.   I have reviewed the Medications.     Review of Systems  Anesthesia Hx:  No problems with previous Anesthesia  Neg history of prior surgery. Denies Family Hx of Anesthesia complications.  Personal Hx of Anesthesia complications, Post-Operative Nausea/Vomiting, with every anesthetic, despite treatment   Social:  Non-Smoker, Social Alcohol Use    Cardiovascular:   Hypertension    Renal/:   Chronic Renal Disease Adrenal adenoma   Hepatic/GI:   PUD, Gastric ulcer   Neurological:   CVA    Endocrine:   Hypothyroidism  Metabolic Disorders, Obesity / BMI > 30  Psych:   Psychiatric History          Physical Exam  General: Well nourished    Airway:  Mallampati: II / II  Mouth Opening: Normal  TM Distance: 4 - 6 cm  Tongue: Normal  Neck ROM: Normal ROM    Dental:  Intact    Chest/Lungs:  Normal Respiratory Rate        Anesthesia Plan  Type of Anesthesia, risks & benefits discussed:    Anesthesia Type: Gen Natural Airway  Intra-op Monitoring Plan:  Standard ASA Monitors  Post Op Pain Control Plan: multimodal analgesia  Induction:  IV  Informed Consent: Informed consent signed with the Patient and all parties understand the risks and agree with anesthesia plan.  All questions answered.   ASA Score: 2  Day of Surgery Review of History & Physical: H&P Update referred to the surgeon/provider.    Ready For Surgery From Anesthesia Perspective.     .

## 2023-09-13 ENCOUNTER — HOSPITAL ENCOUNTER (OUTPATIENT)
Facility: HOSPITAL | Age: 63
Discharge: HOME | End: 2023-09-13
Attending: INTERNAL MEDICINE | Admitting: INTERNAL MEDICINE
Payer: MEDICARE

## 2023-09-13 ENCOUNTER — ANESTHESIA (OUTPATIENT)
Dept: ENDOSCOPY | Facility: HOSPITAL | Age: 63
End: 2023-09-13
Payer: MEDICAID

## 2023-09-13 VITALS
BODY MASS INDEX: 37.53 KG/M2 | TEMPERATURE: 98 F | HEART RATE: 63 BPM | DIASTOLIC BLOOD PRESSURE: 64 MMHG | RESPIRATION RATE: 21 BRPM | WEIGHT: 191.13 LBS | OXYGEN SATURATION: 95 % | HEIGHT: 60 IN | SYSTOLIC BLOOD PRESSURE: 125 MMHG

## 2023-09-13 DIAGNOSIS — R11.0 NAUSEA: ICD-10-CM

## 2023-09-13 DIAGNOSIS — R10.13 EPIGASTRIC PAIN: ICD-10-CM

## 2023-09-13 DIAGNOSIS — K21.9 GASTROESOPHAGEAL REFLUX DISEASE, UNSPECIFIED WHETHER ESOPHAGITIS PRESENT: Primary | ICD-10-CM

## 2023-09-13 PROCEDURE — 43239 EGD BIOPSY SINGLE/MULTIPLE: CPT | Mod: ,,, | Performed by: INTERNAL MEDICINE

## 2023-09-13 PROCEDURE — 88305 TISSUE EXAM BY PATHOLOGIST: CPT | Performed by: STUDENT IN AN ORGANIZED HEALTH CARE EDUCATION/TRAINING PROGRAM

## 2023-09-13 PROCEDURE — 37000008 HC ANESTHESIA 1ST 15 MINUTES: Performed by: INTERNAL MEDICINE

## 2023-09-13 PROCEDURE — D9220A PRA ANESTHESIA: Mod: ,,, | Performed by: NURSE ANESTHETIST, CERTIFIED REGISTERED

## 2023-09-13 PROCEDURE — D9220A PRA ANESTHESIA: ICD-10-PCS | Mod: ,,, | Performed by: NURSE ANESTHETIST, CERTIFIED REGISTERED

## 2023-09-13 PROCEDURE — 25000003 PHARM REV CODE 250: Performed by: NURSE ANESTHETIST, CERTIFIED REGISTERED

## 2023-09-13 PROCEDURE — 88305 TISSUE EXAM BY PATHOLOGIST: ICD-10-PCS | Mod: 26,,, | Performed by: STUDENT IN AN ORGANIZED HEALTH CARE EDUCATION/TRAINING PROGRAM

## 2023-09-13 PROCEDURE — 63600175 PHARM REV CODE 636 W HCPCS: Performed by: NURSE ANESTHETIST, CERTIFIED REGISTERED

## 2023-09-13 PROCEDURE — 88342 IMHCHEM/IMCYTCHM 1ST ANTB: CPT | Mod: 26,,, | Performed by: STUDENT IN AN ORGANIZED HEALTH CARE EDUCATION/TRAINING PROGRAM

## 2023-09-13 PROCEDURE — 63600175 PHARM REV CODE 636 W HCPCS: Performed by: INTERNAL MEDICINE

## 2023-09-13 PROCEDURE — 88305 TISSUE EXAM BY PATHOLOGIST: CPT | Mod: 26,,, | Performed by: STUDENT IN AN ORGANIZED HEALTH CARE EDUCATION/TRAINING PROGRAM

## 2023-09-13 PROCEDURE — 27201012 HC FORCEPS, HOT/COLD, DISP: Performed by: INTERNAL MEDICINE

## 2023-09-13 PROCEDURE — 88342 CHG IMMUNOCYTOCHEMISTRY: ICD-10-PCS | Mod: 26,,, | Performed by: STUDENT IN AN ORGANIZED HEALTH CARE EDUCATION/TRAINING PROGRAM

## 2023-09-13 PROCEDURE — 43239 PR EGD, FLEX, W/BIOPSY, SGL/MULTI: ICD-10-PCS | Mod: ,,, | Performed by: INTERNAL MEDICINE

## 2023-09-13 PROCEDURE — 43239 EGD BIOPSY SINGLE/MULTIPLE: CPT | Performed by: INTERNAL MEDICINE

## 2023-09-13 RX ORDER — PROMETHAZINE HYDROCHLORIDE 25 MG/ML
INJECTION, SOLUTION INTRAMUSCULAR; INTRAVENOUS
Status: COMPLETED
Start: 2023-09-13 | End: 2023-09-13

## 2023-09-13 RX ORDER — SODIUM CHLORIDE, SODIUM LACTATE, POTASSIUM CHLORIDE, CALCIUM CHLORIDE 600; 310; 30; 20 MG/100ML; MG/100ML; MG/100ML; MG/100ML
INJECTION, SOLUTION INTRAVENOUS CONTINUOUS
Status: DISCONTINUED | OUTPATIENT
Start: 2023-09-13 | End: 2023-09-13 | Stop reason: HOSPADM

## 2023-09-13 RX ORDER — PROPOFOL 10 MG/ML
INJECTION, EMULSION INTRAVENOUS
Status: DISCONTINUED | OUTPATIENT
Start: 2023-09-13 | End: 2023-09-13

## 2023-09-13 RX ORDER — PROMETHAZINE HYDROCHLORIDE 25 MG/ML
INJECTION, SOLUTION INTRAMUSCULAR; INTRAVENOUS
Status: DISCONTINUED | OUTPATIENT
Start: 2023-09-13 | End: 2023-09-13

## 2023-09-13 RX ORDER — LIDOCAINE HYDROCHLORIDE 20 MG/ML
INJECTION INTRAVENOUS
Status: DISCONTINUED | OUTPATIENT
Start: 2023-09-13 | End: 2023-09-13

## 2023-09-13 RX ADMIN — PROMETHAZINE HYDROCHLORIDE 25 MG: 25 INJECTION INTRAMUSCULAR; INTRAVENOUS at 11:09

## 2023-09-13 RX ADMIN — PROPOFOL 20 MG: 10 INJECTION, EMULSION INTRAVENOUS at 11:09

## 2023-09-13 RX ADMIN — PROPOFOL 100 MG: 10 INJECTION, EMULSION INTRAVENOUS at 11:09

## 2023-09-13 RX ADMIN — LIDOCAINE HYDROCHLORIDE 40 MG: 20 INJECTION INTRAVENOUS at 11:09

## 2023-09-13 RX ADMIN — SODIUM CHLORIDE, SODIUM LACTATE, POTASSIUM CHLORIDE, AND CALCIUM CHLORIDE: 600; 310; 30; 20 INJECTION, SOLUTION INTRAVENOUS at 11:09

## 2023-09-13 NOTE — ANESTHESIA POSTPROCEDURE EVALUATION
Anesthesia Post Evaluation    Patient: Cara Mendoza    Procedure(s) Performed: Procedure(s) (LRB):  EGD (ESOPHAGOGASTRODUODENOSCOPY) (N/A)    Final Anesthesia Type: general      Patient location during evaluation: PACU  Patient participation: Yes- Able to Participate  Level of consciousness: awake  Post-procedure vital signs: reviewed and stable  Pain management: adequate  Airway patency: patent    PONV status at discharge: No PONV  Anesthetic complications: no      Cardiovascular status: stable  Respiratory status: unassisted  Hydration status: euvolemic  Follow-up not needed.          Vitals Value Taken Time   /64 09/13/23 1200   Temp 36.5 °C (97.7 °F) 09/13/23 1200   Pulse 63 09/13/23 1200   Resp 21 09/13/23 1200   SpO2 95 % 09/13/23 1200         Event Time   Out of Recovery 12:03:00         Pain/Claudia Score: Claudia Score: 9 (9/13/2023 12:00 PM)

## 2023-09-13 NOTE — H&P
Short Stay Endoscopy History and Physical    PCP - Giacomo Domingo MD    Procedure - EGD  ASA - 2  Mallampati - per anesthesia  History of Anesthesia problems - no  Family history Anesthesia problems -  no     HPI:  This is a 63 y.o. female here for evaluation of :   Active Hospital Problems    Diagnosis  POA    *Epigastric pain [R10.13]  No    Nausea [R11.0]  No    GERD (gastroesophageal reflux disease) [K21.9]  No      Resolved Hospital Problems   No resolved problems to display.         ROS:  CONSTITUTIONAL: Denies weight change,  fatigue, fevers, chills, night sweats.  CARDIOVASCULAR: Denies chest pain, shortness of breath, orthopnea and edema.  RESPIRATORY: Denies cough, hemoptysis, dyspnea, and wheezing.  GI: See HPI.    Medical History:   Past Medical History:   Diagnosis Date    Adrenal adenoma     Gastric ulcer     Glaucoma     Hypothyroidism     Kidney stones     Pancreatitis     Traumatic compression fracture of L1 lumbar vertebra        Surgical History:   Past Surgical History:   Procedure Laterality Date    CHOLECYSTECTOMY      COLONOSCOPY N/A 2016    Procedure: COLONOSCOPY;  Surgeon: Sathya Strickland MD;  Location: [address];  Service: Endoscopy;  Laterality: N/A;    CYSTOSCOPY      HYSTERECTOMY      KYPHOSIS SURGERY  7/20/15    right adrenal gland remove      tonsillectomy      TONSILLECTOMY      TUBAL LIGATION      x2          Family History:  Family History   Problem Relation Age of Onset    Diabetes Father         borderline    Heart attack Father     Dementia Mother     Alzheimer's disease Sister        Social History:   Social History     Tobacco Use    Smoking status: Never     Passive exposure: Never    Smokeless tobacco: Never   Substance Use Topics    Alcohol use: No    Drug use: No       Allergy  Review of patient's allergies indicates:   Allergen Reactions    Ciprofloxacin     Codeine Nausea And Vomiting and Hives    Compazine [prochlorperazine edisylate]  "Anaphylaxis and Nausea And Vomiting    Demerol [meperidine] Anaphylaxis and Nausea And Vomiting    Morpholine analogues Anaphylaxis and Nausea And Vomiting     vomit    Pcn [penicillins] Other (See Comments) and Anaphylaxis     Stomach cramps    Percocet [oxycodone-acetaminophen] Nausea And Vomiting    Corticosteroids (glucocorticoids) Itching     Agitation, "skin crawling" sensation    Cortisone Itching     Agitation, "skin crawling" sensation    Prochlorperazine     Albuterol sulfate Palpitations    Fentanyl Nausea And Vomiting and Swelling     Fentanyl patch made lips numb and swollen    Morphine Nausea And Vomiting    Nsaids (non-steroidal anti-inflammatory drug) Other (See Comments) and Nausea And Vomiting     Cluster ulcers    Sulfa (sulfonamide antibiotics) Nausea And Vomiting and Other (See Comments)     Bad stomach cramps    Toradol [ketorolac] Nausea And Vomiting     ulcers    Tramadol Nausea And Vomiting       Medications:   No current facility-administered medications on file prior to encounter.     Current Outpatient Medications on File Prior to Encounter   Medication Sig Dispense Refill    ALPRAZolam (XANAX) 0.5 MG tablet TAKE 1 TABLET BY MOUTH THREE TIMES A DAY AS NEEDED FOR ANXIETY 90 tablet 3    EScitalopram oxalate (LEXAPRO) 20 MG tablet TAKE 1 TABLET BY MOUTH ONCE DAILY 90 tablet 1    linaCLOtide (LINZESS) 72 mcg Cap capsule Linzess 72 mcg capsule   Take 1 capsule every day by oral route in the morning for 90 days.      pantoprazole (PROTONIX) 40 MG tablet Take 1 tablet (40 mg total) by mouth 2 (two) times daily. 60 tablet 3    albuterol (VENTOLIN HFA) 90 mcg/actuation inhaler       estradioL (ESTRACE) 1 MG tablet Take 1 tablet (1 mg total) by mouth once daily. 90 tablet 3    gabapentin (NEURONTIN) 400 MG capsule TAKE 1 CAPSULE BY MOUTH EVERY MORNING AND EVERY EVENING AND UP TO 4 CAPSULES BY MOUTH AT BEDTIME 180 capsule 3    hydroCHLOROthiazide (MICROZIDE) 12.5 mg capsule TAKE 1 CAPSULE BY MOUTH " ONCE A DAY 90 capsule 2    HYDROcodone-acetaminophen (NORCO)  mg per tablet       HYDROmorphone (DILAUDID) 2 MG tablet       levothyroxine (SYNTHROID) 75 MCG tablet Take 1 tablet (75 mcg total) by mouth before breakfast. 90 tablet 3    promethazine (PHENERGAN) 25 MG tablet Take 1 tablet (25 mg total) by mouth every 6 (six) hours as needed for Nausea. 30 tablet 0    sucralfate (CARAFATE) 1 gram tablet          Physical Exam:  Vital Signs:   Vitals:    09/13/23 0928   BP: (!) 147/70   Pulse: 81   Resp: 16   Temp: 97 °F (36.1 °C)     General Appearance: Well appearing in no acute distress  ENT: OP clear  Chest: CTA B  CV: RRR, no m/r/g  Abd: s/nt/nd/nabs  Ext: no edema    Labs:  Reviewed    IMP:  Active Hospital Problems    Diagnosis  POA    *Epigastric pain [R10.13]  No    Nausea [R11.0]  No    GERD (gastroesophageal reflux disease) [K21.9]  No      Resolved Hospital Problems   No resolved problems to display.         Plan:  I have explained the risks and benefits of endoscopy procedures to the patient including but not limited to bleeding, perforation, infection, and death. The patient wishes to proceed.

## 2023-09-13 NOTE — TRANSFER OF CARE
Anesthesia Transfer of Care Note    Patient: Cara Mendoza    Procedure(s) Performed: Procedure(s) (LRB):  EGD (ESOPHAGOGASTRODUODENOSCOPY) (N/A)    Patient location: PACU    Anesthesia Type: general    Transport from OR: Transported from OR on room air with adequate spontaneous ventilation    Post pain: adequate analgesia    Post assessment: no apparent anesthetic complications    Post vital signs: stable    Level of consciousness: alert    Nausea/Vomiting: no nausea/vomiting    Complications: none    Transfer of care protocol was followed      Last vitals:   Visit Vitals  BP (!) 147/70 (BP Location: Right arm, Patient Position: Sitting)   Pulse 81   Temp 36.1 °C (97 °F)   Resp 16   Ht 5' (1.524 m)   Wt 86.7 kg (191 lb 2.2 oz)   SpO2 97%   Breastfeeding No   BMI 37.33 kg/m²

## 2023-09-13 NOTE — [employer]ATION PATIENT INSTRUCTIONS
Discharge Summary/Instructions after an Endoscopic Procedure  Patient Name: Destinee Mendoza  Patient MRN: [id number]  Patient YOB: 1960 Wednesday, September 13, 2023  Susanne Spear MD  Dear patient,  As a result of recent federal legislation (The Federal Cures Act), you may   receive lab or pathology results from your procedure in your [facility]   account before your physician is able to contact you. Your physician or   their representative will relay the results to you with their   recommendations at their soonest availability.  Thank you,  RESTRICTIONS:  During your procedure today, you received medications for sedation.  These   medications may affect your judgment, balance and coordination.  Therefore,   for 24 hours, you have the following restrictions:   - DO NOT drive a car, operate machinery, make legal/financial decisions,   sign important papers or drink alcohol.    ACTIVITY:  Today: no heavy lifting, straining or running due to procedural   sedation/anesthesia.  The following day: return to full activity including work.  DIET:  Eat and drink normally unless instructed otherwise.     TREATMENT FOR COMMON SIDE EFFECTS:  - Mild abdominal pain, nausea, belching, bloating or excessive gas:  rest,   eat lightly and use a heating pad.  - Sore Throat: treat with throat lozenges and/or gargle with warm salt   water.  - Because air was used during the procedure, expelling large amounts of air   from your rectum or belching is normal.  - If a bowel prep was taken, you may not have a bowel movement for 1-3 days.    This is normal.  SYMPTOMS TO WATCH FOR AND REPORT TO YOUR PHYSICIAN:  1. Abdominal pain or bloating, other than gas cramps.  2. Chest pain.  3. Back pain.  4. Signs of infection such as: chills or fever occurring within 24 hours   after the procedure.  5. Rectal bleeding, which would show as bright red, maroon, or black stools.   (A tablespoon of blood from the rectum is not serious,  especially if   hemorrhoids are present.)  6. Vomiting.  7. Weakness or dizziness.  GO DIRECTLY TO THE NEAREST EMERGENCY ROOM IF YOU HAVE ANY OF THE FOLLOWING:      Difficulty breathing              Chills and/or fever over 101 F   Persistent vomiting and/or vomiting blood   Severe abdominal pain   Severe chest pain   Black, tarry stools   Bleeding- more than one tablespoon   Any other symptom or condition that you feel may need urgent attention  Your doctor recommends these additional instructions:  If any biopsies were taken, your doctors clinic will contact you in 1 to 2   weeks with any results.  - Discharge patient to home (via wheelchair).   - Resume previous diet.   - Continue present medications.   - Await pathology results.   - Patient has a contact number available for emergencies.  The signs and   symptoms of potential delayed complications were discussed with the   patient.  Return to normal activities tomorrow.  Written discharge   instructions were provided to the patient.   - Resume anticoagulant at prior dose.  For questions, problems or results please call your physician Susanne Spear MD at Work:  [phone]  If you have any questions about the above instructions, call the GI   department at [phone] or call the endoscopy unit at [phone]   from 7am until 3 pm.  [facility] [place], EMERGENCY ROOM PHONE NUMBER:   [phone]  IF A COMPLICATION OR EMERGENCY SITUATION ARISES AND YOU ARE UNABLE TO REACH   YOUR PHYSICIAN - GO DIRECTLY TO THE EMERGENCY ROOM.  I have read or have had read to me these discharge instructions for my   procedure and have received a written copy.  I understand these   instructions and will follow-up with my physician if I have any questions.     __________________________________       _____________________________________  Nurse Signature                                          Patient/Designated   Responsible Party Signature  Susanne  MD Susanne Spear MD  9/13/2023 11:27:24 AM  [employer]

## 2023-09-19 LAB
FINAL PATHOLOGIC DIAGNOSIS: NORMAL
Lab: NORMAL

## 2023-09-21 ENCOUNTER — TELEPHONE (OUTPATIENT)
Dept: HEPATOLOGY | Facility: CLINIC | Age: 63
End: 2023-09-21
Payer: MEDICARE

## 2023-09-21 NOTE — TELEPHONE ENCOUNTER
----- Message from Shwetha Baumann sent at 9/21/2023 11:07 AM CDT -----  Contact: Destinee Felipe is needing a call back in regards to following up after her procedure. She stated that she is still in some pain and is still unable to wear a bra. Please give her a call back at [phone]

## 2023-09-21 NOTE — TELEPHONE ENCOUNTER
Returned patient's call she states that she's still having pain and no one called her. Upon checking the patient's chart on 8/17/23 Dr. Rosado spoke to her personally and advised that she see her PCP or GI provider. Patient states that she has had an EGD recently and still doesn't know what to do. Please advise.

## 2023-09-22 NOTE — TELEPHONE ENCOUNTER
Please let patient know she needs to contact her GI provider about the abdominal pains as this is not related to the liver.

## 2023-09-22 NOTE — TELEPHONE ENCOUNTER
Contacted patient via phone call, and informed her again of your recommendations. She's now stating that her GI issues are better but her pain is under her right breast and radiating to her back. I did offer her an appointment but she declined.

## 2023-10-11 RX ORDER — ESCITALOPRAM OXALATE 20 MG/1
TABLET ORAL
Qty: 90 TABLET | Refills: 1 | Status: SHIPPED | OUTPATIENT
Start: 2023-10-11

## 2023-10-11 NOTE — TELEPHONE ENCOUNTER
Care Due:                  Date            Visit Type   Department     Provider  --------------------------------------------------------------------------------                                EP -                              PRIMARY      [facility] FAMILY  Last Visit: 04-      CARE ([facility])   MEDICINE       Giacomo Domingo                              EP -                              PRIMARY      [facility] FAMILY  Next Visit: 10-      CARE ([facility])   MEDICINE       Giacomo Domingo                                                            Last  Test          Frequency    Reason                     Performed    Due Date  --------------------------------------------------------------------------------    TSH.........  12 months..  levothyroxine............  10-   10-    [facility] Embedded Care Due Messages. Reference number: [id number].   10/11/2023 11:26:46 AM CDT

## 2023-10-17 ENCOUNTER — OFFICE VISIT (OUTPATIENT)
Dept: PSYCHIATRY | Facility: CLINIC | Age: 63
End: 2023-10-17
Payer: MEDICARE

## 2023-10-17 DIAGNOSIS — F33.0 MILD EPISODE OF RECURRENT MAJOR DEPRESSIVE DISORDER: Primary | ICD-10-CM

## 2023-10-17 PROCEDURE — 90837 PR PSYCHOTHERAPY W/PATIENT, 60 MIN: ICD-10-PCS | Mod: 95,,, | Performed by: SOCIAL WORKER

## 2023-10-17 PROCEDURE — 90837 PSYTX W PT 60 MINUTES: CPT | Mod: 95,,, | Performed by: SOCIAL WORKER
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
## 2023-10-23 ENCOUNTER — TELEPHONE (OUTPATIENT)
Dept: HEPATOLOGY | Facility: CLINIC | Age: 63
End: 2023-10-23
Payer: MEDICARE

## 2023-10-23 ENCOUNTER — TELEPHONE (OUTPATIENT)
Dept: GASTROENTEROLOGY | Facility: CLINIC | Age: 63
End: 2023-10-23
Payer: MEDICARE

## 2023-10-23 NOTE — TELEPHONE ENCOUNTER
----- Message from Flex Underwood sent at 10/23/2023  9:52 AM CDT -----  .Type:  Needs Medical Advice    Who Called: pt    Would the patient rather a call back or a response via [facility]? Call back  Best Call Back Number: [phone]  Additional Information:     Pt would like a call back please

## 2023-10-23 NOTE — TELEPHONE ENCOUNTER
"Returned patient's call, she's requesting "a special test" be ordered because she's still having right upper quadrant pain. I informed the patient that I see that she'd left a message for her gastro provider Dr. Spear and informed her that Dr. Spear's staff will follow up once she responded. I did confirm her lab on 10/26/23 that Dr. Rosado ordered.  "

## 2023-10-23 NOTE — TELEPHONE ENCOUNTER
----- Message from Flex Underwood sent at 10/23/2023  9:53 AM CDT -----  .Type:  Needs Medical Advice    Who Called: pt    Would the patient rather a call back or a response via [facility]? Call back  Best Call Back Number: [phone]  Additional Information:     Pt would like a call back please

## 2023-10-23 NOTE — TELEPHONE ENCOUNTER
Call returned to , pt states she would test for cancer added to her labs on 10/26/23 due to persistent pain and the area where [place] nearest [place]. States that protonix is assisting with the pain at night but begins again around mid morning.

## 2023-10-24 ENCOUNTER — OFFICE VISIT (OUTPATIENT)
Dept: FAMILY MEDICINE | Facility: CLINIC | Age: 63
End: 2023-10-24
Payer: MEDICARE

## 2023-10-24 ENCOUNTER — PATIENT MESSAGE (OUTPATIENT)
Dept: GASTROENTEROLOGY | Facility: CLINIC | Age: 63
End: 2023-10-24
Payer: MEDICARE

## 2023-10-24 VITALS
SYSTOLIC BLOOD PRESSURE: 130 MMHG | OXYGEN SATURATION: 97 % | WEIGHT: 191.25 LBS | HEART RATE: 62 BPM | BODY MASS INDEX: 37.55 KG/M2 | HEIGHT: 60 IN | DIASTOLIC BLOOD PRESSURE: 66 MMHG | TEMPERATURE: 98 F

## 2023-10-24 DIAGNOSIS — F33.9 EPISODE OF RECURRENT MAJOR DEPRESSIVE DISORDER, UNSPECIFIED DEPRESSION EPISODE SEVERITY: Primary | ICD-10-CM

## 2023-10-24 DIAGNOSIS — I70.0 AORTIC ATHEROSCLEROSIS: ICD-10-CM

## 2023-10-24 DIAGNOSIS — E27.9 DISORDER OF ADRENAL GLAND, UNSPECIFIED: ICD-10-CM

## 2023-10-24 DIAGNOSIS — E78.00 ELEVATED CHOLESTEROL: ICD-10-CM

## 2023-10-24 PROCEDURE — 99214 OFFICE O/P EST MOD 30 MIN: CPT | Mod: S$GLB,,, | Performed by: FAMILY MEDICINE

## 2023-10-24 PROCEDURE — 99214 PR OFFICE/OUTPT VISIT, EST, LEVL IV, 30-39 MIN: ICD-10-PCS | Mod: S$GLB,,, | Performed by: FAMILY MEDICINE

## 2023-10-26 ENCOUNTER — LAB VISIT (OUTPATIENT)
Dept: LAB | Facility: HOSPITAL | Age: 63
End: 2023-10-26
Attending: INTERNAL MEDICINE
Payer: MEDICARE

## 2023-10-26 DIAGNOSIS — K76.0 FATTY LIVER: ICD-10-CM

## 2023-10-26 DIAGNOSIS — E78.00 ELEVATED CHOLESTEROL: ICD-10-CM

## 2023-10-26 LAB
ALBUMIN SERPL BCP-MCNC: 3.8 G/DL (ref 3.5–5.2)
ALP SERPL-CCNC: 46 U/L (ref 55–135)
ALT SERPL W/O P-5'-P-CCNC: 8 U/L (ref 10–44)
AST SERPL-CCNC: 12 U/L (ref 10–40)
BILIRUB DIRECT SERPL-MCNC: 0.2 MG/DL (ref 0.1–0.3)
BILIRUB SERPL-MCNC: 0.5 MG/DL (ref 0.1–1)
CHOLEST SERPL-MCNC: 251 MG/DL (ref 120–199)
CHOLEST/HDLC SERPL: 4.8 {RATIO} (ref 2–5)
HDLC SERPL-MCNC: 52 MG/DL (ref 40–75)
HDLC SERPL: 20.7 % (ref 20–50)
LDLC SERPL CALC-MCNC: 168.8 MG/DL (ref 63–159)
NONHDLC SERPL-MCNC: 199 MG/DL
PROT SERPL-MCNC: 6.7 G/DL (ref 6–8.4)
TRIGL SERPL-MCNC: 151 MG/DL (ref 30–150)

## 2023-10-26 PROCEDURE — 80061 LIPID PANEL: CPT | Performed by: FAMILY MEDICINE

## 2023-10-26 PROCEDURE — 36415 COLL VENOUS BLD VENIPUNCTURE: CPT | Performed by: INTERNAL MEDICINE

## 2023-10-26 PROCEDURE — 80076 HEPATIC FUNCTION PANEL: CPT | Performed by: INTERNAL MEDICINE

## 2023-10-29 NOTE — PROGRESS NOTES
Patient ID: Cara Mendoza is a 63 y.o. female.    Chief Complaint: Follow-up and lump to left hand    HPI      Cara Mendoza is a 63 y.o. female complains of pain around her right side of her chest.  Pain is so bad that you can not wear a brassiere.  This a lot of the area.  She is able to do work around her home such as cut the lawn.  It does take her a day or so to recover however she is able.  She takes care of her father and sees him one or 2 times a day to help with his activities of daily living.  Mood is stable-not having any exacerbations depression at this time   Hypothyroidism-no complaints of tachycardia or severe constipation-uses Linzess for constipation.            Vitals:    10/24/23 1041   BP: 130/66   BP Location: Left arm   Patient Position: Sitting   Pulse: 62   Temp: 97.9 °F (36.6 °C)   TempSrc: Oral   SpO2: 97%   Weight: 86.7 kg (191 lb 4 oz)   Height: 5' (1.524 m)            Review of Symptoms      Physical Exam    Constitutional:  Oriented to person, place, and time.appears well-developed and well-nourished.  No distress.      HENT  Head: Normocephalic and atraumatic  Right Ear: External ear normal.   Left Ear: External ear normal.   Nose: External nose normal.   Mouth:  Moist mucus membranes.    Eyes:  Conjunctivae are normal. Right eye exhibits no discharge.  Left eye exhibits no discharge. No scleral icterus.  No periorbital edema    Cardiovascular:  Regular rate and rhythm with normal S1 and S2     Pulmonary/Chest:   Clear to auscultation bilaterally without wheezes, rhonchi or rales      Musculoskeletal:  No edema. No obvious deformity No wasting       Neurological:  Alert and oriented to person, place, and time.   Coordination normal.     Skin:   Skin is warm and dry.  No diaphoresis.   No rash noted.     Psychiatric: Normal mood and affect. Behavior is normal.  Judgment and thought content normal.     Complete Blood Count  Lab Results   Component Value Date    RBC 4.83 08/09/2023  "   HGB 14.1 08/09/2023    HCT 41.7 08/09/2023    MCV 86 08/09/2023    MCH 29.2 08/09/2023    MCHC 33.8 08/09/2023    RDW 14.3 08/09/2023     08/09/2023    MPV 10.9 08/09/2023    GRAN 4.7 08/09/2023    GRAN 59.1 08/09/2023    LYMPH 2.3 08/09/2023    LYMPH 29.6 08/09/2023    MONO 0.7 08/09/2023    MONO 8.3 08/09/2023    EOS 0.2 08/09/2023    BASO 0.04 08/09/2023    EOSINOPHIL 2.2 08/09/2023    BASOPHIL 0.5 08/09/2023    DIFFMETHOD Automated 08/09/2023       Comprehensive Metabolic Panel  Lab Results   Component Value Date     08/09/2023    BUN 12 08/09/2023    CREATININE 0.7 08/09/2023     08/09/2023    K 3.9 08/09/2023     08/09/2023    PROT 6.7 10/26/2023    ALBUMIN 3.8 10/26/2023    BILITOT 0.5 10/26/2023    AST 12 10/26/2023    ALKPHOS 46 (L) 10/26/2023    CO2 30 (H) 08/09/2023    ALT 8 (L) 10/26/2023    ANIONGAP 9 08/09/2023       TSH  No results found for: "TSH"    Assessment / Plan:      ICD-10-CM ICD-9-CM   1. Episode of recurrent major depressive disorder, unspecified depression episode severity  F33.9 296.30   2. Aortic atherosclerosis  I70.0 440.0   3. Disorder of adrenal gland, unspecified  E27.9 255.9   4. Elevated cholesterol  E78.00 272.0     Episode of recurrent major depressive disorder, unspecified depression episode severity    Aortic atherosclerosis    Disorder of adrenal gland, unspecified    Elevated cholesterol  -     Lipid Panel; Future; Expected date: 10/24/2023    Pain in chest is associated wall pain.  Whether neuralgia or associated with costochondritis-discuss plans of treatment with over-the-counter medications heat ice off loading.  "

## 2023-10-30 ENCOUNTER — TELEPHONE (OUTPATIENT)
Dept: FAMILY MEDICINE | Facility: CLINIC | Age: 63
End: 2023-10-30
Payer: MEDICARE

## 2023-10-30 RX ORDER — HYDROCHLOROTHIAZIDE 12.5 MG/1
CAPSULE ORAL
Qty: 90 CAPSULE | Refills: 3 | Status: SHIPPED | OUTPATIENT
Start: 2023-10-30

## 2023-10-30 NOTE — TELEPHONE ENCOUNTER
Refill Decision Note   Caar Mendoza  is requesting a refill authorization.  Brief Assessment and Rationale for Refill:  Approve     Medication Therapy Plan:         Comments:     Note composed:6:55 PM 10/30/2023

## 2023-10-30 NOTE — TELEPHONE ENCOUNTER
Notified pt of lab results. Pt stated she has a friend that is on statins. She is going to contact her friend, and call clinic tomorrow if she wants to be on statins.     ----- Message from Giacomo Domingo MD sent at 10/28/2023  7:43 PM CDT -----  Your cholesterol is elevated to the point where I suggest taking cholesterol-lowering medications such as Lipitor or Crestor.  Are you willing to take this?

## 2023-10-30 NOTE — TELEPHONE ENCOUNTER
No care due was identified.  [facility] Embedded Care Due Messages. Reference number: [id number].   10/30/2023 5:36:19 PM CDT

## 2023-11-03 ENCOUNTER — TELEPHONE (OUTPATIENT)
Dept: HEPATOLOGY | Facility: CLINIC | Age: 63
End: 2023-11-03
Payer: MEDICARE

## 2023-11-03 ENCOUNTER — PATIENT MESSAGE (OUTPATIENT)
Dept: HEPATOLOGY | Facility: CLINIC | Age: 63
End: 2023-11-03
Payer: MEDICAID

## 2023-11-03 ENCOUNTER — PATIENT MESSAGE (OUTPATIENT)
Dept: PSYCHIATRY | Facility: CLINIC | Age: 63
End: 2023-11-03
Payer: MEDICAID

## 2023-11-03 NOTE — TELEPHONE ENCOUNTER
----- Message from Sandy Quintana sent at 11/3/2023 10:40 AM CDT -----  Contact: Rgunri-[phone]    Patient is returning a phone call.- Reji Felipe-     Who left a message for the patient: - Juan Antonio Skaggs MA-     Does patient know what this is regarding: -Returning nurse call-     Would you like a call back, or a response through your [facility] portal?:- Call back-     Comments: Please call the patient back to advise.

## 2023-11-09 ENCOUNTER — OFFICE VISIT (OUTPATIENT)
Dept: PSYCHIATRY | Facility: CLINIC | Age: 63
End: 2023-11-09
Payer: MEDICARE

## 2023-11-09 DIAGNOSIS — F33.0 MILD EPISODE OF RECURRENT MAJOR DEPRESSIVE DISORDER: Primary | ICD-10-CM

## 2023-11-09 PROCEDURE — 90837 PSYTX W PT 60 MINUTES: CPT | Mod: 95,,, | Performed by: SOCIAL WORKER

## 2023-11-09 PROCEDURE — 90837 PR PSYCHOTHERAPY W/PATIENT, 60 MIN: ICD-10-PCS | Mod: 95,,, | Performed by: SOCIAL WORKER

## 2023-11-14 DIAGNOSIS — I10 ESSENTIAL HYPERTENSION: ICD-10-CM

## 2023-11-14 DIAGNOSIS — G44.86 CERVICOGENIC HEADACHE: ICD-10-CM

## 2023-11-14 DIAGNOSIS — E66.01 SEVERE OBESITY (BMI 35.0-39.9) WITH COMORBIDITY: ICD-10-CM

## 2023-11-14 DIAGNOSIS — F41.9 ANXIETY: Chronic | ICD-10-CM

## 2023-11-15 ENCOUNTER — OFFICE VISIT (OUTPATIENT)
Dept: PSYCHIATRY | Facility: CLINIC | Age: 63
End: 2023-11-15
Payer: MEDICARE

## 2023-11-15 DIAGNOSIS — F33.0 MILD EPISODE OF RECURRENT MAJOR DEPRESSIVE DISORDER: Primary | ICD-10-CM

## 2023-11-15 PROCEDURE — 90834 PR PSYCHOTHERAPY W/PATIENT, 45 MIN: ICD-10-PCS | Mod: 95,,, | Performed by: SOCIAL WORKER

## 2023-11-15 PROCEDURE — 90834 PSYTX W PT 45 MINUTES: CPT | Mod: 95,,, | Performed by: SOCIAL WORKER

## 2023-11-15 RX ORDER — ALPRAZOLAM 0.5 MG/1
TABLET ORAL
Qty: 90 TABLET | Refills: 1 | Status: SHIPPED | OUTPATIENT
Start: 2023-11-15 | End: 2024-01-13

## 2023-11-15 RX ORDER — GABAPENTIN 400 MG/1
CAPSULE ORAL
Qty: 180 CAPSULE | Refills: 3 | Status: SHIPPED | OUTPATIENT
Start: 2023-11-15 | End: 2024-03-11

## 2023-11-15 NOTE — PROGRESS NOTES
"Individual Psychotherapy (PhD/LCSW)    11/15/2023    Site:  Telemed         Therapeutic Intervention: Met with patient.  Outpatient - Supportive psychotherapy 60 min - CPT Code 66319    Chief complaint/reason for encounter: depression and anxiety     Interval history and content of current session:   The patient states that her father recently fell again and was on the floor for several hours before she knew. They were able to get to the ER and dad is not at home with a sitter 24/7, and she is relieved by this.   States that her older son "has lost his mind." States that he has allowed his girlfriend to come between him and his family.     She states that she and her grandkids will spend Thanksgiving together and she is looking forward to that.   Support and encouragement were offered to the patient and she is advised to follow up as scheduled.     Treatment plan:  Target symptoms: depression, distractability, lack of focus, recurrent depression, anxiety   Why chosen therapy is appropriate versus another modality: relevant to diagnosis, patient responds to this modality, evidence based practice  Outcome monitoring methods: self-report, observation  Therapeutic intervention type: insight oriented psychotherapy, supportive psychotherapy, interactive psychotherapy    Risk parameters:  Patient reports no suicidal ideation  Patient reports no homicidal ideation  Patient reports no self-injurious behavior  Patient reports no violent behavior    Verbal deficits: None    Patient's response to intervention:  The patient's response to intervention is accepting.    Progress toward goals and other mental status changes:  The patient's progress toward goals is fair .    Diagnosis:     ICD-10-CM ICD-9-CM   1. Mild episode of recurrent major depressive disorder  F33.0 296.31           Plan:  individual psychotherapy    Return to clinic: 3 weeks  Length of Service (minutes): 45    The patient location is: [place]  The chief " complaint leading to consultation is: Anxiety/Depression    Visit type: audiovisual    Face to Face time with patient: 45  45 minutes of total time spent on the encounter, which includes face to face time and non-face to face time preparing to see the patient (eg, review of tests), Obtaining and/or reviewing separately obtained history, Documenting clinical information in the electronic or other health record, Independently interpreting results (not separately reported) and communicating results to the patient/family/caregiver, or Care coordination (not separately reported).   Each patient to whom he or she provides medical services by telemedicine is:  (1) informed of the relationship between the physician and patient and the respective role of any other health care provider with respect to management of the patient; and (2) notified that he or she may decline to receive medical services by telemedicine and may withdraw from such care at any time.    Notes:

## 2023-11-15 NOTE — TELEPHONE ENCOUNTER
No care due was identified.  [facility] Embedded Care Due Messages. Reference number: [id number].   11/14/2023 6:44:54 PM CST

## 2023-11-16 NOTE — PROGRESS NOTES
Individual Psychotherapy (PhD/LCSW)    11/9/2023    Site:  Telemed         Therapeutic Intervention: Met with patient.  Outpatient - Supportive psychotherapy 60 min - CPT Code 86024    Chief complaint/reason for encounter: depression and anxiety     Interval history and content of current session:   The patient presents with a pleasant mood and an appropriate affect.   The patient states that she has been having some difficulty with logging on to these meetings for the last 4 weeks and she finally got IS to give her some assistance.   She states that she has been dealing with some complex family dynamics.   The patient states that she has been overwhelmed with caring for her 96 year old father and states that her siblings offer limited support.     Treatment plan:  Target symptoms: depression, distractability, lack of focus, recurrent depression, anxiety   Why chosen therapy is appropriate versus another modality: relevant to diagnosis, patient responds to this modality, evidence based practice  Outcome monitoring methods: self-report, observation  Therapeutic intervention type: insight oriented psychotherapy, supportive psychotherapy, interactive psychotherapy    Risk parameters:  Patient reports no suicidal ideation  Patient reports no homicidal ideation  Patient reports no self-injurious behavior  Patient reports no violent behavior    Verbal deficits: None    Patient's response to intervention:  The patient's response to intervention is accepting.    Progress toward goals and other mental status changes:  The patient's progress toward goals is fair .    Diagnosis:     ICD-10-CM ICD-9-CM   1. Mild episode of recurrent major depressive disorder  F33.0 296.31         Plan:  individual psychotherapy    Return to clinic: 3 weeks  Length of Service (minutes): 45    The patient location is: [place], [place]  The chief complaint leading to consultation is: Anxiety/Depression    Visit type: audiovisual    Face to Face time  with patient: 45  45 minutes of total time spent on the encounter, which includes face to face time and non-face to face time preparing to see the patient (eg, review of tests), Obtaining and/or reviewing separately obtained history, Documenting clinical information in the electronic or other health record, Independently interpreting results (not separately reported) and communicating results to the patient/family/caregiver, or Care coordination (not separately reported).   Each patient to whom he or she provides medical services by telemedicine is:  (1) informed of the relationship between the physician and patient and the respective role of any other health care provider with respect to management of the patient; and (2) notified that he or she may decline to receive medical services by telemedicine and may withdraw from such care at any time.    Notes:

## 2023-11-27 ENCOUNTER — OFFICE VISIT (OUTPATIENT)
Dept: PSYCHIATRY | Facility: CLINIC | Age: 63
End: 2023-11-27
Payer: MEDICARE

## 2023-11-27 DIAGNOSIS — F33.0 MILD EPISODE OF RECURRENT MAJOR DEPRESSIVE DISORDER: Primary | ICD-10-CM

## 2023-11-27 DIAGNOSIS — F41.9 ANXIETY: ICD-10-CM

## 2023-11-27 PROCEDURE — 90837 PR PSYCHOTHERAPY W/PATIENT, 60 MIN: ICD-10-PCS | Mod: 95,,, | Performed by: SOCIAL WORKER

## 2023-11-27 PROCEDURE — 90837 PSYTX W PT 60 MINUTES: CPT | Mod: 95,,, | Performed by: SOCIAL WORKER
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
## 2023-12-11 ENCOUNTER — PATIENT MESSAGE (OUTPATIENT)
Dept: PSYCHIATRY | Facility: CLINIC | Age: 63
End: 2023-12-11
Payer: MEDICAID

## 2023-12-12 ENCOUNTER — OFFICE VISIT (OUTPATIENT)
Dept: PSYCHIATRY | Facility: CLINIC | Age: 63
End: 2023-12-12
Payer: MEDICARE

## 2023-12-12 DIAGNOSIS — F41.9 ANXIETY: Primary | ICD-10-CM

## 2023-12-12 DIAGNOSIS — F33.0 MILD EPISODE OF RECURRENT MAJOR DEPRESSIVE DISORDER: ICD-10-CM

## 2023-12-12 PROCEDURE — 90837 PSYTX W PT 60 MINUTES: CPT | Mod: 95,,, | Performed by: SOCIAL WORKER

## 2023-12-12 PROCEDURE — 90837 PR PSYCHOTHERAPY W/PATIENT, 60 MIN: ICD-10-PCS | Mod: 95,,, | Performed by: SOCIAL WORKER

## 2023-12-19 ENCOUNTER — OFFICE VISIT (OUTPATIENT)
Dept: PSYCHIATRY | Facility: CLINIC | Age: 63
End: 2023-12-19
Payer: MEDICARE

## 2023-12-19 DIAGNOSIS — F33.0 MILD EPISODE OF RECURRENT MAJOR DEPRESSIVE DISORDER: Primary | ICD-10-CM

## 2023-12-19 DIAGNOSIS — F41.9 ANXIETY: ICD-10-CM

## 2023-12-19 PROCEDURE — 90834 PR PSYCHOTHERAPY W/PATIENT, 45 MIN: ICD-10-PCS | Mod: 95,,, | Performed by: SOCIAL WORKER

## 2023-12-19 PROCEDURE — 90834 PSYTX W PT 45 MINUTES: CPT | Mod: 95,,, | Performed by: SOCIAL WORKER
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
## 2024-01-10 ENCOUNTER — OFFICE VISIT (OUTPATIENT)
Dept: PSYCHIATRY | Facility: CLINIC | Age: 64
End: 2024-01-10
Payer: MEDICARE

## 2024-01-10 DIAGNOSIS — F41.9 ANXIETY: ICD-10-CM

## 2024-01-10 DIAGNOSIS — F33.0 MILD EPISODE OF RECURRENT MAJOR DEPRESSIVE DISORDER: Primary | ICD-10-CM

## 2024-01-10 PROCEDURE — 90837 PSYTX W PT 60 MINUTES: CPT | Mod: 95,,, | Performed by: SOCIAL WORKER
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
## 2024-01-11 DIAGNOSIS — Z00.00 ENCOUNTER FOR MEDICARE ANNUAL WELLNESS EXAM: ICD-10-CM

## 2024-01-11 DIAGNOSIS — F41.9 ANXIETY: Chronic | ICD-10-CM

## 2024-01-12 NOTE — TELEPHONE ENCOUNTER
Refill Routing Note   Medication(s) are not appropriate for processing by [facility] for the following reason(s):        Required labs outdated  Outside of protocol    [facility] action(s):  Defer  Route     Requires labs : Yes             Appointments  past 12m or future 3m with PCP    Date Provider   Last Visit   10/24/2023 Giacomo Domingo MD   Next Visit   4/24/2024 Giacomo Domingo MD   ED visits in past 90 days: 0        Note composed:12:42 PM 01/12/2024

## 2024-01-12 NOTE — TELEPHONE ENCOUNTER
Care Due:                  Date            Visit Type   Department     Provider  --------------------------------------------------------------------------------                                EP -                              PRIMARY      [facility] FAM[facility]  Last Visit: 10-      CARE ([facility])   MEDICINE       Giacomo Domingo                              EP -                              PRIMARY      [facility]  Next Visit: 04-      CARE ([facility])   MEDICINE       Giacomo Domingo                                                            Last  Test          Frequency    Reason                     Performed    Due Date  --------------------------------------------------------------------------------    TSH.........  12 months..  levothyroxine............  10-   10-    [facility] Embedded Care Due Messages. Reference number: [id number].   1/11/2024 6:25:52 PM CST

## 2024-01-13 RX ORDER — ALPRAZOLAM 0.5 MG/1
TABLET ORAL
Qty: 90 TABLET | Refills: 0 | Status: SHIPPED | OUTPATIENT
Start: 2024-01-13 | End: 2024-02-11

## 2024-01-13 RX ORDER — LEVOTHYROXINE SODIUM 75 UG/1
TABLET ORAL
Qty: 90 TABLET | Refills: 3 | Status: SHIPPED | OUTPATIENT
Start: 2024-01-13

## 2024-01-17 ENCOUNTER — OFFICE VISIT (OUTPATIENT)
Dept: PSYCHIATRY | Facility: CLINIC | Age: 64
End: 2024-01-17
Payer: MEDICARE

## 2024-01-17 DIAGNOSIS — F41.9 ANXIETY: ICD-10-CM

## 2024-01-17 DIAGNOSIS — F33.0 MILD EPISODE OF RECURRENT MAJOR DEPRESSIVE DISORDER: Primary | ICD-10-CM

## 2024-01-17 PROCEDURE — 90837 PSYTX W PT 60 MINUTES: CPT | Mod: 95,,, | Performed by: SOCIAL WORKER

## 2024-01-23 RX ORDER — PANTOPRAZOLE SODIUM 40 MG/1
40 TABLET, DELAYED RELEASE ORAL 2 TIMES DAILY
Qty: 60 TABLET | Refills: 3 | Status: SHIPPED | OUTPATIENT
Start: 2024-01-23 | End: 2024-05-22
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
## 2024-01-29 ENCOUNTER — TELEPHONE (OUTPATIENT)
Dept: FAMILY MEDICINE | Facility: CLINIC | Age: 64
End: 2024-01-29
Payer: MEDICARE

## 2024-01-29 NOTE — TELEPHONE ENCOUNTER
----- Message from Josefina Moreno sent at 1/29/2024 10:10 AM CST -----  Type:  Call    Who Called:pt  Does the patient know what this is regarding?:call  Would the patient rather a call back or a response via [facility]? call  Best Call Back Number:[phone]      Patient needs exception letter written to rx company ( new company - [employer])  For xanax    She will drop off letter for me to complete this

## 2024-02-05 ENCOUNTER — OFFICE VISIT (OUTPATIENT)
Dept: PSYCHIATRY | Facility: CLINIC | Age: 64
End: 2024-02-05
Payer: MEDICARE

## 2024-02-05 DIAGNOSIS — F33.0 MILD EPISODE OF RECURRENT MAJOR DEPRESSIVE DISORDER: Primary | ICD-10-CM

## 2024-02-05 DIAGNOSIS — F41.9 ANXIETY: ICD-10-CM

## 2024-02-05 PROCEDURE — 90837 PSYTX W PT 60 MINUTES: CPT | Mod: 95,,, | Performed by: SOCIAL WORKER

## 2024-02-14 NOTE — PROGRESS NOTES
Individual Psychotherapy (PhD/LCSW)    2/5/2024    Site:  Telemed         Therapeutic Intervention: Met with patient.  Outpatient - Supportive psychotherapy ]    Chief complaint/reason for encounter: depression and anxiety     Interval history and content of current session:   The patient presents with a depressed mood and a flat affect.   She states that she has moved her father into her home and states that she does not feel as though he will live much longer, he is 96 years old and is very needy of her.   She continues to have some relational problems with her siblings surrounding her dad's finances.   Support and encouragement were offered and the patient is advised to follow up as scheduled.     Treatment plan:  Target symptoms: depression, distractability, lack of focus, recurrent depression, anxiety   Why chosen therapy is appropriate versus another modality: relevant to diagnosis, patient responds to this modality, evidence based practice  Outcome monitoring methods: self-report, observation  Therapeutic intervention type: insight oriented psychotherapy, supportive psychotherapy, interactive psychotherapy    Risk parameters:  Patient reports no suicidal ideation  Patient reports no homicidal ideation  Patient reports no self-injurious behavior  Patient reports no violent behavior    Verbal deficits: None    Patient's response to intervention:  The patient's response to intervention is accepting.    Progress toward goals and other mental status changes:  The patient's progress toward goals is fair .    Diagnosis:     ICD-10-CM ICD-9-CM   1. Mild episode of recurrent major depressive disorder  F33.0 296.31   2. Anxiety  F41.9 300.00                     Plan:  individual psychotherapy    Return to clinic: 3 weeks  Length of Service (minutes): 53    The patient location is: [place], [place]  The chief complaint leading to consultation is: Anxiety/Depression    Visit type: audiovisual    Face to Face time with  patient: 53  60 minutes of total time spent on the encounter, which includes face to face time and non-face to face time preparing to see the patient (eg, review of tests), Obtaining and/or reviewing separately obtained history, Documenting clinical information in the electronic or other health record, Independently interpreting results (not separately reported) and communicating results to the patient/family/caregiver, or Care coordination (not separately reported).   Each patient to whom he or she provides medical services by telemedicine is:  (1) informed of the relationship between the physician and patient and the respective role of any other health care provider with respect to management of the patient; and (2) notified that he or she may decline to receive medical services by telemedicine and may withdraw from such care at any time.    Notes:

## 2024-02-16 ENCOUNTER — OFFICE VISIT (OUTPATIENT)
Dept: PSYCHIATRY | Facility: CLINIC | Age: 64
End: 2024-02-16
Payer: MEDICARE

## 2024-02-16 DIAGNOSIS — F33.0 MILD EPISODE OF RECURRENT MAJOR DEPRESSIVE DISORDER: Primary | ICD-10-CM

## 2024-02-16 PROCEDURE — 90837 PSYTX W PT 60 MINUTES: CPT | Mod: 95,,, | Performed by: SOCIAL WORKER

## 2024-02-26 NOTE — PROGRESS NOTES
Individual Psychotherapy (PhD/LCSW)    2/16/2024    Site:  Telemed         Therapeutic Intervention: Met with patient.  Outpatient - Supportive psychotherapy ]    Chief complaint/reason for encounter: depression and anxiety     Interval history and content of current session:   The patient presents with a depressed mood and a flat affect.   She states that she has moved her father into her home and states that she does not feel as though he will live much longer, he is 96 years old and is very needy of her.   She continues to have some relational problems with her siblings surrounding her dad's finances.   Support and encouragement were offered and the patient is advised to follow up as scheduled.     Treatment plan:  Target symptoms: depression, distractability, lack of focus, recurrent depression, anxiety   Why chosen therapy is appropriate versus another modality: relevant to diagnosis, patient responds to this modality, evidence based practice  Outcome monitoring methods: self-report, observation  Therapeutic intervention type: insight oriented psychotherapy, supportive psychotherapy, interactive psychotherapy    Risk parameters:  Patient reports no suicidal ideation  Patient reports no homicidal ideation  Patient reports no self-injurious behavior  Patient reports no violent behavior    Verbal deficits: None    Patient's response to intervention:  The patient's response to intervention is accepting.    Progress toward goals and other mental status changes:  The patient's progress toward goals is fair .    Diagnosis:     ICD-10-CM ICD-9-CM   1. Mild episode of recurrent major depressive disorder  F33.0 296.31                       Plan:  individual psychotherapy    Return to clinic: 3 weeks  Length of Service (minutes): 53    The patient location is: [place], [place]  The chief complaint leading to consultation is: Anxiety/Depression    Visit type: audiovisual    Face to Face time with patient: 53  60 minutes of  total time spent on the encounter, which includes face to face time and non-face to face time preparing to see the patient (eg, review of tests), Obtaining and/or reviewing separately obtained history, Documenting clinical information in the electronic or other health record, Independently interpreting results (not separately reported) and communicating results to the patient/family/caregiver, or Care coordination (not separately reported).   Each patient to whom he or she provides medical services by telemedicine is:  (1) informed of the relationship between the physician and patient and the respective role of any other health care provider with respect to management of the patient; and (2) notified that he or she may decline to receive medical services by telemedicine and may withdraw from such care at any time.    Notes:

## 2024-03-06 ENCOUNTER — OFFICE VISIT (OUTPATIENT)
Dept: PSYCHIATRY | Facility: CLINIC | Age: 64
End: 2024-03-06
Payer: MEDICARE

## 2024-03-06 DIAGNOSIS — F33.0 MILD EPISODE OF RECURRENT MAJOR DEPRESSIVE DISORDER: Primary | ICD-10-CM

## 2024-03-06 DIAGNOSIS — F41.9 ANXIETY: ICD-10-CM

## 2024-03-06 PROCEDURE — 90837 PSYTX W PT 60 MINUTES: CPT | Mod: 95,,, | Performed by: SOCIAL WORKER
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
## 2024-03-11 DIAGNOSIS — G44.86 CERVICOGENIC HEADACHE: ICD-10-CM

## 2024-03-11 DIAGNOSIS — E66.01 SEVERE OBESITY (BMI 35.0-39.9) WITH COMORBIDITY: ICD-10-CM

## 2024-03-11 DIAGNOSIS — I10 ESSENTIAL HYPERTENSION: ICD-10-CM

## 2024-03-11 RX ORDER — GABAPENTIN 400 MG/1
CAPSULE ORAL
Qty: 180 CAPSULE | Refills: 3 | Status: SHIPPED | OUTPATIENT
Start: 2024-03-11

## 2024-03-11 NOTE — TELEPHONE ENCOUNTER
No care due was identified.  [facility] Embedded Care Due Messages. Reference number: [id number].   3/11/2024 4:37:32 PM CDT

## 2024-03-21 ENCOUNTER — TELEPHONE (OUTPATIENT)
Dept: FAMILY MEDICINE | Facility: CLINIC | Age: 64
End: 2024-03-21
Payer: MEDICARE

## 2024-03-21 NOTE — TELEPHONE ENCOUNTER
----- Message from Penny William sent at 3/21/2024  8:51 AM CDT -----  Type:  Needs Medical Advice    Who Called: Pt   Symptoms (please be specific): bad bruise ..Contusion   How long has patient had these symptoms:  2days   Pharmacy name and phone #:    Would the patient rather a call back or a response via [facility]? Call back   Best Call Back Number: [phone]  Additional Information:  pt fell ..MOnday .. went to [facility] ... bad bruise ..Contusion.. was told to see dr within 2 days   Do have medication for pain

## 2024-03-21 NOTE — TELEPHONE ENCOUNTER
Spoke with pt. Appt has been scheduled for eval of hip after fall Monday. [facility].

## 2024-03-22 ENCOUNTER — OFFICE VISIT (OUTPATIENT)
Dept: FAMILY MEDICINE | Facility: CLINIC | Age: 64
End: 2024-03-22
Payer: MEDICARE

## 2024-03-22 ENCOUNTER — TELEPHONE (OUTPATIENT)
Dept: FAMILY MEDICINE | Facility: CLINIC | Age: 64
End: 2024-03-22

## 2024-03-22 VITALS
HEIGHT: 60 IN | WEIGHT: 190.38 LBS | SYSTOLIC BLOOD PRESSURE: 130 MMHG | HEART RATE: 70 BPM | DIASTOLIC BLOOD PRESSURE: 66 MMHG | TEMPERATURE: 98 F | OXYGEN SATURATION: 89 % | BODY MASS INDEX: 37.37 KG/M2
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
## 2024-03-22 DIAGNOSIS — E66.01 MORBID OBESITY: ICD-10-CM

## 2024-03-22 DIAGNOSIS — E27.9 DISORDER OF ADRENAL GLAND, UNSPECIFIED: ICD-10-CM

## 2024-03-22 DIAGNOSIS — F33.9 EPISODE OF RECURRENT MAJOR DEPRESSIVE DISORDER, UNSPECIFIED DEPRESSION EPISODE SEVERITY: ICD-10-CM

## 2024-03-22 DIAGNOSIS — K86.1 OTHER CHRONIC PANCREATITIS: ICD-10-CM

## 2024-03-22 DIAGNOSIS — I70.0 AORTIC ATHEROSCLEROSIS: ICD-10-CM

## 2024-03-22 DIAGNOSIS — S70.02XD CONTUSION OF LEFT HIP, SUBSEQUENT ENCOUNTER: Primary | ICD-10-CM

## 2024-03-22 PROCEDURE — 99214 OFFICE O/P EST MOD 30 MIN: CPT | Mod: S$GLB,,, | Performed by: PHYSICIAN ASSISTANT

## 2024-03-22 RX ORDER — HYDROCODONE BITARTRATE AND ACETAMINOPHEN 10; 325 MG/1; MG/1
1 TABLET ORAL EVERY 6 HOURS PRN
Qty: 16 TABLET | Refills: 0 | Status: SHIPPED | OUTPATIENT
Start: 2024-03-22 | End: 2024-03-26

## 2024-03-22 NOTE — LETTER
March 22, 2024      [address] [address]  Phone: [phone]  Fax: [phone]       Patient: Cara Mendoza   YOB: 1960  Date of Visit: 03/22/2024    To Whom It May Concern:    Dean Mendoza  was at [facility] on 03/22/2024. The patient may return to work/school on 3/25/2024 with restrictions: no lifting or standing for longer than 30min. If you have any questions or concerns, or if I can be of further assistance, please do not hesitate to contact me.    Sincerely,    Domi Short PA-C

## 2024-03-22 NOTE — TELEPHONE ENCOUNTER
EMANUEL for [facility] ER report has been faxed to Medical records at [phone]

## 2024-03-25 ENCOUNTER — TELEPHONE (OUTPATIENT)
Dept: FAMILY MEDICINE | Facility: CLINIC | Age: 64
End: 2024-03-25
Payer: MEDICARE

## 2024-03-25 PROBLEM — S06.0X0A CONCUSSION WITH NO LOSS OF CONSCIOUSNESS: Status: RESOLVED | Noted: 2020-11-16 | Resolved: 2024-03-25

## 2024-03-25 NOTE — TELEPHONE ENCOUNTER
I spoke with the pt   She is ok without the pain meds  Tylenol is controlling the pain at this time

## 2024-03-25 NOTE — PROGRESS NOTES
Patient ID: Cara Mendoza is a 63 y.o. female.     Chief Complaint: Follow-up (ER followu)    63 year old female presents to the clinic for ED follow up. Patient states 2 days ago she slipped in cooking oil, and fell in her kitchen. States she fell hard onto the left hip. States she presented to [facility], where she was imaged. States images were WNL. No records. Signed release of records today. Patient states she was given oxycodone (though allergic - nausea and vomiting). States she took this twice due to severe pain, which caused nausea and vomiting. Patient admits she is able to ambulate with help of a walker. 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 Requests change in medication from oxycodone to hydrocodone. Requests note to her brothers and sisters stating she cannot lift father alone during the weekend. Denies numbness/tingling to legs, saddle anesthesia, fever, N/V/D.            Review of Systems  Review of Systems   Constitutional:  Negative for fever.   HENT:  Negative for ear pain and sinus pain.    Eyes:  Negative for discharge.   Respiratory:  Negative for cough and wheezing.    Cardiovascular:  Negative for chest pain and leg swelling.   Gastrointestinal:  Negative for diarrhea, nausea and vomiting.   Genitourinary:  Negative for urgency.   Musculoskeletal:  Positive for joint pain. Negative for myalgias.   Skin:  Negative for rash.   Neurological:  Negative for weakness and headaches.   Psychiatric/Behavioral:  Negative for depression.        Currently Medications  Current Outpatient Medications on File Prior to Visit   Medication Sig Dispense Refill    ALPRAZolam (XANAX) 0.5 MG tablet TAKE 1 TABLET BY MOUTH THREE TIMES A DAY AS NEEDED FOR ANXIETY 90 tablet 0    EScitalopram oxalate (LEXAPRO) 20 MG tablet TAKE 1 TABLET BY MOUTH ONCE DAILY 90 tablet 1    estradioL (ESTRACE) 1 MG tablet Take 1 tablet (1 mg total) by mouth once daily. 90 tablet 3    gabapentin (NEURONTIN) 400 MG capsule TAKE 1  CAPSULE BY MOUTH EVERY MORNING AND EVERY EVENING AND UP TO 4 CAPSULES BY MOUTH AT BEDTIME 180 capsule 3    hydroCHLOROthiazide (MICROZIDE) 12.5 mg capsule TAKE 1 CAPSULE BY MOUTH ONCE A DAY 90 capsule 3    levothyroxine (SYNTHROID) 75 MCG tablet TAKE 1 TABLET BY MOUTH DAILY BEFOE BREAKFAST 90 tablet 3    linaCLOtide (LINZESS) 72 mcg Cap capsule Linzess 72 mcg capsule   Take 1 capsule every day by oral route in the morning for 90 days.      pantoprazole (PROTONIX) 40 MG tablet TAKE 1 TABLET BY MOUTH TWO TIMES A DAY 60 tablet 3    promethazine (PHENERGAN) 25 MG tablet Take 1 tablet (25 mg total) by mouth every 6 (six) hours as needed for Nausea. 30 tablet 0     No current facility-administered medications on file prior to visit.       Physical  Exam  Vitals:    03/22/24 1112   BP: 130/66   BP Location: Right arm   Patient Position: Sitting   Pulse: 70   Temp: 98 °F (36.7 °C)   SpO2: (!) 89%   Weight: 86.4 kg (190 lb 5.9 oz)   Height: 5' (1.524 m)      Body mass index is 37.18 kg/m².  Wt Readings from Last 3 Encounters:   03/22/24 86.4 kg (190 lb 5.9 oz)   10/24/23 86.7 kg (191 lb 4 oz)   09/13/23 86.7 kg (191 lb 2.2 oz)       Physical Exam  Vitals and nursing note reviewed.   Constitutional:       General: She is not in acute distress.     Appearance: She is morbidly obese. She is not ill-appearing.   HENT:      Head: Normocephalic and atraumatic.      Right Ear: External ear normal.      Left Ear: External ear normal.      Nose: Nose normal.      Mouth/Throat:      Mouth: Mucous membranes are moist.   Eyes:      Extraocular Movements: Extraocular movements intact.      Conjunctiva/sclera: Conjunctivae normal.   Cardiovascular:      Rate and Rhythm: Normal rate and regular rhythm.      Pulses: Normal pulses.           Dorsalis pedis pulses are 2+ on the right side and 2+ on the left side.        Posterior tibial pulses are 2+ on the right side and 2+ on the left side.      Heart sounds: No murmur heard.  Pulmonary:     "  Effort: Pulmonary effort is normal. No respiratory distress.      Breath sounds: No wheezing.   Abdominal:      General: There is no distension.      Palpations: Abdomen is soft. There is no mass.      Tenderness: There is no abdominal tenderness.   Musculoskeletal:         General: No swelling.      Cervical back: Normal range of motion.      Left hip: Bony tenderness (over greater trochanter left femur) present.      Comments: Ambulates with walker   Skin:     Coloration: Skin is not jaundiced.      Findings: No rash.   Neurological:      General: No focal deficit present.      Mental Status: She is alert and oriented to person, place, and time.   Psychiatric:         Mood and Affect: Mood normal.         Thought Content: Thought content normal.         Labs:    Complete Blood Count  Lab Results   Component Value Date    RBC 4.83 08/09/2023    HGB 14.1 08/09/2023    HCT 41.7 08/09/2023    MCV 86 08/09/2023    MCH 29.2 08/09/2023    MCHC 33.8 08/09/2023    RDW 14.3 08/09/2023     08/09/2023    MPV 10.9 08/09/2023    GRAN 4.7 08/09/2023    GRAN 59.1 08/09/2023    LYMPH 2.3 08/09/2023    LYMPH 29.6 08/09/2023    MONO 0.7 08/09/2023    MONO 8.3 08/09/2023    EOS 0.2 08/09/2023    BASO 0.04 08/09/2023    EOSINOPHIL 2.2 08/09/2023    BASOPHIL 0.5 08/09/2023    DIFFMETHOD Automated 08/09/2023       Comprehensive Metabolic Panel  Lab Results   Component Value Date    PROT 6.7 10/26/2023    ALBUMIN 3.8 10/26/2023    BILITOT 0.5 10/26/2023    AST 12 10/26/2023    ALKPHOS 46 (L) 10/26/2023    ALT 8 (L) 10/26/2023       TSH  No results found for: "TSH"    Imaging:  US Abdomen Limited  Narrative: EXAM: US ABDOMEN LIMITED    CLINICAL HISTORY: Fatty liver    TECHNIQUE: Transabdominal ultrasound was performed of the right upper quadrant. Additionally, spectral Doppler and color flow vascular ultrasound was performed.    FINDINGS:    Comparison ultrasound abdomen, 05/12/2023.    There is fatty infiltration present " involving the liver.  Liver measures 12 cm.  No focal masses.    Spleen measures 9.5 cm in length.  No focal masses.     There is normal hepatopedal flow the main portal vein.    Previous cholecystectomy.      Common duct is normal in caliber.   The common bile duct measures  5.5 mm.    Examination of the right kidney reveals no evidence of hydronephrosis. Cortical echotexture is normal.    The visualized pancreas is unremarkable.  Impression:   Fatty infiltration of the liver.  No focal masses.  Previous cholecystectomy.    Finalized on: 8/9/2023 9:03 AM By:  Trevin Kurtz MD  [facility]# [id number]      2023-08-09 09:05:39.064    [facility]      Assessment/Plan:    1. Contusion of left hip, subsequent encounter  Comments:  - DISCONTINUE oxycodone (allergy); start hydrocodone  - Sign request of records  - Follow as needed  Orders:  -     HYDROcodone-acetaminophen (NORCO)  mg per tablet; Take 1 tablet by mouth every 6 (six) hours as needed for Pain.  Dispense: 16 tablet; Refill: 0    2. Fall from standing, subsequent encounter  Comments:  - Pt requested note for family describing she should not lift elderly father  - Sign request of records  - Follow as needed  Orders:  -     HYDROcodone-acetaminophen (NORCO)  mg per tablet; Take 1 tablet by mouth every 6 (six) hours as needed for Pain.  Dispense: 16 tablet; Refill: 0    3. Morbid obesity  Comments:  - Advised pt on healthy diet and lifestyle changes  - Advised to increase physical activity >150min/week    4. Other chronic pancreatitis  Comments:  - Chronic, stable  - Continue med management  - Follow with GI    5. Disorder of adrenal gland, unspecified  Comments:  - Chronic, stable  - Continue med management  - Follow with GI/urology    6. Episode of recurrent major depressive disorder, unspecified depression episode severity  Comments:  - Chronic, stable  - Continue lexapro and xanax as needed  - Follow with PCP    7. Aortic atherosclerosis  Comments:  - Chronic,  stable  - Continue to monitor  - Follow with PCP         Discussed how to stay healthy including: diet, exercise, refraining from smoking and discussed screening exams / tests needed for age, sex and family Hx.    RTC PRN    Domi Short PA-C

## 2024-03-25 NOTE — TELEPHONE ENCOUNTER
----- Message from Kyra Mckeon sent at 3/22/2024  5:05 PM CDT -----  Type:  Needs Medical Advice    Who Called: pt  Would the patient rather a call back or a response via [facility]? Call   Best Call Back Number:  [phone]  Additional Information: needing authorization for HYDROcodone-acetaminophen (NORCO)  mg per tablet states the pharmacy doesn't want to fill it because of other pain medication that she can't take

## 2024-03-26 ENCOUNTER — PATIENT MESSAGE (OUTPATIENT)
Dept: PSYCHIATRY | Facility: CLINIC | Age: 64
End: 2024-03-26

## 2024-04-10 ENCOUNTER — OFFICE VISIT (OUTPATIENT)
Dept: PSYCHIATRY | Facility: CLINIC | Age: 64
End: 2024-04-10
Payer: MEDICARE

## 2024-04-10 DIAGNOSIS — F33.0 MILD EPISODE OF RECURRENT MAJOR DEPRESSIVE DISORDER: Primary | ICD-10-CM

## 2024-04-10 DIAGNOSIS — F41.9 ANXIETY: ICD-10-CM

## 2024-04-10 PROCEDURE — 90837 PSYTX W PT 60 MINUTES: CPT | Mod: 95,,, | Performed by: SOCIAL WORKER

## 2024-04-10 NOTE — TELEPHONE ENCOUNTER
No care due was identified.  [facility] Embedded Care Due Messages. Reference number: [id number].   4/10/2024 6:26:27 PM CDT

## 2024-04-10 NOTE — PROGRESS NOTES
Individual Psychotherapy (PhD/LCSW)    4/10/2024    Site:  Telemed         Therapeutic Intervention: Met with patient.  Outpatient - Supportive psychotherapy ]    Chief complaint/reason for encounter: depression and anxiety     Interval history and content of current session:   Buried her father yesterday and states that it is raining in her living room today because of bad weather.   April 8th made for years since her mother's death and today is the two year anniversary of her sister's death.   The patient states that she has been tearful today and misses her father.   Her significant other and her kids have been supportive of her.   We were able to talk through the stages of grief as patient wants to know how long she will have to go through this.   Support and encouragement were offered and we will continue to monitor the patient for stabilization.     Treatment plan:  Target symptoms: depression, distractability, lack of focus, recurrent depression, anxiety   Why chosen therapy is appropriate versus another modality: relevant to diagnosis, patient responds to this modality, evidence based practice  Outcome monitoring methods: self-report, observation  Therapeutic intervention type: insight oriented psychotherapy, supportive psychotherapy, interactive psychotherapy    Risk parameters:  Patient reports no suicidal ideation  Patient reports no homicidal ideation  Patient reports no self-injurious behavior  Patient reports no violent behavior    Verbal deficits: None    Patient's response to intervention:  The patient's response to intervention is accepting.    Progress toward goals and other mental status changes:  The patient's progress toward goals is fair .    Diagnosis:     ICD-10-CM ICD-9-CM   1. Mild episode of recurrent major depressive disorder  F33.0 296.31   2. Anxiety  F41.9 300.00                         Plan:  individual psychotherapy    Return to clinic: 3 weeks  Length of Service (minutes): 53    The  patient location is: [place], [place]  The chief complaint leading to consultation is: Anxiety/Depression    Visit type: audiovisual    Face to Face time with patient: 53  60 minutes of total time spent on the encounter, which includes face to face time and non-face to face time preparing to see the patient (eg, review of tests), Obtaining and/or reviewing separately obtained history, Documenting clinical information in the electronic or other health record, Independently interpreting results (not separately reported) and communicating results to the patient/family/caregiver, or Care coordination (not separately reported).   Each patient to whom he or she provides medical services by telemedicine is:  (1) informed of the relationship between the physician and patient and the respective role of any other health care provider with respect to management of the patient; and (2) notified that he or she may decline to receive medical services by telemedicine and may withdraw from such care at any time.    Notes:

## 2024-04-11 RX ORDER — ESCITALOPRAM OXALATE 20 MG/1
20 TABLET ORAL DAILY
Qty: 90 TABLET | Refills: 1 | Status: SHIPPED | OUTPATIENT
Start: 2024-04-11

## 2024-04-11 NOTE — TELEPHONE ENCOUNTER
Refill Routing Note   Medication(s) are not appropriate for processing by [facility] for the following reason(s):        Outside of protocol: non-delegated    [facility] action(s):  Route  Approve      Medication Therapy Plan:         Appointments  past 12m or future 3m with PCP    Date Provider   Last Visit   10/24/2023 Giacomo Domingo MD   Next Visit   4/24/2024 Giacomo Domingo MD   ED visits in past 90 days: 0        Note composed:3:19 PM 04/11/2024

## 2024-04-12 RX ORDER — ALPRAZOLAM 0.5 MG/1
TABLET ORAL
Qty: 90 TABLET | Refills: 0 | Status: SHIPPED | OUTPATIENT
Start: 2024-04-12 | End: 2024-05-09

## 2024-04-17 ENCOUNTER — OFFICE VISIT (OUTPATIENT)
Dept: PSYCHIATRY | Facility: CLINIC | Age: 64
End: 2024-04-17
Payer: MEDICARE

## 2024-04-17 DIAGNOSIS — F41.9 ANXIETY: ICD-10-CM

## 2024-04-17 DIAGNOSIS — F33.0 MILD EPISODE OF RECURRENT MAJOR DEPRESSIVE DISORDER: Primary | ICD-10-CM

## 2024-04-17 PROCEDURE — 90837 PSYTX W PT 60 MINUTES: CPT | Mod: 95,,, | Performed by: SOCIAL WORKER

## 2024-04-17 NOTE — PROGRESS NOTES
Individual Psychotherapy (PhD/LCSW)    4/17/2024    Site:  Telemed         Therapeutic Intervention: Met with patient.  Outpatient - Supportive psychotherapy ]    Chief complaint/reason for encounter: depression and anxiety     Interval history and content of current session:   The patient presents with a depressed mood and a flat affect.   She is tearful and states that her mood and sleep have been disturbed since her father passed.   The patient states that she feels alone now since her father has passed and she has no . The patient's grief is normalized and she is advised to allow herself to go through the grieving process.   She states that her children have been supportive of her.  Support and encouragement were offered to the patient and we will follow up next week.     Treatment plan:  Target symptoms: depression, distractability, lack of focus, recurrent depression, anxiety   Why chosen therapy is appropriate versus another modality: relevant to diagnosis, patient responds to this modality, evidence based practice  Outcome monitoring methods: self-report, observation  Therapeutic intervention type: insight oriented psychotherapy, supportive psychotherapy, interactive psychotherapy    Risk parameters:  Patient reports no suicidal ideation  Patient reports no homicidal ideation  Patient reports no self-injurious behavior  Patient reports no violent behavior    Verbal deficits: None    Patient's response to intervention:  The patient's response to intervention is accepting.    Progress toward goals and other mental status changes:  The patient's progress toward goals is fair .    Diagnosis:     ICD-10-CM ICD-9-CM   1. Mild episode of recurrent major depressive disorder  F33.0 296.31   2. Anxiety  F41.9 300.00                         Plan:  individual psychotherapy    Return to clinic: 3 weeks  Length of Service (minutes): 53    The patient location is: [place], [place]  The chief complaint leading to  consultation is: Anxiety/Depression    Visit type: audiovisual    Face to Face time with patient: 53  60 minutes of total time spent on the encounter, which includes face to face time and non-face to face time preparing to see the patient (eg, review of tests), Obtaining and/or reviewing separately obtained history, Documenting clinical information in the electronic or other health record, Independently interpreting results (not separately reported) and communicating results to the patient/family/caregiver, or Care coordination (not separately reported).   Each patient to whom he or she provides medical services by telemedicine is:  (1) informed of the relationship between the physician and patient and the respective role of any other health care provider with respect to management of the patient; and (2) notified that he or she may decline to receive medical services by telemedicine and may withdraw from such care at any time.    Notes:

## 2024-04-24 ENCOUNTER — OFFICE VISIT (OUTPATIENT)
Dept: FAMILY MEDICINE | Facility: CLINIC | Age: 64
End: 2024-04-24
Payer: MEDICARE

## 2024-04-24 ENCOUNTER — HOSPITAL ENCOUNTER (OUTPATIENT)
Dept: RADIOLOGY | Facility: HOSPITAL | Age: 64
Discharge: HOME | End: 2024-04-24
Attending: FAMILY MEDICINE
Payer: MEDICARE

## 2024-04-24 VITALS — BODY MASS INDEX: 37.3 KG/M2 | HEIGHT: 60 IN | WEIGHT: 190 LBS

## 2024-04-24 VITALS
HEIGHT: 60 IN | TEMPERATURE: 98 F | HEART RATE: 74 BPM | BODY MASS INDEX: 38.42 KG/M2 | SYSTOLIC BLOOD PRESSURE: 124 MMHG | DIASTOLIC BLOOD PRESSURE: 64 MMHG | WEIGHT: 195.69 LBS | OXYGEN SATURATION: 96 %

## 2024-04-24 DIAGNOSIS — E03.9 HYPOTHYROIDISM, UNSPECIFIED TYPE: Chronic | ICD-10-CM

## 2024-04-24 DIAGNOSIS — Z12.31 ENCOUNTER FOR SCREENING MAMMOGRAM FOR BREAST CANCER: ICD-10-CM

## 2024-04-24 DIAGNOSIS — F43.10 PTSD (POST-TRAUMATIC STRESS DISORDER): ICD-10-CM

## 2024-04-24 DIAGNOSIS — Z00.00 ROUTINE HEALTH MAINTENANCE: ICD-10-CM

## 2024-04-24 DIAGNOSIS — I10 ESSENTIAL HYPERTENSION: Primary | Chronic | ICD-10-CM

## 2024-04-24 DIAGNOSIS — E55.9 VITAMIN D DEFICIENCY: ICD-10-CM

## 2024-04-24 PROCEDURE — 99214 OFFICE O/P EST MOD 30 MIN: CPT | Mod: S$GLB,,, | Performed by: FAMILY MEDICINE

## 2024-04-24 PROCEDURE — 77067 SCR MAMMO BI INCL CAD: CPT | Mod: TC,PN

## 2024-04-24 PROCEDURE — 77067 SCR MAMMO BI INCL CAD: CPT | Mod: 26,,, | Performed by: RADIOLOGY

## 2024-04-24 PROCEDURE — 77063 BREAST TOMOSYNTHESIS BI: CPT | Mod: 26,,, | Performed by: RADIOLOGY

## 2024-04-24 RX ORDER — ATORVASTATIN CALCIUM 10 MG/1
10 TABLET, FILM COATED ORAL DAILY
Qty: 90 TABLET | Refills: 3 | Status: SHIPPED | OUTPATIENT
Start: 2024-04-24 | End: 2024-05-13

## 2024-04-24 NOTE — PROGRESS NOTES
Patient ID: Cara Mendoza is a 63 y.o. female.    Chief Complaint: Follow-up    HPI     Cara Mendoza is a 63 y.o. female. here for annual exam.  Follow-up for PTSD for which she takes anxiety lowering medications.  Does not want to change medicines time.  Patient also with hypothyroidism-stable with no weight loss weight gain constipation tachycardia.  Hypertension-control with current medication.        Review of Symptoms    Constitutional: Negative.    HENT: Negative.    Eyes: Negative.    Respiratory: Negative.    Cardiovascular: Negative.    Gastrointestinal: Negative.    Endocrine: Negative.    Genitourinary: Negative.    Musculoskeletal: Negative.    Skin: Negative.    Allergic/Immunologic: Negative.    Neurological: Negative.    Hematological: Negative.    Psychiatric/Behavioral: Negative.      Except as above in HPI    Current Outpatient Medications on File Prior to Visit   Medication Sig Dispense Refill    ALPRAZolam (XANAX) 0.5 MG tablet TAKE 1 TABLET BY MOUTH THREE TIMES A DAY AS NEEDED FOR ANXIETY 90 tablet 0    EScitalopram oxalate (LEXAPRO) 20 MG tablet Take 1 tablet (20 mg total) by mouth once daily. 90 tablet 1    estradioL (ESTRACE) 1 MG tablet Take 1 tablet (1 mg total) by mouth once daily. 90 tablet 3    gabapentin (NEURONTIN) 400 MG capsule TAKE 1 CAPSULE BY MOUTH EVERY MORNING AND EVERY EVENING AND UP TO 4 CAPSULES BY MOUTH AT BEDTIME 180 capsule 3    hydroCHLOROthiazide (MICROZIDE) 12.5 mg capsule TAKE 1 CAPSULE BY MOUTH ONCE A DAY 90 capsule 3    levothyroxine (SYNTHROID) 75 MCG tablet TAKE 1 TABLET BY MOUTH DAILY BEFOE BREAKFAST 90 tablet 3    linaCLOtide (LINZESS) 72 mcg Cap capsule Linzess 72 mcg capsule   Take 1 capsule every day by oral route in the morning for 90 days.      pantoprazole (PROTONIX) 40 MG tablet TAKE 1 TABLET BY MOUTH TWO TIMES A DAY 60 tablet 3    promethazine (PHENERGAN) 25 MG tablet Take 1 tablet (25 mg total) by mouth every 6 (six) hours as needed for Nausea.  30 tablet 0     No current facility-administered medications on file prior to visit.         Physical  Exam    Vitals:    04/24/24 1500   BP: 124/64   BP Location: Left arm   Patient Position: Sitting   Pulse: 74   Temp: 97.9 °F (36.6 °C)   TempSrc: Oral   SpO2: 96%   Weight: 88.7 kg (195 lb 10.5 oz)   Height: 5' (1.524 m)          Constitutional:  Oriented to person, place, and time. Appears well-developed and well-nourished.     HENT:   Head: Normocephalic and atraumatic.     Right Ear: External ear normal     Left Ear: External ear normal      Nose: Nose normal. No rhinorrhea or nasal deformity.     Mouth/Throat: Moist mucus membranes      Eyes: Conjunctivae are normal. Right eye exhibits no discharge. Left eye exhibits no  discharge. No scleral icterus.     Neck:  No JVD present. No tracheal deviation  []  Neck supple.   Carotid Arteries  []  No Bruit    Cardiovascular:  Regular rate and rhythm with normal S1 and S2     Pulmonary/Chest:   Clear to auscultation bilaterally without wheezes, rhonchi or rales    Abdominal: Soft. No distension and no mass.  No tenderness. No rebound and No guarding.     Musculoskeletal: Normal range of motion. No edema or tenderness.   No deformity     Lymphadenopathy:   []  No cervical adenopathy.  []  No inguinal adenopathy    Neurological:  Alert and oriented to person, place, and time. Coordination normal.     Skin: Skin is warm and dry. No rash noted. No bruising     Psychiatric: Normal mood and affect. Speech is normal and behavior is normal. Judgment and thought content normal.       Assessment / Plan:      ICD-10-CM ICD-9-CM   1. Essential hypertension  I10 401.9   2. Routine health maintenance  Z00.00 V70.0   3. Hypothyroidism, unspecified type  E03.9 244.9   4. PTSD (post-traumatic stress disorder)  F43.10 309.81   5. Vitamin D deficiency  E55.9 268.9     Essential hypertension  -     Comprehensive Metabolic Panel; Future  -     CBC Auto Differential; Future  -     Lipid  Panel; Future  -     TSH; Future  -     T4, Free; Future; Expected date: 04/24/2024    Routine health maintenance  -     Comprehensive Metabolic Panel; Future  -     CBC Auto Differential; Future  -     Lipid Panel; Future  -     TSH; Future  -     T4, Free; Future; Expected date: 04/24/2024    Hypothyroidism, unspecified type  -     Comprehensive Metabolic Panel; Future  -     CBC Auto Differential; Future  -     Lipid Panel; Future  -     TSH; Future  -     T4, Free; Future; Expected date: 04/24/2024    PTSD (post-traumatic stress disorder)    Vitamin D deficiency    Other orders  -     atorvastatin (LIPITOR) 10 MG tablet; Take 1 tablet (10 mg total) by mouth once daily. For cholesterol  Dispense: 90 tablet; Refill: 3    Continue current medications no changes      Discussed how to stay healthy             Giacomo Brice M.D.  1

## 2024-05-01 ENCOUNTER — OFFICE VISIT (OUTPATIENT)
Dept: PSYCHIATRY | Facility: CLINIC | Age: 64
End: 2024-05-01
Payer: MEDICARE

## 2024-05-01 DIAGNOSIS — F33.0 MILD EPISODE OF RECURRENT MAJOR DEPRESSIVE DISORDER: Primary | ICD-10-CM

## 2024-05-01 DIAGNOSIS — F41.9 ANXIETY: ICD-10-CM

## 2024-05-01 PROCEDURE — 90837 PSYTX W PT 60 MINUTES: CPT | Mod: 95,,, | Performed by: SOCIAL WORKER

## 2024-05-01 RX ORDER — ESTRADIOL 1 MG/1
1 TABLET ORAL
Qty: 30 TABLET | Refills: 11 | Status: SHIPPED | OUTPATIENT
Start: 2024-05-01

## 2024-05-01 NOTE — TELEPHONE ENCOUNTER
No care due was identified.  [facility] Embedded Care Due Messages. Reference number: [id number].   5/01/2024 4:47:38 PM CDT

## 2024-05-07 ENCOUNTER — PATIENT MESSAGE (OUTPATIENT)
Dept: PSYCHIATRY | Facility: CLINIC | Age: 64
End: 2024-05-07
Payer: MEDICARE

## 2024-05-08 ENCOUNTER — OFFICE VISIT (OUTPATIENT)
Dept: PSYCHIATRY | Facility: CLINIC | Age: 64
End: 2024-05-08
Payer: MEDICARE

## 2024-05-08 ENCOUNTER — PATIENT MESSAGE (OUTPATIENT)
Dept: PSYCHIATRY | Facility: CLINIC | Age: 64
End: 2024-05-08

## 2024-05-08 DIAGNOSIS — F33.0 MILD EPISODE OF RECURRENT MAJOR DEPRESSIVE DISORDER: Primary | ICD-10-CM

## 2024-05-08 DIAGNOSIS — F41.9 ANXIETY: ICD-10-CM

## 2024-05-08 PROCEDURE — 90837 PSYTX W PT 60 MINUTES: CPT | Mod: 95,,, | Performed by: SOCIAL WORKER

## 2024-05-08 NOTE — PROGRESS NOTES
Individual Psychotherapy (PhD/LCSW)    5/1/2024    Site:  Telemed         Therapeutic Intervention: Met with patient.  Outpatient - Supportive psychotherapy ]    Chief complaint/reason for encounter: depression and anxiety     Interval history and content of current session:   The patient presents with a depressed mood and a flat affect.   She is tearful and states  that there has been conflict in her family. Her grandson and his girlfriend were living with him, the girlfriend got mad and called DCFS on the patient's daughter in law. The patient states that the DCFS worker told that the girlfriend called. She states that her son gave her an ultimatum to put the grandson's girlfriend out or they would not associate with her. The patient states that this was a difficult decision for her but she had to choose her family. She states that her grandson was upset and moved out of the house.   The patient continues to deal with the recent loss of her father and support and encouragement were offered. Stress reductions techniques were discussed along with the need for appropriate boundary setting. The patient was receptive to feedback and encouraged to follow up as scheduled.     Treatment plan:  Target symptoms: depression, distractability, lack of focus, recurrent depression, anxiety   Why chosen therapy is appropriate versus another modality: relevant to diagnosis, patient responds to this modality, evidence based practice  Outcome monitoring methods: self-report, observation  Therapeutic intervention type: insight oriented psychotherapy, supportive psychotherapy, interactive psychotherapy    Risk parameters:  Patient reports no suicidal ideation  Patient reports no homicidal ideation  Patient reports no self-injurious behavior  Patient reports no violent behavior    Verbal deficits: None    Patient's response to intervention:  The patient's response to intervention is accepting.    Progress toward goals and other mental  status changes:  The patient's progress toward goals is fair .    Diagnosis:     ICD-10-CM ICD-9-CM   1. Mild episode of recurrent major depressive disorder  F33.0 296.31   2. Anxiety  F41.9 300.00                           Plan:  individual psychotherapy    Return to clinic: 3 weeks  Length of Service (minutes): 53    The patient location is: [place], [place]  The chief complaint leading to consultation is: Anxiety/Depression    Visit type: audiovisual    Face to Face time with patient: 53  60 minutes of total time spent on the encounter, which includes face to face time and non-face to face time preparing to see the patient (eg, review of tests), Obtaining and/or reviewing separately obtained history, Documenting clinical information in the electronic or other health record, Independently interpreting results (not separately reported) and communicating results to the patient/family/caregiver, or Care coordination (not separately reported).   Each patient to whom he or she provides medical services by telemedicine is:  (1) informed of the relationship between the physician and patient and the respective role of any other health care provider with respect to management of the patient; and (2) notified that he or she may decline to receive medical services by telemedicine and may withdraw from such care at any time.    Notes:

## 2024-05-09 ENCOUNTER — PATIENT MESSAGE (OUTPATIENT)
Dept: PSYCHIATRY | Facility: CLINIC | Age: 64
End: 2024-05-09
Payer: MEDICARE

## 2024-05-09 RX ORDER — ALPRAZOLAM 0.5 MG/1
TABLET ORAL
Qty: 90 TABLET | Refills: 1 | Status: SHIPPED | OUTPATIENT
Start: 2024-05-09

## 2024-05-09 NOTE — TELEPHONE ENCOUNTER
Care Due:                  Date            Visit Type   Department     Provider  --------------------------------------------------------------------------------                                EP -                              PRIMARY      [facility] FAM[facility]  Last Visit: 04-      CARE ([facility])   MEDICINE       Giacomo Domingo                              EP -                              PRIMARY      [facility]  Next Visit: 10-      CARE ([facility])   MEDICINE       Giacomo Domingo                                                            Last  Test          Frequency    Reason                     Performed    Due Date  --------------------------------------------------------------------------------    CMP.........  12 months..  hydroCHLOROthiazide......  08- 08-    [facility] Embedded Care Due Messages. Reference number: [id number].   5/09/2024 8:16:48 AM CDT

## 2024-05-10 ENCOUNTER — TELEPHONE (OUTPATIENT)
Dept: FAMILY MEDICINE | Facility: CLINIC | Age: 64
End: 2024-05-10
Payer: MEDICARE

## 2024-05-10 ENCOUNTER — PATIENT MESSAGE (OUTPATIENT)
Dept: FAMILY MEDICINE | Facility: CLINIC | Age: 64
End: 2024-05-10
Payer: MEDICARE

## 2024-05-10 RX ORDER — PROMETHAZINE HYDROCHLORIDE 25 MG/1
25 TABLET ORAL EVERY 6 HOURS PRN
Qty: 30 TABLET | Refills: 0 | OUTPATIENT
Start: 2024-05-10

## 2024-05-10 RX ORDER — PROMETHAZINE HYDROCHLORIDE 25 MG/1
25 TABLET ORAL EVERY 6 HOURS PRN
Qty: 30 TABLET | Refills: 0 | Status: SHIPPED | OUTPATIENT
Start: 2024-05-10

## 2024-05-10 NOTE — TELEPHONE ENCOUNTER
Pt worked outside all day yesterday cleaning out the flower bed  She is in a lot of pain  She is using her walker  Pain in hip area  Radiates down leg  Tried tylenol  No help     She can't take ibuprofen   Requesting pain meds to cover the weekend    [facility]    Please send response to the pt portal once you address this message

## 2024-05-10 NOTE — TELEPHONE ENCOUNTER
No care due was identified.  [facility] Embedded Care Due Messages. Reference number: [id number].   5/10/2024 12:59:14 PM CDT

## 2024-05-10 NOTE — TELEPHONE ENCOUNTER
----- Message from Kathleen Stevens sent at 5/10/2024 12:42 PM CDT -----  Type:  Needs Medical Advice    Who Called: Pt  Would the patient rather a call back or a response via [facility]? Callback   Best Call Back Number: [phone]  Additional Information: caller is requesting a callback from this provider in regards to pain medications.

## 2024-05-10 NOTE — TELEPHONE ENCOUNTER
----- Message from Jessi Rociosudarshan Jenkinsry sent at 5/10/2024 12:50 PM CDT -----  Type:  RX Refill Request    Who Called: pt  Refill or New Rx:refill  RX Name and Strength:promethazine (PHENERGAN) 25 MG tablet  How is the patient currently taking it? (ex. 1XDay):Take 1 tablet (25 mg total) by mouth every 6 (six) hours as needed for Nausea  Is this a 30 day or 90 day RX:30  [facility] with phone number[id number]   Phone: [phone]  Fax: [phone]        Local or Mail Order:local  Ordering Provider:Dr Domingo  Would the patient rather a call back or a response via [facility]? call  Best Call Back Number:[phone] (  Additional Information: please call pt back when you send in so she will have it to take with other medication if need be

## 2024-05-10 NOTE — TELEPHONE ENCOUNTER
No care due was identified.  [facility] Embedded Care Due Messages. Reference number: [id number].   5/10/2024 12:55:54 PM CDT

## 2024-05-13 ENCOUNTER — OFFICE VISIT (OUTPATIENT)
Dept: FAMILY MEDICINE | Facility: CLINIC | Age: 64
End: 2024-05-13
Payer: MEDICARE

## 2024-05-13 VITALS
TEMPERATURE: 98 F | HEIGHT: 60 IN | DIASTOLIC BLOOD PRESSURE: 80 MMHG | WEIGHT: 193.81 LBS | HEART RATE: 81 BPM | BODY MASS INDEX: 38.05 KG/M2 | SYSTOLIC BLOOD PRESSURE: 138 MMHG | OXYGEN SATURATION: 98 %

## 2024-05-13 DIAGNOSIS — I10 ESSENTIAL HYPERTENSION: ICD-10-CM

## 2024-05-13 DIAGNOSIS — M54.50 ACUTE BILATERAL LOW BACK PAIN WITHOUT SCIATICA: Primary | ICD-10-CM

## 2024-05-13 PROCEDURE — 99213 OFFICE O/P EST LOW 20 MIN: CPT | Mod: S$GLB,,, | Performed by: FAMILY MEDICINE

## 2024-05-13 RX ORDER — HYDROCODONE BITARTRATE AND ACETAMINOPHEN 10; 325 MG/1; MG/1
TABLET ORAL
Qty: 25 TABLET | Refills: 0 | Status: SHIPPED | OUTPATIENT
Start: 2024-05-13

## 2024-05-13 NOTE — PROGRESS NOTES
Patient ID: Cara Mendoza is a 63 y.o. female.    Chief Complaint: Pain and Back Pain    HPI     Cara Mendoza is a 63 y.o. female severe back pain over last four or five days.  Was working in her flower bed weaning for about 6 hours-she could not get up from a squatting/seated position.  A neighbor had to help her up.  Since then has been excruciating lower back pain.  No pain running down her legs is really focused on the lumbar area and maybe SI area left side more so than right.  No red flags.  Current over-the-counter medications not really relieving this pain.      Review of Symptoms      /80   Pulse 81   Temp 97.7 °F (36.5 °C) (Oral)   Ht 5' (1.524 m)   Wt 87.9 kg (193 lb 12.6 oz)   SpO2 98%   BMI 37.85 kg/m²     Physical Exam    Vitals:    05/13/24 1152 05/13/24 1220   BP: (!) 142/82 138/80   BP Location: Left arm    Patient Position: Standing    Pulse: 81    Temp: 97.7 °F (36.5 °C)    TempSrc: Oral    SpO2: 98%    Weight: 87.9 kg (193 lb 12.6 oz)    Height: 5' (1.524 m)        Constitutional:   Oriented to person, place, and time.appears well-developed and well-nourished.   No distress.      HENT  Head: Normocephalic and atraumatic  Right Ear: External ear normal.   Left Ear: External ear normal.   Nose: External nose normal.   Mouth: Moist mucous membranes    Eyes:   Conjunctivae are normal. Right eye exhibits no discharge. Left eye exhibits no discharge. No scleral icterus. No periorbital edema    Musculoskeletal:  No edema. No obvious deformity No wasting  Walking with a walker of right  Does not want to sit down because of the pain.  Tenderness palpation along bilateral as five and upper sacroiliac areas.     Neurological:  Alert and oriented to person, place, and time. Coordination normal.     Skin:   Skin is warm and dry.  No diaphoresis.   No rash noted.     Psychiatric: Normal mood and affect. Behavior is normal. Judgment and thought content normal.     Complete Blood Count  Lab  "Results   Component Value Date    RBC 4.83 08/09/2023    HGB 14.1 08/09/2023    HCT 41.7 08/09/2023    MCV 86 08/09/2023    MCH 29.2 08/09/2023    MCHC 33.8 08/09/2023    RDW 14.3 08/09/2023     08/09/2023    MPV 10.9 08/09/2023    GRAN 4.7 08/09/2023    GRAN 59.1 08/09/2023    LYMPH 2.3 08/09/2023    LYMPH 29.6 08/09/2023    MONO 0.7 08/09/2023    MONO 8.3 08/09/2023    EOS 0.2 08/09/2023    BASO 0.04 08/09/2023    EOSINOPHIL 2.2 08/09/2023    BASOPHIL 0.5 08/09/2023    DIFFMETHOD Automated 08/09/2023       Comprehensive Metabolic Panel  No results found for: "GLU", "BUN", "CREATININE", "NA", "K", "CL", "PROT", "ALBUMIN", "BILITOT", "AST", "ALKPHOS", "CO2", "ALT", "ANIONGAP", "EGFRNONAA", "ESTGFRAFRICA"    TSH  No results found for: "TSH"    Assessment / Plan:      ICD-10-CM ICD-9-CM   1. Acute bilateral low back pain without sciatica  M54.50 724.2     338.19   2. Essential hypertension  I10 401.9     Acute bilateral low back pain without sciatica    Essential hypertension    Other orders  -     linaCLOtide (LINZESS) 72 mcg Cap capsule; Take 1 capsule (72 mcg total) by mouth before breakfast.  Dispense: 30 each; Refill: 1  -     HYDROcodone-acetaminophen (NORCO)  mg per tablet; One half to one po Q 6 hours as needed for moderate to severe pain in lower back  Dispense: 25 tablet; Refill: 0        "

## 2024-05-15 ENCOUNTER — OFFICE VISIT (OUTPATIENT)
Dept: PSYCHIATRY | Facility: CLINIC | Age: 64
End: 2024-05-15
Payer: MEDICARE

## 2024-05-15 DIAGNOSIS — F41.9 ANXIETY: ICD-10-CM

## 2024-05-15 DIAGNOSIS — F33.0 MILD EPISODE OF RECURRENT MAJOR DEPRESSIVE DISORDER: Primary | ICD-10-CM

## 2024-05-15 PROCEDURE — 90837 PSYTX W PT 60 MINUTES: CPT | Mod: 95,,, | Performed by: SOCIAL WORKER

## 2024-05-21 ENCOUNTER — PATIENT MESSAGE (OUTPATIENT)
Dept: FAMILY MEDICINE | Facility: CLINIC | Age: 64
End: 2024-05-21
Payer: MEDICARE

## 2024-05-21 NOTE — PROGRESS NOTES
Individual Psychotherapy (PhD/LCSW)    5/8/2024    Site:  Telemed         Therapeutic Intervention: Met with patient.  Outpatient - Supportive psychotherapy ]    Chief complaint/reason for encounter: depression and anxiety     Interval history and content of current session:   The patient presents with a depressed mood and a flat affect.   She is tearful and states  that there has been conflict in her family. Her grandson and his girlfriend were living with him, the girlfriend got mad and called DCFS on the patient's daughter in law. The patient states that the DCFS worker told that the girlfriend called. She states that her son gave her an ultimatum to put the grandson's girlfriend out or they would not associate with her. The patient states that this was a difficult decision for her but she had to choose her family. She states that her grandson was upset and moved out of the house.   The patient continues to deal with the recent loss of her father and support and encouragement were offered. Stress reductions techniques were discussed along with the need for appropriate boundary setting. The patient was receptive to feedback and encouraged to follow up as scheduled.     Treatment plan:  Target symptoms: depression, distractability, lack of focus, recurrent depression, anxiety   Why chosen therapy is appropriate versus another modality: relevant to diagnosis, patient responds to this modality, evidence based practice  Outcome monitoring methods: self-report, observation  Therapeutic intervention type: insight oriented psychotherapy, supportive psychotherapy, interactive psychotherapy    Risk parameters:  Patient reports no suicidal ideation  Patient reports no homicidal ideation  Patient reports no self-injurious behavior  Patient reports no violent behavior    Verbal deficits: None    Patient's response to intervention:  The patient's response to intervention is accepting.    Progress toward goals and other mental  status changes:  The patient's progress toward goals is fair .    Diagnosis:     ICD-10-CM ICD-9-CM   1. Mild episode of recurrent major depressive disorder  F33.0 296.31   2. Anxiety  F41.9 300.00                             Plan:  individual psychotherapy    Return to clinic: 3 weeks  Length of Service (minutes): 53    The patient location is: [place], [place]  The chief complaint leading to consultation is: Anxiety/Depression    Visit type: audiovisual    Face to Face time with patient: 53  60 minutes of total time spent on the encounter, which includes face to face time and non-face to face time preparing to see the patient (eg, review of tests), Obtaining and/or reviewing separately obtained history, Documenting clinical information in the electronic or other health record, Independently interpreting results (not separately reported) and communicating results to the patient/family/caregiver, or Care coordination (not separately reported).   Each patient to whom he or she provides medical services by telemedicine is:  (1) informed of the relationship between the physician and patient and the respective role of any other health care provider with respect to management of the patient; and (2) notified that he or she may decline to receive medical services by telemedicine and may withdraw from such care at any time.    Notes:

## 2024-05-22 ENCOUNTER — OFFICE VISIT (OUTPATIENT)
Dept: PSYCHIATRY | Facility: CLINIC | Age: 64
End: 2024-05-22
Payer: MEDICARE

## 2024-05-22 DIAGNOSIS — F41.9 ANXIETY: ICD-10-CM

## 2024-05-22 DIAGNOSIS — F33.0 MILD EPISODE OF RECURRENT MAJOR DEPRESSIVE DISORDER: Primary | ICD-10-CM

## 2024-05-22 PROCEDURE — 90837 PSYTX W PT 60 MINUTES: CPT | Mod: 95,,, | Performed by: SOCIAL WORKER

## 2024-05-22 RX ORDER — PANTOPRAZOLE SODIUM 40 MG/1
40 TABLET, DELAYED RELEASE ORAL 2 TIMES DAILY
Qty: 60 TABLET | Refills: 3 | Status: SHIPPED | OUTPATIENT
Start: 2024-05-22

## 2024-05-28 NOTE — PROGRESS NOTES
Individual Psychotherapy (PhD/LCSW)    5/15/2024    Site:  Telemed         Therapeutic Intervention: Met with patient.  Outpatient - Supportive psychotherapy ]    Chief complaint/reason for encounter: depression and anxiety     Interval history and content of current session:   The patient presents with a depressed mood and a flat affect.   She states that she is missing her father - she states that there continues to be conflict in her family.  The patient states that she has been having issues sleeping at night and she is advised to speak with her PCP.   She was receptive to provided feedback and encouraged to follow up as scheduled.     Treatment plan:  Target symptoms: depression, distractability, lack of focus, recurrent depression, anxiety   Why chosen therapy is appropriate versus another modality: relevant to diagnosis, patient responds to this modality, evidence based practice  Outcome monitoring methods: self-report, observation  Therapeutic intervention type: insight oriented psychotherapy, supportive psychotherapy, interactive psychotherapy    Risk parameters:  Patient reports no suicidal ideation  Patient reports no homicidal ideation  Patient reports no self-injurious behavior  Patient reports no violent behavior    Verbal deficits: None    Patient's response to intervention:  The patient's response to intervention is accepting.    Progress toward goals and other mental status changes:  The patient's progress toward goals is fair .    Diagnosis:     ICD-10-CM ICD-9-CM   1. Mild episode of recurrent major depressive disorder  F33.0 296.31   2. Anxiety  F41.9 300.00                 Plan:  individual psychotherapy    Return to clinic: 3 weeks  Length of Service (minutes): 53    The patient location is: [place], [place]  The chief complaint leading to consultation is: Anxiety/Depression    Visit type: audiovisual    Face to Face time with patient: 53  60 minutes of total time spent on the encounter, which  includes face to face time and non-face to face time preparing to see the patient (eg, review of tests), Obtaining and/or reviewing separately obtained history, Documenting clinical information in the electronic or other health record, Independently interpreting results (not separately reported) and communicating results to the patient/family/caregiver, or Care coordination (not separately reported).   Each patient to whom he or she provides medical services by telemedicine is:  (1) informed of the relationship between the physician and patient and the respective role of any other health care provider with respect to management of the patient; and (2) notified that he or she may decline to receive medical services by telemedicine and may withdraw from such care at any time.    Notes:

## 2024-05-30 NOTE — PROGRESS NOTES
Individual Psychotherapy (PhD/LCSW)    5/22/2024    Site:  Telemed         Therapeutic Intervention: Met with patient.  Outpatient - Supportive psychotherapy ]    Chief complaint/reason for encounter: depression and anxiety     Interval history and content of current session:   The patient presents with a depressed mood and a flat affect.   She states that she is missing her father - she states that there continues to be conflict in her family.  The patient states that she has been having issues sleeping at night and she is advised to speak with her PCP.   She was receptive to provided feedback and encouraged to follow up as scheduled.     The patient is also going on a trip with her children and grandchildren and she is excited about that. Pervasive sadness continues surrounding the loss of loved ones.     Treatment plan:  Target symptoms: depression, distractability, lack of focus, recurrent depression, anxiety   Why chosen therapy is appropriate versus another modality: relevant to diagnosis, patient responds to this modality, evidence based practice  Outcome monitoring methods: self-report, observation  Therapeutic intervention type: insight oriented psychotherapy, supportive psychotherapy, interactive psychotherapy    Risk parameters:  Patient reports no suicidal ideation  Patient reports no homicidal ideation  Patient reports no self-injurious behavior  Patient reports no violent behavior    Verbal deficits: None    Patient's response to intervention:  The patient's response to intervention is accepting.    Progress toward goals and other mental status changes:  The patient's progress toward goals is fair .    Diagnosis:     ICD-10-CM ICD-9-CM   1. Mild episode of recurrent major depressive disorder  F33.0 296.31   2. Anxiety  F41.9 300.00                   Plan:  individual psychotherapy    Return to clinic: 3 weeks  Length of Service (minutes): 53    The patient location is: [place], [place]  The chief complaint  leading to consultation is: Anxiety/Depression    Visit type: audiovisual    Face to Face time with patient: 53  60 minutes of total time spent on the encounter, which includes face to face time and non-face to face time preparing to see the patient (eg, review of tests), Obtaining and/or reviewing separately obtained history, Documenting clinical information in the electronic or other health record, Independently interpreting results (not separately reported) and communicating results to the patient/family/caregiver, or Care coordination (not separately reported).   Each patient to whom he or she provides medical services by telemedicine is:  (1) informed of the relationship between the physician and patient and the respective role of any other health care provider with respect to management of the patient; and (2) notified that he or she may decline to receive medical services by telemedicine and may withdraw from such care at any time.    Notes:

## 2024-06-06 ENCOUNTER — OFFICE VISIT (OUTPATIENT)
Dept: PSYCHIATRY | Facility: CLINIC | Age: 64
End: 2024-06-06
Payer: MEDICARE

## 2024-06-06 DIAGNOSIS — F33.0 MILD EPISODE OF RECURRENT MAJOR DEPRESSIVE DISORDER: ICD-10-CM

## 2024-06-06 DIAGNOSIS — F41.9 ANXIETY: Primary | ICD-10-CM

## 2024-06-06 PROCEDURE — 90837 PSYTX W PT 60 MINUTES: CPT | Mod: 95,,, | Performed by: SOCIAL WORKER

## 2024-06-12 DIAGNOSIS — Z78.0 MENOPAUSE: ICD-10-CM

## 2024-06-19 ENCOUNTER — OFFICE VISIT (OUTPATIENT)
Dept: PSYCHIATRY | Facility: CLINIC | Age: 64
End: 2024-06-19
Payer: MEDICARE

## 2024-06-19 DIAGNOSIS — F33.0 MILD EPISODE OF RECURRENT MAJOR DEPRESSIVE DISORDER: Primary | ICD-10-CM

## 2024-06-19 DIAGNOSIS — F41.9 ANXIETY: ICD-10-CM

## 2024-06-19 PROCEDURE — 90837 PSYTX W PT 60 MINUTES: CPT | Mod: 95,,, | Performed by: SOCIAL WORKER

## 2024-06-24 NOTE — PROGRESS NOTES
Individual Psychotherapy (PhD/LCSW)    6/6/2024    Site:  Telemed         Therapeutic Intervention: Met with patient.  Outpatient - Supportive psychotherapy     Chief complaint/reason for encounter: depression and anxiety     Interval history and content of current session:     The patient presents with a depressed mood and a flat affect.   She continues to grieve the loss of her father and she states that she recently had to sign legal documents for the sale of her parents' home. The patient does not feel as though her siblings have supported her or understand how she feels as she took care of her father and he passed at her home.   She and her niece have had a disagreement, I have encouraged the patient to consider how her behavior and attitude contribute to interpersonal conflict, she does admit that she can be passive aggressive at times. The patient was receptive to feedback and she is encouraged to follow up as scheduled.       Treatment plan:  Target symptoms: depression, distractability, lack of focus, recurrent depression, anxiety   Why chosen therapy is appropriate versus another modality: relevant to diagnosis, patient responds to this modality, evidence based practice  Outcome monitoring methods: self-report, observation  Therapeutic intervention type: insight oriented psychotherapy, supportive psychotherapy, interactive psychotherapy    Risk parameters:  Patient reports no suicidal ideation  Patient reports no homicidal ideation  Patient reports no self-injurious behavior  Patient reports no violent behavior    Verbal deficits: None    Patient's response to intervention:  The patient's response to intervention is accepting.    Progress toward goals and other mental status changes:  The patient's progress toward goals is fair .    Diagnosis:     ICD-10-CM ICD-9-CM   1. Anxiety  F41.9 300.00   2. Mild episode of recurrent major depressive disorder  F33.0 296.31                     Plan:  individual  psychotherapy    Return to clinic: 3 weeks  Length of Service (minutes): 53    The patient location is: [place], [place]  The chief complaint leading to consultation is: Anxiety/Depression    Visit type: audiovisual    Face to Face time with patient: 53  60 minutes of total time spent on the encounter, which includes face to face time and non-face to face time preparing to see the patient (eg, review of tests), Obtaining and/or reviewing separately obtained history, Documenting clinical information in the electronic or other health record, Independently interpreting results (not separately reported) and communicating results to the patient/family/caregiver, or Care coordination (not separately reported).   Each patient to whom he or she provides medical services by telemedicine is:  (1) informed of the relationship between the physician and patient and the respective role of any other health care provider with respect to management of the patient; and (2) notified that he or she may decline to receive medical services by telemedicine and may withdraw from such care at any time.    Notes:

## 2024-06-25 NOTE — PROGRESS NOTES
Individual Psychotherapy (PhD/LCSW)    6/19/2024    Site:  Telemed         Therapeutic Intervention: Met with patient.  Outpatient - Supportive psychotherapy     Chief complaint/reason for encounter: depression and anxiety     Interval history and content of current session:     The patient is present with a pleasant mood and an appropriate affect, though she acknowledges that times have difficult for her.   She continues to have some family problems and states that she is upset with her brother for not defending her grandson's job.   The patient continues to be stressed by many extraneous factors, including her family. Appropriate boundaries were discussed but the patient feels as though she has to caretake.   She continues to have some financial issues. We discussed budgeting and patient was receptive. She is hopeful to get her inheritance from father soon.       Treatment plan:  Target symptoms: depression, distractability, lack of focus, recurrent depression, anxiety   Why chosen therapy is appropriate versus another modality: relevant to diagnosis, patient responds to this modality, evidence based practice  Outcome monitoring methods: self-report, observation  Therapeutic intervention type: insight oriented psychotherapy, supportive psychotherapy, interactive psychotherapy    Risk parameters:  Patient reports no suicidal ideation  Patient reports no homicidal ideation  Patient reports no self-injurious behavior  Patient reports no violent behavior    Verbal deficits: None    Patient's response to intervention:  The patient's response to intervention is accepting.    Progress toward goals and other mental status changes:  The patient's progress toward goals is fair .    Diagnosis:     ICD-10-CM ICD-9-CM   1. Mild episode of recurrent major depressive disorder  F33.0 296.31   2. Anxiety  F41.9 300.00                       Plan:  individual psychotherapy    Return to clinic: 3 weeks  Length of Service (minutes):  53    The patient location is: [place], [place]  The chief complaint leading to consultation is: Anxiety/Depression    Visit type: audiovisual    Face to Face time with patient: 53  60 minutes of total time spent on the encounter, which includes face to face time and non-face to face time preparing to see the patient (eg, review of tests), Obtaining and/or reviewing separately obtained history, Documenting clinical information in the electronic or other health record, Independently interpreting results (not separately reported) and communicating results to the patient/family/caregiver, or Care coordination (not separately reported).   Each patient to whom he or she provides medical services by telemedicine is:  (1) informed of the relationship between the physician and patient and the respective role of any other health care provider with respect to management of the patient; and (2) notified that he or she may decline to receive medical services by telemedicine and may withdraw from such care at any time.    Notes:

## 2024-07-01 DIAGNOSIS — E66.01 SEVERE OBESITY (BMI 35.0-39.9) WITH COMORBIDITY: ICD-10-CM

## 2024-07-01 DIAGNOSIS — G44.86 CERVICOGENIC HEADACHE: ICD-10-CM

## 2024-07-01 DIAGNOSIS — I10 ESSENTIAL HYPERTENSION: ICD-10-CM

## 2024-07-01 RX ORDER — ALPRAZOLAM 0.5 MG/1
TABLET ORAL
Qty: 90 TABLET | Refills: 1 | Status: CANCELLED | OUTPATIENT
Start: 2024-07-06

## 2024-07-01 RX ORDER — GABAPENTIN 400 MG/1
CAPSULE ORAL
Qty: 180 CAPSULE | Refills: 3 | Status: SHIPPED | OUTPATIENT
Start: 2024-07-06 | End: 2024-07-03

## 2024-07-01 NOTE — TELEPHONE ENCOUNTER
No care due was identified.  [facility] Embedded Care Due Messages. Reference number: [id number].   7/01/2024 10:15:12 AM CDT

## 2024-07-01 NOTE — TELEPHONE ENCOUNTER
----- Message from Nanette Bethea sent at 7/1/2024 10:06 AM CDT -----  Type:  RX Refill Request    Who Called: Pt   Refill or New Rx: Refill   RX Name and Strength:    gabapentin (NEURONTIN) 400 MG capsule  ALPRAZolam (XANAX) 0.5 MG tablet    Preferred Pharmacy with phone number:  [id number] - [place], [place] - [address] [address]  Phone: [phone] Fax: [phone]  Local or Mail Order: Local   Ordering Provider: St Santos   Would the patient rather a call back or a response via [facility]? Call back   Best Call Back Number: [phone]  Additional Information: Please be advised, pt would like a call back when prescriptions are put in

## 2024-07-02 ENCOUNTER — OFFICE VISIT (OUTPATIENT)
Dept: PSYCHIATRY | Facility: CLINIC | Age: 64
End: 2024-07-02
Payer: MEDICARE

## 2024-07-02 DIAGNOSIS — F33.0 MILD EPISODE OF RECURRENT MAJOR DEPRESSIVE DISORDER: Primary | ICD-10-CM

## 2024-07-02 PROCEDURE — 90837 PSYTX W PT 60 MINUTES: CPT | Mod: 95,,, | Performed by: SOCIAL WORKER

## 2024-07-03 DIAGNOSIS — I10 ESSENTIAL HYPERTENSION: ICD-10-CM

## 2024-07-03 DIAGNOSIS — G44.86 CERVICOGENIC HEADACHE: ICD-10-CM

## 2024-07-03 DIAGNOSIS — E66.01 SEVERE OBESITY (BMI 35.0-39.9) WITH COMORBIDITY: ICD-10-CM

## 2024-07-03 RX ORDER — ALPRAZOLAM 0.5 MG/1
TABLET ORAL
Qty: 90 TABLET | OUTPATIENT
Start: 2024-07-03

## 2024-07-03 RX ORDER — GABAPENTIN 400 MG/1
CAPSULE ORAL
Qty: 180 CAPSULE | Refills: 3 | Status: SHIPPED | OUTPATIENT
Start: 2024-07-03

## 2024-07-03 NOTE — TELEPHONE ENCOUNTER
No care due was identified.  [facility] Embedded Care Due Messages. Reference number: [id number].   7/03/2024 6:13:27 AM CDT

## 2024-07-03 NOTE — TELEPHONE ENCOUNTER
No care due was identified.  [facility] Embedded Care Due Messages. Reference number: [id number].   7/03/2024 8:21:02 AM CDT

## 2024-07-04 RX ORDER — ALPRAZOLAM 0.5 MG/1
TABLET ORAL
Qty: 90 TABLET | Refills: 1 | Status: SHIPPED | OUTPATIENT
Start: 2024-07-04

## 2024-07-08 NOTE — PROGRESS NOTES
Individual Psychotherapy (PhD/LCSW)    7/2/2024    Site:  Telemed         Therapeutic Intervention: Met with patient.  Outpatient - Supportive psychotherapy ]    Chief complaint/reason for encounter: depression and anxiety     Interval history and content of current session:   The patient presents with a depressed mood and a flat affect.   She is tearful and states  that there has been conflict in her family. Her grandson and his girlfriend were living with him, the girlfriend got mad and called DCFS on the patient's daughter in law. The patient states that the DCFS worker told that the girlfriend called. She states that her son gave her an ultimatum to put the grandson's girlfriend out or they would not associate with her. The patient states that this was a difficult decision for her but she had to choose her family. She states that her grandson was upset and moved out of the house.   The patient continues to deal with the recent loss of her father and support and encouragement were offered. Stress reductions techniques were discussed along with the need for appropriate boundary setting. The patient was receptive to feedback and encouraged to follow up as scheduled.     Treatment plan:  Target symptoms: depression, distractability, lack of focus, recurrent depression, anxiety   Why chosen therapy is appropriate versus another modality: relevant to diagnosis, patient responds to this modality, evidence based practice  Outcome monitoring methods: self-report, observation  Therapeutic intervention type: insight oriented psychotherapy, supportive psychotherapy, interactive psychotherapy    Risk parameters:  Patient reports no suicidal ideation  Patient reports no homicidal ideation  Patient reports no self-injurious behavior  Patient reports no violent behavior    Verbal deficits: None    Patient's response to intervention:  The patient's response to intervention is accepting.    Progress toward goals and other mental  status changes:  The patient's progress toward goals is fair .    Diagnosis:     ICD-10-CM ICD-9-CM   1. Mild episode of recurrent major depressive disorder  F33.0 296.31                               Plan:  individual psychotherapy    Return to clinic: 3 weeks  Length of Service (minutes): 53    The patient location is: [place], [place]  The chief complaint leading to consultation is: Anxiety/Depression    Visit type: audiovisual    Face to Face time with patient: 53  60 minutes of total time spent on the encounter, which includes face to face time and non-face to face time preparing to see the patient (eg, review of tests), Obtaining and/or reviewing separately obtained history, Documenting clinical information in the electronic or other health record, Independently interpreting results (not separately reported) and communicating results to the patient/family/caregiver, or Care coordination (not separately reported).   Each patient to whom he or she provides medical services by telemedicine is:  (1) informed of the relationship between the physician and patient and the respective role of any other health care provider with respect to management of the patient; and (2) notified that he or she may decline to receive medical services by telemedicine and may withdraw from such care at any time.    Notes:

## 2024-07-09 ENCOUNTER — OFFICE VISIT (OUTPATIENT)
Dept: PSYCHIATRY | Facility: CLINIC | Age: 64
End: 2024-07-09
Payer: MEDICARE

## 2024-07-09 DIAGNOSIS — F33.0 MILD EPISODE OF RECURRENT MAJOR DEPRESSIVE DISORDER: Primary | ICD-10-CM

## 2024-07-09 DIAGNOSIS — F41.9 ANXIETY: ICD-10-CM

## 2024-07-09 PROCEDURE — 90837 PSYTX W PT 60 MINUTES: CPT | Mod: 95,,, | Performed by: SOCIAL WORKER

## 2024-07-16 NOTE — PROGRESS NOTES
Individual Psychotherapy (PhD/LCSW)    7/9/2024    Site:  Telemed         Therapeutic Intervention: Met with patient.  Outpatient - Supportive psychotherapy ]    Chief complaint/reason for encounter: depression and anxiety     Interval history and content of current session:   The patient presents with a pleasant mood and an appropriate affect.   The patient states that she is looking forward to going on a family trip with her children.   She continues to feel sadness relative to the recent death of her father.   There continues to deal with some complex family dynamics and states that she is trying her best to engage with her family as best as she can being mindful of her behaviors and interactions with them.   Support and encouragement were offered to the patient and she is advised to follow up as needed.     Treatment plan:  Target symptoms: depression, distractability, lack of focus, recurrent depression, anxiety   Why chosen therapy is appropriate versus another modality: relevant to diagnosis, patient responds to this modality, evidence based practice  Outcome monitoring methods: self-report, observation  Therapeutic intervention type: insight oriented psychotherapy, supportive psychotherapy, interactive psychotherapy    Risk parameters:  Patient reports no suicidal ideation  Patient reports no homicidal ideation  Patient reports no self-injurious behavior  Patient reports no violent behavior    Verbal deficits: None    Patient's response to intervention:  The patient's response to intervention is accepting.    Progress toward goals and other mental status changes:  The patient's progress toward goals is fair .    Diagnosis:     ICD-10-CM ICD-9-CM   1. Mild episode of recurrent major depressive disorder  F33.0 296.31   2. Anxiety  F41.9 300.00                                 Plan:  individual psychotherapy    Return to clinic: 3 weeks  Length of Service (minutes): 53    The patient location is: [facility]  [place]  The chief complaint leading to consultation is: Anxiety/Depression    Visit type: audiovisual    Face to Face time with patient: 53  60 minutes of total time spent on the encounter, which includes face to face time and non-face to face time preparing to see the patient (eg, review of tests), Obtaining and/or reviewing separately obtained history, Documenting clinical information in the electronic or other health record, Independently interpreting results (not separately reported) and communicating results to the patient/family/caregiver, or Care coordination (not separately reported).   Each patient to whom he or she provides medical services by telemedicine is:  (1) informed of the relationship between the physician and patient and the respective role of any other health care provider with respect to management of the patient; and (2) notified that he or she may decline to receive medical services by telemedicine and may withdraw from such care at any time.    Notes:

## 2024-07-17 ENCOUNTER — OFFICE VISIT (OUTPATIENT)
Dept: PSYCHIATRY | Facility: CLINIC | Age: 64
End: 2024-07-17
Payer: MEDICARE

## 2024-07-17 DIAGNOSIS — F33.0 MILD EPISODE OF RECURRENT MAJOR DEPRESSIVE DISORDER: Primary | ICD-10-CM

## 2024-07-17 PROCEDURE — 90834 PSYTX W PT 45 MINUTES: CPT | Mod: 95,,, | Performed by: SOCIAL WORKER

## 2024-07-24 ENCOUNTER — LAB VISIT (OUTPATIENT)
Dept: LAB | Facility: HOSPITAL | Age: 64
End: 2024-07-24
Attending: FAMILY MEDICINE
Payer: MEDICARE

## 2024-07-24 ENCOUNTER — PATIENT MESSAGE (OUTPATIENT)
Dept: HEPATOLOGY | Facility: CLINIC | Age: 64
End: 2024-07-24
Payer: MEDICARE

## 2024-07-24 DIAGNOSIS — Z00.00 ROUTINE HEALTH MAINTENANCE: ICD-10-CM

## 2024-07-24 DIAGNOSIS — E03.9 HYPOTHYROIDISM, UNSPECIFIED TYPE: Chronic | ICD-10-CM

## 2024-07-24 DIAGNOSIS — I10 ESSENTIAL HYPERTENSION: Chronic | ICD-10-CM

## 2024-07-24 LAB
ALBUMIN SERPL BCP-MCNC: 3.8 G/DL (ref 3.5–5.2)
ALP SERPL-CCNC: 65 U/L (ref 38–126)
ALT SERPL W/O P-5'-P-CCNC: 9 U/L (ref 10–44)
ANION GAP SERPL CALC-SCNC: 5 MMOL/L (ref 8–16)
AST SERPL-CCNC: 20 U/L (ref 15–46)
BASOPHILS # BLD AUTO: 0.04 K/UL (ref 0–0.2)
BASOPHILS NFR BLD: 0.7 % (ref 0–1.9)
BILIRUB SERPL-MCNC: 0.3 MG/DL (ref 0.1–1)
CALCIUM SERPL-MCNC: 9.1 MG/DL (ref 8.7–10.5)
CHLORIDE SERPL-SCNC: 97 MMOL/L (ref 95–110)
CHOLEST SERPL-MCNC: 244 MG/DL (ref 120–199)
CHOLEST/HDLC SERPL: 5.1 {RATIO} (ref 2–5)
CO2 SERPL-SCNC: 36 MMOL/L (ref 23–29)
CREAT SERPL-MCNC: 0.74 MG/DL (ref 0.5–1.4)
DIFFERENTIAL METHOD BLD: ABNORMAL
EOSINOPHIL # BLD AUTO: 0.2 K/UL (ref 0–0.5)
EOSINOPHIL NFR BLD: 4 % (ref 0–8)
ERYTHROCYTE [DISTWIDTH] IN BLOOD BY AUTOMATED COUNT: 14.6 % (ref 11.5–14.5)
EST. GFR  (NO RACE VARIABLE): >60 ML/MIN/1.73 M^2
GLUCOSE SERPL-MCNC: 98 MG/DL (ref 70–110)
HCT VFR BLD AUTO: 41.8 % (ref 37–48.5)
HDLC SERPL-MCNC: 48 MG/DL (ref 40–75)
HDLC SERPL: 19.7 % (ref 20–50)
HGB BLD-MCNC: 13.8 G/DL (ref 12–16)
IMM GRANULOCYTES # BLD AUTO: 0.02 K/UL (ref 0–0.04)
IMM GRANULOCYTES NFR BLD AUTO: 0.3 % (ref 0–0.5)
LDLC SERPL CALC-MCNC: 155.8 MG/DL (ref 63–159)
LYMPHOCYTES # BLD AUTO: 2.8 K/UL (ref 1–4.8)
LYMPHOCYTES NFR BLD: 46.6 % (ref 18–48)
MCH RBC QN AUTO: 28.7 PG (ref 27–31)
MCHC RBC AUTO-ENTMCNC: 33 G/DL (ref 32–36)
MCV RBC AUTO: 87 FL (ref 82–98)
MONOCYTES # BLD AUTO: 0.5 K/UL (ref 0.3–1)
MONOCYTES NFR BLD: 8.8 % (ref 4–15)
NEUTROPHILS # BLD AUTO: 2.4 K/UL (ref 1.8–7.7)
NEUTROPHILS NFR BLD: 39.6 % (ref 38–73)
NONHDLC SERPL-MCNC: 196 MG/DL
NRBC BLD-RTO: 0 /100 WBC
PLATELET # BLD AUTO: 276 K/UL (ref 150–450)
PMV BLD AUTO: 10.8 FL (ref 9.2–12.9)
POTASSIUM SERPL-SCNC: 4.2 MMOL/L (ref 3.5–5.1)
PROT SERPL-MCNC: 6.6 G/DL (ref 6–8.4)
RBC # BLD AUTO: 4.81 M/UL (ref 4–5.4)
SODIUM SERPL-SCNC: 138 MMOL/L (ref 136–145)
T4 FREE SERPL-MCNC: 0.93 NG/DL (ref 0.71–1.51)
TRIGL SERPL-MCNC: 201 MG/DL (ref 30–150)
TSH SERPL DL<=0.005 MIU/L-ACNC: 2.57 UIU/ML (ref 0.4–4)
UUN UR-MCNC: 14 MG/DL (ref 7–17)
WBC # BLD AUTO: 6.01 K/UL (ref 3.9–12.7)

## 2024-07-24 PROCEDURE — 84443 ASSAY THYROID STIM HORMONE: CPT | Mod: PN | Performed by: FAMILY MEDICINE

## 2024-07-24 PROCEDURE — 84439 ASSAY OF FREE THYROXINE: CPT | Performed by: FAMILY MEDICINE

## 2024-07-24 PROCEDURE — 80053 COMPREHEN METABOLIC PANEL: CPT | Mod: PN | Performed by: FAMILY MEDICINE

## 2024-07-24 PROCEDURE — 36415 COLL VENOUS BLD VENIPUNCTURE: CPT | Mod: PN | Performed by: FAMILY MEDICINE

## 2024-07-24 PROCEDURE — 85025 COMPLETE CBC W/AUTO DIFF WBC: CPT | Mod: PN | Performed by: FAMILY MEDICINE

## 2024-07-24 PROCEDURE — 80061 LIPID PANEL: CPT | Performed by: FAMILY MEDICINE

## 2024-07-27 ENCOUNTER — PATIENT MESSAGE (OUTPATIENT)
Dept: FAMILY MEDICINE | Facility: CLINIC | Age: 64
End: 2024-07-27
Payer: MEDICARE

## 2024-07-31 ENCOUNTER — OFFICE VISIT (OUTPATIENT)
Dept: PSYCHIATRY | Facility: CLINIC | Age: 64
End: 2024-07-31
Payer: MEDICARE

## 2024-07-31 DIAGNOSIS — F33.0 MILD EPISODE OF RECURRENT MAJOR DEPRESSIVE DISORDER: Primary | ICD-10-CM

## 2024-07-31 DIAGNOSIS — F41.9 ANXIETY: ICD-10-CM

## 2024-07-31 PROCEDURE — 90834 PSYTX W PT 45 MINUTES: CPT | Mod: 95,,, | Performed by: SOCIAL WORKER

## 2024-07-31 NOTE — PROGRESS NOTES
Individual Psychotherapy (PhD/LCSW)    7/17/2024    Site:  Telemed         Therapeutic Intervention: Met with patient.  Outpatient - Supportive psychotherapy ]    Chief complaint/reason for encounter: depression and anxiety     Interval history and content of current session:   The patient presents with a pleasant mood and an appropriate affect.   She states that she went on her trip with her son and hs wife and she did not enjoy herself. The patient states that she got into an argument with her son's wife and states that her son is now angry with her.   She states that she feels lonely without her father and she feels as though her support is minimal. Endorses some minor conflict with her brother secondary to selling her parents' property. Support and encouragement were offered.     Treatment plan:  Target symptoms: depression, distractability, lack of focus, recurrent depression, anxiety   Why chosen therapy is appropriate versus another modality: relevant to diagnosis, patient responds to this modality, evidence based practice  Outcome monitoring methods: self-report, observation  Therapeutic intervention type: insight oriented psychotherapy, supportive psychotherapy, interactive psychotherapy    Risk parameters:  Patient reports no suicidal ideation  Patient reports no homicidal ideation  Patient reports no self-injurious behavior  Patient reports no violent behavior    Verbal deficits: None    Patient's response to intervention:  The patient's response to intervention is accepting.    Progress toward goals and other mental status changes:  The patient's progress toward goals is fair .    Diagnosis:     ICD-10-CM ICD-9-CM   1. Mild episode of recurrent major depressive disorder  F33.0 296.31                                   Plan:  individual psychotherapy    Return to clinic: 3 weeks  Length of Service (minutes): 53    The patient location is: [place], [place]  The chief complaint leading to consultation is:  Anxiety/Depression    Visit type: audiovisual    Face to Face time with patient: 53  60 minutes of total time spent on the encounter, which includes face to face time and non-face to face time preparing to see the patient (eg, review of tests), Obtaining and/or reviewing separately obtained history, Documenting clinical information in the electronic or other health record, Independently interpreting results (not separately reported) and communicating results to the patient/family/caregiver, or Care coordination (not separately reported).   Each patient to whom he or she provides medical services by telemedicine is:  (1) informed of the relationship between the physician and patient and the respective role of any other health care provider with respect to management of the patient; and (2) notified that he or she may decline to receive medical services by telemedicine and may withdraw from such care at any time.    Notes:

## 2024-08-01 ENCOUNTER — PATIENT MESSAGE (OUTPATIENT)
Dept: PSYCHIATRY | Facility: CLINIC | Age: 64
End: 2024-08-01
Payer: MEDICARE

## 2024-08-02 ENCOUNTER — PATIENT MESSAGE (OUTPATIENT)
Dept: PSYCHIATRY | Facility: CLINIC | Age: 64
End: 2024-08-02
Payer: MEDICARE

## 2024-08-02 NOTE — PROGRESS NOTES
Individual Psychotherapy (PhD/LCSW)    7/31/2024    Site:  Telemed         Therapeutic Intervention: Met with patient.  Outpatient - Supportive psychotherapy ]    Chief complaint/reason for encounter: depression and anxiety     Interval history and content of current session:   The patient presents with a sad affect and a depressed mood.   She states that she feels lonely and would like to have a mate but she does not believe that she can attract a man. She required some redirection for her self disparaging comments. She states that she has been distant with her family. We processed a need to socialize more also discussed options for SSRIs and encouraged a conversation with her PCP about her depression and grief.     Treatment plan:  Target symptoms: depression, distractability, lack of focus, recurrent depression, anxiety   Why chosen therapy is appropriate versus another modality: relevant to diagnosis, patient responds to this modality, evidence based practice  Outcome monitoring methods: self-report, observation  Therapeutic intervention type: insight oriented psychotherapy, supportive psychotherapy, interactive psychotherapy    Risk parameters:  Patient reports no suicidal ideation  Patient reports no homicidal ideation  Patient reports no self-injurious behavior  Patient reports no violent behavior    Verbal deficits: None    Patient's response to intervention:  The patient's response to intervention is accepting.    Progress toward goals and other mental status changes:  The patient's progress toward goals is fair .    Diagnosis:     ICD-10-CM ICD-9-CM   1. Mild episode of recurrent major depressive disorder  F33.0 296.31   2. Anxiety  F41.9 300.00                                     Plan:  individual psychotherapy    Return to clinic: 3 weeks  Length of Service (minutes): 53    The patient location is: [place], [place]  The chief complaint leading to consultation is: Anxiety/Depression    Visit type:  audiovisual    Face to Face time with patient: 53  60 minutes of total time spent on the encounter, which includes face to face time and non-face to face time preparing to see the patient (eg, review of tests), Obtaining and/or reviewing separately obtained history, Documenting clinical information in the electronic or other health record, Independently interpreting results (not separately reported) and communicating results to the patient/family/caregiver, or Care coordination (not separately reported).   Each patient to whom he or she provides medical services by telemedicine is:  (1) informed of the relationship between the physician and patient and the respective role of any other health care provider with respect to management of the patient; and (2) notified that he or she may decline to receive medical services by telemedicine and may withdraw from such care at any time.    Notes:

## 2024-08-05 ENCOUNTER — TELEPHONE (OUTPATIENT)
Dept: FAMILY MEDICINE | Facility: CLINIC | Age: 64
End: 2024-08-05
Payer: MEDICARE

## 2024-08-05 RX ORDER — EZETIMIBE 10 MG/1
10 TABLET ORAL DAILY
Qty: 90 TABLET | Refills: 3 | Status: SHIPPED | OUTPATIENT
Start: 2024-08-05 | End: 2025-08-05

## 2024-08-06 ENCOUNTER — PATIENT MESSAGE (OUTPATIENT)
Dept: PSYCHIATRY | Facility: CLINIC | Age: 64
End: 2024-08-06
Payer: MEDICARE

## 2024-09-02 NOTE — TELEPHONE ENCOUNTER
No care due was identified.  [facility] Embedded Care Due Messages. Reference number: [id number].   9/02/2024 5:05:10 PM CDT

## 2024-09-03 RX ORDER — ALPRAZOLAM 0.5 MG/1
TABLET ORAL
Qty: 90 TABLET | Refills: 1 | Status: SHIPPED | OUTPATIENT
Start: 2024-09-03

## 2024-09-23 RX ORDER — PANTOPRAZOLE SODIUM 40 MG/1
40 TABLET, DELAYED RELEASE ORAL 2 TIMES DAILY
Qty: 60 TABLET | Refills: 0 | Status: SHIPPED | OUTPATIENT
Start: 2024-09-23

## 2024-09-29 RX ORDER — PROMETHAZINE HYDROCHLORIDE 25 MG/1
25 TABLET ORAL EVERY 6 HOURS PRN
Qty: 30 TABLET | Refills: 0 | Status: SHIPPED | OUTPATIENT
Start: 2024-09-29

## 2024-09-30 NOTE — TELEPHONE ENCOUNTER
No care due was identified.  [facility] Embedded Care Due Messages. Reference number: [id number].   9/29/2024 8:37:31 PM CDT

## 2024-10-09 RX ORDER — ESCITALOPRAM OXALATE 20 MG/1
20 TABLET ORAL
Qty: 90 TABLET | Refills: 2 | Status: SHIPPED | OUTPATIENT
Start: 2024-10-09

## 2024-10-09 NOTE — TELEPHONE ENCOUNTER
No care due was identified.  [facility] Embedded Care Due Messages. Reference number: [id number].   10/09/2024 9:22:52 AM CDT

## 2024-10-10 NOTE — TELEPHONE ENCOUNTER
Refill Decision Note   Cara Mendoza  is requesting a refill authorization.  Brief Assessment and Rationale for Refill:  Approve     Medication Therapy Plan:         Comments:     Note composed:8:19 PM 10/09/2024

## 2024-10-24 NOTE — TELEPHONE ENCOUNTER
No care due was identified.  [facility] Embedded Care Due Messages. Reference number: [id number].   10/24/2024 5:32:56 PM CDT

## 2024-10-25 ENCOUNTER — PATIENT OUTREACH (OUTPATIENT)
Dept: FAMILY MEDICINE | Facility: CLINIC | Age: 64
End: 2024-10-25

## 2024-10-25 ENCOUNTER — OFFICE VISIT (OUTPATIENT)
Dept: FAMILY MEDICINE | Facility: CLINIC | Age: 64
End: 2024-10-25
Payer: MEDICARE

## 2024-10-25 VITALS
TEMPERATURE: 98 F | HEIGHT: 60 IN | SYSTOLIC BLOOD PRESSURE: 124 MMHG | BODY MASS INDEX: 39.71 KG/M2 | HEART RATE: 67 BPM | OXYGEN SATURATION: 95 % | WEIGHT: 202.25 LBS | DIASTOLIC BLOOD PRESSURE: 82 MMHG

## 2024-10-25 DIAGNOSIS — I10 ESSENTIAL HYPERTENSION: Primary | ICD-10-CM

## 2024-10-25 DIAGNOSIS — G72.0 STATIN MYOPATHY: ICD-10-CM

## 2024-10-25 DIAGNOSIS — E66.01 SEVERE OBESITY (BMI 35.0-39.9) WITH COMORBIDITY: ICD-10-CM

## 2024-10-25 DIAGNOSIS — T46.6X5A STATIN MYOPATHY: ICD-10-CM

## 2024-10-25 DIAGNOSIS — I70.0 AORTIC ATHEROSCLEROSIS: ICD-10-CM

## 2024-10-25 DIAGNOSIS — E03.9 HYPOTHYROIDISM, UNSPECIFIED TYPE: Chronic | ICD-10-CM

## 2024-10-25 DIAGNOSIS — E55.9 VITAMIN D DEFICIENCY: ICD-10-CM

## 2024-10-25 DIAGNOSIS — K21.9 GASTROESOPHAGEAL REFLUX DISEASE, UNSPECIFIED WHETHER ESOPHAGITIS PRESENT: ICD-10-CM

## 2024-10-25 PROCEDURE — 99214 OFFICE O/P EST MOD 30 MIN: CPT | Mod: S$GLB,,, | Performed by: FAMILY MEDICINE

## 2024-10-25 RX ORDER — HYDROCHLOROTHIAZIDE 12.5 MG/1
CAPSULE ORAL
Qty: 90 CAPSULE | Refills: 1 | Status: SHIPPED | OUTPATIENT
Start: 2024-10-25

## 2024-10-25 NOTE — TELEPHONE ENCOUNTER
Refill Authorization Note     Refill Decision Note   Cara Reji  is requesting a refill authorization.  Brief Assessment and Rationale for Refill:  Approve     Medication Therapy Plan:       Medication Reconciliation Completed: No   Comments:     No Care Gaps recommended.     Note composed:4:11 PM 10/25/2024

## 2024-10-25 NOTE — PROGRESS NOTES
Patient ID: Cara Mendoza is a 64 y.o. female.    Chief Complaint: Follow-up    HPI    Cara Mendoza is a 64 y.o. female here for follow-up.  Patient with a essential hypertension which is well-controlled-obese disease for which he is trying to lose weight.  Vitamin-D deficiency and hypothyroidism.  Replacing with medication.  Reflux-stable this time.  Trying to recover emotionally from death of her father.          Vitals:    10/25/24 0903   BP: 124/82   BP Location: Right arm   Patient Position: Sitting   Pulse: 67   Temp: 98.3 °F (36.8 °C)   TempSrc: Oral   SpO2: 95%   Weight: 91.7 kg (202 lb 4.4 oz)   Height: 5' (1.524 m)            Review of Symptoms      Physical Exam    Constitutional:  Oriented to person, place, and time.appears well-developed and well-nourished.  No distress.      HENT  Head: Normocephalic and atraumatic  Right Ear: External ear normal.   Left Ear: External ear normal.   Nose: External nose normal.   Mouth:  Moist mucus membranes.    Eyes:  Conjunctivae are normal. Right eye exhibits no discharge.  Left eye exhibits no discharge. No scleral icterus.  No periorbital edema    Cardiovascular:  Regular rate and rhythm with normal S1 and S2     Pulmonary/Chest:   Clear to auscultation bilaterally without wheezes, rhonchi or rales      Musculoskeletal:  No edema. No obvious deformity No wasting       Neurological:  Alert and oriented to person, place, and time.   Coordination normal.     Skin:   Skin is warm and dry.  No diaphoresis.   No rash noted.     Psychiatric: Normal mood and affect. Behavior is normal.  Judgment and thought content normal.     Physical Exam              Complete Blood Count  Lab Results   Component Value Date    RBC 4.81 07/24/2024    HGB 13.8 07/24/2024    HCT 41.8 07/24/2024    MCV 87 07/24/2024    MCH 28.7 07/24/2024    MCHC 33.0 07/24/2024    RDW 14.6 (H) 07/24/2024     07/24/2024    MPV 10.8 07/24/2024    GRAN 2.4 07/24/2024    GRAN 39.6 07/24/2024     LYMPH 2.8 07/24/2024    LYMPH 46.6 07/24/2024    MONO 0.5 07/24/2024    MONO 8.8 07/24/2024    EOS 0.2 07/24/2024    BASO 0.04 07/24/2024    EOSINOPHIL 4.0 07/24/2024    BASOPHIL 0.7 07/24/2024    DIFFMETHOD Automated 07/24/2024       Comprehensive Metabolic Panel  Lab Results   Component Value Date    GLU 98 07/24/2024    BUN 14 07/24/2024    CREATININE 0.74 07/24/2024     07/24/2024    K 4.2 07/24/2024    CL 97 07/24/2024    PROT 6.6 07/24/2024    ALBUMIN 3.8 07/24/2024    BILITOT 0.3 07/24/2024    AST 20 07/24/2024    ALKPHOS 65 07/24/2024    CO2 36 (H) 07/24/2024    ALT 9 (L) 07/24/2024    ANIONGAP 5 (L) 07/24/2024       TSH  Lab Results   Component Value Date    TSH 2.570 07/24/2024       Assessment / Plan:      ICD-10-CM ICD-9-CM   1. Essential hypertension  I10 401.9   2. Statin myopathy  G72.0 359.4    T46.6X5A E942.2   3. Severe obesity (BMI 35.0-39.9) with comorbidity  E66.01 278.01   4. Vitamin D deficiency  E55.9 268.9   5. Hypothyroidism, unspecified type  E03.9 244.9   6. Aortic atherosclerosis  I70.0 440.0   7. Gastroesophageal reflux disease, unspecified whether esophagitis present  K21.9 530.81     Essential hypertension  Comments:  Continue current medications  Orders:  -     Comprehensive Metabolic Panel; Future  -     CBC Auto Differential; Future  -     Lipid Panel; Future  -     TSH; Future  -     T4, Free; Future; Expected date: 10/25/2024    Statin myopathy    Severe obesity (BMI 35.0-39.9) with comorbidity  -     Comprehensive Metabolic Panel; Future  -     CBC Auto Differential; Future  -     Lipid Panel; Future  -     TSH; Future  -     T4, Free; Future; Expected date: 10/25/2024    Vitamin D deficiency    Hypothyroidism, unspecified type  -     Comprehensive Metabolic Panel; Future  -     CBC Auto Differential; Future  -     Lipid Panel; Future  -     TSH; Future  -     T4, Free; Future; Expected date: 10/25/2024    Aortic atherosclerosis    Gastroesophageal reflux disease,  unspecified whether esophagitis present  -     Comprehensive Metabolic Panel; Future  -     CBC Auto Differential; Future  -     Lipid Panel; Future  -     TSH; Future  -     T4, Free; Future; Expected date: 10/25/2024        -hypothyroidism continue replacement therapy   Vitamin-D deficiency-continue replacement   Statin myopathy-using other methods to try to help controlled cholesterol.        Take vit d and calcium  Discussed walking   just walk

## 2024-10-31 DIAGNOSIS — F41.9 ANXIETY: Primary | ICD-10-CM

## 2024-11-01 RX ORDER — ALPRAZOLAM 0.5 MG/1
TABLET ORAL
Qty: 90 TABLET | Refills: 0 | Status: SHIPPED | OUTPATIENT
Start: 2024-11-01

## 2024-11-07 DIAGNOSIS — G44.86 CERVICOGENIC HEADACHE: ICD-10-CM

## 2024-11-07 DIAGNOSIS — E66.01 SEVERE OBESITY (BMI 35.0-39.9) WITH COMORBIDITY: ICD-10-CM

## 2024-11-07 DIAGNOSIS — I10 ESSENTIAL HYPERTENSION: ICD-10-CM

## 2024-11-08 RX ORDER — GABAPENTIN 400 MG/1
CAPSULE ORAL
Qty: 180 CAPSULE | Refills: 3 | Status: SHIPPED | OUTPATIENT
Start: 2024-11-08

## 2024-11-08 NOTE — TELEPHONE ENCOUNTER
No care due was identified.  [facility] Embedded Care Due Messages. Reference number: [id number].   11/07/2024 7:42:48 PM CST

## 2025-01-02 DIAGNOSIS — F41.9 ANXIETY: ICD-10-CM

## 2025-01-02 RX ORDER — ALPRAZOLAM 0.5 MG/1
TABLET ORAL
Qty: 90 TABLET | Refills: 0 | Status: SHIPPED | OUTPATIENT
Start: 2025-01-02

## 2025-01-02 NOTE — TELEPHONE ENCOUNTER
No care due was identified.  [facility] Embedded Care Due Messages. Reference number: [id number].   1/02/2025 3:22:04 PM CST

## 2025-01-02 NOTE — TELEPHONE ENCOUNTER
No care due was identified.  [facility] Embedded Care Due Messages. Reference number: [id number].   1/02/2025 3:21:36 PM CST

## 2025-01-03 RX ORDER — LEVOTHYROXINE SODIUM 75 UG/1
75 TABLET ORAL
Qty: 90 TABLET | Refills: 1 | Status: SHIPPED | OUTPATIENT
Start: 2025-01-03

## 2025-01-03 NOTE — TELEPHONE ENCOUNTER
Refill Decision Note   Cara Mendoza  is requesting a refill authorization.  Brief Assessment and Rationale for Refill:  Approve     Medication Therapy Plan: TSH-WNL      Comments:     Note composed:9:50 AM 01/03/2025

## 2025-01-13 DIAGNOSIS — Z00.00 ENCOUNTER FOR MEDICARE ANNUAL WELLNESS EXAM: ICD-10-CM

## 2025-01-16 ENCOUNTER — NURSE TRIAGE (OUTPATIENT)
Dept: ADMINISTRATIVE | Facility: CLINIC | Age: 65
End: 2025-01-16
Payer: MEDICARE

## 2025-01-16 ENCOUNTER — PATIENT MESSAGE (OUTPATIENT)
Dept: FAMILY MEDICINE | Facility: CLINIC | Age: 65
End: 2025-01-16
Payer: MEDICARE

## 2025-01-16 NOTE — TELEPHONE ENCOUNTER
Patient requesting to speak with pcp for a sinus infection. She is requesting a zpack. DIspo provided- be seen today or tomorrow. Patient declined advice and would like to speak with her provider. Also offered ODVV but patient declined as well. WiIl route message to MD with high priority. Instructed to call back with additional questions or worsening of symptoms. Patient verbalized understanding.     Reason for Disposition   Using nasal washes and pain medicine > 24 hours and sinus pain (lower forehead, cheekbone, or eye) persists    Additional Information   Negative: Sounds like a life-threatening emergency to the triager   Negative: Difficulty breathing, and not from stuffy nose (e.g., not relieved by cleaning out the nose)   Negative: SEVERE headache and has fever   Negative: Patient sounds very sick or weak to the triager   Negative: SEVERE sinus pain (e.g., excruciating)   Negative: Severe headache   Negative: Redness or swelling on the cheek, forehead, or around the eye   Negative: Fever > 103 F (39.4 C)   Negative: Fever > 101 F (38.3 C) and over 60 years of age   Negative: Fever > 100 F (37.8 C) and has diabetes mellitus or a weak immune system (e.g., HIV positive, cancer chemotherapy, organ transplant, splenectomy, chronic steroids)   Negative: Fever > 100 F (37.8 C) and bedridden (e.g., CVA, chronic illness, recovering from surgery)   Negative: Fever present > 3 days (72 hours)   Negative: Fever returns after gone for over 24 hours and symptoms worse or not improved   Negative: Sinus pain (not just congestion) and fever   Negative: Earache   Negative: Sinus congestion (pressure, fullness) present > 10 days   Negative: Nasal discharge present > 10 days    Protocols used: Sinus Pain or Congestion-A-OH    
There are no Wet Read(s) to document.

## 2025-01-28 RX ORDER — AZITHROMYCIN 250 MG/1
TABLET, FILM COATED ORAL
Qty: 6 TABLET | Refills: 0 | Status: SHIPPED | OUTPATIENT
Start: 2025-01-28

## 2025-01-31 ENCOUNTER — OFFICE VISIT (OUTPATIENT)
Dept: FAMILY MEDICINE | Facility: CLINIC | Age: 65
End: 2025-01-31
Payer: MEDICARE

## 2025-01-31 VITALS
DIASTOLIC BLOOD PRESSURE: 82 MMHG | HEIGHT: 60 IN | OXYGEN SATURATION: 95 % | WEIGHT: 200.94 LBS | SYSTOLIC BLOOD PRESSURE: 120 MMHG | HEART RATE: 87 BPM | BODY MASS INDEX: 39.45 KG/M2 | TEMPERATURE: 98 F

## 2025-01-31 DIAGNOSIS — R05.9 COUGH, UNSPECIFIED TYPE: ICD-10-CM

## 2025-01-31 DIAGNOSIS — F33.9 EPISODE OF RECURRENT MAJOR DEPRESSIVE DISORDER, UNSPECIFIED DEPRESSION EPISODE SEVERITY: ICD-10-CM

## 2025-01-31 DIAGNOSIS — K86.1 OTHER CHRONIC PANCREATITIS: ICD-10-CM

## 2025-01-31 DIAGNOSIS — R07.89 CHEST WALL PAIN: ICD-10-CM

## 2025-01-31 DIAGNOSIS — E89.6 HISTORY OF PARTIAL ADRENALECTOMY: ICD-10-CM

## 2025-01-31 DIAGNOSIS — I70.0 AORTIC ATHEROSCLEROSIS: ICD-10-CM

## 2025-01-31 DIAGNOSIS — R52 PAIN: Primary | ICD-10-CM

## 2025-01-31 DIAGNOSIS — E66.01 SEVERE OBESITY (BMI 35.0-39.9) WITH COMORBIDITY: ICD-10-CM

## 2025-01-31 PROCEDURE — 99214 OFFICE O/P EST MOD 30 MIN: CPT | Mod: S$GLB,,, | Performed by: FAMILY MEDICINE

## 2025-01-31 RX ORDER — BENZONATATE 100 MG/1
100 CAPSULE ORAL 3 TIMES DAILY PRN
Qty: 30 CAPSULE | Refills: 1 | Status: SHIPPED | OUTPATIENT
Start: 2025-01-31 | End: 2025-02-10

## 2025-01-31 RX ORDER — HYDROCODONE BITARTRATE AND ACETAMINOPHEN 10; 325 MG/1; MG/1
1 TABLET ORAL EVERY 6 HOURS PRN
Qty: 15 TABLET | Refills: 0 | Status: SHIPPED | OUTPATIENT
Start: 2025-01-31

## 2025-01-31 NOTE — PROGRESS NOTES
Patient ID: Cara Mendoza is a 64 y.o. female.    Chief Complaint: Cough, Nasal Congestion, and Chest Pain (Rib pain)    HPI       Cara Mendoza is a 64 y.o. female patient with recent upper respiratory infection cough.  Feels like she pulled a muscle her side and ribs.  Pain is moderate-to-severe in intensity.  This has been going on for few days.  No hemoptysis no emesis abdominal pain constipation or diarrhea.  No fever.  Upper respiratory infection-treated and resolving                 Review of Symptoms        Physical Exam    Vitals:    01/31/25 0950   BP: 120/82   BP Location: Right arm   Patient Position: Standing   Pulse: 87   Temp: 98.2 °F (36.8 °C)   TempSrc: Oral   SpO2: 95%   Weight: 91.2 kg (200 lb 15.2 oz)   Height: 5' (1.524 m)        Constitutional:   Oriented to person, place, and time.appears well-developed and well-nourished.   No distress.     HENT:   Head: Normocephalic and atraumatic.     Right Ear: Tympanic membrane, external ear and ear canal normal     Left Ear: Tympanic membrane, external ear and ear canal normal    Nose: External Normal. Normal turbinates, No rhinorrhea     Mouth/Throat: Uvula is midline, oropharynx is clear and moist and mucous membranes are normal.     Neck: supple no anterior cervical adenopathy    Carotid artery:  Bruit    NEG []   POS[]    Eyes:   Conjunctivae are normal. Right eye exhibits no discharge. Left eye exhibits no discharge. No scleral icterus. No periorbital edema     Cardiovascular:  Regular rate and rhythm with normal S1 and S2     Pulmonary/Chest:   Clear to auscultation bilaterally without wheezes, rhonchi or rales    Musculoskeletal:  No edema. No obvious deformity No wasting    Tender to palpation along right lower rib line no rash seen     Neurological:   Alert and oriented to person, place, and time. Coordination normal.     Skin:   Skin is warm and dry.   No diaphoresis.   No rash noted.    Psychiatric: Normal mood and affect. Behavior is  normal. Judgment and thought content normal.     Physical Exam              Assessment / Plan:      ICD-10-CM ICD-9-CM   1. Pain  R52 780.96   2. Severe obesity (BMI 35.0-39.9) with comorbidity  E66.01 278.01   3. Other chronic pancreatitis  K86.1 577.1   4. History of partial adrenalectomy  E89.6 V15.29   5. Episode of recurrent major depressive disorder, unspecified depression episode severity  F33.9 296.30   6. Aortic atherosclerosis  I70.0 440.0     Pain  -     HYDROcodone-acetaminophen (NORCO)  mg per tablet; Take 1 tablet by mouth every 6 (six) hours as needed for Pain. Due to pulled muscle  Dispense: 15 tablet; Refill: 0    Severe obesity (BMI 35.0-39.9) with comorbidity    Other chronic pancreatitis    History of partial adrenalectomy    Episode of recurrent major depressive disorder, unspecified depression episode severity    Aortic atherosclerosis    Other orders  -     benzonatate (TESSALON) 100 MG capsule; Take 1 capsule (100 mg total) by mouth 3 (three) times daily as needed for Cough.  Dispense: 30 capsule; Refill: 1        Pain medication for chest wall pain     Upper respiratory infection treated with medication   Cough Tessalon Perles

## 2025-02-04 ENCOUNTER — TELEPHONE (OUTPATIENT)
Dept: FAMILY MEDICINE | Facility: CLINIC | Age: 65
End: 2025-02-04
Payer: MEDICARE

## 2025-02-04 DIAGNOSIS — R52 PAIN: ICD-10-CM

## 2025-02-04 RX ORDER — HYDROCODONE BITARTRATE AND ACETAMINOPHEN 10; 325 MG/1; MG/1
1 TABLET ORAL EVERY 6 HOURS PRN
Qty: 15 TABLET | Refills: 0 | Status: SHIPPED | OUTPATIENT
Start: 2025-02-05

## 2025-02-04 NOTE — TELEPHONE ENCOUNTER
No care due was identified.  Health [facility] Embedded Care Due Messages. Reference number: [id number].   2/04/2025 12:08:53 PM CST

## 2025-02-04 NOTE — TELEPHONE ENCOUNTER
HYDROcodone-acetaminophen (NORCO)  mg per tablet 15 tablet 0 2/5/2025 --    Sig - Route: Take 1 tablet by mouth every 6 (six) hours as needed for Pain. - Oral    Sent to pharmacy as: HYDROcodone-acetaminophen (NORCO)  mg per tablet    Earliest Fill Date: 2/5/2025    Notes to Pharmacy: Quantity prescribed more than 7 day supply? Yes, quantity medically necessary    E-Prescribing Status: Transmission to pharmacy failed (2/4/2025  1:24 AM CST)            Please res[facility].

## 2025-02-05 RX ORDER — PROMETHAZINE HYDROCHLORIDE 25 MG/1
25 TABLET ORAL EVERY 6 HOURS PRN
Qty: 30 TABLET | Refills: 0 | OUTPATIENT
Start: 2025-02-05

## 2025-02-28 DIAGNOSIS — F41.9 ANXIETY: ICD-10-CM

## 2025-02-28 NOTE — TELEPHONE ENCOUNTER
No care due was identified.  [facility] Embedded Care Due Messages. Reference number: [id number].   2/28/2025 5:21:20 PM CST

## 2025-03-01 RX ORDER — ALPRAZOLAM 0.5 MG/1
TABLET ORAL
Qty: 90 TABLET | Refills: 0 | Status: SHIPPED | OUTPATIENT
Start: 2025-03-01

## 2025-03-05 DIAGNOSIS — E66.01 SEVERE OBESITY (BMI 35.0-39.9) WITH COMORBIDITY: ICD-10-CM

## 2025-03-05 DIAGNOSIS — G44.86 CERVICOGENIC HEADACHE: ICD-10-CM

## 2025-03-05 DIAGNOSIS — I10 ESSENTIAL HYPERTENSION: ICD-10-CM

## 2025-03-05 RX ORDER — GABAPENTIN 400 MG/1
CAPSULE ORAL
Qty: 180 CAPSULE | Refills: 3 | Status: SHIPPED | OUTPATIENT
Start: 2025-03-05

## 2025-03-05 NOTE — TELEPHONE ENCOUNTER
No care due was identified.  Health [facility] Embedded Care Due Messages. Reference number: [id number].   3/05/2025 11:32:41 AM CST

## 2025-03-21 RX ORDER — PROMETHAZINE HYDROCHLORIDE 25 MG/1
25 TABLET ORAL EVERY 6 HOURS PRN
Qty: 30 TABLET | Refills: 0 | Status: SHIPPED | OUTPATIENT
Start: 2025-03-21

## 2025-03-21 NOTE — TELEPHONE ENCOUNTER
No care due was identified.  [facility] Embedded Care Due Messages. Reference number: [id number].   3/20/2025 7:51:04 PM CDT

## 2025-04-04 DIAGNOSIS — F41.9 ANXIETY: ICD-10-CM

## 2025-04-04 RX ORDER — ALPRAZOLAM 0.5 MG/1
TABLET ORAL
Qty: 90 TABLET | Refills: 0 | Status: SHIPPED | OUTPATIENT
Start: 2025-04-04

## 2025-04-04 NOTE — TELEPHONE ENCOUNTER
No care due was identified.  Health [facility] Embedded Care Due Messages. Reference number: [id number].   4/04/2025 11:08:56 AM CDT

## 2025-04-09 DIAGNOSIS — D35.02 ADENOMA OF LEFT ADRENAL GLAND: Primary | ICD-10-CM

## 2025-04-22 ENCOUNTER — LAB VISIT (OUTPATIENT)
Dept: LAB | Facility: HOSPITAL | Age: 65
End: 2025-04-22
Attending: FAMILY MEDICINE
Payer: MEDICARE

## 2025-04-22 DIAGNOSIS — E66.01 SEVERE OBESITY (BMI 35.0-39.9) WITH COMORBIDITY: ICD-10-CM

## 2025-04-22 DIAGNOSIS — K21.9 GASTROESOPHAGEAL REFLUX DISEASE, UNSPECIFIED WHETHER ESOPHAGITIS PRESENT: ICD-10-CM

## 2025-04-22 DIAGNOSIS — I10 ESSENTIAL HYPERTENSION: ICD-10-CM

## 2025-04-22 DIAGNOSIS — E03.9 HYPOTHYROIDISM, UNSPECIFIED TYPE: ICD-10-CM

## 2025-04-22 LAB
ABSOLUTE EOSINOPHIL (OHS): 0.2 K/UL
ABSOLUTE MONOCYTE (OHS): 0.53 K/UL (ref 0.3–1)
ABSOLUTE NEUTROPHIL COUNT (OHS): 2.12 K/UL (ref 1.8–7.7)
ALBUMIN SERPL BCP-MCNC: 3.7 G/DL (ref 3.5–5.2)
ALP SERPL-CCNC: 59 UNIT/L (ref 38–126)
ALT SERPL W/O P-5'-P-CCNC: 12 UNIT/L (ref 10–44)
ANION GAP (OHS): 6 MMOL/L (ref 8–16)
AST SERPL-CCNC: 19 UNIT/L (ref 15–46)
BASOPHILS # BLD AUTO: 0.04 K/UL
BASOPHILS NFR BLD AUTO: 0.7 %
BILIRUB SERPL-MCNC: 0.2 MG/DL (ref 0.1–1)
BUN SERPL-MCNC: 13 MG/DL (ref 7–17)
CALCIUM SERPL-MCNC: 8.7 MG/DL (ref 8.7–10.5)
CHLORIDE SERPL-SCNC: 100 MMOL/L (ref 95–110)
CHOLEST SERPL-MCNC: 235 MG/DL (ref 120–199)
CHOLEST/HDLC SERPL: 4.3 {RATIO} (ref 2–5)
CO2 SERPL-SCNC: 31 MMOL/L (ref 23–29)
CREAT SERPL-MCNC: 0.6 MG/DL (ref 0.5–1.4)
ERYTHROCYTE [DISTWIDTH] IN BLOOD BY AUTOMATED COUNT: 14.3 % (ref 11.5–14.5)
GFR SERPLBLD CREATININE-BSD FMLA CKD-EPI: >60 ML/MIN/1.73/M2
GLUCOSE SERPL-MCNC: 96 MG/DL (ref 70–110)
HCT VFR BLD AUTO: 39.3 % (ref 37–48.5)
HDLC SERPL-MCNC: 55 MG/DL (ref 40–75)
HDLC SERPL: 23.4 % (ref 20–50)
HGB BLD-MCNC: 13.1 GM/DL (ref 12–16)
IMM GRANULOCYTES # BLD AUTO: 0.01 K/UL (ref 0–0.04)
IMM GRANULOCYTES NFR BLD AUTO: 0.2 % (ref 0–0.5)
LDLC SERPL CALC-MCNC: 147.2 MG/DL (ref 63–159)
LYMPHOCYTES # BLD AUTO: 2.85 K/UL (ref 1–4.8)
MCH RBC QN AUTO: 29.2 PG (ref 27–31)
MCHC RBC AUTO-ENTMCNC: 33.3 G/DL (ref 32–36)
MCV RBC AUTO: 88 FL (ref 82–98)
NONHDLC SERPL-MCNC: 180 MG/DL
NUCLEATED RBC (/100WBC) (OHS): 0 /100 WBC
PLATELET # BLD AUTO: 242 K/UL (ref 150–450)
PMV BLD AUTO: 11.1 FL (ref 9.2–12.9)
POTASSIUM SERPL-SCNC: 3.8 MMOL/L (ref 3.5–5.1)
PROT SERPL-MCNC: 6.3 GM/DL (ref 6–8.4)
RBC # BLD AUTO: 4.49 M/UL (ref 4–5.4)
RELATIVE EOSINOPHIL (OHS): 3.5 %
RELATIVE LYMPHOCYTE (OHS): 49.6 % (ref 18–48)
RELATIVE MONOCYTE (OHS): 9.2 % (ref 4–15)
RELATIVE NEUTROPHIL (OHS): 36.8 % (ref 38–73)
SODIUM SERPL-SCNC: 137 MMOL/L (ref 136–145)
T4 FREE SERPL-MCNC: 0.94 NG/DL (ref 0.71–1.51)
TRIGL SERPL-MCNC: 164 MG/DL (ref 30–150)
TSH SERPL-ACNC: 2.99 UIU/ML (ref 0.4–4)
WBC # BLD AUTO: 5.75 K/UL (ref 3.9–12.7)

## 2025-04-22 PROCEDURE — 80061 LIPID PANEL: CPT | Mod: PN

## 2025-04-22 PROCEDURE — 84443 ASSAY THYROID STIM HORMONE: CPT | Mod: PN

## 2025-04-22 PROCEDURE — 85025 COMPLETE CBC W/AUTO DIFF WBC: CPT | Mod: PN

## 2025-04-22 PROCEDURE — 36415 COLL VENOUS BLD VENIPUNCTURE: CPT | Mod: PN

## 2025-04-22 PROCEDURE — 84439 ASSAY OF FREE THYROXINE: CPT | Mod: PN

## 2025-04-22 PROCEDURE — 82040 ASSAY OF SERUM ALBUMIN: CPT | Mod: PN

## 2025-04-24 ENCOUNTER — RESULTS FOLLOW-UP (OUTPATIENT)
Dept: FAMILY MEDICINE | Facility: CLINIC | Age: 65
End: 2025-04-24

## 2025-04-28 ENCOUNTER — OFFICE VISIT (OUTPATIENT)
Dept: FAMILY MEDICINE | Facility: CLINIC | Age: 65
End: 2025-04-28
Payer: MEDICARE

## 2025-04-28 VITALS
TEMPERATURE: 98 F | BODY MASS INDEX: 40.06 KG/M2 | OXYGEN SATURATION: 95 % | DIASTOLIC BLOOD PRESSURE: 76 MMHG | SYSTOLIC BLOOD PRESSURE: 138 MMHG | HEIGHT: 60 IN | WEIGHT: 204.06 LBS | HEART RATE: 77 BPM

## 2025-04-28 DIAGNOSIS — I10 ESSENTIAL HYPERTENSION: Primary | ICD-10-CM

## 2025-04-28 DIAGNOSIS — E03.9 HYPOTHYROIDISM, UNSPECIFIED TYPE: ICD-10-CM

## 2025-04-28 DIAGNOSIS — K21.9 GASTROESOPHAGEAL REFLUX DISEASE, UNSPECIFIED WHETHER ESOPHAGITIS PRESENT: ICD-10-CM

## 2025-04-28 DIAGNOSIS — F41.9 ANXIETY: ICD-10-CM

## 2025-04-28 DIAGNOSIS — E78.00 ELEVATED CHOLESTEROL: ICD-10-CM

## 2025-04-28 DIAGNOSIS — E66.01 MORBID OBESITY: ICD-10-CM

## 2025-04-28 DIAGNOSIS — Z12.31 ENCOUNTER FOR SCREENING MAMMOGRAM FOR BREAST CANCER: ICD-10-CM

## 2025-04-28 DIAGNOSIS — F43.10 PTSD (POST-TRAUMATIC STRESS DISORDER): ICD-10-CM

## 2025-04-28 PROCEDURE — 99214 OFFICE O/P EST MOD 30 MIN: CPT | Mod: S$GLB,,, | Performed by: FAMILY MEDICINE

## 2025-04-28 PROCEDURE — G2211 COMPLEX E/M VISIT ADD ON: HCPCS | Mod: S$GLB,,, | Performed by: FAMILY MEDICINE

## 2025-04-28 RX ORDER — ALPRAZOLAM 0.5 MG/1
TABLET ORAL
Qty: 90 TABLET | Refills: 0 | Status: SHIPPED | OUTPATIENT
Start: 2025-05-03

## 2025-04-28 RX ORDER — EZETIMIBE 10 MG/1
10 TABLET ORAL DAILY
Qty: 90 TABLET | Refills: 3 | Status: SHIPPED | OUTPATIENT
Start: 2025-04-28 | End: 2026-04-28
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
## 2025-05-02 DIAGNOSIS — F41.9 ANXIETY: ICD-10-CM

## 2025-05-02 RX ORDER — ESTRADIOL 1 MG/1
1 TABLET ORAL
Qty: 30 TABLET | Refills: 11 | Status: SHIPPED | OUTPATIENT
Start: 2025-05-02

## 2025-05-02 RX ORDER — ALPRAZOLAM 0.5 MG/1
TABLET ORAL
Qty: 90 TABLET | OUTPATIENT
Start: 2025-05-02

## 2025-05-02 RX ORDER — HYDROCHLOROTHIAZIDE 12.5 MG/1
12.5 CAPSULE ORAL
Qty: 90 CAPSULE | Refills: 3 | Status: SHIPPED | OUTPATIENT
Start: 2025-05-02

## 2025-05-02 NOTE — TELEPHONE ENCOUNTER
No care due was identified.  [facility] Embedded Care Due Messages. Reference number: [id number].   5/01/2025 10:53:41 PM CDT

## 2025-05-02 NOTE — TELEPHONE ENCOUNTER
Refill Decision Note   Cara Mendoza  is requesting a refill authorization.  Brief Assessment and Rationale for Refill:  Approve     Medication Therapy Plan:         Comments:     Note composed:4:56 AM 05/02/2025

## 2025-05-05 ENCOUNTER — HOSPITAL ENCOUNTER (OUTPATIENT)
Dept: RADIOLOGY | Facility: HOSPITAL | Age: 65
Discharge: HOME | End: 2025-05-05
Attending: INTERNAL MEDICINE
Payer: MEDICARE

## 2025-05-05 DIAGNOSIS — D35.02 ADENOMA OF LEFT ADRENAL GLAND: ICD-10-CM

## 2025-05-05 PROCEDURE — 74183 MRI ABD W/O CNTR FLWD CNTR: CPT | Mod: 26,,, | Performed by: RADIOLOGY

## 2025-05-05 PROCEDURE — 74183 MRI ABD W/O CNTR FLWD CNTR: CPT | Mod: TC

## 2025-05-05 PROCEDURE — A9585 GADOBUTROL INJECTION: HCPCS

## 2025-05-05 PROCEDURE — 25500020 PHARM REV CODE 255

## 2025-05-05 RX ORDER — GADOBUTROL 604.72 MG/ML
10 INJECTION INTRAVENOUS
Status: COMPLETED | OUTPATIENT
Start: 2025-05-05 | End: 2025-05-05

## 2025-05-05 RX ADMIN — GADOBUTROL 10 ML: 604.72 INJECTION INTRAVENOUS at 11:05

## 2025-05-20 ENCOUNTER — HOSPITAL ENCOUNTER (OUTPATIENT)
Dept: RADIOLOGY | Facility: HOSPITAL | Age: 65
Discharge: HOME | End: 2025-05-20
Attending: FAMILY MEDICINE
Payer: MEDICARE

## 2025-05-20 DIAGNOSIS — Z78.0 MENOPAUSE: ICD-10-CM

## 2025-05-20 PROCEDURE — 77080 DXA BONE DENSITY AXIAL: CPT | Mod: 26,,, | Performed by: RADIOLOGY

## 2025-05-20 PROCEDURE — 77080 DXA BONE DENSITY AXIAL: CPT | Mod: TC,PN

## 2025-05-21 ENCOUNTER — RESULTS FOLLOW-UP (OUTPATIENT)
Dept: FAMILY MEDICINE | Facility: CLINIC | Age: 65
End: 2025-05-21

## 2025-06-03 NOTE — TELEPHONE ENCOUNTER
Reason for Disposition   Message left on identified answering machine.    Protocols used: ST NO CONTACT OR DUPLICATE CONTACT CALL-A-AH       Yes

## 2025-06-11 ENCOUNTER — OFFICE VISIT (OUTPATIENT)
Dept: DERMATOLOGY | Facility: CLINIC | Age: 65
End: 2025-06-11
Payer: MEDICARE

## 2025-06-11 DIAGNOSIS — D23.9 DERMATOFIBROMA: ICD-10-CM

## 2025-06-11 DIAGNOSIS — L82.1 SEBORRHEIC KERATOSES: ICD-10-CM

## 2025-06-11 DIAGNOSIS — D48.5 NEOPLASM OF UNCERTAIN BEHAVIOR OF SKIN: Primary | ICD-10-CM

## 2025-06-11 DIAGNOSIS — Z12.83 SCREENING, MALIGNANT NEOPLASM, SKIN: ICD-10-CM

## 2025-06-11 PROCEDURE — 99999 PR PBB SHADOW E&M-EST. PATIENT-LVL III: CPT | Mod: PBBFAC,,, | Performed by: STUDENT IN AN ORGANIZED HEALTH CARE EDUCATION/TRAINING PROGRAM

## 2025-06-11 PROCEDURE — 11102 TANGNTL BX SKIN SINGLE LES: CPT | Mod: S$PBB,,, | Performed by: STUDENT IN AN ORGANIZED HEALTH CARE EDUCATION/TRAINING PROGRAM

## 2025-06-11 PROCEDURE — 11102 TANGNTL BX SKIN SINGLE LES: CPT | Mod: PBBFAC | Performed by: STUDENT IN AN ORGANIZED HEALTH CARE EDUCATION/TRAINING PROGRAM

## 2025-06-11 PROCEDURE — 99213 OFFICE O/P EST LOW 20 MIN: CPT | Mod: PBBFAC | Performed by: STUDENT IN AN ORGANIZED HEALTH CARE EDUCATION/TRAINING PROGRAM

## 2025-06-11 PROCEDURE — 99203 OFFICE O/P NEW LOW 30 MIN: CPT | Mod: 25,S$PBB,, | Performed by: STUDENT IN AN ORGANIZED HEALTH CARE EDUCATION/TRAINING PROGRAM

## 2025-06-11 NOTE — PATIENT INSTRUCTIONS
Shave Biopsy Wound Care    Your doctor has performed a shave biopsy today.  A band aid and vaseline ointment has been placed over the site.  This should remain in place for 24 hours.  It is recommended that you keep the area dry for the first 24 hours.  After 24 hours, you may remove the band aid and wash the area with warm soap and water and apply Vaseline jelly.  Many patients prefer to use Neosporin or Bacitracin ointment.  This is acceptable; however, know that you can develop an allergy to this medication even if you have used it safely for years.  It is important to keep the area moist.  Letting it dry out and get air slows healing time, and will worsen the scar.  Band aid is optional after first 24 hours.      If you notice increasing redness, tenderness, pain, or yellow drainage at the biopsy site, please notify your doctor.  These are signs of an infection.    If your biopsy site is bleeding, apply firm pressure for 15 minutes straight.  Repeat for another 15 minutes, if it is still bleeding.   If the surgical site continues to bleed, then please contact your doctor.      [facility] - [facility]   DERMATOLOGY  [address]   [place] [address]   Dept: [phone]   Dept Fax: [phone]

## 2025-06-11 NOTE — PROGRESS NOTES
Subjective:       Patient ID:  Cara Mendoza is a 65 y.o. female who presents for   Chief Complaint   Patient presents with    Skin Check     FBSE  Loc on the nose, noticed 2 years, growing, no treatment.   Loc on the left under eye, noticed months, no treatment.     History of Present Illness: The patient presents with chief complaint of a non healing skin lesion.  Location: left side of the nose  Duration: present for months to year, comes and goes  Signs/Symptoms: recurring growth on the side of the nose, sometimes is irritated  Prior treatments: none  Denies any history of skin cancer.         Review of Systems   Constitutional:  Negative for fever and chills.   Skin:  Negative for itching, rash and dry skin.        Objective:    Physical Exam   Constitutional: She appears well-developed and well-nourished. No distress.   Neurological: She is alert and oriented to person, place, and time. She is not disoriented.   Psychiatric: She has a normal mood and affect.   Skin:   Areas Examined (abnormalities noted in diagram):   Head / Face Inspection Performed  Neck Inspection Performed  Chest / Axilla Inspection Performed  Abdomen Inspection Performed  Back Inspection Performed  RUE Inspected  LUE Inspection Performed  RLE Inspected  LLE Inspection Performed                   Diagram Legend     Erythematous scaling macule/papule c/w actinic keratosis       Vascular papule c/w angioma      Pigmented verrucoid papule/plaque c/w seborrheic keratosis      Yellow umbilicated papule c/w sebaceous hyperplasia      Irregularly shaped tan macule c/w lentigo     1-2 mm smooth white papules consistent with Milia      Movable subcutaneous cyst with punctum c/w epidermal inclusion cyst      Subcutaneous movable cyst c/w pilar cyst      Firm pink to brown papule c/w dermatofibroma      Pedunculated fleshy papule(s) c/w skin tag(s)      Evenly pigmented macule c/w junctional nevus     Mildly variegated pigmented, slightly  irregular-bordered macule c/w mildly atypical nevus      Flesh colored to evenly pigmented papule c/w intradermal nevus       Pink pearly papule/plaque c/w basal cell carcinoma      Erythematous hyperkeratotic cursted plaque c/w SCC      Surgical scar with no sign of skin cancer recurrence      Open and closed comedones      Inflammatory papules and pustules      Verrucoid papule consistent consistent with wart     Erythematous eczematous patches and plaques     Dystrophic onycholytic nail with subungual debris c/w onychomycosis     Umbilicated papule    Erythematous-base heme-crusted tan verrucoid plaque consistent with inflamed seborrheic keratosis     Erythematous Silvery Scaling Plaque c/w Psoriasis     See annotation      Assessment / Plan:      Pathology Orders:       Normal Orders This Visit    Specimen to Pathology, Dermatology     Questions:    Procedure Type: Dermatology and skin neoplasms    Number of Specimens: 1    ------------------------: -------------------------    Spec 1 Procedure: Shave Biopsy    Spec 1 Clinical Impression: r/o fibroma vs NMSC vs other    Spec 1 Source: left nasal sidewall    Clinical Information: see EPIC    Clinical History: see EPIC    Specimen Source: Skin    Release to patient: Immediate    Send normal result to authorizing provider's In Basket if patient is active on MyChart: Yes          Neoplasm of uncertain behavior of skin  -     Specimen to Pathology, Dermatology    Shave biopsy procedure note:    Shave biopsy performed after verbal consent including risk of infection, scar, recurrence, need for additional treatment of site. Area prepped with alcohol, anesthetized with approximately 1.0cc of 1% lidocaine with epinephrine. Lesional tissue shaved with razor blade. Hemostasis achieved with application of aluminum chloride followed by hyfrecation. No complications. Dressing applied. Wound care explained.    If biopsy positive for malignancy, refer for Mohs surgery  consultation.      Seborrheic keratoses  These are benign inherited growths without a malignant potential. Reassurance given to patient. No treatment is necessary.     Milia  Reassurance given to patient. No treatment is necessary.   Treatment of benign, asymptomatic lesions may be considered cosmetic.           Follow up in about 1 year (around 6/11/2026).

## 2025-06-19 ENCOUNTER — TELEPHONE (OUTPATIENT)
Dept: DERMATOLOGY | Facility: CLINIC | Age: 65
End: 2025-06-19
Payer: MEDICARE

## 2025-06-19 NOTE — TELEPHONE ENCOUNTER
Return call made to patient regarding biopsy results. Spoke to patient who is requesting biopsy results. I advised that the results are not back yet however we would reach out to her once Dr. Fox reviewed them.     Copied from CRM #[id number]. Topic: General Inquiry - Return Call  >> Jun 19, 2025  9:08 AM [place] wrote:  Type:  Patient Returning Call    Who Called:Pt  Who Left Message for Patient:Nurse  Does the patient know what this is regarding?:Follow up   Would the patient rather a call back or a response via [facility]? Call  Best Call Back Number:[phone]   Additional Information:   Pt would like to have a follow up of her biopsy

## 2025-06-25 ENCOUNTER — RESULTS FOLLOW-UP (OUTPATIENT)
Dept: DERMATOLOGY | Facility: CLINIC | Age: 65
End: 2025-06-25

## 2025-06-26 ENCOUNTER — HOSPITAL ENCOUNTER (OUTPATIENT)
Dept: RADIOLOGY | Facility: HOSPITAL | Age: 65
Discharge: HOME | End: 2025-06-26
Attending: FAMILY MEDICINE
Payer: MEDICARE

## 2025-06-26 DIAGNOSIS — Z12.31 ENCOUNTER FOR SCREENING MAMMOGRAM FOR BREAST CANCER: ICD-10-CM

## 2025-06-26 PROCEDURE — 77067 SCR MAMMO BI INCL CAD: CPT | Mod: TC,PN

## 2025-06-26 PROCEDURE — 77063 BREAST TOMOSYNTHESIS BI: CPT | Mod: 26,,, | Performed by: RADIOLOGY

## 2025-06-26 PROCEDURE — 77067 SCR MAMMO BI INCL CAD: CPT | Mod: 26,,, | Performed by: RADIOLOGY

## 2025-06-28 ENCOUNTER — RESULTS FOLLOW-UP (OUTPATIENT)
Dept: FAMILY MEDICINE | Facility: CLINIC | Age: 65
End: 2025-06-28

## 2025-06-30 DIAGNOSIS — F41.9 ANXIETY: ICD-10-CM

## 2025-06-30 NOTE — TELEPHONE ENCOUNTER
No care due was identified.  Health [facility] Embedded Care Due Messages. Reference number: [id number].   6/30/2025 4:51:14 PM CDT

## 2025-07-01 RX ORDER — ALPRAZOLAM 0.5 MG/1
TABLET ORAL
Qty: 90 TABLET | Refills: 0 | Status: SHIPPED | OUTPATIENT
Start: 2025-07-07

## 2025-07-01 RX ORDER — LEVOTHYROXINE SODIUM 75 UG/1
75 TABLET ORAL
Qty: 90 TABLET | Refills: 3 | Status: SHIPPED | OUTPATIENT
Start: 2025-07-01

## 2025-07-01 NOTE — TELEPHONE ENCOUNTER
Refill Routing Note   Medication(s) are not appropriate for processing by [facility] for the following reason(s):        Outside of protocol    [facility] action(s):  Route  Approve               Appointments  past 12m or future 3m with PCP    Date Provider   Last Visit   4/28/2025 Giacomo Domingo MD   Next Visit   11/3/2025 Giacomo Domingo MD   ED visits in past 90 days: 0        Note composed:7:29 AM 07/01/2025

## 2025-07-07 ENCOUNTER — TELEPHONE (OUTPATIENT)
Dept: DERMATOLOGY | Facility: CLINIC | Age: 65
End: 2025-07-07
Payer: MEDICARE

## 2025-07-07 DIAGNOSIS — G44.86 CERVICOGENIC HEADACHE: ICD-10-CM

## 2025-07-07 DIAGNOSIS — E66.01 SEVERE OBESITY (BMI 35.0-39.9) WITH COMORBIDITY: ICD-10-CM

## 2025-07-07 DIAGNOSIS — I10 ESSENTIAL HYPERTENSION: ICD-10-CM

## 2025-07-07 NOTE — TELEPHONE ENCOUNTER
No care due was identified.  [facility] Embedded Care Due Messages. Reference number: [id number].   7/07/2025 8:56:31 AM CDT

## 2025-07-07 NOTE — TELEPHONE ENCOUNTER
Outreach made to patient regarding biopsy results. 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 I advised that per Dr. Fox it isn't cancerous, but we do monitor to make sure they don't grow back or start getting larger. If she sees the lesion growing back, we can schedule to remove the entire lesion. Patient verbalized understanding.    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 These are not cancerous, but we do monitor to make sure they don't grow back or start   getting larger. If she sees the lesion growing back, we can schedule to remove the entire lesion. But often we just monitor it. Thanks.   ----- Message -----  From: Lab, Background User  Sent: 6/24/2025   3:25 PM CDT  To: Angel Luis Fox MD

## 2025-07-08 RX ORDER — GABAPENTIN 400 MG/1
CAPSULE ORAL
Qty: 180 CAPSULE | Refills: 3 | Status: SHIPPED | OUTPATIENT
Start: 2025-07-08

## 2025-07-13 NOTE — TELEPHONE ENCOUNTER
No care due was identified.  [facility] [place] Embedded Care Due Messages. Reference number: [id number].   7/13/2025 3:39:31 PM CDT

## 2025-07-14 RX ORDER — ESCITALOPRAM OXALATE 20 MG/1
20 TABLET ORAL
Qty: 90 TABLET | Refills: 3 | Status: SHIPPED | OUTPATIENT
Start: 2025-07-14

## 2025-07-15 NOTE — TELEPHONE ENCOUNTER
Refill Decision Note   Cara Mendoza  is requesting a refill authorization.  Brief Assessment and Rationale for Refill:  Approve     Medication Therapy Plan:        Comments:     Note composed:8:42 PM 07/14/2025

## 2025-08-01 DIAGNOSIS — F41.9 ANXIETY: ICD-10-CM

## 2025-08-01 RX ORDER — ALPRAZOLAM 0.5 MG/1
TABLET ORAL
Qty: 90 TABLET | Refills: 0 | Status: SHIPPED | OUTPATIENT
Start: 2025-08-04

## 2025-08-01 NOTE — TELEPHONE ENCOUNTER
No care due was identified.  [facility] Embedded Care Due Messages. Reference number: [id number].   8/01/2025 12:06:00 PM CDT

## 2025-09-03 DIAGNOSIS — F41.9 ANXIETY: ICD-10-CM

## 2025-09-04 RX ORDER — ALPRAZOLAM 0.5 MG/1
TABLET ORAL
Qty: 90 TABLET | Refills: 0 | Status: SHIPPED | OUTPATIENT
Start: 2025-09-04